# Patient Record
Sex: FEMALE | Race: BLACK OR AFRICAN AMERICAN | NOT HISPANIC OR LATINO | Employment: OTHER | URBAN - METROPOLITAN AREA
[De-identification: names, ages, dates, MRNs, and addresses within clinical notes are randomized per-mention and may not be internally consistent; named-entity substitution may affect disease eponyms.]

---

## 2017-01-27 DIAGNOSIS — E11.9 TYPE 2 DIABETES MELLITUS WITHOUT COMPLICATION: ICD-10-CM

## 2017-03-01 ENCOUNTER — OFFICE VISIT (OUTPATIENT)
Dept: FAMILY MEDICINE | Facility: CLINIC | Age: 77
End: 2017-03-01
Payer: MEDICARE

## 2017-03-01 VITALS
TEMPERATURE: 98 F | HEART RATE: 86 BPM | DIASTOLIC BLOOD PRESSURE: 82 MMHG | WEIGHT: 190.5 LBS | OXYGEN SATURATION: 99 % | HEIGHT: 64 IN | SYSTOLIC BLOOD PRESSURE: 144 MMHG | BODY MASS INDEX: 32.52 KG/M2

## 2017-03-01 DIAGNOSIS — E03.4 HYPOTHYROIDISM DUE TO ACQUIRED ATROPHY OF THYROID: ICD-10-CM

## 2017-03-01 DIAGNOSIS — E11.9 TYPE 2 DIABETES MELLITUS WITHOUT COMPLICATION, WITHOUT LONG-TERM CURRENT USE OF INSULIN: ICD-10-CM

## 2017-03-01 DIAGNOSIS — Z78.0 ASYMPTOMATIC POSTMENOPAUSAL STATUS (AGE-RELATED) (NATURAL): ICD-10-CM

## 2017-03-01 DIAGNOSIS — H61.23 BILATERAL IMPACTED CERUMEN: ICD-10-CM

## 2017-03-01 DIAGNOSIS — H91.90 HEARING LOSS, UNSPECIFIED HEARING LOSS TYPE, UNSPECIFIED LATERALITY: Primary | ICD-10-CM

## 2017-03-01 DIAGNOSIS — E78.5 HYPERLIPIDEMIA, UNSPECIFIED HYPERLIPIDEMIA TYPE: ICD-10-CM

## 2017-03-01 DIAGNOSIS — I10 ESSENTIAL HYPERTENSION: ICD-10-CM

## 2017-03-01 PROCEDURE — 69210 REMOVE IMPACTED EAR WAX UNI: CPT | Mod: S$GLB,,, | Performed by: FAMILY MEDICINE

## 2017-03-01 PROCEDURE — 99214 OFFICE O/P EST MOD 30 MIN: CPT | Mod: 25,S$GLB,, | Performed by: FAMILY MEDICINE

## 2017-03-01 RX ORDER — IRBESARTAN 300 MG/1
300 TABLET ORAL DAILY
Qty: 90 TABLET | Refills: 3 | Status: SHIPPED | OUTPATIENT
Start: 2017-03-01 | End: 2018-06-04 | Stop reason: SDUPTHER

## 2017-03-01 RX ORDER — AMLODIPINE BESYLATE 10 MG/1
10 TABLET ORAL DAILY
Qty: 90 TABLET | Refills: 3 | Status: SHIPPED | OUTPATIENT
Start: 2017-03-01 | End: 2017-07-27

## 2017-03-01 RX ORDER — METFORMIN HYDROCHLORIDE 500 MG/1
500 TABLET ORAL
Qty: 90 TABLET | Refills: 1 | Status: SHIPPED | OUTPATIENT
Start: 2017-03-01 | End: 2017-06-23

## 2017-03-01 NOTE — MR AVS SNAPSHOT
[facility]  [address] [address]  Phone: [phone]  Fax: [phone]                  CLAUDETTE Soto   3/1/2017 9:20 AM   Office Visit    Description:  Female : 1940   Provider:  Rudy Cruz MD   Department:  [facility]           Reason for Visit     Follow-up     Otalgia           Diagnoses this Visit        Comments    Hearing loss, unspecified hearing loss type, unspecified laterality    -  Primary     Essential hypertension         Type 2 diabetes mellitus without complication, without long-term current use of insulin         Hyperlipidemia, unspecified hyperlipidemia type         Hypothyroidism due to acquired atrophy of thyroid         Asymptomatic postmenopausal status (age-related) (natural)         Bilateral impacted cerumen                To Do List           Goals (5 Years of Data)     None      Follow-Up and Disposition     Return in about 6 months (around 2017).       These Medications        Disp Refills Start End    amlodipine (NORVASC) 10 MG tablet 90 tablet 3 3/1/2017     Take 1 tablet (10 mg total) by mouth once daily. - Oral    Pharmacy: [facility] [address] [place] [address] [address] [facility] Ph #: [phone]       irbesartan (AVAPRO) 300 MG tablet 90 tablet 3 3/1/2017     Take 1 tablet (300 mg total) by mouth once daily. - Oral    Pharmacy: [facility] [address] [place] [address] [address] [facility] Ph #: [phone]       metformin (GLUCOPHAGE) 500 MG tablet 90 tablet 1 3/1/2017     Take 1 tablet (500 mg total) by mouth daily with breakfast. - Oral    Pharmacy: [facility] [address] [place] [address] [address] [facility] Ph #: [phone]         [facility]     [facility] Care [facility] -  Assistance  Registered nurses in the [facility] provide clinical advisement, health  education, appointment booking, and other advisory services.  Call for this free service at [phone].             Medications           Message regarding Medications     Verify the changes and/or additions to your medication regime listed below are the same as discussed with your clinician today.  If any of these changes or additions are incorrect, please notify your healthcare provider.        STOP taking these medications     gabapentin (NEURONTIN) 300 MG capsule Take 1 capsule (300 mg total) by mouth 3 (three) times daily.           Verify that the below list of medications is an accurate representation of the medications you are currently taking.  If none reported, the list may be blank. If incorrect, please contact your healthcare provider. Carry this list with you in case of emergency.           Current Medications     amlodipine (NORVASC) 10 MG tablet Take 1 tablet (10 mg total) by mouth once daily.    aspirin (ECOTRIN) 81 MG EC tablet Take 81 mg by mouth once daily.    blood sugar diagnostic (BLOOD GLUCOSE TEST) Strp Please disp strips and lancets for true result meter  Pt testing BID  Disp 90 supply of each with 3 refills    blood sugar diagnostic Strp 1 each by Misc.(Non-Drug; Combo Route) route 2 (two) times daily before meals.    furosemide (LASIX) 20 MG tablet Take 1 tablet (20 mg total) by mouth 2 (two) times daily.    irbesartan (AVAPRO) 300 MG tablet Take 1 tablet (300 mg total) by mouth once daily.    metformin (GLUCOPHAGE) 500 MG tablet Take 1 tablet (500 mg total) by mouth daily with breakfast.    potassium chloride SA (K-DUR,KLOR-CON) 20 MEQ tablet Take 1 tablet (20 mEq total) by mouth once daily.           Clinical Reference Information           Your Vitals Were     BP                   144/82           Blood Pressure          Most Recent Value    BP  (!)  144/82      Allergies as of 3/1/2017     Shellfish Containing Products      Immunizations Administered on Date of Encounter - 3/1/2017      None      Orders Placed During Today's Visit     Future Labs/Procedures Expected by Expires    DXA Bone Density Spine And Hip_Axial Skeleton  3/1/2017 3/1/2018    Hemoglobin A1c  As directed 3/1/2018    Lipid panel  As directed 3/1/2018    TSH  As directed 3/1/2018      [facility] Sign-Up     Activating your [facility] account is as easy as 1-2-3!     1) Visit [email or web address], select Sign Up Now, enter this activation code and your date of birth, then select Next.  [email or web address]  Expires: 4/15/2017 10:38 AM      2) Create a username and password to use when you visit [facility] in the future and select a security question in case you lose your password and select Next.    3) Enter your e-mail address and click Sign Up!    Additional Information  If you have questions, please e-mail [email or web address] or call [phone] to talk to our [facility] staff. Remember, [facility] is NOT to be used for urgent needs. For medical emergencies, dial 911.         Language Assistance Services     ATTENTION: Language assistance services are available, free of charge. Please call [phone].      ATENCIÓN: Si shahriarla edith, tiene a rahman disposición servicios gratuitos de asistencia lingüística. Llame al [phone].     CHÚ Ý: N?u b?n nói Ti?ng Vi?t, có các d?ch v? h? tr? ngôn ng? mi?n phí dành cho b?n. G?i s? [phone].         [facility] complies with applicable Federal civil rights laws and does not discriminate on the basis of race, color, national origin, age, disability, or sex.

## 2017-03-01 NOTE — PROCEDURES
"Ear Cerumen Removal  Date/Time: 3/1/2017 10:40 AM  Performed by: THALIA SAL  Authorized by: THALIA SAL     Time out: Immediately prior to procedure a "time out" was called to verify the correct patient, procedure, equipment, support staff and site/side marked as required.    Consent Done?:  Yes (Verbal)    Local anesthetic:  None  Ceruminolytics applied: Ceruminolytics applied prior to the procedure    Medication Used:  Other  Location details:  Both ears  Procedure type: irrigation    Cerumen  Removal Results:  Cerumen completely removed  Patient tolerance:  Patient tolerated the procedure well with no immediate complications      "

## 2017-03-01 NOTE — PROGRESS NOTES
Subjective:      Patient ID: CLAUDETTE Soto is a 76 y.o. female.    Chief Complaint: Follow-up (check-up) and Otalgia (both ears)    HPI Comments: Check up and n eice Holger Kohler wants her ears flushed; Kenisha is her sister and Humphrey; due labs, eye exam, declines imm. History updated    Otalgia    Associated symptoms include hearing loss.     Review of Systems   Constitutional: Negative.    HENT: Positive for ear pain and hearing loss.    Respiratory: Negative.    Cardiovascular: Negative.    Gastrointestinal: Negative.    Endocrine: Negative.    Genitourinary: Negative.    Musculoskeletal: Negative.    Psychiatric/Behavioral: Negative.    All other systems reviewed and are negative.    Objective:     Physical Exam   Constitutional: She is oriented to person, place, and time. She appears well-developed and well-nourished.   HENT:   Head: Normocephalic.   Nose: Nose normal.   Mouth/Throat: Oropharynx is clear and moist.   Some wax both ears removed   Eyes: Conjunctivae and EOM are normal. Pupils are equal, round, and reactive to light.   Neck: Normal range of motion. Neck supple.   Cardiovascular: Normal rate, regular rhythm and normal heart sounds.    Pulses:       Dorsalis pedis pulses are 2+ on the right side, and 2+ on the left side.        Posterior tibial pulses are 2+ on the right side, and 2+ on the left side.   Pulmonary/Chest: Effort normal and breath sounds normal.   Abdominal: Soft. Bowel sounds are normal. She exhibits no distension and no mass. There is no tenderness. There is no guarding.   Musculoskeletal: Normal range of motion.        Right foot: There is normal range of motion and no deformity.        Left foot: There is normal range of motion.   Feet:   Right Foot:   Protective Sensation: 3 sites tested. 3 sites sensed.   Skin Integrity: Negative for ulcer, blister, skin breakdown, erythema, warmth, callus or dry skin.   Left Foot:   Protective Sensation: 3 sites tested. 3 sites sensed.    Skin Integrity: Negative for ulcer, blister, skin breakdown, erythema, warmth, callus or dry skin.   Neurological: She is alert and oriented to person, place, and time. She has normal reflexes.   Skin: Skin is warm and dry.   Psychiatric: She has a normal mood and affect. Her behavior is normal. Judgment and thought content normal.   Nursing note and vitals reviewed.    Assessment:     1. Hearing loss, unspecified hearing loss type, unspecified laterality    2. Essential hypertension    3. Type 2 diabetes mellitus without complication, without long-term current use of insulin    4. Hyperlipidemia, unspecified hyperlipidemia type    5. Hypothyroidism due to acquired atrophy of thyroid    6. Asymptomatic postmenopausal status (age-related) (natural)    7. Bilateral impacted cerumen      Plan:     New Prescriptions    No medications on file     Discontinued Medications    GABAPENTIN (NEURONTIN) 300 MG CAPSULE    Take 1 capsule (300 mg total) by mouth 3 (three) times daily.     Modified Medications    Modified Medication Previous Medication    AMLODIPINE (NORVASC) 10 MG TABLET amlodipine (NORVASC) 10 MG tablet       Take 1 tablet (10 mg total) by mouth once daily.    Take 1 tablet (10 mg total) by mouth once daily.    IRBESARTAN (AVAPRO) 300 MG TABLET irbesartan (AVAPRO) 300 MG tablet       Take 1 tablet (300 mg total) by mouth once daily.    Take 1 tablet (300 mg total) by mouth once daily.    METFORMIN (GLUCOPHAGE) 500 MG TABLET metformin (GLUCOPHAGE) 500 MG tablet       Take 1 tablet (500 mg total) by mouth daily with breakfast.    Take 1 tablet (500 mg total) by mouth daily with breakfast.       Hearing loss, unspecified hearing loss type, unspecified laterality  -     amlodipine (NORVASC) 10 MG tablet; Take 1 tablet (10 mg total) by mouth once daily.  Dispense: 90 tablet; Refill: 3  -     irbesartan (AVAPRO) 300 MG tablet; Take 1 tablet (300 mg total) by mouth once daily.  Dispense: 90 tablet; Refill: 3  -      metformin (GLUCOPHAGE) 500 MG tablet; Take 1 tablet (500 mg total) by mouth daily with breakfast.  Dispense: 90 tablet; Refill: 1  -     DXA Bone Density Spine And Hip_Axial Skeleton; Future; Expected date: 3/1/17  -     Cancel: Hemoglobin A1c; Future  -     Hemoglobin A1c; Future  -     Lipid panel; Future  -     TSH; Future    Essential hypertension  -     amlodipine (NORVASC) 10 MG tablet; Take 1 tablet (10 mg total) by mouth once daily.  Dispense: 90 tablet; Refill: 3  -     irbesartan (AVAPRO) 300 MG tablet; Take 1 tablet (300 mg total) by mouth once daily.  Dispense: 90 tablet; Refill: 3  -     metformin (GLUCOPHAGE) 500 MG tablet; Take 1 tablet (500 mg total) by mouth daily with breakfast.  Dispense: 90 tablet; Refill: 1  -     DXA Bone Density Spine And Hip_Axial Skeleton; Future; Expected date: 3/1/17  -     Cancel: Hemoglobin A1c; Future  -     Hemoglobin A1c; Future  -     Lipid panel; Future  -     TSH; Future    Type 2 diabetes mellitus without complication, without long-term current use of insulin  -     amlodipine (NORVASC) 10 MG tablet; Take 1 tablet (10 mg total) by mouth once daily.  Dispense: 90 tablet; Refill: 3  -     irbesartan (AVAPRO) 300 MG tablet; Take 1 tablet (300 mg total) by mouth once daily.  Dispense: 90 tablet; Refill: 3  -     metformin (GLUCOPHAGE) 500 MG tablet; Take 1 tablet (500 mg total) by mouth daily with breakfast.  Dispense: 90 tablet; Refill: 1  -     DXA Bone Density Spine And Hip_Axial Skeleton; Future; Expected date: 3/1/17  -     Cancel: Hemoglobin A1c; Future  -     Hemoglobin A1c; Future  -     Lipid panel; Future  -     TSH; Future    Hyperlipidemia, unspecified hyperlipidemia type  -     amlodipine (NORVASC) 10 MG tablet; Take 1 tablet (10 mg total) by mouth once daily.  Dispense: 90 tablet; Refill: 3  -     irbesartan (AVAPRO) 300 MG tablet; Take 1 tablet (300 mg total) by mouth once daily.  Dispense: 90 tablet; Refill: 3  -     metformin (GLUCOPHAGE) 500 MG  tablet; Take 1 tablet (500 mg total) by mouth daily with breakfast.  Dispense: 90 tablet; Refill: 1  -     DXA Bone Density Spine And Hip_Axial Skeleton; Future; Expected date: 3/1/17  -     Cancel: Hemoglobin A1c; Future  -     Hemoglobin A1c; Future  -     Lipid panel; Future  -     TSH; Future    Hypothyroidism due to acquired atrophy of thyroid  -     amlodipine (NORVASC) 10 MG tablet; Take 1 tablet (10 mg total) by mouth once daily.  Dispense: 90 tablet; Refill: 3  -     irbesartan (AVAPRO) 300 MG tablet; Take 1 tablet (300 mg total) by mouth once daily.  Dispense: 90 tablet; Refill: 3  -     metformin (GLUCOPHAGE) 500 MG tablet; Take 1 tablet (500 mg total) by mouth daily with breakfast.  Dispense: 90 tablet; Refill: 1  -     DXA Bone Density Spine And Hip_Axial Skeleton; Future; Expected date: 3/1/17  -     Cancel: Hemoglobin A1c; Future  -     Hemoglobin A1c; Future  -     Lipid panel; Future  -     TSH; Future    Asymptomatic postmenopausal status (age-related) (natural)  -     amlodipine (NORVASC) 10 MG tablet; Take 1 tablet (10 mg total) by mouth once daily.  Dispense: 90 tablet; Refill: 3  -     irbesartan (AVAPRO) 300 MG tablet; Take 1 tablet (300 mg total) by mouth once daily.  Dispense: 90 tablet; Refill: 3  -     metformin (GLUCOPHAGE) 500 MG tablet; Take 1 tablet (500 mg total) by mouth daily with breakfast.  Dispense: 90 tablet; Refill: 1  -     DXA Bone Density Spine And Hip_Axial Skeleton; Future; Expected date: 3/1/17  -     Cancel: Hemoglobin A1c; Future  -     Hemoglobin A1c; Future  -     Lipid panel; Future  -     TSH; Future    Bilateral impacted cerumen  -     EAR CERUMEN REMOVAL      Go get hearing aides; come back for bp check

## 2017-03-02 ENCOUNTER — TELEPHONE (OUTPATIENT)
Dept: FAMILY MEDICINE | Facility: CLINIC | Age: 77
End: 2017-03-02

## 2017-03-02 LAB
CHOLEST SERPL-MCNC: 227 MG/DL (ref 125–200)
CHOLEST/HDLC SERPL: 5.8 (CALC)
HBA1C MFR BLD: 5.6 % OF TOTAL HGB
HDLC SERPL-MCNC: 39 MG/DL
LDLC SERPL CALC-MCNC: 156 MG/DL (CALC)
NONHDLC SERPL-MCNC: 188 MG/DL (CALC)
TRIGL SERPL-MCNC: 159 MG/DL

## 2017-03-02 RX ORDER — ATORVASTATIN CALCIUM 10 MG/1
10 TABLET, FILM COATED ORAL DAILY
Qty: 90 TABLET | Refills: 3 | Status: SHIPPED | OUTPATIENT
Start: 2017-03-02 | End: 2017-07-27

## 2017-03-02 NOTE — TELEPHONE ENCOUNTER
----- Message from Rudy Cruz MD sent at 3/2/2017  6:59 AM CST -----  Cholesterol too high; starting atrovastatin 10 mg; repeat labs 3 months, lipid

## 2017-03-02 NOTE — LETTER
March 6, 2017    CLAUDETTE Soto   [address]  [facility] [address]             [place] [id number] [address] [address]  Phone: [phone]  Fax: [phone] Dear Mrs. Soto:    We have been trying to contact you in reference to lab results.  Please give us a  call to discuss.        Sincerely,        Justina Don LPN

## 2017-03-14 ENCOUNTER — TELEPHONE (OUTPATIENT)
Dept: FAMILY MEDICINE | Facility: CLINIC | Age: 77
End: 2017-03-14

## 2017-03-14 NOTE — TELEPHONE ENCOUNTER
----- Message from Emilie Khoury sent at 3/14/2017  1:15 PM CDT -----  Contact: Holger @ [phone]  Please call with lab results

## 2017-03-14 NOTE — TELEPHONE ENCOUNTER
Spoke with patient's niece, Holger, regarding patient's lab work results. Patient already started on the Atorvastatin. Will repeat in 3 months.

## 2017-04-04 ENCOUNTER — HOSPITAL ENCOUNTER (OUTPATIENT)
Dept: RADIOLOGY | Facility: HOSPITAL | Age: 77
Discharge: HOME | End: 2017-04-04
Attending: FAMILY MEDICINE
Payer: MEDICARE

## 2017-04-04 DIAGNOSIS — I10 ESSENTIAL HYPERTENSION: ICD-10-CM

## 2017-04-04 DIAGNOSIS — E03.4 HYPOTHYROIDISM DUE TO ACQUIRED ATROPHY OF THYROID: ICD-10-CM

## 2017-04-04 DIAGNOSIS — E78.5 HYPERLIPIDEMIA, UNSPECIFIED HYPERLIPIDEMIA TYPE: ICD-10-CM

## 2017-04-04 DIAGNOSIS — H91.90 HEARING LOSS, UNSPECIFIED HEARING LOSS TYPE, UNSPECIFIED LATERALITY: ICD-10-CM

## 2017-04-04 DIAGNOSIS — Z78.0 ASYMPTOMATIC POSTMENOPAUSAL STATUS (AGE-RELATED) (NATURAL): ICD-10-CM

## 2017-04-04 DIAGNOSIS — E11.9 TYPE 2 DIABETES MELLITUS WITHOUT COMPLICATION, WITHOUT LONG-TERM CURRENT USE OF INSULIN: ICD-10-CM

## 2017-04-04 PROCEDURE — 77080 DXA BONE DENSITY AXIAL: CPT | Mod: TC,PO

## 2017-06-01 DIAGNOSIS — I10 ESSENTIAL HYPERTENSION: Primary | ICD-10-CM

## 2017-06-01 DIAGNOSIS — E11.9 TYPE 2 DIABETES MELLITUS WITHOUT COMPLICATION, WITHOUT LONG-TERM CURRENT USE OF INSULIN: ICD-10-CM

## 2017-06-01 DIAGNOSIS — E78.5 HYPERLIPIDEMIA, UNSPECIFIED HYPERLIPIDEMIA TYPE: ICD-10-CM

## 2017-06-01 DIAGNOSIS — E03.4 HYPOTHYROIDISM DUE TO ACQUIRED ATROPHY OF THYROID: ICD-10-CM

## 2017-06-07 ENCOUNTER — TELEPHONE (OUTPATIENT)
Dept: FAMILY MEDICINE | Facility: CLINIC | Age: 77
End: 2017-06-07

## 2017-06-07 LAB
CHOLEST SERPL-MCNC: 175 MG/DL (ref 125–200)
CHOLEST/HDLC SERPL: 4.2 (CALC)
HBA1C MFR BLD: 5.6 % OF TOTAL HGB
HDLC SERPL-MCNC: 42 MG/DL
LDLC SERPL CALC-MCNC: 114 MG/DL (CALC)
NONHDLC SERPL-MCNC: 133 MG/DL (CALC)
TRIGL SERPL-MCNC: 97 MG/DL
TSH SERPL-ACNC: 3.07 MIU/L (ref 0.4–4.5)

## 2017-06-23 ENCOUNTER — HOSPITAL ENCOUNTER (OUTPATIENT)
Dept: RADIOLOGY | Facility: HOSPITAL | Age: 77
Discharge: HOME | End: 2017-06-23
Attending: FAMILY MEDICINE
Payer: MEDICARE

## 2017-06-23 ENCOUNTER — OFFICE VISIT (OUTPATIENT)
Dept: FAMILY MEDICINE | Facility: CLINIC | Age: 77
End: 2017-06-23
Payer: MEDICARE

## 2017-06-23 VITALS
RESPIRATION RATE: 18 BRPM | HEIGHT: 64 IN | DIASTOLIC BLOOD PRESSURE: 78 MMHG | WEIGHT: 195.75 LBS | BODY MASS INDEX: 33.42 KG/M2 | OXYGEN SATURATION: 100 % | SYSTOLIC BLOOD PRESSURE: 138 MMHG | HEART RATE: 84 BPM

## 2017-06-23 DIAGNOSIS — R60.9 EDEMA, UNSPECIFIED TYPE: ICD-10-CM

## 2017-06-23 DIAGNOSIS — H91.90 HEARING LOSS, UNSPECIFIED HEARING LOSS TYPE, UNSPECIFIED LATERALITY: ICD-10-CM

## 2017-06-23 DIAGNOSIS — M19.90 ARTHRITIS: ICD-10-CM

## 2017-06-23 DIAGNOSIS — M19.90 ARTHRITIS: Primary | ICD-10-CM

## 2017-06-23 DIAGNOSIS — I10 ESSENTIAL HYPERTENSION: ICD-10-CM

## 2017-06-23 DIAGNOSIS — E78.5 HYPERLIPIDEMIA, UNSPECIFIED HYPERLIPIDEMIA TYPE: ICD-10-CM

## 2017-06-23 PROBLEM — E03.4 HYPOTHYROIDISM DUE TO ACQUIRED ATROPHY OF THYROID: Status: RESOLVED | Noted: 2017-03-01 | Resolved: 2017-06-23

## 2017-06-23 PROBLEM — Z78.0 ASYMPTOMATIC POSTMENOPAUSAL STATUS (AGE-RELATED) (NATURAL): Status: RESOLVED | Noted: 2017-03-01 | Resolved: 2017-06-23

## 2017-06-23 PROBLEM — E11.9 TYPE 2 DIABETES MELLITUS WITHOUT COMPLICATION, WITHOUT LONG-TERM CURRENT USE OF INSULIN: Status: RESOLVED | Noted: 2017-03-01 | Resolved: 2017-06-23

## 2017-06-23 PROCEDURE — 73562 X-RAY EXAM OF KNEE 3: CPT | Mod: TC,PO,RT

## 2017-06-23 PROCEDURE — 99214 OFFICE O/P EST MOD 30 MIN: CPT | Mod: S$GLB,,, | Performed by: FAMILY MEDICINE

## 2017-06-23 PROCEDURE — 1159F MED LIST DOCD IN RCRD: CPT | Mod: S$GLB,,, | Performed by: FAMILY MEDICINE

## 2017-06-23 PROCEDURE — 1125F AMNT PAIN NOTED PAIN PRSNT: CPT | Mod: S$GLB,,, | Performed by: FAMILY MEDICINE

## 2017-06-23 RX ORDER — TRAMADOL HYDROCHLORIDE 50 MG/1
50 TABLET ORAL 2 TIMES DAILY PRN
Qty: 30 TABLET | Refills: 1 | Status: SHIPPED | OUTPATIENT
Start: 2017-06-23 | End: 2017-07-23

## 2017-06-23 NOTE — PROGRESS NOTES
Subjective:      Patient ID: CLAUDETTE Soto is a 77 y.o. female.    Chief Complaint: Knee Pain (bilateral knee pain , started 1 month ago)    Check up and right knee hurts for 6 weeks since being in [facility] [employer] [facility]; no falls; Occ left knee; hurts to stand up and walk; not at rest; took advil, a little help; no hx surgery; legs are always swollen; swelling runs in the family, Kenisha and this pts mother, Nubia Mera; she made it to 79, despite having 13 children; A1C normal; on one metformin a day from Dr Laguna; has chronoic edema of both legs, on norvasc 10;       Knee Pain        Review of Systems   Constitutional: Negative.    HENT: Negative.    Respiratory: Negative.    Cardiovascular: Positive for leg swelling.   Gastrointestinal: Negative.    Endocrine: Negative.    Genitourinary: Negative.    Musculoskeletal: Positive for arthralgias.   Psychiatric/Behavioral: Negative.    All other systems reviewed and are negative.    Objective:     Physical Exam   Constitutional: She is oriented to person, place, and time. She appears well-developed and well-nourished.   HENT:   Head: Normocephalic.   Eyes: Conjunctivae and EOM are normal. Pupils are equal, round, and reactive to light.   Neck: Normal range of motion. Neck supple.   Cardiovascular: Normal rate, regular rhythm and normal heart sounds.    Pulmonary/Chest: Effort normal and breath sounds normal.   Musculoskeletal: Normal range of motion. She exhibits edema and tenderness.   Neurological: She is alert and oriented to person, place, and time. She has normal reflexes.   Skin: Skin is warm and dry.   Psychiatric: She has a normal mood and affect. Her behavior is normal. Judgment and thought content normal.   Nursing note and vitals reviewed.    Assessment:     1. Arthritis    2. Essential hypertension    3. Hearing loss, unspecified hearing loss type, unspecified laterality    4. Hyperlipidemia, unspecified hyperlipidemia type    5. Edema, unspecified type       Plan:     New Prescriptions    No medications on file     Discontinued Medications    METFORMIN (GLUCOPHAGE) 500 MG TABLET    Take 1 tablet (500 mg total) by mouth daily with breakfast.     Modified Medications    No medications on file       Arthritis  Comments:  right knee  Orders:  -     X-Ray Knee 3 View Right; Future; Expected date: 06/23/2017    Essential hypertension    Hearing loss, unspecified hearing loss type, unspecified laterality    Hyperlipidemia, unspecified hyperlipidemia type    Edema, unspecified type      Poss gout causing sudden right knee pain, swelling, on lasix    Handicapped form for parking given

## 2017-06-26 ENCOUNTER — TELEPHONE (OUTPATIENT)
Dept: FAMILY MEDICINE | Facility: CLINIC | Age: 77
End: 2017-06-26

## 2017-06-26 NOTE — LETTER
June 26, 2017    CLAUDETTE Soto  [address]  [address] [facility] [address]             [facility]  [address] [address]  Phone: [phone]  Fax: [phone] Dear Mrs. Soto:    Below are the results from your recent visit:    Resulted Orders   Hemoglobin A1c   Result Value Ref Range    Hemoglobin A1C 5.6 4.0 - 5.6 %      Comment:      According to ADA guidelines, hemoglobin A1c <7.0% represents  optimal control in non-pregnant diabetic patients. Different  metrics may apply to specific patient populations.   Standards of Medical Care in Diabetes-2016.  For the purpose of screening for the presence of diabetes:  <5.7%     Consistent with the absence of diabetes  5.7-6.4%  Consistent with increasing risk for diabetes   (prediabetes)  >or=6.5%  Consistent with diabetes  Currently, no consensus exists for use of hemoglobin A1c  for diagnosis of diabetes for children.  This Hemoglobin A1c assay has significant interference with fetal   hemoglobin   (HbF). The results are invalid for patients with abnormal amounts of   HbF,   including those with known Hereditary Persistence   of Fetal Hemoglobin. Heterozygous hemoglobin variants (HbAS, HbAC,   HbAD, HbAE, HbA2) do not significantly interfere with this assay;   however, presence of multiple variants in a sample may impact the %   interference.      Estimated Avg Glucose 114 68 - 131 mg/dL    Narrative    [facility] Bulk Order     Your results are normal   Sincerely,  Dr. Rudy Ramirez

## 2017-06-26 NOTE — TELEPHONE ENCOUNTER
----- Message from Rudy Cruz MD sent at 6/24/2017  6:09 PM CDT -----  Arthritis with fluid in her knee

## 2017-06-26 NOTE — LETTER
June 26, 2017    CLAUDETTE Soto   [address]  [address] [facility] [address]             [facility]  [address] [address]  Phone: [phone]  Fax: [phone] Dear Mrs. Soto:    Below are the results from your recent visit:    Resulted Orders   X-Ray Knee 3 View Right    Narrative    Three-view x-ray of the right knee    Clinical History:     Unspecified osteoarthritis, unspecified site    Findings:     There is no fracture. There is no dislocation. There is mild narrowing of the joint space of the medial compartment of the right knee. There is a moderate-sized joint effusion in the right knee.    Impression    1. There is a moderate-sized joint effusion in the right knee.  2. There is mild narrowing of the joint space of the medial compartment of the right knee. This is consistent with the patient's history and characteristic of osteoarthritis.      Electronically signed by: LAURA AKHTAR MD  Date:     06/23/17  Time:    13:15      Your results show Arthritis with fluid in right knee.    Sincerely,    Dr. Rudy Cruz MD

## 2017-06-26 NOTE — TELEPHONE ENCOUNTER
Called to advise of x-ray results. No answer patient doesn't have answering . Results mailed to patient

## 2017-07-17 ENCOUNTER — TELEPHONE (OUTPATIENT)
Dept: FAMILY MEDICINE | Facility: CLINIC | Age: 77
End: 2017-07-17

## 2017-07-17 NOTE — TELEPHONE ENCOUNTER
----- Message from Emilie Khoury sent at 7/17/2017  3:07 PM CDT -----  Contact: Mara -friend [phone]  Patient stopped by office & is requesting steroid daina & Allopurinol for gout. Being told that this will reduce muratic acid. Would like to  later this afternoon. Please call to advise once done    Lian

## 2017-07-18 NOTE — TELEPHONE ENCOUNTER
Where are all the labs I ordered on previous visit; I spoke to someone with pt; knee no better; all I have is an A1C; get me  Other labs and show them/fax to me so I can call her back ASAP.

## 2017-07-19 LAB
ALBUMIN: 3.5 GRAM/DL (ref 3.5–5)
ALP SERPL-CCNC: 114 UNIT/L (ref 38–126)
ALT SERPL W P-5'-P-CCNC: <10 UNIT/L (ref 7–56)
ANION GAP SERPL CALC-SCNC: 17 MEQ/L (ref 9–18)
AST SERPL-CCNC: 14 UNIT/L (ref 7–40)
BILIRUB SERPL-MCNC: 0.5 MG/DL (ref 0–1.2)
BUN BLD-MCNC: 20 MG/DL (ref 7–21)
BUN/CREAT SERPL: 22 RATIO (ref 6–22)
CALC OSMOLALITY: 285 MOSM/KG (ref 275–295)
CALCIUM SERPL-MCNC: 8.8 MG/DL (ref 8.5–10.3)
CHLORIDE SERPL-SCNC: 101 MEQ/L (ref 98–107)
CO2 SERPL-SCNC: 26 MEQ/L (ref 21–31)
CREAT SERPL-MCNC: 0.9 MG/DL (ref 0.5–1)
ERYTHROCYTE [SEDIMENTATION RATE] IN BLOOD BY WESTERGREN METHOD: 74 MM/H
GFR: 59.8 ML/MIN/1.73M2
GLUCOSE SERPL-MCNC: 144 MG/DL (ref 70–100)
POTASSIUM SERPL-SCNC: 4.1 MEQ/L (ref 3.5–5)
RHEUMATOID FACT SERPL-ACNC: 7 IU/ML
SODIUM BLD-SCNC: 140 MEQ/L (ref 135–145)
TOTAL PROTEIN: 7.5 GRAM/DL (ref 6.3–8.2)
URATE SERPL-MCNC: 6.9 MG/DL (ref 2.5–7.5)

## 2017-07-20 ENCOUNTER — TELEPHONE (OUTPATIENT)
Dept: FAMILY MEDICINE | Facility: CLINIC | Age: 77
End: 2017-07-20

## 2017-07-20 RX ORDER — METHYLPREDNISOLONE 4 MG/1
TABLET ORAL
Qty: 1 PACKAGE | Refills: 0 | Status: SHIPPED | OUTPATIENT
Start: 2017-07-20 | End: 2017-07-27

## 2017-07-27 ENCOUNTER — OFFICE VISIT (OUTPATIENT)
Dept: FAMILY MEDICINE | Facility: CLINIC | Age: 77
End: 2017-07-27
Payer: MEDICARE

## 2017-07-27 VITALS
HEIGHT: 64 IN | HEART RATE: 74 BPM | WEIGHT: 192.44 LBS | SYSTOLIC BLOOD PRESSURE: 136 MMHG | BODY MASS INDEX: 32.85 KG/M2 | DIASTOLIC BLOOD PRESSURE: 84 MMHG | TEMPERATURE: 98 F | OXYGEN SATURATION: 100 %

## 2017-07-27 DIAGNOSIS — H91.90 HEARING LOSS, UNSPECIFIED HEARING LOSS TYPE, UNSPECIFIED LATERALITY: ICD-10-CM

## 2017-07-27 DIAGNOSIS — I10 ESSENTIAL HYPERTENSION: ICD-10-CM

## 2017-07-27 DIAGNOSIS — M19.90 ARTHRITIS: Primary | ICD-10-CM

## 2017-07-27 DIAGNOSIS — E78.5 HYPERLIPIDEMIA, UNSPECIFIED HYPERLIPIDEMIA TYPE: ICD-10-CM

## 2017-07-27 DIAGNOSIS — Z78.0 ASYMPTOMATIC POSTMENOPAUSAL STATUS (AGE-RELATED) (NATURAL): ICD-10-CM

## 2017-07-27 DIAGNOSIS — E11.9 TYPE 2 DIABETES MELLITUS WITHOUT COMPLICATION, WITHOUT LONG-TERM CURRENT USE OF INSULIN: ICD-10-CM

## 2017-07-27 DIAGNOSIS — E03.4 HYPOTHYROIDISM DUE TO ACQUIRED ATROPHY OF THYROID: ICD-10-CM

## 2017-07-27 DIAGNOSIS — I73.9 PAD (PERIPHERAL ARTERY DISEASE): ICD-10-CM

## 2017-07-27 PROCEDURE — 1159F MED LIST DOCD IN RCRD: CPT | Mod: S$GLB,,, | Performed by: FAMILY MEDICINE

## 2017-07-27 PROCEDURE — 1125F AMNT PAIN NOTED PAIN PRSNT: CPT | Mod: S$GLB,,, | Performed by: FAMILY MEDICINE

## 2017-07-27 PROCEDURE — 99213 OFFICE O/P EST LOW 20 MIN: CPT | Mod: S$GLB,,, | Performed by: FAMILY MEDICINE

## 2017-07-27 RX ORDER — FUROSEMIDE 20 MG/1
20 TABLET ORAL DAILY
Qty: 30 TABLET | Refills: 3 | Status: SHIPPED | OUTPATIENT
Start: 2017-07-27 | End: 2017-09-01 | Stop reason: SDUPTHER

## 2017-07-27 RX ORDER — POTASSIUM CHLORIDE 20 MEQ/1
20 TABLET, EXTENDED RELEASE ORAL DAILY
Qty: 30 TABLET | Refills: 3 | Status: SHIPPED | OUTPATIENT
Start: 2017-07-27 | End: 2018-01-03 | Stop reason: SDUPTHER

## 2017-07-27 RX ORDER — IBUPROFEN 600 MG/1
600 TABLET ORAL 2 TIMES DAILY PRN
Qty: 60 TABLET | Refills: 11 | Status: SHIPPED | OUTPATIENT
Start: 2017-07-27 | End: 2018-03-09

## 2017-07-27 NOTE — PROGRESS NOTES
Subjective:      Patient ID: Katy Soto is a 77 y.o. female.    Chief Complaint: Follow-up (lab results)    Here with step daughter nurse; both legs hurt, ache 3 months; first time; no injuries; toenails fell of right foot and pain is severe from knee down, right worse than left; no back pain, no fever; swell too; was in sisters cold house, has DM, sees [place]; never smoked. Off statin, off dm rx; off norvasc and swelling got better; appears knees hurt and Bakers cyst, not dependent      Review of Systems   Constitutional: Negative.    HENT: Positive for hearing loss.    Respiratory: Negative.    Cardiovascular: Positive for leg swelling.   Gastrointestinal: Negative.    Endocrine: Negative.    Genitourinary: Negative.    Musculoskeletal: Positive for arthralgias.   Psychiatric/Behavioral: Negative.    All other systems reviewed and are negative.    Objective:     Physical Exam   Constitutional: She is oriented to person, place, and time. She appears well-developed and well-nourished.   HENT:   Head: Normocephalic.   Eyes: Conjunctivae and EOM are normal. Pupils are equal, round, and reactive to light.   Neck: Normal range of motion. Neck supple.   Cardiovascular: Normal rate, regular rhythm and normal heart sounds.    No palpable pulses   Pulmonary/Chest: Effort normal and breath sounds normal.   Musculoskeletal: Normal range of motion.   Neurological: She is alert and oriented to person, place, and time. She has normal reflexes.   Skin: Skin is warm and dry.   Psychiatric: She has a normal mood and affect. Her behavior is normal. Judgment and thought content normal.   Nursing note and vitals reviewed.    Assessment:     1. Arthritis    2. Hyperlipidemia, unspecified hyperlipidemia type    3. Type 2 diabetes mellitus without complication, without long-term current use of insulin    4. Essential hypertension    5. Hearing loss, unspecified hearing loss type, unspecified laterality    6. PAD (peripheral artery  disease)    7. Hypothyroidism due to acquired atrophy of thyroid    8. Asymptomatic postmenopausal status (age-related) (natural)      Plan:     New Prescriptions    IBUPROFEN (ADVIL,MOTRIN) 600 MG TABLET    Take 1 tablet (600 mg total) by mouth 2 (two) times daily as needed for Pain. With food as needed     Discontinued Medications    AMLODIPINE (NORVASC) 10 MG TABLET    Take 1 tablet (10 mg total) by mouth once daily.    ATORVASTATIN (LIPITOR) 10 MG TABLET    Take 1 tablet (10 mg total) by mouth once daily.    METHYLPREDNISOLONE (MEDROL DOSEPACK) 4 MG TABLET    use as directed     Modified Medications    Modified Medication Previous Medication    FUROSEMIDE (LASIX) 20 MG TABLET furosemide (LASIX) 20 MG tablet       Take 1 tablet (20 mg total) by mouth once daily. As leg swelling    Take 1 tablet (20 mg total) by mouth 2 (two) times daily.    POTASSIUM CHLORIDE SA (K-DUR,KLOR-CON) 20 MEQ TABLET potassium chloride SA (K-DUR,KLOR-CON) 20 MEQ tablet       Take 1 tablet (20 mEq total) by mouth once daily. With lasix as needed    Take 1 tablet (20 mEq total) by mouth once daily.       Arthritis  -     US Lower Extremity Arteries Bilateral; Future; Expected date: 07/27/2017    Hyperlipidemia, unspecified hyperlipidemia type  -     US Lower Extremity Arteries Bilateral; Future; Expected date: 07/27/2017    Type 2 diabetes mellitus without complication, without long-term current use of insulin  -     US Lower Extremity Arteries Bilateral; Future; Expected date: 07/27/2017    Essential hypertension  -     US Lower Extremity Arteries Bilateral; Future; Expected date: 07/27/2017    Hearing loss, unspecified hearing loss type, unspecified laterality  -     US Lower Extremity Arteries Bilateral; Future; Expected date: 07/27/2017    PAD (peripheral artery disease)  -     US Lower Extremity Arteries Bilateral; Future; Expected date: 07/27/2017    Hypothyroidism due to acquired atrophy of thyroid    Asymptomatic postmenopausal  status (age-related) (natural)    Other orders  -     ibuprofen (ADVIL,MOTRIN) 600 MG tablet; Take 1 tablet (600 mg total) by mouth 2 (two) times daily as needed for Pain. With food as needed  Dispense: 60 tablet; Refill: 11  -     furosemide (LASIX) 20 MG tablet; Take 1 tablet (20 mg total) by mouth once daily. As leg swelling  Dispense: 30 tablet; Refill: 3  -     potassium chloride SA (K-DUR,KLOR-CON) 20 MEQ tablet; Take 1 tablet (20 mEq total) by mouth once daily. With lasix as needed  Dispense: 30 tablet; Refill: 3

## 2017-07-28 ENCOUNTER — HOSPITAL ENCOUNTER (OUTPATIENT)
Dept: RADIOLOGY | Facility: HOSPITAL | Age: 77
Discharge: HOME | End: 2017-07-28
Attending: FAMILY MEDICINE
Payer: MEDICARE

## 2017-07-28 DIAGNOSIS — I10 ESSENTIAL HYPERTENSION: ICD-10-CM

## 2017-07-28 DIAGNOSIS — M19.90 ARTHRITIS: ICD-10-CM

## 2017-07-28 DIAGNOSIS — I73.9 PAD (PERIPHERAL ARTERY DISEASE): ICD-10-CM

## 2017-07-28 DIAGNOSIS — E78.5 HYPERLIPIDEMIA, UNSPECIFIED HYPERLIPIDEMIA TYPE: ICD-10-CM

## 2017-07-28 DIAGNOSIS — E11.9 TYPE 2 DIABETES MELLITUS WITHOUT COMPLICATION, WITHOUT LONG-TERM CURRENT USE OF INSULIN: ICD-10-CM

## 2017-07-28 DIAGNOSIS — H91.90 HEARING LOSS, UNSPECIFIED HEARING LOSS TYPE, UNSPECIFIED LATERALITY: ICD-10-CM

## 2017-07-28 PROCEDURE — 93925 LOWER EXTREMITY STUDY: CPT | Mod: TC,PO

## 2017-07-30 ENCOUNTER — TELEPHONE (OUTPATIENT)
Dept: FAMILY MEDICINE | Facility: CLINIC | Age: 77
End: 2017-07-30

## 2017-07-30 DIAGNOSIS — I73.9 PAD (PERIPHERAL ARTERY DISEASE): ICD-10-CM

## 2017-08-01 ENCOUNTER — TELEPHONE (OUTPATIENT)
Dept: FAMILY MEDICINE | Facility: CLINIC | Age: 77
End: 2017-08-01

## 2017-08-01 NOTE — TELEPHONE ENCOUNTER
----- Message from Rudy Cruz MD sent at 7/30/2017 10:11 PM CDT -----  Leg arteries have blockages; referring to cardiologist; help pt with appt

## 2017-08-01 NOTE — TELEPHONE ENCOUNTER
Patient's daughter advised of test results & appointment scheduled with cardiology. Daughter advised that she will contact Dr. Fried's office to seek a earlier date & time.

## 2017-08-04 ENCOUNTER — OFFICE VISIT (OUTPATIENT)
Dept: CARDIOLOGY | Facility: CLINIC | Age: 77
End: 2017-08-04
Payer: MEDICARE

## 2017-08-04 VITALS
OXYGEN SATURATION: 98 % | HEART RATE: 66 BPM | WEIGHT: 195.38 LBS | BODY MASS INDEX: 33.54 KG/M2 | DIASTOLIC BLOOD PRESSURE: 94 MMHG | SYSTOLIC BLOOD PRESSURE: 160 MMHG

## 2017-08-04 DIAGNOSIS — I10 ESSENTIAL HYPERTENSION: ICD-10-CM

## 2017-08-04 DIAGNOSIS — E78.5 HYPERLIPIDEMIA, UNSPECIFIED HYPERLIPIDEMIA TYPE: ICD-10-CM

## 2017-08-04 DIAGNOSIS — R60.0 BILATERAL LEG EDEMA: ICD-10-CM

## 2017-08-04 DIAGNOSIS — I73.9 PAD (PERIPHERAL ARTERY DISEASE): Primary | ICD-10-CM

## 2017-08-04 PROCEDURE — 99204 OFFICE O/P NEW MOD 45 MIN: CPT | Mod: S$GLB,,, | Performed by: INTERNAL MEDICINE

## 2017-08-04 PROCEDURE — 1159F MED LIST DOCD IN RCRD: CPT | Mod: S$GLB,,, | Performed by: INTERNAL MEDICINE

## 2017-08-04 RX ORDER — CILOSTAZOL 50 MG/1
50 TABLET ORAL 2 TIMES DAILY
Qty: 60 TABLET | Refills: 11 | Status: SHIPPED | OUTPATIENT
Start: 2017-08-04 | End: 2017-09-01

## 2017-08-04 RX ORDER — ATORVASTATIN CALCIUM 40 MG/1
40 TABLET, FILM COATED ORAL DAILY
Qty: 90 TABLET | Refills: 3 | Status: SHIPPED | OUTPATIENT
Start: 2017-08-04 | End: 2018-12-20 | Stop reason: SDUPTHER

## 2017-08-04 NOTE — LETTER
August 4, 2017      Rudy Cruz MD  [address] [facility] [address]           [facility]  [address]  [facility] [address]  Phone: [phone]  Fax: [phone]          Patient: Katy Soto   MR Number: [id number]   YOB: 1940   Date of Visit: 8/4/2017       Dear Dr. Rudy Cruz:    Thank you for referring Katy Soto to me for evaluation. Attached you will find relevant portions of my assessment and plan of care.    If you have questions, please do not hesitate to call me. I look forward to following Katy Soto along with you.    Sincerely,    David Fried MD    Enclosure  CC:  No Recipients    If you would like to receive this communication electronically, please contact [email or web address] or [phone] to request more information on [facility] access.    For providers and/or their staff who would like to refer a patient to [facility], please contact us through our one-stop-shop provider referral line, [facility], at [phone].    If you feel you have received this communication in error or would no longer like to receive these types of communications, please e-mail [email or web address]

## 2017-08-04 NOTE — PROGRESS NOTES
Subjective:    Patient ID:  Katy Soto is a 77 y.o. female who presents for evaluation of Leg Swelling      HPI  76 y/o female with hx of HTN, HLD who presents for evaluation. She has been having bilateral LE symptoms for about 3 months now. She has exertional bilateral calf cramping less than a block which resolves with rest. No non healing ulceration or signs of limb ischemia. RLE worse that LLE. Had arterial doppler which suggests moderate PAD in both lower extremities. Has stopped taking ASA and no longer on a statin. She also has bilateral LE edema R>L. Legs are painful to touch in the shin and ankle area. No lesions or ulceration. She admits to dietary indiscretion with high salt intake. BP is also high today in clinic and she does not take BP at home. Does not exercise regularly and does not smoke.     Review of Systems   Constitution: Negative for weakness and malaise/fatigue.   HENT: Negative for congestion and headaches.    Eyes: Negative for blurred vision.   Cardiovascular: Positive for claudication, dyspnea on exertion and leg swelling. Negative for chest pain, cyanosis, irregular heartbeat, near-syncope, orthopnea, palpitations, paroxysmal nocturnal dyspnea and syncope.   Respiratory: Negative for shortness of breath.    Endocrine: Negative for polyuria.   Hematologic/Lymphatic: Negative for bleeding problem.   Skin: Negative for itching and rash.   Musculoskeletal: Positive for joint pain and joint swelling. Negative for muscle cramps and muscle weakness.   Gastrointestinal: Negative for abdominal pain, hematemesis, hematochezia, melena, nausea and vomiting.   Genitourinary: Negative for dysuria and hematuria.   Neurological: Negative for dizziness, focal weakness, light-headedness and loss of balance.   Psychiatric/Behavioral: Negative for depression. The patient is not nervous/anxious.         Objective:    Physical Exam   Constitutional: She is oriented to person, place, and time. She appears  well-developed and well-nourished.   HENT:   Head: Normocephalic and atraumatic.   Neck: Neck supple. No JVD present.   Cardiovascular: Normal rate, regular rhythm and normal heart sounds.    Pulses:       Carotid pulses are 2+ on the right side, and 2+ on the left side.       Radial pulses are 2+ on the right side, and 2+ on the left side.        Femoral pulses are 2+ on the right side, and 2+ on the left side.       Dorsalis pedis pulses are 1+ on the left side.        Posterior tibial pulses are 1+ on the right side, and 2+ on the left side.   Monophasic doppler RDP  Biphasic doppler RPT  Triphasic doppler LDP and LPT   Pulmonary/Chest: Effort normal and breath sounds normal.   Abdominal: Soft. Bowel sounds are normal.   Musculoskeletal: She exhibits edema.   Neurological: She is alert and oriented to person, place, and time.   Skin: Skin is warm and dry.   Psychiatric: She has a normal mood and affect. Her behavior is normal. Thought content normal.         Assessment:       1. PAD (peripheral artery disease)    2. Bilateral leg edema    3. Hyperlipidemia, unspecified hyperlipidemia type    4. Essential hypertension      78 y/o female with hx and presentation as above. Arterial doppler suggests PAD and clinically has Balta IIb claudication (no rest pain). We discussed the etiology, evaluation, and management of PAD. Will start with optimizing medical management. ASA/statin daily. Cilostazol BID for claudication. If after a month symptoms persist will schedule for peripheral angiogram +/- intervention. Regarding the edema, likely venous insufficiency. Will evaluate with venous doppler and to r/o DVT. Will increase lasix to BID for 1 week, then back to once daily. Recommend compression stockings and elevation and night. We discussed the importance of low salt diet and medication compliance.          Plan:       -Restart ASA 81 mg daily  -Continue current prescription of atorvastatin for now and will increase to  moderate dose (40 mg) daily  -Start cilostazol 50 mg BID for claudication  -Venous doppler bilateral lower extremities  -BP log daily   -f/u in 1 month

## 2017-08-04 NOTE — PATIENT INSTRUCTIONS
Increase lasix (fluid pill) to twice a day for 1 week, then back to once daily  Start cilostazol twice daily for leg pain - new medication  Compression stockings - 20-30 mmHg  Low salt diet  Follow up in 1 month

## 2017-08-14 ENCOUNTER — HOSPITAL ENCOUNTER (OUTPATIENT)
Dept: RADIOLOGY | Facility: HOSPITAL | Age: 77
Discharge: HOME | End: 2017-08-14
Attending: INTERNAL MEDICINE
Payer: MEDICARE

## 2017-08-14 DIAGNOSIS — R60.0 BILATERAL LEG EDEMA: ICD-10-CM

## 2017-08-14 PROCEDURE — 93970 EXTREMITY STUDY: CPT | Mod: TC,PO

## 2017-08-21 ENCOUNTER — TELEPHONE (OUTPATIENT)
Dept: CARDIOLOGY | Facility: CLINIC | Age: 77
End: 2017-08-21

## 2017-08-21 NOTE — TELEPHONE ENCOUNTER
----- Message from Abbie Guerrier sent at 8/18/2017  1:47 PM CDT -----  Contact: Daughter/ [phone]  Patient's daughter would like to speak with you about getting patient's test results. Please advise.

## 2017-08-29 ENCOUNTER — TELEPHONE (OUTPATIENT)
Dept: CARDIOLOGY | Facility: CLINIC | Age: 77
End: 2017-08-29

## 2017-08-29 NOTE — TELEPHONE ENCOUNTER
----- Message from David Fried MD sent at 8/28/2017  1:39 PM CDT -----  Please let Ms Soto know that her leg ultrasound showed no evidence of clots and will further discuss during next clinic visit  Thanks  MATTHEW

## 2017-09-01 ENCOUNTER — OFFICE VISIT (OUTPATIENT)
Dept: CARDIOLOGY | Facility: CLINIC | Age: 77
End: 2017-09-01
Payer: MEDICARE

## 2017-09-01 DIAGNOSIS — I10 ESSENTIAL HYPERTENSION: ICD-10-CM

## 2017-09-01 DIAGNOSIS — E78.5 HYPERLIPIDEMIA, UNSPECIFIED HYPERLIPIDEMIA TYPE: ICD-10-CM

## 2017-09-01 DIAGNOSIS — I73.9 CLAUDICATION OF BOTH LOWER EXTREMITIES: ICD-10-CM

## 2017-09-01 DIAGNOSIS — I73.9 PAD (PERIPHERAL ARTERY DISEASE): Primary | ICD-10-CM

## 2017-09-01 PROCEDURE — 99214 OFFICE O/P EST MOD 30 MIN: CPT | Mod: S$GLB,,, | Performed by: INTERNAL MEDICINE

## 2017-09-01 PROCEDURE — 1159F MED LIST DOCD IN RCRD: CPT | Mod: S$GLB,,, | Performed by: INTERNAL MEDICINE

## 2017-09-01 RX ORDER — FUROSEMIDE 40 MG/1
40 TABLET ORAL DAILY
Qty: 30 TABLET | Refills: 11 | Status: SHIPPED | OUTPATIENT
Start: 2017-09-01 | End: 2019-01-29 | Stop reason: SDUPTHER

## 2017-09-01 RX ORDER — DILTIAZEM HYDROCHLORIDE 120 MG/1
120 CAPSULE, EXTENDED RELEASE ORAL DAILY
Qty: 30 CAPSULE | Refills: 11 | Status: SHIPPED | OUTPATIENT
Start: 2017-09-01 | End: 2017-12-14

## 2017-09-01 NOTE — PATIENT INSTRUCTIONS
Discontinue cilostazol (circulation pill)  Restart Aspirin 81 mg daily  Start Diltiazem 120 mg daily (new blood pressure pill)  Increase furosemide to 40 mg daily (fluid pill)  Procedure scheduled for September 11th  Low salt diet  Compression stockings

## 2017-09-01 NOTE — PROGRESS NOTES
Subjective:    Patient ID:  Katy Soto is a 77 y.o. female who presents for follow-up of Peripheral Arterial Disease      HPI  78 y/o female with hx of recently diagnosed PAD with severe Balta IIb lifestyle limiting claudication, HTN, HLD who presents for f/u with her daughter. She has been having bilateral LE symptoms for about 3 months now. She has exertional bilateral calf cramping less than a block which resolves with rest. No non healing ulceration or signs of limb ischemia. RLE worse that LLE. Had arterial doppler which suggests moderate PAD in both lower extremities. She also has bilateral LE edema R>L. Legs are painful to touch in the shin and ankle area. No lesions or ulceration.   Last clinic visit was restarted on statin, ASA and prescribed cilostazol. Had nosebleeds and stopped ASA. She continues to admit to dietary indiscretion with high salt intake. BP again is high today in clinic and last clinic visit she has not been taking BP at home. Does not exercise regularly and does not smoke. Arterial doppler with waveforms suggestive of possibly RCFA stenosis, distal LSFA stenosis and likely bilateral mid to distal AT disease. Venous doppler without evidence of DVT.     Review of Systems   Constitution: Positive for weakness. Negative for malaise/fatigue.   HENT: Negative for congestion.    Eyes: Negative for blurred vision.   Cardiovascular: Positive for claudication, dyspnea on exertion and leg swelling. Negative for chest pain, cyanosis, irregular heartbeat, near-syncope, orthopnea, palpitations, paroxysmal nocturnal dyspnea and syncope.   Respiratory: Negative for shortness of breath.    Endocrine: Negative for polyuria.   Hematologic/Lymphatic: Negative for bleeding problem.   Skin: Negative for itching and rash.   Musculoskeletal: Positive for joint pain and joint swelling. Negative for muscle cramps and muscle weakness.   Gastrointestinal: Negative for abdominal pain, hematemesis,  hematochezia, melena, nausea and vomiting.   Genitourinary: Negative for dysuria and hematuria.   Neurological: Negative for dizziness, focal weakness, headaches, light-headedness and loss of balance.   Psychiatric/Behavioral: Negative for depression. The patient is not nervous/anxious.         Objective:    Physical Exam   Constitutional: She is oriented to person, place, and time. She appears well-developed and well-nourished.   HENT:   Head: Normocephalic and atraumatic.   Neck: Neck supple. No JVD present.   Cardiovascular: Normal rate, regular rhythm and normal heart sounds.    Pulses:       Carotid pulses are 2+ on the right side, and 2+ on the left side.       Radial pulses are 2+ on the right side, and 2+ on the left side.        Femoral pulses are 2+ on the right side, and 2+ on the left side.       Dorsalis pedis pulses are 2+ on the right side, and 2+ on the left side.        Posterior tibial pulses are 2+ on the right side, and 2+ on the left side.   Monophasic doppler RDP  Biphasic doppler RPT  Triphasic doppler LDP and LPT    Pulmonary/Chest: Effort normal and breath sounds normal.   Abdominal: Soft. Bowel sounds are normal.   Musculoskeletal: She exhibits edema.   Neurological: She is alert and oriented to person, place, and time.   Skin: Skin is warm and dry.   Psychiatric: She has a normal mood and affect. Her behavior is normal. Thought content normal.         Assessment:       1. PAD (peripheral artery disease)    2. Hyperlipidemia, unspecified hyperlipidemia type    3. Essential hypertension    4. Claudication of both lower extremities      78 y/o female with hx and presentation as above. Has severe lifestyle limiting Balta IIb claudication. Will stop cilostazol due to bleeding and restart ASA/statin daily. Will plan for diagnostic peripheral angiogram. Regarding edema, we again spoke of low salt diet, BP control, compression stockings and med compliance. Will add anti-hypertensive (previously  on amlodipine which was stopped due to LE edema and already on diuretic) and increase lasix dose.       Plan:       -Stop cilostazol  -Restart ASA 81 mg daily  -Compression stockings 20-30 mmHg  -Increase lasix to 40 mg daily  -Start Diltiazem 120 mg daily  -Aortogram with selective LE angiogram   -Right radial access with 4/5 slender   -The procedure was discussed with the pt along with the risks/benefits and the pt voiced understanding. All questions were answered and written consents obtained.

## 2017-09-05 NOTE — TELEPHONE ENCOUNTER
----- Message from Will Ellis sent at 9/5/2017  9:55 AM CDT -----  Contact: self  RX request - refill or new RX.  Is this a refill or new RX:  New RX  RX name and strength: DilitaZEM 120MG  Directions:   Is this a 30 day or 90 day RX:    Pharmacy name and phone #: [facility] [phone]   Comments:  This is a new RX Dr Fried  Put her on Friday Sept 1st.  Pt states she went to the pharmacy and it's not there please have Dr Fried resend it, pt says she will pick it up this afternoon

## 2017-09-11 ENCOUNTER — TELEPHONE (OUTPATIENT)
Dept: CARDIOLOGY | Facility: CLINIC | Age: 77
End: 2017-09-11

## 2017-09-11 DIAGNOSIS — I73.9 CLAUDICATION: ICD-10-CM

## 2017-09-11 DIAGNOSIS — I73.9 PAD (PERIPHERAL ARTERY DISEASE): Primary | ICD-10-CM

## 2017-09-11 RX ORDER — SODIUM CHLORIDE 9 MG/ML
3 INJECTION, SOLUTION INTRAVENOUS CONTINUOUS
Status: CANCELLED | OUTPATIENT
Start: 2017-09-11 | End: 2017-09-11

## 2017-09-11 RX ORDER — DIPHENHYDRAMINE HCL 25 MG
50 CAPSULE ORAL ONCE
Status: CANCELLED | OUTPATIENT
Start: 2017-09-11 | End: 2017-09-11

## 2017-09-11 NOTE — TELEPHONE ENCOUNTER
----- Message from Roula Adames sent at 9/11/2017  2:29 PM CDT -----  Contact: [phone]/pt's daughter Mara  Patient's daughter  called in requesting to speak with you. Patient prefers to speak with a nurse. Please advise.

## 2017-09-20 NOTE — NURSING
Spoke with patient's daughter, Mara, and she will have the patient here for 8am.  All preop instructions given.  All questions answered.

## 2017-09-25 ENCOUNTER — HOSPITAL ENCOUNTER (OUTPATIENT)
Facility: HOSPITAL | Age: 77
Discharge: HOME | End: 2017-09-25
Attending: INTERNAL MEDICINE | Admitting: INTERNAL MEDICINE
Payer: MEDICARE

## 2017-09-25 VITALS
RESPIRATION RATE: 20 BRPM | TEMPERATURE: 98 F | OXYGEN SATURATION: 99 % | HEART RATE: 77 BPM | DIASTOLIC BLOOD PRESSURE: 74 MMHG | WEIGHT: 195 LBS | HEIGHT: 64 IN | BODY MASS INDEX: 33.29 KG/M2 | SYSTOLIC BLOOD PRESSURE: 141 MMHG

## 2017-09-25 DIAGNOSIS — I73.9 CLAUDICATION: ICD-10-CM

## 2017-09-25 DIAGNOSIS — I73.9 PAD (PERIPHERAL ARTERY DISEASE): Primary | ICD-10-CM

## 2017-09-25 LAB
ANION GAP SERPL CALC-SCNC: 9 MMOL/L
BASOPHILS # BLD AUTO: 0.02 K/UL
BASOPHILS NFR BLD: 0.4 %
BUN SERPL-MCNC: 23 MG/DL
CALCIUM SERPL-MCNC: 8.6 MG/DL
CHLORIDE SERPL-SCNC: 107 MMOL/L
CO2 SERPL-SCNC: 21 MMOL/L
CREAT SERPL-MCNC: 1 MG/DL
DIFFERENTIAL METHOD: ABNORMAL
EOSINOPHIL # BLD AUTO: 0.1 K/UL
EOSINOPHIL NFR BLD: 1.6 %
ERYTHROCYTE [DISTWIDTH] IN BLOOD BY AUTOMATED COUNT: 12.7 %
EST. GFR  (AFRICAN AMERICAN): >60 ML/MIN/1.73 M^2
EST. GFR  (NON AFRICAN AMERICAN): 54 ML/MIN/1.73 M^2
GLUCOSE SERPL-MCNC: 125 MG/DL
HCT VFR BLD AUTO: 36 %
HGB BLD-MCNC: 11.6 G/DL
INR PPP: 1
LYMPHOCYTES # BLD AUTO: 2.4 K/UL
LYMPHOCYTES NFR BLD: 43.1 %
MCH RBC QN AUTO: 30 PG
MCHC RBC AUTO-ENTMCNC: 32.2 G/DL
MCV RBC AUTO: 93 FL
MONOCYTES # BLD AUTO: 0.5 K/UL
MONOCYTES NFR BLD: 8.7 %
NEUTROPHILS # BLD AUTO: 2.6 K/UL
NEUTROPHILS NFR BLD: 46 %
PERIPHERAL STENOSIS: ABNORMAL
PLATELET # BLD AUTO: 217 K/UL
PMV BLD AUTO: 9.6 FL
POTASSIUM SERPL-SCNC: 4.3 MMOL/L
PROTHROMBIN TIME: 10.2 SEC
RBC # BLD AUTO: 3.87 M/UL
SODIUM SERPL-SCNC: 137 MMOL/L
WBC # BLD AUTO: 5.61 K/UL

## 2017-09-25 PROCEDURE — 80048 BASIC METABOLIC PNL TOTAL CA: CPT

## 2017-09-25 PROCEDURE — 25000003 PHARM REV CODE 250: Performed by: INTERNAL MEDICINE

## 2017-09-25 PROCEDURE — 36245 INS CATH ABD/L-EXT ART 1ST: CPT | Mod: ,,, | Performed by: INTERNAL MEDICINE

## 2017-09-25 PROCEDURE — 75625 CONTRAST EXAM ABDOMINL AORTA: CPT | Mod: 26,,, | Performed by: INTERNAL MEDICINE

## 2017-09-25 PROCEDURE — 63600175 PHARM REV CODE 636 W HCPCS

## 2017-09-25 PROCEDURE — 99152 MOD SED SAME PHYS/QHP 5/>YRS: CPT | Mod: ,,, | Performed by: INTERNAL MEDICINE

## 2017-09-25 PROCEDURE — 63600175 PHARM REV CODE 636 W HCPCS: Performed by: INTERNAL MEDICINE

## 2017-09-25 PROCEDURE — 25000003 PHARM REV CODE 250

## 2017-09-25 PROCEDURE — 85610 PROTHROMBIN TIME: CPT

## 2017-09-25 PROCEDURE — 27200085 CATH LAB PROCEDURE

## 2017-09-25 PROCEDURE — 25500020 PHARM REV CODE 255

## 2017-09-25 PROCEDURE — 75716 ARTERY X-RAYS ARMS/LEGS: CPT | Mod: 26,,, | Performed by: INTERNAL MEDICINE

## 2017-09-25 PROCEDURE — 36415 COLL VENOUS BLD VENIPUNCTURE: CPT

## 2017-09-25 PROCEDURE — 85025 COMPLETE CBC W/AUTO DIFF WBC: CPT

## 2017-09-25 RX ORDER — SODIUM CHLORIDE 9 MG/ML
3 INJECTION, SOLUTION INTRAVENOUS CONTINUOUS
Status: ACTIVE | OUTPATIENT
Start: 2017-09-25 | End: 2017-09-25

## 2017-09-25 RX ORDER — ACETAMINOPHEN 325 MG/1
650 TABLET ORAL EVERY 6 HOURS PRN
Status: DISCONTINUED | OUTPATIENT
Start: 2017-09-25 | End: 2017-09-25 | Stop reason: HOSPADM

## 2017-09-25 RX ORDER — DIPHENHYDRAMINE HCL 50 MG
50 CAPSULE ORAL ONCE
Status: COMPLETED | OUTPATIENT
Start: 2017-09-25 | End: 2017-09-25

## 2017-09-25 RX ORDER — HYDRALAZINE HYDROCHLORIDE 20 MG/ML
10 INJECTION INTRAMUSCULAR; INTRAVENOUS ONCE
Status: COMPLETED | OUTPATIENT
Start: 2017-09-25 | End: 2017-09-25

## 2017-09-25 RX ORDER — SODIUM CHLORIDE 9 MG/ML
3 INJECTION, SOLUTION INTRAVENOUS CONTINUOUS
Status: DISCONTINUED | OUTPATIENT
Start: 2017-09-25 | End: 2017-09-25 | Stop reason: HOSPADM

## 2017-09-25 RX ORDER — ONDANSETRON 2 MG/ML
4 INJECTION INTRAMUSCULAR; INTRAVENOUS EVERY 12 HOURS PRN
Status: DISCONTINUED | OUTPATIENT
Start: 2017-09-25 | End: 2017-09-25 | Stop reason: HOSPADM

## 2017-09-25 RX ADMIN — SODIUM CHLORIDE 3 ML/KG/HR: 0.9 INJECTION, SOLUTION INTRAVENOUS at 11:09

## 2017-09-25 RX ADMIN — HYDRALAZINE HYDROCHLORIDE 10 MG: 20 INJECTION INTRAMUSCULAR; INTRAVENOUS at 10:09

## 2017-09-25 RX ADMIN — SODIUM CHLORIDE 3 ML/KG/HR: 0.9 INJECTION, SOLUTION INTRAVENOUS at 08:09

## 2017-09-25 RX ADMIN — DIPHENHYDRAMINE HYDROCHLORIDE 50 MG: 50 CAPSULE ORAL at 08:09

## 2017-09-25 NOTE — NURSING
Informed MD Fried about pt SBP in 190s-200s. Pt resting well and no complaints of pain. Took home BP meds around 2am. Verbal order for 10 IV hydralazine once. Repeated back and confirmed.

## 2017-09-25 NOTE — INTERVAL H&P NOTE
The patient has been examined and the H&P has been reviewed:    I concur with the findings and no changes have occurred since H&P was written.    Anesthesia/Surgery risks, benefits and alternative options discussed and understood by patient/family.          Active Hospital Problems    Diagnosis  POA    PAD (peripheral artery disease) [I73.9]  Yes     Priority: Low      Resolved Hospital Problems    Diagnosis Date Resolved POA   No resolved problems to display.

## 2017-09-25 NOTE — PROGRESS NOTES
Post Procedure Note: Aortogram with runoff    The pt was brought to the cath lab and under sterile technique, right radial access was obtained without difficulty with US guidance. Images were obtained in multiple views and single vessel r/o noted btk bilaterally via peroneal which supplies collaterals distally to a DP and plantar arch. At least moderate RCFA disease angiographically. Please see full report for details. The pt tolerated the procedure well without complications. Radial band device used with successful hemostasis.     Vitals:    09/25/17 1015 09/25/17 1030 09/25/17 1045 09/25/17 1100   BP: (!) 198/88 (!) 177/81 (!) 165/71 (!) 151/69   BP Location: Left arm Left arm Left arm Left arm   Patient Position: Lying Lying Lying Lying   Pulse: (!) 51 (!) 56 68 63   Resp: 11 20 16 19   Temp:       TempSrc:       SpO2: 98% 100% 99% 100%   Weight:       Height:             Gen: NAD  Ext: 2+ radial pulse, no evidence of hematoma    Plan:  -Post cath care per protocol  -ASA/statin  -staged intervention of RLE first - PT

## 2017-09-25 NOTE — PLAN OF CARE
Pt in bed with no complaints. Monitors in place, VSS. Yellow socks on. Fall risk assessment reviewed with pt and verbalizes understanding. All questions answered. Will continue to monitor.

## 2017-09-25 NOTE — H&P (VIEW-ONLY)
Subjective:    Patient ID:  Katy Soto is a 77 y.o. female who presents for follow-up of Peripheral Arterial Disease      HPI  78 y/o female with hx of recently diagnosed PAD with severe Balta IIb lifestyle limiting claudication, HTN, HLD who presents for f/u with her daughter. She has been having bilateral LE symptoms for about 3 months now. She has exertional bilateral calf cramping less than a block which resolves with rest. No non healing ulceration or signs of limb ischemia. RLE worse that LLE. Had arterial doppler which suggests moderate PAD in both lower extremities. She also has bilateral LE edema R>L. Legs are painful to touch in the shin and ankle area. No lesions or ulceration.   Last clinic visit was restarted on statin, ASA and prescribed cilostazol. Had nosebleeds and stopped ASA. She continues to admit to dietary indiscretion with high salt intake. BP again is high today in clinic and last clinic visit she has not been taking BP at home. Does not exercise regularly and does not smoke. Arterial doppler with waveforms suggestive of possibly RCFA stenosis, distal LSFA stenosis and likely bilateral mid to distal AT disease. Venous doppler without evidence of DVT.     Review of Systems   Constitution: Positive for weakness. Negative for malaise/fatigue.   HENT: Negative for congestion.    Eyes: Negative for blurred vision.   Cardiovascular: Positive for claudication, dyspnea on exertion and leg swelling. Negative for chest pain, cyanosis, irregular heartbeat, near-syncope, orthopnea, palpitations, paroxysmal nocturnal dyspnea and syncope.   Respiratory: Negative for shortness of breath.    Endocrine: Negative for polyuria.   Hematologic/Lymphatic: Negative for bleeding problem.   Skin: Negative for itching and rash.   Musculoskeletal: Positive for joint pain and joint swelling. Negative for muscle cramps and muscle weakness.   Gastrointestinal: Negative for abdominal pain, hematemesis,  hematochezia, melena, nausea and vomiting.   Genitourinary: Negative for dysuria and hematuria.   Neurological: Negative for dizziness, focal weakness, headaches, light-headedness and loss of balance.   Psychiatric/Behavioral: Negative for depression. The patient is not nervous/anxious.         Objective:    Physical Exam   Constitutional: She is oriented to person, place, and time. She appears well-developed and well-nourished.   HENT:   Head: Normocephalic and atraumatic.   Neck: Neck supple. No JVD present.   Cardiovascular: Normal rate, regular rhythm and normal heart sounds.    Pulses:       Carotid pulses are 2+ on the right side, and 2+ on the left side.       Radial pulses are 2+ on the right side, and 2+ on the left side.        Femoral pulses are 2+ on the right side, and 2+ on the left side.       Dorsalis pedis pulses are 2+ on the right side, and 2+ on the left side.        Posterior tibial pulses are 2+ on the right side, and 2+ on the left side.   Monophasic doppler RDP  Biphasic doppler RPT  Triphasic doppler LDP and LPT    Pulmonary/Chest: Effort normal and breath sounds normal.   Abdominal: Soft. Bowel sounds are normal.   Musculoskeletal: She exhibits edema.   Neurological: She is alert and oriented to person, place, and time.   Skin: Skin is warm and dry.   Psychiatric: She has a normal mood and affect. Her behavior is normal. Thought content normal.         Assessment:       1. PAD (peripheral artery disease)    2. Hyperlipidemia, unspecified hyperlipidemia type    3. Essential hypertension    4. Claudication of both lower extremities      76 y/o female with hx and presentation as above. Has severe lifestyle limiting Balta IIb claudication. Will stop cilostazol due to bleeding and restart ASA/statin daily. Will plan for diagnostic peripheral angiogram. Regarding edema, we again spoke of low salt diet, BP control, compression stockings and med compliance. Will add anti-hypertensive (previously  on amlodipine which was stopped due to LE edema and already on diuretic) and increase lasix dose.       Plan:       -Stop cilostazol  -Restart ASA 81 mg daily  -Compression stockings 20-30 mmHg  -Increase lasix to 40 mg daily  -Start Diltiazem 120 mg daily  -Aortogram with selective LE angiogram   -Right radial access with 4/5 slender   -The procedure was discussed with the pt along with the risks/benefits and the pt voiced understanding. All questions were answered and written consents obtained.

## 2017-09-25 NOTE — DISCHARGE SUMMARY
[facility]  Discharge Summary      Admit Date: 9/25/2017    Discharge Date and Time:  09/25/2017 11:32 AM    Attending Physician: David Fried MD     Reason for Admission: Peripheral angiogram    Procedures Performed: Procedure(s) (LRB):  AORTOGRAM WITH RUNOFF (N/A)    Hospital Course (synopsis of major diagnoses, care, treatment, and services provided during the course of the hospital stay): The pt was brought to the cath lab and she has significant below the knee disease bilaterally. Will plan for intervention of RLE first for lifestyle limiting claudication, Balta III (rest pain).       Final Diagnoses:    Principal Problem: PAD   Secondary Diagnoses:   Active Hospital Problems    Diagnosis  POA    PAD (peripheral artery disease) [I73.9]  Yes     Priority: Low      Resolved Hospital Problems    Diagnosis Date Resolved POA   No resolved problems to display.   HLD  HTN  Claudication    Discharged Condition: good    Disposition: Home or Self Care    Follow Up/Patient Instructions:     Medications:  Reconciled Home Medications:   Current Discharge Medication List      CONTINUE these medications which have NOT CHANGED    Details   aspirin (ECOTRIN) 81 MG EC tablet Take 81 mg by mouth once daily.      atorvastatin (LIPITOR) 40 MG tablet Take 1 tablet (40 mg total) by mouth once daily.  Qty: 90 tablet, Refills: 3      !! blood sugar diagnostic (BLOOD GLUCOSE TEST) Strp Please disp strips and lancets for true result meter  Pt testing BID  Disp 90 supply of each with 3 refills  Qty: 200 each, Refills: 3      !! blood sugar diagnostic Strp 1 each by Misc.(Non-Drug; Combo Route) route 2 (two) times daily before meals.  Qty: 200 each, Refills: 11      diltiaZEM (DILACOR XR) 120 MG CDCR Take 1 capsule (120 mg total) by mouth once daily.  Qty: 30 capsule, Refills: 11      furosemide (LASIX) 40 MG tablet Take 1 tablet (40 mg total) by mouth once daily. As leg swelling  Qty: 30 tablet, Refills: 11       ibuprofen (ADVIL,MOTRIN) 600 MG tablet Take 1 tablet (600 mg total) by mouth 2 (two) times daily as needed for Pain. With food as needed  Qty: 60 tablet, Refills: 11      irbesartan (AVAPRO) 300 MG tablet Take 1 tablet (300 mg total) by mouth once daily.  Qty: 90 tablet, Refills: 3    Associated Diagnoses: Essential hypertension; Hearing loss, unspecified hearing loss type, unspecified laterality; Type 2 diabetes mellitus without complication, without long-term current use of insulin; Hyperlipidemia, unspecified hyperlipidemia type; Hypothyroidism due to acquired atrophy of thyroid; Asymptomatic postmenopausal status (age-related) (natural)      potassium chloride SA (K-DUR,KLOR-CON) 20 MEQ tablet Take 1 tablet (20 mEq total) by mouth once daily. With lasix as needed  Qty: 30 tablet, Refills: 3       !! - Potential duplicate medications found. Please discuss with provider.          Discharge Procedure Orders  Diet general   Order Specific Question Answer Comments   Additional restrictions: Cardiac (Low Na/Chol)      Activity as tolerated

## 2017-10-10 ENCOUNTER — TELEPHONE (OUTPATIENT)
Dept: CARDIOLOGY | Facility: CLINIC | Age: 77
End: 2017-10-10

## 2017-10-10 DIAGNOSIS — I73.9 CLAUDICATION: ICD-10-CM

## 2017-10-10 DIAGNOSIS — I73.9 PAD (PERIPHERAL ARTERY DISEASE): Primary | ICD-10-CM

## 2017-10-10 RX ORDER — SODIUM CHLORIDE 9 MG/ML
3 INJECTION, SOLUTION INTRAVENOUS CONTINUOUS
Status: CANCELLED | OUTPATIENT
Start: 2017-10-10 | End: 2017-10-10

## 2017-10-10 RX ORDER — DIPHENHYDRAMINE HCL 25 MG
50 CAPSULE ORAL ONCE
Status: CANCELLED | OUTPATIENT
Start: 2017-10-10 | End: 2017-10-10

## 2017-10-10 NOTE — TELEPHONE ENCOUNTER
----- Message from David Fried MD sent at 10/10/2017  1:08 PM CDT -----  Please let Ms Soto know that we will schedule her for her procedure in the next few weeks and someone from the cath lab will be contacting her with final instructions  Thanks  MATTHEW

## 2017-10-11 ENCOUNTER — TELEPHONE (OUTPATIENT)
Dept: CARDIOLOGY | Facility: CLINIC | Age: 77
End: 2017-10-11

## 2017-10-26 NOTE — NURSING
Called and spoke with patient's daughter, Mara.   Arrival time 6am on Nov. 1st.   Pt's daughter  verbalizes full understanding of all pre op instructions and denies questions.

## 2017-11-01 ENCOUNTER — HOSPITAL ENCOUNTER (OUTPATIENT)
Facility: HOSPITAL | Age: 77
Discharge: HOME | End: 2017-11-01
Attending: INTERNAL MEDICINE | Admitting: INTERNAL MEDICINE
Payer: MEDICARE

## 2017-11-01 VITALS
DIASTOLIC BLOOD PRESSURE: 70 MMHG | RESPIRATION RATE: 20 BRPM | HEART RATE: 70 BPM | BODY MASS INDEX: 32.49 KG/M2 | OXYGEN SATURATION: 100 % | TEMPERATURE: 98 F | WEIGHT: 195 LBS | SYSTOLIC BLOOD PRESSURE: 153 MMHG | HEIGHT: 65 IN

## 2017-11-01 DIAGNOSIS — I10 ESSENTIAL (PRIMARY) HYPERTENSION: ICD-10-CM

## 2017-11-01 DIAGNOSIS — I73.9 CLAUDICATION: ICD-10-CM

## 2017-11-01 DIAGNOSIS — I73.9 PAD (PERIPHERAL ARTERY DISEASE): Primary | ICD-10-CM

## 2017-11-01 LAB
ANION GAP SERPL CALC-SCNC: 7 MMOL/L
BASOPHILS # BLD AUTO: 0.02 K/UL
BASOPHILS NFR BLD: 0.3 %
BUN SERPL-MCNC: 22 MG/DL
CALCIUM SERPL-MCNC: 8.8 MG/DL
CHLORIDE SERPL-SCNC: 107 MMOL/L
CO2 SERPL-SCNC: 24 MMOL/L
CREAT SERPL-MCNC: 1 MG/DL
DIFFERENTIAL METHOD: ABNORMAL
EOSINOPHIL # BLD AUTO: 0.1 K/UL
EOSINOPHIL NFR BLD: 1.6 %
ERYTHROCYTE [DISTWIDTH] IN BLOOD BY AUTOMATED COUNT: 13 %
EST. GFR  (AFRICAN AMERICAN): >60 ML/MIN/1.73 M^2
EST. GFR  (NON AFRICAN AMERICAN): 54 ML/MIN/1.73 M^2
GLUCOSE SERPL-MCNC: 106 MG/DL
HCT VFR BLD AUTO: 34.8 %
HGB BLD-MCNC: 11.2 G/DL
INR PPP: 1
LYMPHOCYTES # BLD AUTO: 2.9 K/UL
LYMPHOCYTES NFR BLD: 41.6 %
MCH RBC QN AUTO: 29.9 PG
MCHC RBC AUTO-ENTMCNC: 32.2 G/DL
MCV RBC AUTO: 93 FL
MONOCYTES # BLD AUTO: 0.5 K/UL
MONOCYTES NFR BLD: 7.7 %
NEUTROPHILS # BLD AUTO: 3.4 K/UL
NEUTROPHILS NFR BLD: 48.5 %
PERIPHERAL STENOSIS: ABNORMAL
PLATELET # BLD AUTO: 237 K/UL
PMV BLD AUTO: 9.7 FL
POTASSIUM SERPL-SCNC: 4 MMOL/L
PROTHROMBIN TIME: 10.5 SEC
RBC # BLD AUTO: 3.74 M/UL
SODIUM SERPL-SCNC: 138 MMOL/L
WBC # BLD AUTO: 7.02 K/UL

## 2017-11-01 PROCEDURE — 25500020 PHARM REV CODE 255

## 2017-11-01 PROCEDURE — C1769 GUIDE WIRE: HCPCS

## 2017-11-01 PROCEDURE — 25000003 PHARM REV CODE 250: Performed by: INTERNAL MEDICINE

## 2017-11-01 PROCEDURE — 63600175 PHARM REV CODE 636 W HCPCS

## 2017-11-01 PROCEDURE — 80048 BASIC METABOLIC PNL TOTAL CA: CPT

## 2017-11-01 PROCEDURE — 37228 CATH LAB PROCEDURE: CPT | Mod: RT,,, | Performed by: INTERNAL MEDICINE

## 2017-11-01 PROCEDURE — 36415 COLL VENOUS BLD VENIPUNCTURE: CPT

## 2017-11-01 PROCEDURE — 85610 PROTHROMBIN TIME: CPT

## 2017-11-01 PROCEDURE — 25000003 PHARM REV CODE 250

## 2017-11-01 PROCEDURE — 85025 COMPLETE CBC W/AUTO DIFF WBC: CPT

## 2017-11-01 PROCEDURE — 37252 INTRVASC US NONCORONARY 1ST: CPT | Mod: ,,, | Performed by: INTERNAL MEDICINE

## 2017-11-01 PROCEDURE — 99152 MOD SED SAME PHYS/QHP 5/>YRS: CPT | Mod: ,,, | Performed by: INTERNAL MEDICINE

## 2017-11-01 PROCEDURE — 93005 ELECTROCARDIOGRAM TRACING: CPT

## 2017-11-01 PROCEDURE — C1753 CATH, INTRAVAS ULTRASOUND: HCPCS

## 2017-11-01 RX ORDER — ONDANSETRON 2 MG/ML
4 INJECTION INTRAMUSCULAR; INTRAVENOUS EVERY 12 HOURS PRN
Status: DISCONTINUED | OUTPATIENT
Start: 2017-11-01 | End: 2017-11-01 | Stop reason: HOSPADM

## 2017-11-01 RX ORDER — DIPHENHYDRAMINE HCL 50 MG
50 CAPSULE ORAL ONCE
Status: DISCONTINUED | OUTPATIENT
Start: 2017-11-01 | End: 2017-11-01 | Stop reason: HOSPADM

## 2017-11-01 RX ORDER — SODIUM CHLORIDE 9 MG/ML
3 INJECTION, SOLUTION INTRAVENOUS CONTINUOUS
Status: ACTIVE | OUTPATIENT
Start: 2017-11-01 | End: 2017-11-01

## 2017-11-01 RX ORDER — ACETAMINOPHEN 325 MG/1
650 TABLET ORAL EVERY 4 HOURS PRN
Status: DISCONTINUED | OUTPATIENT
Start: 2017-11-01 | End: 2017-11-01 | Stop reason: HOSPADM

## 2017-11-01 RX ADMIN — SODIUM CHLORIDE 3 ML/KG/HR: 0.9 INJECTION, SOLUTION INTRAVENOUS at 06:11

## 2017-11-01 RX ADMIN — SODIUM CHLORIDE 3 ML/KG/HR: 0.9 INJECTION, SOLUTION INTRAVENOUS at 10:11

## 2017-11-01 NOTE — PROGRESS NOTES
"Post Procedure Note: Peripheral PTA    The pt was brought to the cath lab and under sterile technique, LCFA access was obtained without difficulty. Images were obtained in multiple views and successful PTA performed to RPTA. Please see full report for details. The pt tolerated the procedure well without complications. Perclose closure device used with successful hemostasis.     Vitals:    11/01/17 0651 11/01/17 0657   BP:  (!) 179/85   BP Location:  Right arm   Patient Position:  Lying   Temp: 97.9 °F (36.6 °C)    TempSrc: Oral    SpO2:  100%   Weight:  88.5 kg (195 lb)   Height:  5' 5" (1.651 m)         Gen: NAD  Ext: 2+ fem/PT pulse, no evidence of hematoma    Plan:  -Post cath care per protocol  -ASA/statin  -Arterial doppler at 1, 3, 6, 12 months   -Walking program   "

## 2017-11-01 NOTE — NURSING
Ambulated in recovery area gait steady no distress noted. Left groin site remained clean dry and intact and soft to touch. Verbalized understanding of discharge instructions.

## 2017-11-01 NOTE — DISCHARGE SUMMARY
[facility]  Discharge Summary      Admit Date: 11/1/2017    Discharge Date and Time:  11/01/2017 12:48 PM    Attending Physician: David Fried MD     Reason for Admission: Peripheral PTA    Procedures Performed: Procedure(s) (LRB):  PTA-PERIPHERAL (N/A)    Hospital Course (synopsis of major diagnoses, care, treatment, and services provided during the course of the hospital stay): The pt was taken to the cath lab and successful PTA performed to RPTA for severe, lifestyle limiting Balta III claudication        Final Diagnoses:    Principal Problem: PAD (peripheral artery disease)   Secondary Diagnoses:   Active Hospital Problems    Diagnosis  POA    *PAD (peripheral artery disease) [I73.9]  Yes     Priority: Low    Claudication [I73.9]  Yes    Bilateral leg edema [R60.0]  Yes    Hearing loss [H91.90]  Yes    Hyperlipidemia [E78.5]  Yes    Hypertension [I10]  Yes      Resolved Hospital Problems    Diagnosis Date Resolved POA   No resolved problems to display.       Discharged Condition: good    Disposition: Home or Self Care    Follow Up/Patient Instructions:     Medications:  Reconciled Home Medications:   Current Discharge Medication List      CONTINUE these medications which have NOT CHANGED    Details   aspirin (ECOTRIN) 81 MG EC tablet Take 81 mg by mouth once daily.      atorvastatin (LIPITOR) 40 MG tablet Take 1 tablet (40 mg total) by mouth once daily.  Qty: 90 tablet, Refills: 3      diltiaZEM (DILACOR XR) 120 MG CDCR Take 1 capsule (120 mg total) by mouth once daily.  Qty: 30 capsule, Refills: 11      furosemide (LASIX) 40 MG tablet Take 1 tablet (40 mg total) by mouth once daily. As leg swelling  Qty: 30 tablet, Refills: 11      irbesartan (AVAPRO) 300 MG tablet Take 1 tablet (300 mg total) by mouth once daily.  Qty: 90 tablet, Refills: 3    Associated Diagnoses: Essential hypertension; Hearing loss, unspecified hearing loss type, unspecified laterality; Type 2 diabetes  mellitus without complication, without long-term current use of insulin; Hyperlipidemia, unspecified hyperlipidemia type; Hypothyroidism due to acquired atrophy of thyroid; Asymptomatic postmenopausal status (age-related) (natural)      potassium chloride SA (K-DUR,KLOR-CON) 20 MEQ tablet Take 1 tablet (20 mEq total) by mouth once daily. With lasix as needed  Qty: 30 tablet, Refills: 3      !! blood sugar diagnostic (BLOOD GLUCOSE TEST) Strp Please disp strips and lancets for true result meter  Pt testing BID  Disp 90 supply of each with 3 refills  Qty: 200 each, Refills: 3      !! blood sugar diagnostic Strp 1 each by Misc.(Non-Drug; Combo Route) route 2 (two) times daily before meals.  Qty: 200 each, Refills: 11      ibuprofen (ADVIL,MOTRIN) 600 MG tablet Take 1 tablet (600 mg total) by mouth 2 (two) times daily as needed for Pain. With food as needed  Qty: 60 tablet, Refills: 11       !! - Potential duplicate medications found. Please discuss with provider.          Discharge Procedure Orders  Diet general   Order Specific Question Answer Comments   Additional restrictions: Cardiac (Low Na/Chol)      Activity as tolerated       Follow-up Information     David Fried MD In 1 month.    Specialties:  INTERVENTIONAL CARDIOLOGY, Cardiology  Contact information:  [address]  [address]  [facility] [address] [phone]

## 2017-11-01 NOTE — H&P
Subjective:    Patient ID:  Katy Soto is a 77 y.o. female who presents for follow-up of Peripheral Arterial Disease        HPI  78 y/o female with hx of recently diagnosed PAD with severe Balta IIb lifestyle limiting claudication, HTN, HLD who presents for f/u with her daughter. She has been having bilateral LE symptoms for about 3 months now. She has exertional bilateral calf cramping less than a block which resolves with rest. No non healing ulceration or signs of limb ischemia. RLE worse that LLE. Had arterial doppler which suggests moderate PAD in both lower extremities. She also has bilateral LE edema R>L. Legs are painful to touch in the shin and ankle area. No lesions or ulceration.   Last clinic visit was restarted on statin, ASA and prescribed cilostazol. Had nosebleeds and stopped ASA. She continues to admit to dietary indiscretion with high salt intake. BP again is high today in clinic and last clinic visit she has not been taking BP at home. Does not exercise regularly and does not smoke. Arterial doppler with waveforms suggestive of possibly RCFA stenosis, distal LSFA stenosis and likely bilateral mid to distal AT disease. Venous doppler without evidence of DVT.      Review of Systems   Constitution: Positive for weakness. Negative for malaise/fatigue.   HENT: Negative for congestion.    Eyes: Negative for blurred vision.   Cardiovascular: Positive for claudication, dyspnea on exertion and leg swelling. Negative for chest pain, cyanosis, irregular heartbeat, near-syncope, orthopnea, palpitations, paroxysmal nocturnal dyspnea and syncope.   Respiratory: Negative for shortness of breath.    Endocrine: Negative for polyuria.   Hematologic/Lymphatic: Negative for bleeding problem.   Skin: Negative for itching and rash.   Musculoskeletal: Positive for joint pain and joint swelling. Negative for muscle cramps and muscle weakness.   Gastrointestinal: Negative for abdominal pain, hematemesis,  "hematochezia, melena, nausea and vomiting.   Genitourinary: Negative for dysuria and hematuria.   Neurological: Negative for dizziness, focal weakness, headaches, light-headedness and loss of balance.   Psychiatric/Behavioral: Negative for depression. The patient is not nervous/anxious.          Objective:    Physical Exam   Constitutional: She is oriented to person, place, and time. She appears well-developed and well-nourished.   HENT:   Head: Normocephalic and atraumatic.   Neck: Neck supple. No JVD present.   Cardiovascular: Normal rate, regular rhythm and normal heart sounds.    Pulses:       Carotid pulses are 2+ on the right side, and 2+ on the left side.       Radial pulses are 2+ on the right side, and 2+ on the left side.        Femoral pulses are 2+ on the right side, and 2+ on the left side.       Dorsalis pedis pulses are 2+ on the right side, and 2+ on the left side.        Posterior tibial pulses are 2+ on the right side, and 2+ on the left side.   Monophasic doppler RDP  Biphasic doppler RPT  Triphasic doppler LDP and LPT    Pulmonary/Chest: Effort normal and breath sounds normal.   Abdominal: Soft. Bowel sounds are normal.   Musculoskeletal: She exhibits edema.   Neurological: She is alert and oriented to person, place, and time.   Skin: Skin is warm and dry.   Psychiatric: She has a normal mood and affect. Her behavior is normal. Thought content normal.      Vitals:    11/01/17 0651 11/01/17 0657   BP:  (!) 179/85   BP Location:  Right arm   Patient Position:  Lying   Temp: 97.9 °F (36.6 °C)    TempSrc: Oral    SpO2:  100%   Weight:  88.5 kg (195 lb)   Height:  5' 5" (1.651 m)     Recent Labs      11/01/17   0624   HGB  11.2*   HCT  34.8*   BUN  22   CREATININE  1.0   ]        Assessment:       1. PAD (peripheral artery disease)    2. Hyperlipidemia, unspecified hyperlipidemia type    3. Essential hypertension    4. Claudication of both lower extremities       76 y/o female with hx and presentation " as above. Has severe lifestyle limiting Balta IIb claudication. Will stop cilostazol due to bleeding and restart ASA/statin daily. Will plan for diagnostic peripheral angiogram. Regarding edema, we again spoke of low salt diet, BP control, compression stockings and med compliance. Will add anti-hypertensive (previously on amlodipine which was stopped due to LE edema and already on diuretic) and increase lasix dose.        Plan:       -Staged intervention of RLE   LCFA access  -The procedure was discussed with the pt along with the risks/benefits and the pt voiced understanding. All questions were answered and written consents obtained.

## 2017-11-01 NOTE — NURSING
Received in post cath at 1025 per stretcher no distress noted left groin site clean dry and intact with gauze and tegaderm, site is soft to touch.

## 2017-11-14 LAB
POC ACTIVATED CLOTTING TIME K: 246 SEC (ref 74–137)
SAMPLE: ABNORMAL

## 2017-12-14 ENCOUNTER — OFFICE VISIT (OUTPATIENT)
Dept: CARDIOLOGY | Facility: CLINIC | Age: 77
End: 2017-12-14
Payer: MEDICARE

## 2017-12-14 VITALS
SYSTOLIC BLOOD PRESSURE: 187 MMHG | DIASTOLIC BLOOD PRESSURE: 80 MMHG | HEIGHT: 65 IN | BODY MASS INDEX: 33.21 KG/M2 | OXYGEN SATURATION: 99 % | HEART RATE: 68 BPM | WEIGHT: 199.31 LBS

## 2017-12-14 DIAGNOSIS — R60.0 BILATERAL LEG EDEMA: ICD-10-CM

## 2017-12-14 DIAGNOSIS — I10 ESSENTIAL HYPERTENSION: ICD-10-CM

## 2017-12-14 DIAGNOSIS — I73.9 PAD (PERIPHERAL ARTERY DISEASE): Primary | ICD-10-CM

## 2017-12-14 DIAGNOSIS — M19.90 ARTHRITIS: ICD-10-CM

## 2017-12-14 DIAGNOSIS — I73.9 CLAUDICATION: ICD-10-CM

## 2017-12-14 DIAGNOSIS — E78.5 HYPERLIPIDEMIA, UNSPECIFIED HYPERLIPIDEMIA TYPE: ICD-10-CM

## 2017-12-14 PROCEDURE — 99213 OFFICE O/P EST LOW 20 MIN: CPT | Mod: PBBFAC,PO | Performed by: INTERNAL MEDICINE

## 2017-12-14 PROCEDURE — 99214 OFFICE O/P EST MOD 30 MIN: CPT | Mod: S$PBB,,, | Performed by: INTERNAL MEDICINE

## 2017-12-14 PROCEDURE — 99999 PR PBB SHADOW E&M-EST. PATIENT-LVL III: CPT | Mod: PBBFAC,,, | Performed by: INTERNAL MEDICINE

## 2017-12-14 RX ORDER — GABAPENTIN 600 MG/1
600 TABLET ORAL 3 TIMES DAILY
COMMUNITY
End: 2017-12-21

## 2017-12-14 RX ORDER — CILOSTAZOL 50 MG/1
TABLET ORAL
Refills: 11 | COMMUNITY
Start: 2017-10-12 | End: 2017-12-14

## 2017-12-14 RX ORDER — DILTIAZEM HYDROCHLORIDE EXTENDED-RELEASE TABLETS 120 MG/1
120 TABLET, EXTENDED RELEASE ORAL DAILY
Qty: 30 TABLET | Refills: 11 | Status: SHIPPED | OUTPATIENT
Start: 2017-12-14 | End: 2018-02-02 | Stop reason: SDUPTHER

## 2017-12-14 NOTE — PROGRESS NOTES
Subjective:    Patient ID:  Katy Soto is a 77 y.o. female who presents for follow-up of Peripheral Arterial Disease and Hypertension      HPI  76 y/o female with hx of PAD s/p PTA to RPTA with resolution of severe Balta IIb lifestyle limiting claudication, HTN, HLD who presents for f/u.   Last clinic visit was seen for claudication, intervention performed to RLE with resolution of claudication. No claudication of LLE. Confusion as to her meds and claims to have had a nosebleed from Diltiazem and currently not taking Dilt or Pletal (I suspect it was Pletal that caused the nosebleed). She continues to admit to dietary indiscretion with high salt intake. BP again is high today in clinic and last clinic visit she has not been taking BP at home. Does not exercise regularly and does not smoke. Denies CP, SOB/SPIVEY, orthopnea, PND, syncope, palps.      Review of Systems   Constitution: Negative for weakness and malaise/fatigue.   HENT: Negative for congestion.    Eyes: Negative for blurred vision.   Cardiovascular: Positive for leg swelling. Negative for chest pain, claudication, cyanosis, dyspnea on exertion, irregular heartbeat, near-syncope, orthopnea, palpitations, paroxysmal nocturnal dyspnea and syncope.   Respiratory: Negative for shortness of breath.    Endocrine: Negative for polyuria.   Hematologic/Lymphatic: Negative for bleeding problem.   Skin: Negative for itching and rash.   Musculoskeletal: Positive for joint pain. Negative for joint swelling, muscle cramps and muscle weakness.   Gastrointestinal: Negative for abdominal pain, hematemesis, hematochezia, melena, nausea and vomiting.   Genitourinary: Negative for dysuria and hematuria.   Neurological: Negative for dizziness, focal weakness, headaches, light-headedness and loss of balance.   Psychiatric/Behavioral: Negative for depression. The patient is not nervous/anxious.         Objective:    Physical Exam   Constitutional: She is oriented to  person, place, and time. She appears well-developed and well-nourished.   HENT:   Head: Normocephalic and atraumatic.   Neck: Neck supple. No JVD present.   Cardiovascular: Normal rate, regular rhythm and normal heart sounds.    Pulses:       Carotid pulses are 2+ on the right side, and 2+ on the left side.       Radial pulses are 2+ on the right side, and 2+ on the left side.        Femoral pulses are 2+ on the right side, and 2+ on the left side.       Posterior tibial pulses are 1+ on the right side, and 1+ on the left side.   Monophasic doppler RDP  Biphasic doppler RPT  Biphasic doppler LDP and LPT     Pulmonary/Chest: Effort normal and breath sounds normal.   Abdominal: Soft. Bowel sounds are normal.   Musculoskeletal: She exhibits no edema.   Neurological: She is alert and oriented to person, place, and time.   Skin: Skin is warm and dry.   Psychiatric: She has a normal mood and affect. Her behavior is normal. Thought content normal.         Assessment:       1. PAD (peripheral artery disease)    2. Claudication    3. Essential hypertension    4. Hyperlipidemia, unspecified hyperlipidemia type    5. Bilateral leg edema    6. Arthritis      76 y/o female with hx and presentation as above. Regarding PAD, no more claudication. Will monitor with serial arterial doppler. Regarding HTN, will restart Diltiazem.        Plan:       -Restart Diltiazem  -OK to continue to hold cilostazol  -F/u in 6 months with arterial doppler before

## 2017-12-14 NOTE — TELEPHONE ENCOUNTER
----- Message from Lucia Uriarte sent at 12/14/2017  2:09 PM CST -----  Contact: Belkis (caretaker)/[phone]  Caretaker needs the following medication called into the pharmacy as the patient is out.  Please call her and advise when done so it can be picked up.    gabapentin (NEURONTIN) 600 MG tablet    [facility] [address] [place] [address], [place] [address] [address] [facility]

## 2017-12-15 RX ORDER — GABAPENTIN 600 MG/1
600 TABLET ORAL 3 TIMES DAILY
OUTPATIENT
Start: 2017-12-15

## 2017-12-15 NOTE — TELEPHONE ENCOUNTER
----- Message from Favio Luu NP sent at 12/15/2017  2:20 PM CST -----  She needs to contact her PCP for Neurontin refill

## 2017-12-21 ENCOUNTER — OFFICE VISIT (OUTPATIENT)
Dept: FAMILY MEDICINE | Facility: CLINIC | Age: 77
End: 2017-12-21
Payer: MEDICARE

## 2017-12-21 ENCOUNTER — HOSPITAL ENCOUNTER (OUTPATIENT)
Dept: RADIOLOGY | Facility: HOSPITAL | Age: 77
Discharge: HOME | End: 2017-12-21
Attending: INTERNAL MEDICINE
Payer: MEDICARE

## 2017-12-21 VITALS
SYSTOLIC BLOOD PRESSURE: 176 MMHG | TEMPERATURE: 98 F | BODY MASS INDEX: 32.93 KG/M2 | HEIGHT: 65 IN | WEIGHT: 197.63 LBS | DIASTOLIC BLOOD PRESSURE: 80 MMHG | HEART RATE: 74 BPM | OXYGEN SATURATION: 100 %

## 2017-12-21 DIAGNOSIS — I73.9 PAD (PERIPHERAL ARTERY DISEASE): ICD-10-CM

## 2017-12-21 DIAGNOSIS — M19.90 ARTHRITIS: ICD-10-CM

## 2017-12-21 DIAGNOSIS — E78.5 HYPERLIPIDEMIA, UNSPECIFIED HYPERLIPIDEMIA TYPE: ICD-10-CM

## 2017-12-21 DIAGNOSIS — I73.9 CLAUDICATION: ICD-10-CM

## 2017-12-21 DIAGNOSIS — H91.90 HEARING LOSS, UNSPECIFIED HEARING LOSS TYPE, UNSPECIFIED LATERALITY: ICD-10-CM

## 2017-12-21 DIAGNOSIS — F41.9 ANXIETY: ICD-10-CM

## 2017-12-21 DIAGNOSIS — F32.A DEPRESSION, UNSPECIFIED DEPRESSION TYPE: ICD-10-CM

## 2017-12-21 DIAGNOSIS — I10 ESSENTIAL HYPERTENSION: Primary | ICD-10-CM

## 2017-12-21 DIAGNOSIS — R60.0 BILATERAL LEG EDEMA: ICD-10-CM

## 2017-12-21 PROCEDURE — 93925 LOWER EXTREMITY STUDY: CPT | Mod: TC,PO

## 2017-12-21 PROCEDURE — 99214 OFFICE O/P EST MOD 30 MIN: CPT | Mod: S$GLB,,, | Performed by: FAMILY MEDICINE

## 2017-12-21 RX ORDER — ESCITALOPRAM OXALATE 10 MG/1
10 TABLET ORAL DAILY
Qty: 30 TABLET | Refills: 11 | Status: SHIPPED | OUTPATIENT
Start: 2017-12-21 | End: 2018-01-04

## 2017-12-21 RX ORDER — CILOSTAZOL 50 MG/1
50 TABLET ORAL 2 TIMES DAILY
COMMUNITY
End: 2018-03-09

## 2017-12-21 NOTE — PROGRESS NOTES
Subjective:      Patient ID: Katy Soto is a 77 y.o. female.    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    HENT: Negative.    Respiratory: Negative.    Cardiovascular: Negative.    Gastrointestinal: Negative.    Endocrine: Negative.    Genitourinary: Negative.    Musculoskeletal: Negative.    Psychiatric/Behavioral: Positive for dysphoric mood. Negative for sleep disturbance and suicidal ideas. The patient is nervous/anxious.    All other systems reviewed and are negative.    Objective:     Physical Exam   Constitutional: She is oriented to person, place, and time. She appears well-developed and well-nourished.   obese   HENT:   Head: Normocephalic.   Eyes: Conjunctivae and EOM are normal. Pupils are equal, round, and reactive to light.   Neck: Normal range of motion. Neck supple.   Cardiovascular: Normal rate, regular rhythm and normal heart sounds.    Pulmonary/Chest: Effort normal and breath sounds normal.   Musculoskeletal: Normal range of motion.   Neurological: She is alert and oriented to person, place, and time. She has normal reflexes.   Skin: Skin is warm and dry.   Psychiatric: She has a normal mood and affect. Her behavior is normal. Judgment and thought content normal.     Assessment:     1. Essential hypertension    2. Hearing loss, unspecified hearing loss type, unspecified laterality    3. PAD (peripheral artery disease)    4. Hyperlipidemia, unspecified hyperlipidemia type    5. Arthritis    6. Bilateral leg edema    7. Depression, unspecified depression type    8. Anxiety      Plan:     New Prescriptions    ESCITALOPRAM OXALATE (LEXAPRO) 10 MG TABLET     Take 1 tablet (10 mg total) by mouth once daily. For depression and anxiety     Discontinued Medications    GABAPENTIN (NEURONTIN) 600 MG TABLET    Take 600 mg by mouth 3 (three) times daily.     Modified Medications    No medications on file       Essential hypertension    Hearing loss, unspecified hearing loss type, unspecified laterality    PAD (peripheral artery disease)    Hyperlipidemia, unspecified hyperlipidemia type    Arthritis    Bilateral leg edema    Depression, unspecified depression type    Anxiety    Other orders  -     escitalopram oxalate (LEXAPRO) 10 MG tablet; Take 1 tablet (10 mg total) by mouth once daily. For depression and anxiety  Dispense: 30 tablet; Refill: 11    monitor bp at home, come back with the machine and shows us her numbers;  Take as little ibuprofen  As possible for kneess; strt lexapro for depressionand anxiety    No more diabetes

## 2018-01-02 ENCOUNTER — TELEPHONE (OUTPATIENT)
Dept: CARDIOLOGY | Facility: CLINIC | Age: 78
End: 2018-01-02

## 2018-01-02 NOTE — TELEPHONE ENCOUNTER
----- Message from David Fried MD sent at 1/2/2018  9:01 AM CST -----  Please let Ms Soto know that her leg ultrasound showed findings that we already knew about from her angiogram and will follow up in clinic as previously scheduled  Thanks  MATTHEW

## 2018-01-03 ENCOUNTER — TELEPHONE (OUTPATIENT)
Dept: FAMILY MEDICINE | Facility: CLINIC | Age: 78
End: 2018-01-03

## 2018-01-04 ENCOUNTER — CLINICAL SUPPORT (OUTPATIENT)
Dept: FAMILY MEDICINE | Facility: CLINIC | Age: 78
End: 2018-01-04
Payer: MEDICARE

## 2018-01-04 DIAGNOSIS — I10 ESSENTIAL HYPERTENSION: Primary | ICD-10-CM

## 2018-01-04 RX ORDER — POTASSIUM CHLORIDE 20 MEQ/1
TABLET, EXTENDED RELEASE ORAL
Qty: 90 TABLET | Refills: 3 | Status: SHIPPED | OUTPATIENT
Start: 2018-01-04 | End: 2019-03-04 | Stop reason: SDUPTHER

## 2018-01-04 NOTE — TELEPHONE ENCOUNTER
Patient here today for BP check  Last seen here on Dec 21, 2017  Pt c/o nose bleeds daily since her last visit with you.  Pt is only taking motrin prn leg pain    Pt is monitoring BP at home. Log scanned in for you to view ( I compared her machine to my manual BP and it is working properly )    Today BP here 168/80 pulse 80 @ 940  Patient took her Bp meds this am at 745 am  She is taking Cardizem  mg 1 daily  Irbesartan 300 mg and lasix 40 mg  For her BP    At last visit pt was given new rx of lexapro - she only took one dose and then d/abida it due to dizziness    She has a follow up apt with you on Jan 18    Please advise [phone]

## 2018-01-04 NOTE — PROGRESS NOTES
Katy Soto 77 y.o. female is here today for Blood Pressure check.   History of HTN yes.    Review of patient's allergies indicates:   Allergen Reactions    Shellfish containing products Hives     Creatinine   Date Value Ref Range Status   11/01/2017 1.0 0.5 - 1.4 mg/dL Final     Sodium   Date Value Ref Range Status   11/01/2017 138 136 - 145 mmol/L Final     Potassium   Date Value Ref Range Status   11/01/2017 4.0 3.5 - 5.1 mmol/L Final   ]  Patient verifies taking blood pressure medications on a regular basis at the same time of the day.     Current Outpatient Prescriptions:     aspirin (ECOTRIN) 81 MG EC tablet, Take 81 mg by mouth once daily., Disp: , Rfl:     atorvastatin (LIPITOR) 40 MG tablet, Take 1 tablet (40 mg total) by mouth once daily., Disp: 90 tablet, Rfl: 3    blood sugar diagnostic (BLOOD GLUCOSE TEST) Strp, Please disp strips and lancets for true result meter Pt testing BID Disp 90 supply of each with 3 refills, Disp: 200 each, Rfl: 3    blood sugar diagnostic Strp, 1 each by Misc.(Non-Drug; Combo Route) route 2 (two) times daily before meals., Disp: 200 each, Rfl: 11    cilostazol (PLETAL) 50 MG Tab, Take 50 mg by mouth 2 (two) times daily., Disp: , Rfl:     diltiaZEM (CARDIZEM LA) 120 mg 24 hr tablet, Take 1 tablet (120 mg total) by mouth once daily., Disp: 30 tablet, Rfl: 11    furosemide (LASIX) 40 MG tablet, Take 1 tablet (40 mg total) by mouth once daily. As leg swelling, Disp: 30 tablet, Rfl: 11    ibuprofen (ADVIL,MOTRIN) 600 MG tablet, Take 1 tablet (600 mg total) by mouth 2 (two) times daily as needed for Pain. With food as needed, Disp: 60 tablet, Rfl: 11    irbesartan (AVAPRO) 300 MG tablet, Take 1 tablet (300 mg total) by mouth once daily., Disp: 90 tablet, Rfl: 3    potassium chloride SA (K-DUR,KLOR-CON) 20 MEQ tablet, TAKE 1 TABLET BY MOUTH ONCE DAILY WITH LASIX AS NEEDED, Disp: 90 tablet, Rfl: 3  Does patient have record of home blood pressure readings yes. See BP  log scanned into Epic  Last dose of blood pressure medication was taken at 745 am.  Patient is asymptomatic.   Complains of nose bleeds.      Blood pressure reading after 15 minutes was 168/80, Pulse 80.  Message sent to dr suarez with detailed message

## 2018-02-02 ENCOUNTER — OFFICE VISIT (OUTPATIENT)
Dept: FAMILY MEDICINE | Facility: CLINIC | Age: 78
End: 2018-02-02
Payer: MEDICARE

## 2018-02-02 VITALS
HEART RATE: 92 BPM | SYSTOLIC BLOOD PRESSURE: 180 MMHG | TEMPERATURE: 98 F | OXYGEN SATURATION: 100 % | BODY MASS INDEX: 33.76 KG/M2 | DIASTOLIC BLOOD PRESSURE: 110 MMHG | WEIGHT: 202.63 LBS | HEIGHT: 65 IN

## 2018-02-02 DIAGNOSIS — F41.9 ANXIETY: ICD-10-CM

## 2018-02-02 DIAGNOSIS — H91.90 HEARING LOSS, UNSPECIFIED HEARING LOSS TYPE, UNSPECIFIED LATERALITY: Primary | ICD-10-CM

## 2018-02-02 DIAGNOSIS — I73.9 PAD (PERIPHERAL ARTERY DISEASE): ICD-10-CM

## 2018-02-02 DIAGNOSIS — I10 ESSENTIAL HYPERTENSION: ICD-10-CM

## 2018-02-02 PROCEDURE — 1126F AMNT PAIN NOTED NONE PRSNT: CPT | Mod: S$GLB,,, | Performed by: FAMILY MEDICINE

## 2018-02-02 PROCEDURE — 1159F MED LIST DOCD IN RCRD: CPT | Mod: S$GLB,,, | Performed by: FAMILY MEDICINE

## 2018-02-02 PROCEDURE — 99213 OFFICE O/P EST LOW 20 MIN: CPT | Mod: S$GLB,,, | Performed by: FAMILY MEDICINE

## 2018-02-02 RX ORDER — CARVEDILOL 12.5 MG/1
12.5 TABLET ORAL 2 TIMES DAILY WITH MEALS
Qty: 60 TABLET | Refills: 11 | Status: SHIPPED | OUTPATIENT
Start: 2018-02-02 | End: 2018-02-19 | Stop reason: SDUPTHER

## 2018-02-02 RX ORDER — DILTIAZEM HYDROCHLORIDE EXTENDED-RELEASE TABLETS 120 MG/1
240 TABLET, EXTENDED RELEASE ORAL DAILY
Qty: 60 TABLET | Refills: 11 | Status: SHIPPED | OUTPATIENT
Start: 2018-02-02 | End: 2018-02-19

## 2018-02-02 RX ORDER — ALPRAZOLAM 0.25 MG/1
0.25 TABLET ORAL 2 TIMES DAILY PRN
Qty: 30 TABLET | Refills: 0 | Status: SHIPPED | OUTPATIENT
Start: 2018-02-02 | End: 2018-02-27 | Stop reason: SDUPTHER

## 2018-02-02 NOTE — PROGRESS NOTES
Subjective:      Patient ID: Katy Soto is a 77 y.o. female.    Chief Complaint: Follow-up (blood pressure)    BP still too high at home; brought home numbers; doesn't feel badk; no ha, cp, sob; taking meds as should ; lots of stress with dying brotgher in law and son in prison;       Review of Systems   Constitutional: Negative.    HENT: Negative.    Respiratory: Positive for shortness of breath.         Walking yesterday   Cardiovascular: Negative.  Negative for chest pain and leg swelling.   Gastrointestinal: Negative.    Endocrine: Negative.    Genitourinary: Negative.    Musculoskeletal: Negative.    Psychiatric/Behavioral: Negative for sleep disturbance and suicidal ideas. The patient is nervous/anxious.    All other systems reviewed and are negative.    Objective:     Physical Exam   Constitutional: She is oriented to person, place, and time. She appears well-developed and well-nourished.   HENT:   Head: Normocephalic.   Eyes: Conjunctivae and EOM are normal. Pupils are equal, round, and reactive to light.   Neck: Normal range of motion. Neck supple.   Cardiovascular: Normal rate, regular rhythm and normal heart sounds.    Pulmonary/Chest: Effort normal and breath sounds normal.   Musculoskeletal: Normal range of motion.   Neurological: She is alert and oriented to person, place, and time. She has normal reflexes.   Skin: Skin is warm and dry.   Psychiatric: She has a normal mood and affect. Her behavior is normal. Judgment and thought content normal.   Nursing note and vitals reviewed.    Assessment:     1. Hearing loss, unspecified hearing loss type, unspecified laterality    2. Essential hypertension    3. Anxiety    4. PAD (peripheral artery disease)      Plan:     New Prescriptions    ALPRAZOLAM (XANAX) 0.25 MG TABLET    Take 1 tablet (0.25 mg total) by mouth 2 (two) times daily as needed for Anxiety.    CARVEDILOL (COREG) 12.5 MG TABLET    Take 1 tablet (12.5 mg total) by mouth 2 (two) times  daily with meals.     Discontinued Medications    No medications on file     Modified Medications    Modified Medication Previous Medication    DILTIAZEM (CARDIZEM LA) 120 MG 24 HR TABLET diltiaZEM (CARDIZEM LA) 120 mg 24 hr tablet       Take 2 tablets (240 mg total) by mouth once daily.    Take 1 tablet (120 mg total) by mouth once daily.       Hearing loss, unspecified hearing loss type, unspecified laterality  Comments:  stable    Essential hypertension  Comments:  not controlled; will adjust meds and have her come back in 2 weeks    Anxiety  Comments:  will treat with low dose xanax bid prn to help her be less anxious and help BP    PAD (peripheral artery disease)  Comments:  no symptoms and stable    Other orders  -     ALPRAZolam (XANAX) 0.25 MG tablet; Take 1 tablet (0.25 mg total) by mouth 2 (two) times daily as needed for Anxiety.  Dispense: 30 tablet; Refill: 0  -     diltiaZEM (CARDIZEM LA) 120 mg 24 hr tablet; Take 2 tablets (240 mg total) by mouth once daily.  Dispense: 60 tablet; Refill: 11  -     carvedilol (COREG) 12.5 MG tablet; Take 1 tablet (12.5 mg total) by mouth 2 (two) times daily with meals.  Dispense: 60 tablet; Refill: 11

## 2018-02-19 ENCOUNTER — OFFICE VISIT (OUTPATIENT)
Dept: FAMILY MEDICINE | Facility: CLINIC | Age: 78
End: 2018-02-19
Payer: MEDICARE

## 2018-02-19 VITALS
DIASTOLIC BLOOD PRESSURE: 76 MMHG | HEIGHT: 65 IN | OXYGEN SATURATION: 99 % | TEMPERATURE: 98 F | HEART RATE: 76 BPM | BODY MASS INDEX: 33.92 KG/M2 | SYSTOLIC BLOOD PRESSURE: 134 MMHG | WEIGHT: 203.63 LBS

## 2018-02-19 DIAGNOSIS — I10 ESSENTIAL HYPERTENSION: Primary | ICD-10-CM

## 2018-02-19 PROCEDURE — 1159F MED LIST DOCD IN RCRD: CPT | Mod: S$GLB,,, | Performed by: FAMILY MEDICINE

## 2018-02-19 PROCEDURE — 1126F AMNT PAIN NOTED NONE PRSNT: CPT | Mod: S$GLB,,, | Performed by: FAMILY MEDICINE

## 2018-02-19 PROCEDURE — 99213 OFFICE O/P EST LOW 20 MIN: CPT | Mod: S$GLB,,, | Performed by: FAMILY MEDICINE

## 2018-02-19 RX ORDER — CARVEDILOL 25 MG/1
25 TABLET ORAL 2 TIMES DAILY WITH MEALS
Qty: 60 TABLET | Refills: 11 | Status: SHIPPED | OUTPATIENT
Start: 2018-02-19 | End: 2018-03-09

## 2018-02-19 RX ORDER — DILTIAZEM HYDROCHLORIDE EXTENDED-RELEASE TABLETS 240 MG/1
240 TABLET, EXTENDED RELEASE ORAL DAILY
Qty: 30 TABLET | Refills: 11 | Status: SHIPPED | OUTPATIENT
Start: 2018-02-19 | End: 2018-03-09

## 2018-02-19 NOTE — PROGRESS NOTES
Subjective:      Patient ID: Katy Soto is a 77 y.o. female.    Chief Complaint: Hypertension    Blood pressure has been too high at home.  She brought numbers with her there are reason the 160s systolic.  She's been compliant with her medicines Coreg 12.5 twice a day and Cardizem extended release 120 once a day she also is on Lasix no headaches or chest pain her left nostril has been bleeding but it is been doing that off and on since she was a child.  She one episode of [employer] Omniplane yesterday suddenly socially with nausea diaphoresis that resolved with drinking Coca-Cola      Review of Systems   Constitutional: Negative.    HENT: Negative.    Respiratory: Negative.    Cardiovascular: Negative.    Gastrointestinal: Positive for abdominal pain.        The pain was yesterday negative result quickly spontaneously   Endocrine: Negative.    Genitourinary: Negative.    Musculoskeletal: Negative.    Psychiatric/Behavioral: Negative.    All other systems reviewed and are negative.    Objective:     Physical Exam   Constitutional: She is oriented to person, place, and time. She appears well-developed and well-nourished.   HENT:   Head: Normocephalic.   Eyes: Conjunctivae and EOM are normal. Pupils are equal, round, and reactive to light.   Neck: Normal range of motion. Neck supple.   Cardiovascular: Normal rate, regular rhythm and normal heart sounds.    Pulmonary/Chest: Effort normal and breath sounds normal.   Musculoskeletal: Normal range of motion.   Neurological: She is alert and oriented to person, place, and time. She has normal reflexes.   Skin: Skin is warm and dry.   Psychiatric: She has a normal mood and affect. Her behavior is normal. Judgment and thought content normal.     Assessment:     1. Essential hypertension      Plan:     New Prescriptions    DILTIAZEM HCL (CARDIZEM LA) 240 MG 24 HR TABLET    Take 1 tablet (240 mg total) by mouth once daily.     Discontinued Medications    DILTIAZEM (CARDIZEM  LA) 120 MG 24 HR TABLET    Take 2 tablets (240 mg total) by mouth once daily.     Modified Medications    Modified Medication Previous Medication    CARVEDILOL (COREG) 25 MG TABLET carvedilol (COREG) 12.5 MG tablet       Take 1 tablet (25 mg total) by mouth 2 (two) times daily with meals.    Take 1 tablet (12.5 mg total) by mouth 2 (two) times daily with meals.       Essential hypertension    Other orders  -     carvedilol (COREG) 25 MG tablet; Take 1 tablet (25 mg total) by mouth 2 (two) times daily with meals.  Dispense: 60 tablet; Refill: 11  -     diltiaZEM HCl (CARDIZEM LA) 240 mg 24 hr tablet; Take 1 tablet (240 mg total) by mouth once daily.  Dispense: 30 tablet; Refill: 11      Increasing Cardizem to 240 extended release once a day and Coreg 25 twice a day and return to clinic 2 weeks for blood pressure check

## 2018-02-28 RX ORDER — ALPRAZOLAM 0.25 MG/1
TABLET ORAL
Qty: 30 TABLET | Refills: 0 | Status: SHIPPED | OUTPATIENT
Start: 2018-02-28 | End: 2018-03-09 | Stop reason: ALTCHOICE

## 2018-03-09 ENCOUNTER — OFFICE VISIT (OUTPATIENT)
Dept: FAMILY MEDICINE | Facility: CLINIC | Age: 78
End: 2018-03-09
Payer: MEDICARE

## 2018-03-09 VITALS
TEMPERATURE: 98 F | WEIGHT: 203.63 LBS | HEART RATE: 72 BPM | BODY MASS INDEX: 33.92 KG/M2 | DIASTOLIC BLOOD PRESSURE: 90 MMHG | HEIGHT: 65 IN | SYSTOLIC BLOOD PRESSURE: 150 MMHG | OXYGEN SATURATION: 99 %

## 2018-03-09 DIAGNOSIS — I10 ESSENTIAL HYPERTENSION: Primary | ICD-10-CM

## 2018-03-09 PROCEDURE — 99213 OFFICE O/P EST LOW 20 MIN: CPT | Mod: S$GLB,,, | Performed by: FAMILY MEDICINE

## 2018-03-09 RX ORDER — ISOSORBIDE MONONITRATE 30 MG/1
30 TABLET, EXTENDED RELEASE ORAL DAILY
Qty: 30 TABLET | Refills: 11 | Status: SHIPPED | OUTPATIENT
Start: 2018-03-09 | End: 2018-03-26 | Stop reason: SDUPTHER

## 2018-03-09 NOTE — PROGRESS NOTES
Subjective:      Patient ID: Katy Soto is a 77 y.o. female.    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    HENT: Negative.    Respiratory: Negative.    Cardiovascular: Negative.    Gastrointestinal: Negative.    Endocrine: Negative.    Genitourinary: Negative.    Musculoskeletal: Negative.    Psychiatric/Behavioral: Negative.    All other systems reviewed and are negative.    Objective:     Physical Exam   Constitutional: She is oriented to person, place, and time. She appears well-developed and well-nourished.   HENT:   Head: Normocephalic.   [place]   Eyes: Conjunctivae and EOM are normal. Pupils are equal, round, and reactive to light.   Neck: Normal range of motion. Neck supple.   Cardiovascular: Normal rate, regular rhythm and normal heart sounds.    Pulmonary/Chest: Effort normal and breath sounds normal.   Musculoskeletal: Normal range of motion.   Neurological: She is alert and oriented to person, place, and time. She has normal reflexes.   Skin: Skin is warm and dry.   Psychiatric: She has a normal mood and affect. Her behavior is normal. Judgment and thought content normal.   Nursing note and vitals reviewed.    Assessment:     1. Essential hypertension      Plan:     New Prescriptions    ISOSORBIDE MONONITRATE (IMDUR) 30 MG 24 HR TABLET    Take 1 tablet (30 mg total) by mouth once daily.     Discontinued Medications    ALPRAZOLAM (XANAX) 0.25 MG TABLET    TAKE 1 TABLET BY MOUTH TWICE DAILY AS NEEDED FOR ANXIETY    CARVEDILOL (COREG) 25 MG TABLET    Take 1 tablet (25 mg total) by mouth 2 (two) times daily with meals.     CILOSTAZOL (PLETAL) 50 MG TAB    Take 50 mg by mouth 2 (two) times daily.    DILTIAZEM HCL (CARDIZEM LA) 240 MG 24 HR TABLET    Take 1 tablet (240 mg total) by mouth once daily.    IBUPROFEN (ADVIL,MOTRIN) 600 MG TABLET    Take 1 tablet (600 mg total) by mouth 2 (two) times daily as needed for Pain. With food as needed     Modified Medications    No medications on file       Essential hypertension    Other orders  -     isosorbide mononitrate (IMDUR) 30 MG 24 hr tablet; Take 1 tablet (30 mg total) by mouth once daily.  Dispense: 30 tablet; Refill: 11    recheck one week with me

## 2018-03-26 ENCOUNTER — OFFICE VISIT (OUTPATIENT)
Dept: FAMILY MEDICINE | Facility: CLINIC | Age: 78
End: 2018-03-26
Payer: MEDICARE

## 2018-03-26 VITALS
TEMPERATURE: 98 F | HEART RATE: 80 BPM | DIASTOLIC BLOOD PRESSURE: 70 MMHG | WEIGHT: 199.94 LBS | HEIGHT: 65 IN | SYSTOLIC BLOOD PRESSURE: 160 MMHG | BODY MASS INDEX: 33.31 KG/M2 | OXYGEN SATURATION: 98 %

## 2018-03-26 DIAGNOSIS — I10 ESSENTIAL HYPERTENSION: Primary | ICD-10-CM

## 2018-03-26 PROCEDURE — 90670 PCV13 VACCINE IM: CPT | Mod: S$GLB,,, | Performed by: FAMILY MEDICINE

## 2018-03-26 PROCEDURE — 99213 OFFICE O/P EST LOW 20 MIN: CPT | Mod: S$GLB,,, | Performed by: FAMILY MEDICINE

## 2018-03-26 PROCEDURE — G0009 ADMIN PNEUMOCOCCAL VACCINE: HCPCS | Mod: S$GLB,,, | Performed by: FAMILY MEDICINE

## 2018-03-26 RX ORDER — ISOSORBIDE MONONITRATE 60 MG/1
60 TABLET, EXTENDED RELEASE ORAL DAILY
Qty: 90 TABLET | Refills: 1 | Status: SHIPPED | OUTPATIENT
Start: 2018-03-26 | End: 2018-09-23 | Stop reason: SDUPTHER

## 2018-03-26 NOTE — PROGRESS NOTES
Subjective:      Patient ID: Katy Soto is a 77 y.o. female.    Chief Complaint: Follow-up (bloodpressure)    bp follow up with family; not dizzy, nose not bleeding, goes outside, walking dee; taking imdur 30, lasix, ARB; bp numbers from home, in 160's;       Review of Systems   Constitutional: Negative.    HENT: Positive for hearing loss.    Respiratory: Negative.    Cardiovascular: Negative.    Gastrointestinal: Negative.    Endocrine: Negative.    Genitourinary: Negative.    Musculoskeletal: Negative.    Psychiatric/Behavioral: Negative.    All other systems reviewed and are negative.    Objective:     Physical Exam   Constitutional: She is oriented to person, place, and time. She appears well-developed and well-nourished.   HENT:   Head: Normocephalic.   Eyes: Conjunctivae and EOM are normal. Pupils are equal, round, and reactive to light.   Neck: Normal range of motion. Neck supple.   Cardiovascular: Normal rate, regular rhythm and normal heart sounds.    Pulmonary/Chest: Effort normal and breath sounds normal.   Musculoskeletal: Normal range of motion.   Neurological: She is alert and oriented to person, place, and time. She has normal reflexes.   Skin: Skin is warm and dry.   Psychiatric: She has a normal mood and affect. Her behavior is normal. Judgment and thought content normal.   Nursing note and vitals reviewed.    Assessment:     1. Essential hypertension      Plan:     New Prescriptions    No medications on file     Discontinued Medications    No medications on file     Modified Medications    Modified Medication Previous Medication    ISOSORBIDE MONONITRATE (IMDUR) 60 MG 24 HR TABLET isosorbide mononitrate (IMDUR) 30 MG 24 hr tablet       Take 1 tablet (60 mg total) by mouth once daily.    Take 1 tablet (30 mg total) by mouth once daily.       Essential hypertension  Comments:  dobule imdur 60, recheck one months    Other orders  -     isosorbide mononitrate (IMDUR) 60 MG 24 hr tablet; Take 1  tablet (60 mg total) by mouth once daily.  Dispense: 90 tablet; Refill: 1  -     Pneumococcal Conjugate Vaccine (13 Valent) (IM)

## 2018-04-26 ENCOUNTER — OFFICE VISIT (OUTPATIENT)
Dept: FAMILY MEDICINE | Facility: CLINIC | Age: 78
End: 2018-04-26
Payer: MEDICARE

## 2018-04-26 VITALS
DIASTOLIC BLOOD PRESSURE: 98 MMHG | HEIGHT: 65 IN | SYSTOLIC BLOOD PRESSURE: 160 MMHG | BODY MASS INDEX: 34.79 KG/M2 | HEART RATE: 70 BPM | WEIGHT: 208.81 LBS | TEMPERATURE: 98 F | OXYGEN SATURATION: 100 %

## 2018-04-26 DIAGNOSIS — I10 ESSENTIAL HYPERTENSION: Primary | ICD-10-CM

## 2018-04-26 DIAGNOSIS — E78.5 HYPERLIPIDEMIA, UNSPECIFIED HYPERLIPIDEMIA TYPE: ICD-10-CM

## 2018-04-26 PROBLEM — H91.90 HEARING LOSS: Status: RESOLVED | Noted: 2017-03-01 | Resolved: 2018-04-26

## 2018-04-26 PROBLEM — F32.A DEPRESSION: Status: RESOLVED | Noted: 2017-12-21 | Resolved: 2018-04-26

## 2018-04-26 PROCEDURE — 99213 OFFICE O/P EST LOW 20 MIN: CPT | Mod: S$GLB,,, | Performed by: FAMILY MEDICINE

## 2018-04-26 RX ORDER — AMLODIPINE BESYLATE 5 MG/1
5 TABLET ORAL DAILY
Qty: 30 TABLET | Refills: 11 | Status: SHIPPED | OUTPATIENT
Start: 2018-04-26 | End: 2018-05-25 | Stop reason: SDUPTHER

## 2018-04-26 NOTE — PROGRESS NOTES
Subjective:      Patient ID: Katy Soto is a 77 y.o. female.    Chief Complaint: Follow-up and Blood Pressure Check      HPI   BP follow up; here with her family; she stays active, no more bleeds, recording bP, taking meds; asking about weight gain and the BP meds.; daughter won't let me give clonidine    Review of Systems   Constitutional: Positive for unexpected weight change.   HENT: Negative.  Negative for nosebleeds.    Respiratory: Negative.    Cardiovascular: Negative.    Gastrointestinal: Negative.    Endocrine: Negative.    Genitourinary: Negative.    Musculoskeletal: Negative.    Psychiatric/Behavioral: Negative.    All other systems reviewed and are negative.    Objective:     Physical Exam   Constitutional: She is oriented to person, place, and time. She appears well-developed and well-nourished.   HENT:   Head: Normocephalic.   Eyes: Conjunctivae and EOM are normal. Pupils are equal, round, and reactive to light.   Neck: Normal range of motion. Neck supple.   Cardiovascular: Normal rate, regular rhythm and normal heart sounds.    Pulmonary/Chest: Effort normal and breath sounds normal.   Musculoskeletal: Normal range of motion.   Neurological: She is alert and oriented to person, place, and time. She has normal reflexes.   Skin: Skin is warm and dry.   Psychiatric: She has a normal mood and affect. Her behavior is normal. Judgment and thought content normal.   Nursing note and vitals reviewed.    Assessment:     1. Essential hypertension    2. Hyperlipidemia, unspecified hyperlipidemia type      Plan:     New Prescriptions    AMLODIPINE (NORVASC) 5 MG TABLET    Take 1 tablet (5 mg total) by mouth once daily.     Discontinued Medications    BLOOD SUGAR DIAGNOSTIC (BLOOD GLUCOSE TEST) STRP    Please disp strips and lancets for true result meter  Pt testing BID  Disp 90 supply of each with 3 refills    BLOOD SUGAR DIAGNOSTIC STRP    1 each by Misc.(Non-Drug; Combo Route) route 2 (two) times daily  before meals.     Modified Medications    No medications on file       Essential hypertension    Hyperlipidemia, unspecified hyperlipidemia type    Other orders  -     amLODIPine (NORVASC) 5 MG tablet; Take 1 tablet (5 mg total) by mouth once daily.  Dispense: 30 tablet; Refill: 11    trying with norvasc 5 mg daily and bring numbers in 2 weeks; RTC 1 months

## 2018-05-25 ENCOUNTER — OFFICE VISIT (OUTPATIENT)
Dept: FAMILY MEDICINE | Facility: CLINIC | Age: 78
End: 2018-05-25
Payer: MEDICARE

## 2018-05-25 VITALS
HEART RATE: 77 BPM | OXYGEN SATURATION: 97 % | TEMPERATURE: 98 F | BODY MASS INDEX: 33.86 KG/M2 | WEIGHT: 203.25 LBS | DIASTOLIC BLOOD PRESSURE: 80 MMHG | HEIGHT: 65 IN | SYSTOLIC BLOOD PRESSURE: 140 MMHG

## 2018-05-25 DIAGNOSIS — I10 ESSENTIAL HYPERTENSION: Primary | ICD-10-CM

## 2018-05-25 PROCEDURE — 99213 OFFICE O/P EST LOW 20 MIN: CPT | Mod: S$GLB,,, | Performed by: FAMILY MEDICINE

## 2018-05-25 RX ORDER — AMLODIPINE BESYLATE 10 MG/1
10 TABLET ORAL DAILY
Qty: 90 TABLET | Refills: 3 | Status: SHIPPED | OUTPATIENT
Start: 2018-05-25 | End: 2018-06-25

## 2018-05-25 NOTE — PROGRESS NOTES
Subjective:      Patient ID: Katy Soto is a 77 y.o. female.    Chief Complaint: Follow-up (bloodpressure)      HPI   bp check; feels ok; on amlodipine 5 mg; brought numbers from home will increase to 10 mg and recheck bp 1 month    Review of Systems   Constitutional: Negative.    HENT: Negative.    Respiratory: Negative.    Cardiovascular: Negative.         Bp better at home; brougth numbers   Gastrointestinal: Negative.    Endocrine: Negative.    Genitourinary: Negative.    Musculoskeletal: Negative.    Psychiatric/Behavioral: Negative.    All other systems reviewed and are negative.    Objective:     Physical Exam   Constitutional: She is oriented to person, place, and time. She appears well-developed and well-nourished.   HENT:   Head: Normocephalic.   Eyes: Conjunctivae and EOM are normal. Pupils are equal, round, and reactive to light.   Neck: Normal range of motion. Neck supple.   Cardiovascular: Normal rate, regular rhythm and normal heart sounds.    Pulmonary/Chest: Effort normal and breath sounds normal.   Musculoskeletal: Normal range of motion.   Neurological: She is alert and oriented to person, place, and time. She has normal reflexes.   Skin: Skin is warm and dry.   Psychiatric: She has a normal mood and affect. Her behavior is normal. Judgment and thought content normal.   Nursing note and vitals reviewed.    Assessment:     1. Essential hypertension      Plan:     New Prescriptions    No medications on file     Discontinued Medications    No medications on file     Modified Medications    Modified Medication Previous Medication    AMLODIPINE (NORVASC) 10 MG TABLET amLODIPine (NORVASC) 5 MG tablet       Take 1 tablet (10 mg total) by mouth once daily.    Take 1 tablet (5 mg total) by mouth once daily.       Essential hypertension    Other orders  -     amLODIPine (NORVASC) 10 MG tablet; Take 1 tablet (10 mg total) by mouth once daily.  Dispense: 90 tablet; Refill: 3

## 2018-06-04 DIAGNOSIS — E78.5 HYPERLIPIDEMIA, UNSPECIFIED HYPERLIPIDEMIA TYPE: ICD-10-CM

## 2018-06-04 DIAGNOSIS — Z78.0 ASYMPTOMATIC POSTMENOPAUSAL STATUS: ICD-10-CM

## 2018-06-04 DIAGNOSIS — E11.9 TYPE 2 DIABETES MELLITUS WITHOUT COMPLICATION, WITHOUT LONG-TERM CURRENT USE OF INSULIN: ICD-10-CM

## 2018-06-04 DIAGNOSIS — H91.90 HEARING LOSS, UNSPECIFIED HEARING LOSS TYPE, UNSPECIFIED LATERALITY: ICD-10-CM

## 2018-06-04 DIAGNOSIS — I10 ESSENTIAL HYPERTENSION: ICD-10-CM

## 2018-06-04 DIAGNOSIS — E03.4 HYPOTHYROIDISM DUE TO ACQUIRED ATROPHY OF THYROID: ICD-10-CM

## 2018-06-05 RX ORDER — IRBESARTAN 300 MG/1
TABLET ORAL
Qty: 90 TABLET | Refills: 1 | Status: SHIPPED | OUTPATIENT
Start: 2018-06-05 | End: 2018-06-05 | Stop reason: SDUPTHER

## 2018-06-05 NOTE — TELEPHONE ENCOUNTER
----- Message from Mercedes Uriarte sent at 6/5/2018 12:28 PM CDT -----  Contact: self  Pt came in wanting to get her medication irbesartan refill. [phone]

## 2018-06-07 RX ORDER — IRBESARTAN 300 MG/1
TABLET ORAL
Qty: 90 TABLET | Refills: 1 | Status: SHIPPED | OUTPATIENT
Start: 2018-06-07 | End: 2019-05-21

## 2018-06-25 ENCOUNTER — TELEPHONE (OUTPATIENT)
Dept: FAMILY MEDICINE | Facility: CLINIC | Age: 78
End: 2018-06-25

## 2018-06-25 ENCOUNTER — CLINICAL SUPPORT (OUTPATIENT)
Dept: FAMILY MEDICINE | Facility: CLINIC | Age: 78
End: 2018-06-25
Payer: MEDICARE

## 2018-06-25 VITALS — OXYGEN SATURATION: 100 % | SYSTOLIC BLOOD PRESSURE: 154 MMHG | HEART RATE: 56 BPM | DIASTOLIC BLOOD PRESSURE: 82 MMHG

## 2018-06-25 DIAGNOSIS — I10 ESSENTIAL HYPERTENSION: Primary | ICD-10-CM

## 2018-06-25 RX ORDER — AMLODIPINE BESYLATE 5 MG/1
5 TABLET ORAL DAILY
Qty: 90 TABLET | Refills: 1 | Status: SHIPPED | OUTPATIENT
Start: 2018-06-25 | End: 2019-01-27 | Stop reason: SDUPTHER

## 2018-06-25 NOTE — TELEPHONE ENCOUNTER
Patient here this am for BP check    Pt is currently taking   asa 81 mg  Atorvastatin 40 mg  Lasix 40 mg daily  Avapro 300 mg daily  Imdur 60 mg daily  K Dur 20 mEq daily  At her last office visit you increased her Norvasc from 5 mg to 10 mg  She took 3 doses and it caused swelling so she stopped taking it  So she is no longer taking nay strength of Norvasc  She said the 5 mg did not cause any swelling    /82  P  56  See BP log scanned into media today    Please advise pt  [phone]  If you add 5 mg Norvasc back she will need a new rx sent in to [facility]

## 2018-06-25 NOTE — PROGRESS NOTES
Katy SHIPMAN Brittany 78 y.o. female is here today for Blood Pressure check.   History of HTN yes.    Review of patient's allergies indicates:   Allergen Reactions    Cilostazol      [id number] and stomach cramps    Coreg [carvedilol]      Cramps stomach and nausea    Diltiazem      nauseana d stoamch cramps    Lexapro [escitalopram oxalate]      Dizzy, nausea and weak    Shellfish containing products Hives       Patient verifies taking blood pressure medications on a regular basis at the same time of the day.   Does patient have record of home blood pressure readings yes.   Last dose of blood pressure medication was taken at 7 am.  Patient is asymptomatic.       Pt is currently taking   asa 81 mg  Atorvastatin 40 mg  Lasix 40 mg daily  Avapro 300 mg daily  Imdur 60 mg daily  K Dur 20 mEq daily    At her last office visit Dr Cruz increased her Norvasc from 5 mg to 10 mg  She took 3 doses and it caused swelling so she stopped taking it  So she is no longer taking nay strength of Norvasc  She said the 5 mg did not cause any swelling    /82  P  56  See BP log scanned into media today         Dr. Cruz notified.

## 2018-09-23 RX ORDER — ISOSORBIDE MONONITRATE 60 MG/1
TABLET, EXTENDED RELEASE ORAL
Qty: 90 TABLET | Refills: 0 | Status: SHIPPED | OUTPATIENT
Start: 2018-09-23 | End: 2018-12-20 | Stop reason: SDUPTHER

## 2018-11-07 ENCOUNTER — PES CALL (OUTPATIENT)
Dept: ADMINISTRATIVE | Facility: CLINIC | Age: 78
End: 2018-11-07

## 2018-12-03 DIAGNOSIS — E03.4 HYPOTHYROIDISM DUE TO ACQUIRED ATROPHY OF THYROID: ICD-10-CM

## 2018-12-03 DIAGNOSIS — I10 ESSENTIAL HYPERTENSION: ICD-10-CM

## 2018-12-03 DIAGNOSIS — H91.90 HEARING LOSS, UNSPECIFIED HEARING LOSS TYPE, UNSPECIFIED LATERALITY: ICD-10-CM

## 2018-12-03 DIAGNOSIS — Z78.0 ASYMPTOMATIC POSTMENOPAUSAL STATUS: ICD-10-CM

## 2018-12-03 DIAGNOSIS — E11.9 TYPE 2 DIABETES MELLITUS WITHOUT COMPLICATION, WITHOUT LONG-TERM CURRENT USE OF INSULIN: ICD-10-CM

## 2018-12-03 DIAGNOSIS — E78.5 HYPERLIPIDEMIA, UNSPECIFIED HYPERLIPIDEMIA TYPE: ICD-10-CM

## 2018-12-03 RX ORDER — IRBESARTAN 300 MG/1
TABLET ORAL
Qty: 90 TABLET | Refills: 0 | Status: SHIPPED | OUTPATIENT
Start: 2018-12-03 | End: 2018-12-14 | Stop reason: SDUPTHER

## 2018-12-14 ENCOUNTER — OFFICE VISIT (OUTPATIENT)
Dept: INTERNAL MEDICINE | Facility: CLINIC | Age: 78
End: 2018-12-14
Payer: MEDICARE

## 2018-12-14 VITALS
DIASTOLIC BLOOD PRESSURE: 86 MMHG | HEART RATE: 87 BPM | OXYGEN SATURATION: 97 % | SYSTOLIC BLOOD PRESSURE: 122 MMHG | HEIGHT: 64 IN | TEMPERATURE: 98 F | BODY MASS INDEX: 34.1 KG/M2 | WEIGHT: 199.75 LBS

## 2018-12-14 DIAGNOSIS — M19.90 ARTHRITIS: ICD-10-CM

## 2018-12-14 DIAGNOSIS — I10 ESSENTIAL HYPERTENSION: ICD-10-CM

## 2018-12-14 DIAGNOSIS — Z00.00 ENCOUNTER FOR PREVENTIVE HEALTH EXAMINATION: Primary | ICD-10-CM

## 2018-12-14 DIAGNOSIS — Z23 IMMUNIZATION DUE: ICD-10-CM

## 2018-12-14 DIAGNOSIS — F41.9 ANXIETY: ICD-10-CM

## 2018-12-14 DIAGNOSIS — E66.9 OBESITY (BMI 30.0-34.9): ICD-10-CM

## 2018-12-14 DIAGNOSIS — M85.80 OSTEOPENIA, UNSPECIFIED LOCATION: ICD-10-CM

## 2018-12-14 DIAGNOSIS — E78.5 HYPERLIPIDEMIA, UNSPECIFIED HYPERLIPIDEMIA TYPE: ICD-10-CM

## 2018-12-14 DIAGNOSIS — I70.0 ATHEROSCLEROSIS OF AORTA: ICD-10-CM

## 2018-12-14 DIAGNOSIS — I73.9 PAD (PERIPHERAL ARTERY DISEASE): ICD-10-CM

## 2018-12-14 PROBLEM — E66.811 OBESITY (BMI 30.0-34.9): Status: ACTIVE | Noted: 2018-12-14

## 2018-12-14 PROCEDURE — G0439 PPPS, SUBSEQ VISIT: HCPCS | Mod: S$GLB,,, | Performed by: NURSE PRACTITIONER

## 2018-12-14 PROCEDURE — 99214 OFFICE O/P EST MOD 30 MIN: CPT | Mod: PBBFAC,PN | Performed by: NURSE PRACTITIONER

## 2018-12-14 PROCEDURE — 99999 PR PBB SHADOW E&M-EST. PATIENT-LVL IV: CPT | Mod: PBBFAC,,, | Performed by: NURSE PRACTITIONER

## 2018-12-14 NOTE — PROGRESS NOTES
"Katy Soto presented for a  Medicare AWV and comprehensive Health Risk Assessment today. The following components were reviewed and updated:    · Medical history  · Family History  · Social history  · Allergies and Current Medications  · Health Risk Assessment  · Health Maintenance  · Care Team     ** See Completed Assessments for Annual Wellness Visit within the encounter summary.**       The following assessments were completed:  · Living Situation  · CAGE  · Depression Screening  · Timed Get Up and Go  · Whisper Test  · Cognitive Function Screening  · Nutrition Screening  · ADL Screening  · PAQ Screening    Vitals:    12/14/18 0808   BP: 122/86   BP Location: Left arm   Patient Position: Sitting   BP Method: Medium (Manual)   Pulse: 87   Temp: 98.4 °F (36.9 °C)   TempSrc: Oral   SpO2: 97%   Weight: 90.6 kg (199 lb 11.8 oz)   Height: 5' 4" (1.626 m)     Body mass index is 34.28 kg/m².  Physical Exam   Constitutional: She is oriented to person, place, and time. She appears well-developed and well-nourished.   HENT:   Head: Normocephalic and atraumatic.   Wearing bilateral hearing aids.    Eyes: EOM are normal. Pupils are equal, round, and reactive to light.   Neck: Normal range of motion.   Cardiovascular: Normal rate, regular rhythm and normal heart sounds.   Pulmonary/Chest: Effort normal. No respiratory distress. She has rhonchi.   Musculoskeletal: Normal range of motion.   Neurological: She is alert and oriented to person, place, and time. Coordination normal.   Skin: Skin is warm and dry.   Psychiatric: She has a normal mood and affect. Her behavior is normal. Judgment and thought content normal.   Nursing note and vitals reviewed.        Diagnoses and health risks identified today and associated recommendations/orders:    1. Encounter for preventive health examination    2. PAD (peripheral artery disease)  Chronic; stable. Continue current treatment plan as previously prescribed by Cardiology (Dr." Corky).     3. Atherosclerosis of aorta  Noted on chest xray dated 10/31/2011. Patient followed by Cardiology.     4. Essential hypertension  Chronic; stable. Continue current treatment plan as previously prescribed by PCP.     5. Hyperlipidemia, unspecified hyperlipidemia type  Chronic; stable. Continue current treatment plan as previously prescribed by PCP.     6. Anxiety  Chronic; stable. Patient followed by PCP.     7. Arthritis  Chronic; stable. Continue current treatment plan as previously prescribed by PCP.     8. Osteopenia, unspecified location  Noted on DEXA scan dated 4/4/2017. Patient followed by PCP.     9. Obesity (BMI 30.0-34.9)  Current BMI 34.28. Lifestyle modifications discussed with patient.     10. Immunization due  Patient declined immunization at this time. Patient aware of benefits of being immunized and risks of not getting immunization. Patient reports she has reservations because she had a bad experience with the flu vaccine that caused her entire arm to swell.   - Influenza vaccine        Provided Katy with a 5-10 year written screening schedule and personal prevention plan. Recommendations were developed using the USPSTF age appropriate recommendations. Education, counseling, and referrals were provided as needed. After Visit Summary printed and given to patient which includes a list of additional screenings\tests needed.    Follow-up for HRA visit in 1 year.    Neil Diaz NP

## 2018-12-14 NOTE — PATIENT INSTRUCTIONS
Counseling and Referral of Other Preventative  (Italic type indicates deductible and co-insurance are waived)    Patient Name: Katy Soto  Today's Date: 12/14/2018    Health Maintenance       Date Due Completion Date    Influenza Vaccine 08/01/2018 12/21/2017 (Declined)    Override on 12/21/2017: Declined    Override on 3/1/2017: Not Clinically Appropriate (dec)    Pneumococcal Vaccine (65+ Low/Medium Risk) (2 of 2 - PPSV23) 03/26/2019 3/26/2018    DEXA SCAN 04/04/2020 4/4/2017    Override on 3/1/2017: Done    Lipid Panel 06/06/2022 6/6/2017    TETANUS VACCINE 03/01/2027 3/1/2017 (Done)    Override on 3/1/2017: Done (dec)        No orders of the defined types were placed in this encounter.    The following information is provided to all patients.  This information is to help you find resources for any of the problems found today that may be affecting your health:                Living healthy guide: www.[facility].[place].gov      Understanding Diabetes: www.diabetes.org      Eating healthy: www.cdc.gov/healthyweight      CDC home safety checklist: www.cdc.gov/steadi/patient.html      Agency on Aging: www.goea.[place].gov      Alcoholics anonymous (AA): www.aa.org      Physical Activity: www.spencer.nih.gov/rp0lupv      Tobacco use: www.quitwithusla.org

## 2018-12-19 ENCOUNTER — HOSPITAL ENCOUNTER (EMERGENCY)
Facility: HOSPITAL | Age: 78
Discharge: HOME | End: 2018-12-19
Attending: EMERGENCY MEDICINE
Payer: MEDICARE

## 2018-12-19 VITALS
TEMPERATURE: 98 F | SYSTOLIC BLOOD PRESSURE: 139 MMHG | HEART RATE: 70 BPM | WEIGHT: 198 LBS | HEIGHT: 64 IN | DIASTOLIC BLOOD PRESSURE: 65 MMHG | RESPIRATION RATE: 18 BRPM | OXYGEN SATURATION: 100 % | BODY MASS INDEX: 33.8 KG/M2

## 2018-12-19 DIAGNOSIS — R55 NEAR SYNCOPE: ICD-10-CM

## 2018-12-19 DIAGNOSIS — J06.9 UPPER RESPIRATORY TRACT INFECTION, UNSPECIFIED TYPE: ICD-10-CM

## 2018-12-19 DIAGNOSIS — E86.0 DEHYDRATION: Primary | ICD-10-CM

## 2018-12-19 DIAGNOSIS — J32.9 SINUSITIS, UNSPECIFIED CHRONICITY, UNSPECIFIED LOCATION: ICD-10-CM

## 2018-12-19 DIAGNOSIS — R42 DIZZINESS: ICD-10-CM

## 2018-12-19 LAB
ALBUMIN SERPL BCP-MCNC: 3.8 G/DL
ALP SERPL-CCNC: 126 U/L
ALT SERPL W/O P-5'-P-CCNC: 13 U/L
ANION GAP SERPL CALC-SCNC: 8 MMOL/L
AST SERPL-CCNC: 18 U/L
BACTERIA #/AREA URNS AUTO: ABNORMAL /HPF
BASOPHILS # BLD AUTO: 0.02 K/UL
BASOPHILS NFR BLD: 0.2 %
BILIRUB SERPL-MCNC: 0.9 MG/DL
BILIRUB UR QL STRIP: NEGATIVE
BUN SERPL-MCNC: 20 MG/DL
CALCIUM SERPL-MCNC: 9.1 MG/DL
CHLORIDE SERPL-SCNC: 101 MMOL/L
CLARITY UR REFRACT.AUTO: ABNORMAL
CO2 SERPL-SCNC: 29 MMOL/L
COLOR UR AUTO: ABNORMAL
CREAT SERPL-MCNC: 1.41 MG/DL
DIFFERENTIAL METHOD: ABNORMAL
EOSINOPHIL # BLD AUTO: 0.3 K/UL
EOSINOPHIL NFR BLD: 3.1 %
ERYTHROCYTE [DISTWIDTH] IN BLOOD BY AUTOMATED COUNT: 13.2 %
EST. GFR  (AFRICAN AMERICAN): 40.9 ML/MIN/1.73 M^2
EST. GFR  (NON AFRICAN AMERICAN): 35.5 ML/MIN/1.73 M^2
GLUCOSE SERPL-MCNC: 190 MG/DL
GLUCOSE UR QL STRIP: ABNORMAL
HCT VFR BLD AUTO: 38.1 %
HGB BLD-MCNC: 12.1 G/DL
HGB UR QL STRIP: ABNORMAL
HYALINE CASTS UR QL AUTO: 2 /LPF
KETONES UR QL STRIP: ABNORMAL
LACTATE SERPL-SCNC: 1.3 MMOL/L
LEUKOCYTE ESTERASE UR QL STRIP: ABNORMAL
LYMPHOCYTES # BLD AUTO: 2.4 K/UL
LYMPHOCYTES NFR BLD: 28.2 %
MAGNESIUM SERPL-MCNC: 1.9 MG/DL
MCH RBC QN AUTO: 29.6 PG
MCHC RBC AUTO-ENTMCNC: 31.8 G/DL
MCV RBC AUTO: 93 FL
MICROSCOPIC COMMENT: ABNORMAL
MONOCYTES # BLD AUTO: 0.8 K/UL
MONOCYTES NFR BLD: 9.1 %
NEUTROPHILS # BLD AUTO: 5 K/UL
NEUTROPHILS NFR BLD: 58.7 %
NITRITE UR QL STRIP: NEGATIVE
PH UR STRIP: 5 [PH] (ref 5–8)
PLATELET # BLD AUTO: 257 K/UL
PMV BLD AUTO: 9.9 FL
POTASSIUM SERPL-SCNC: 3.6 MMOL/L
PROT SERPL-MCNC: 8.5 G/DL
PROT UR QL STRIP: ABNORMAL
RBC # BLD AUTO: 4.09 M/UL
RBC #/AREA URNS AUTO: 2 /HPF (ref 0–4)
SODIUM SERPL-SCNC: 138 MMOL/L
SP GR UR STRIP: 1.02 (ref 1–1.03)
TROPONIN I SERPL DL<=0.01 NG/ML-MCNC: <0.012 NG/ML
URN SPEC COLLECT METH UR: ABNORMAL
UROBILINOGEN UR STRIP-ACNC: 1 EU/DL
WBC # BLD AUTO: 8.45 K/UL
WBC #/AREA URNS AUTO: 2 /HPF (ref 0–5)

## 2018-12-19 PROCEDURE — 84484 ASSAY OF TROPONIN QUANT: CPT

## 2018-12-19 PROCEDURE — 96360 HYDRATION IV INFUSION INIT: CPT

## 2018-12-19 PROCEDURE — 94760 N-INVAS EAR/PLS OXIMETRY 1: CPT

## 2018-12-19 PROCEDURE — 96361 HYDRATE IV INFUSION ADD-ON: CPT

## 2018-12-19 PROCEDURE — 25000003 PHARM REV CODE 250: Performed by: EMERGENCY MEDICINE

## 2018-12-19 PROCEDURE — 80053 COMPREHEN METABOLIC PANEL: CPT

## 2018-12-19 PROCEDURE — 93010 EKG 12-LEAD: ICD-10-PCS | Mod: ,,, | Performed by: STUDENT IN AN ORGANIZED HEALTH CARE EDUCATION/TRAINING PROGRAM

## 2018-12-19 PROCEDURE — 85025 COMPLETE CBC W/AUTO DIFF WBC: CPT

## 2018-12-19 PROCEDURE — 93010 ELECTROCARDIOGRAM REPORT: CPT | Mod: ,,, | Performed by: STUDENT IN AN ORGANIZED HEALTH CARE EDUCATION/TRAINING PROGRAM

## 2018-12-19 PROCEDURE — 83605 ASSAY OF LACTIC ACID: CPT

## 2018-12-19 PROCEDURE — 81000 URINALYSIS NONAUTO W/SCOPE: CPT

## 2018-12-19 PROCEDURE — 83735 ASSAY OF MAGNESIUM: CPT

## 2018-12-19 PROCEDURE — 99285 EMERGENCY DEPT VISIT HI MDM: CPT | Mod: 25

## 2018-12-19 PROCEDURE — 93005 ELECTROCARDIOGRAM TRACING: CPT

## 2018-12-19 RX ORDER — LEVOFLOXACIN 500 MG/1
500 TABLET, FILM COATED ORAL DAILY
Qty: 5 TABLET | Refills: 0 | Status: SHIPPED | OUTPATIENT
Start: 2018-12-19 | End: 2018-12-27

## 2018-12-19 RX ORDER — BENZONATATE 100 MG/1
100 CAPSULE ORAL EVERY 8 HOURS
Qty: 15 CAPSULE | Refills: 0 | Status: SHIPPED | OUTPATIENT
Start: 2018-12-19 | End: 2018-12-27

## 2018-12-19 RX ADMIN — SODIUM CHLORIDE 1000 ML: 0.9 INJECTION, SOLUTION INTRAVENOUS at 01:12

## 2018-12-19 NOTE — ED PROVIDER NOTES
Encounter Date: 2018       History     Chief Complaint   Patient presents with    Loss of Consciousness     Pt reports she was sitting on a chair and started feeling dizzy and sweating. Pt woke up on floor. Unknown length of LOC. PT denies any pain right now     Patient is a 78-year-old female who has been sick for several days with upper respiratory infection who was cleaning his house today and began to feel somewhat fatigued.  She stood up and ended up slipping to the ground due to the weakness and was witnessed to fall by the daughter.  There was no striking of her head or loss of consciousness.  She states that she attempted to get up again and once again felt like she was lightheaded.  She then proceeded to the emergency room for evaluation.  She denies any chest pain or shortness of breath.  She has had decreased oral intake over the last several days.          Review of patient's allergies indicates:   Allergen Reactions    Cilostazol      [id number] and stomach cramps    Coreg [carvedilol]      Cramps stomach and nausea    Diltiazem      nauseana d stoamch cramps    Lexapro [escitalopram oxalate]      Dizzy, nausea and weak    Shellfish containing products Hives     Past Medical History:   Diagnosis Date    Hyperlipidemia     Hypertension      Past Surgical History:   Procedure Laterality Date     SECTION      x1    HERNIA REPAIR      umbilical    LEG SURGERY Right 2017    stents placed     Family History   Problem Relation Age of Onset    Diabetes Mother     Hypertension Mother     Stroke Father     Heart disease Father     Heart attack Father     Cancer Sister         Breast cancer    Breast cancer Sister     No Known Problems Son     Kidney failure Sister         Kidney transplant x18 years    Lupus Sister     Hypertension Sister     Diabetes Sister     Arthritis Sister     Hypertension Sister     Cancer Brother      Social History     Tobacco Use    Smoking  status: Never Smoker    Smokeless tobacco: Never Used   Substance Use Topics    Alcohol use: No    Drug use: No     Review of Systems    Physical Exam     Initial Vitals [12/19/18 1205]   BP Pulse Resp Temp SpO2   131/63 84 18 98.3 °F (36.8 °C) 100 %      MAP       --         Physical Exam    ED Course   Procedures  Labs Reviewed   CBC W/ AUTO DIFFERENTIAL - Abnormal; Notable for the following components:       Result Value    MCHC 31.8 (*)     All other components within normal limits   COMPREHENSIVE METABOLIC PANEL - Abnormal; Notable for the following components:    Glucose 190 (*)     BUN, Bld 20 (*)     Creatinine 1.41 (*)     Total Protein 8.5 (*)     eGFR if  40.9 (*)     eGFR if non  35.5 (*)     All other components within normal limits   URINALYSIS - Abnormal; Notable for the following components:    Appearance, UA Hazy (*)     Protein, UA 1+ (*)     Glucose, UA 1+ (*)     Ketones, UA 1+ (*)     Occult Blood UA 1+ (*)     Leukocytes, UA 2+ (*)     All other components within normal limits   URINALYSIS MICROSCOPIC - Abnormal; Notable for the following components:    Bacteria, UA Few (*)     Hyaline Casts, UA 2 (*)     All other components within normal limits   LACTIC ACID, PLASMA   MAGNESIUM   TROPONIN I   TROPONIN I          Imaging Results          CT Head Without Contrast (Final result)  Result time 12/19/18 13:49:28    Final result by Isaias Jj MD (12/19/18 13:49:28)                 Impression:      No acute abnormality.    All CT scans at this facility use dose modulation, iterative reconstruction, and/or weight based dosing when appropriate to reduce radiation dose to as low as reasonably achievable.      Electronically signed by: Isaias Jj  Date:    12/19/2018  Time:    13:49             Narrative:    EXAMINATION:  CT HEAD WITHOUT CONTRAST    CLINICAL HISTORY:  Dizziness;    TECHNIQUE:  Low dose axial CT images obtained throughout the head without  "intravenous contrast. Sagittal and coronal reconstructions were performed.    COMPARISON:  None.    FINDINGS:  Intracranial compartment:    Ventricles and sulci are normal in size for age without evidence of hydrocephalus. No extra-axial blood or fluid collections.    The brain parenchyma appears normal. No parenchymal mass, hemorrhage, edema or major vascular distribution infarct.    Skull/extracranial contents (limited evaluation): No fracture. Mastoids clear.  Mild mucosal thickening ethmoid sinuses and bilateral maxillary sinuses.                               X-Ray Chest AP Portable (Final result)  Result time 12/19/18 13:45:01    Final result by Isaias Jj MD (12/19/18 13:45:01)                 Impression:      No acute abnormality.      Electronically signed by: Isaias Jj  Date:    12/19/2018  Time:    13:45             Narrative:    EXAMINATION:  XR CHEST AP PORTABLE    CLINICAL HISTORY:  Chest Pain;.    TECHNIQUE:  Single frontal portable view of the chest was performed.    COMPARISON:  Report only chest radiograph 10/31/2011    FINDINGS:  Support devices: Telemetry leads    The lungs are clear, with normal appearance of pulmonary vasculature and no pleural effusion or pneumothorax.    The cardiac silhouette is normal in size. The hilar and mediastinal contours are unremarkable.    Bones are intact.                                     - none    Vitals:    12/19/18 1205 12/19/18 1255 12/19/18 1313 12/19/18 1314   BP: 131/63   (!) 145/65   Pulse: 84 71 74 69   Resp: 18 (!) 22 (!) 25    Temp: 98.3 °F (36.8 °C)      TempSrc: Oral      SpO2: 100% 100% 100%    Weight: 89.8 kg (198 lb)      Height: 5' 4" (1.626 m)       12/19/18 1315 12/19/18 1316 12/19/18 1402 12/19/18 1502   BP: (!) 140/62 127/60 138/63 (!) 154/68   Pulse: 73 82 77 70   Resp:  20 19 10   Temp:       TempSrc:       SpO2:       Weight:       Height:        12/19/18 1511 12/19/18 1602   BP:  139/65   Pulse: 69 70   Resp: 18    Temp:   "   TempSrc:     SpO2:     Weight:     Height:         Results for orders placed or performed during the hospital encounter of 12/19/18   CBC auto differential   Result Value Ref Range    WBC 8.45 3.90 - 12.70 K/uL    RBC 4.09 4.00 - 5.40 M/uL    Hemoglobin 12.1 12.0 - 16.0 g/dL    Hematocrit 38.1 37.0 - 48.5 %    MCV 93 82 - 98 fL    MCH 29.6 27.0 - 31.0 pg    MCHC 31.8 (L) 32.0 - 36.0 g/dL    RDW 13.2 11.5 - 14.5 %    Platelets 257 150 - 350 K/uL    MPV 9.9 9.2 - 12.9 fL    Gran # (ANC) 5.0 1.8 - 7.7 K/uL    Lymph # 2.4 1.0 - 4.8 K/uL    Mono # 0.8 0.3 - 1.0 K/uL    Eos # 0.3 0.0 - 0.5 K/uL    Baso # 0.02 0.00 - 0.20 K/uL    Gran% 58.7 38.0 - 73.0 %    Lymph% 28.2 18.0 - 48.0 %    Mono% 9.1 4.0 - 15.0 %    Eosinophil% 3.1 0.0 - 8.0 %    Basophil% 0.2 0.0 - 1.9 %    Differential Method Automated    Comprehensive metabolic panel   Result Value Ref Range    Sodium 138 136 - 145 mmol/L    Potassium 3.6 3.5 - 5.1 mmol/L    Chloride 101 95 - 110 mmol/L    CO2 29 23 - 29 mmol/L    Glucose 190 (H) 70 - 110 mg/dL    BUN, Bld 20 (H) 7 - 17 mg/dL    Creatinine 1.41 (H) 0.50 - 1.40 mg/dL    Calcium 9.1 8.7 - 10.5 mg/dL    Total Protein 8.5 (H) 6.0 - 8.4 g/dL    Albumin 3.8 3.5 - 5.2 g/dL    Total Bilirubin 0.9 0.1 - 1.0 mg/dL    Alkaline Phosphatase 126 38 - 126 U/L    AST 18 15 - 46 U/L    ALT 13 10 - 44 U/L    Anion Gap 8 8 - 16 mmol/L    eGFR if African American 40.9 (A) >60 mL/min/1.73 m^2    eGFR if non African American 35.5 (A) >60 mL/min/1.73 m^2   Urinalysis - Clean Catch   Result Value Ref Range    Specimen UA Urine, Clean Catch     Color, UA Leyla Yellow, Straw, Leyla    Appearance, UA Hazy (A) Clear    pH, UA 5.0 5.0 - 8.0    Specific Gravity, UA 1.025 1.005 - 1.030    Protein, UA 1+ (A) Negative    Glucose, UA 1+ (A) Negative    Ketones, UA 1+ (A) Negative    Bilirubin (UA) Negative Negative    Occult Blood UA 1+ (A) Negative    Nitrite, UA Negative Negative    Urobilinogen, UA 1.0 <2.0 EU/dL    Leukocytes, UA 2+  (A) Negative   Lactic acid, plasma   Result Value Ref Range    Lactate (Lactic Acid) 1.3 0.5 - 2.2 mmol/L   Magnesium   Result Value Ref Range    Magnesium 1.9 1.6 - 2.6 mg/dL   Urinalysis Microscopic   Result Value Ref Range    RBC, UA 2 0 - 4 /hpf    WBC, UA 2 0 - 5 /hpf    Bacteria, UA Few (A) None-Occ /hpf    Hyaline Casts, UA 2 (A) 0-1/lpf /lpf    Microscopic Comment SEE COMMENT    Troponin I   Result Value Ref Range    Troponin I <0.012 0.012 - 0.034 ng/mL     old?    Imaging Results          CT Head Without Contrast (Final result)  Result time 12/19/18 13:49:28    Final result by Isaias Jj MD (12/19/18 13:49:28)                 Impression:      No acute abnormality.    All CT scans at this facility use dose modulation, iterative reconstruction, and/or weight based dosing when appropriate to reduce radiation dose to as low as reasonably achievable.      Electronically signed by: Isaias Jj  Date:    12/19/2018  Time:    13:49             Narrative:    EXAMINATION:  CT HEAD WITHOUT CONTRAST    CLINICAL HISTORY:  Dizziness;    TECHNIQUE:  Low dose axial CT images obtained throughout the head without intravenous contrast. Sagittal and coronal reconstructions were performed.    COMPARISON:  None.    FINDINGS:  Intracranial compartment:    Ventricles and sulci are normal in size for age without evidence of hydrocephalus. No extra-axial blood or fluid collections.    The brain parenchyma appears normal. No parenchymal mass, hemorrhage, edema or major vascular distribution infarct.    Skull/extracranial contents (limited evaluation): No fracture. Mastoids clear.  Mild mucosal thickening ethmoid sinuses and bilateral maxillary sinuses.                               X-Ray Chest AP Portable (Final result)  Result time 12/19/18 13:45:01    Final result by Isaias Jj MD (12/19/18 13:45:01)                 Impression:      No acute abnormality.      Electronically signed by: Isaias  Parviz  Date:    12/19/2018  Time:    13:45             Narrative:    EXAMINATION:  XR CHEST AP PORTABLE    CLINICAL HISTORY:  Chest Pain;.    TECHNIQUE:  Single frontal portable view of the chest was performed.    COMPARISON:  Report only chest radiograph 10/31/2011    FINDINGS:  Support devices: Telemetry leads    The lungs are clear, with normal appearance of pulmonary vasculature and no pleural effusion or pneumothorax.    The cardiac silhouette is normal in size. The hilar and mediastinal contours are unremarkable.    Bones are intact.                                    The above test results and vital signs have been reviewed by the physician.      I have a low suspicion for medical, surgical or other life threatening illness and I believe patient is safe for discharge.  I specifically counseled the patient and/or family/caretaker that despite an unrevealing evaluation in the ED, patient may still be at risk for serious, even life threatening illness.  I have attempted to answer all questions related to her stay today.  I have given the patient explicit instructions to return immediately for any worsening or change in current symptoms, or for any concern.         Re-evaluation:  The patient is resting comfortably and is in no acute distress. Discussed test results and notified of pending labs. Answered questions at this time.                        Clinical Impression:   The primary encounter diagnosis was Dehydration. Diagnoses of Dizziness, Upper respiratory tract infection, unspecified type, Sinusitis, unspecified chronicity, unspecified location, and Near syncope were also pertinent to this visit.                             Jb Mcginnis MD  01/02/19 2032

## 2018-12-19 NOTE — ED TRIAGE NOTES
Pt reports she was sitting on a chair and started feeling dizzy and sweating. Pt woke up on floor. Unknown length of LOC. PT denies any pain right now

## 2018-12-20 NOTE — TELEPHONE ENCOUNTER
----- Message from Ricky Segovia sent at 12/20/2018 11:37 AM CST -----  Contact: [phone]  Patient would like to be seen sooner than the next available appointment for a hospital follow up. Please call.

## 2018-12-20 NOTE — TELEPHONE ENCOUNTER
Called pt back and no answer so called solis back and she had no idea what was going on and she said she has been trying to get in touch with the pt and going to her house and shes not home she said she will try and find out what is wrong and call us back looks as if the pt was in the hospital yesterday for loss of consciousness and dehydration

## 2018-12-20 NOTE — TELEPHONE ENCOUNTER
----- Message from Humera Benítez sent at 12/20/2018  3:47 PM CST -----  Contact: Self / [phone]  Patient is requesting a medication refill.     RX name: isosorbide mononitrate (IMDUR)  Strength:  60 MG 24 hr tablet  Quantity: 90 pills  Directions:  TAKE 1 TABLET(60 MG) BY MOUTH EVERY DAY    RX name: atorvastatin (LIPITOR)   Strength: 40 mg  Quantity: 90 pills  Directions:  Take 1 tablet (40 mg total) by mouth once daily    Pharmacy name: [facility]      Phone number where patient can be reached: [phone]

## 2018-12-21 RX ORDER — ATORVASTATIN CALCIUM 40 MG/1
40 TABLET, FILM COATED ORAL DAILY
Qty: 90 TABLET | Refills: 1 | Status: SHIPPED | OUTPATIENT
Start: 2018-12-21 | End: 2019-07-03 | Stop reason: SDUPTHER

## 2018-12-21 RX ORDER — ISOSORBIDE MONONITRATE 60 MG/1
TABLET, EXTENDED RELEASE ORAL
Qty: 90 TABLET | Refills: 1 | Status: SHIPPED | OUTPATIENT
Start: 2018-12-21 | End: 2019-07-05 | Stop reason: SDUPTHER

## 2018-12-27 ENCOUNTER — OFFICE VISIT (OUTPATIENT)
Dept: FAMILY MEDICINE | Facility: CLINIC | Age: 78
End: 2018-12-27
Payer: MEDICARE

## 2018-12-27 VITALS
BODY MASS INDEX: 33.98 KG/M2 | SYSTOLIC BLOOD PRESSURE: 130 MMHG | WEIGHT: 199.06 LBS | DIASTOLIC BLOOD PRESSURE: 74 MMHG | HEIGHT: 64 IN | TEMPERATURE: 98 F | OXYGEN SATURATION: 100 % | HEART RATE: 85 BPM

## 2018-12-27 DIAGNOSIS — J06.9 ACUTE URI: Primary | ICD-10-CM

## 2018-12-27 DIAGNOSIS — J06.9 ACUTE URI: ICD-10-CM

## 2018-12-27 PROCEDURE — 99213 OFFICE O/P EST LOW 20 MIN: CPT | Mod: S$GLB,,, | Performed by: FAMILY MEDICINE

## 2018-12-27 RX ORDER — FLUTICASONE PROPIONATE 50 MCG
SPRAY, SUSPENSION (ML) NASAL
Qty: 48 ML | Refills: 0 | Status: SHIPPED | OUTPATIENT
Start: 2018-12-27 | End: 2020-09-22

## 2018-12-27 RX ORDER — FLUTICASONE PROPIONATE 50 MCG
1 SPRAY, SUSPENSION (ML) NASAL DAILY
Qty: 15.8 ML | Refills: 0 | Status: SHIPPED | OUTPATIENT
Start: 2018-12-27 | End: 2018-12-27 | Stop reason: SDUPTHER

## 2018-12-27 RX ORDER — BENZONATATE 100 MG/1
100 CAPSULE ORAL 3 TIMES DAILY PRN
Qty: 30 CAPSULE | Refills: 1 | Status: SHIPPED | OUTPATIENT
Start: 2018-12-27 | End: 2019-01-06

## 2018-12-27 NOTE — PROGRESS NOTES
Subjective:       Patient ID: Katy Soto is a 78 y.o. female.    Chief Complaint:   Chief Complaint   Patient presents with    Cough       URI    This is a new (Was seen in the ER about 5 days ago after a near syncopal event.  Head MRI was normal.  She was treated with levaquin and tessalon perles) problem. The current episode started 1 to 4 weeks ago. The problem has been gradually improving. There has been no fever. Associated symptoms include congestion, sinus pain and a sore throat. Pertinent negatives include no abdominal pain, chest pain, coughing, diarrhea, dysuria, ear pain, headaches, nausea, neck pain, rash, rhinorrhea, vomiting or wheezing. She has tried increased fluids and sleep for the symptoms. The treatment provided mild relief.     Review of Systems   Constitutional: Negative for activity change, appetite change, chills, fatigue and fever.   HENT: Positive for congestion, sinus pain and sore throat. Negative for ear discharge, ear pain, rhinorrhea and trouble swallowing.    Eyes: Negative for photophobia, pain, redness, itching and visual disturbance.   Respiratory: Negative for cough, chest tightness, shortness of breath and wheezing.    Cardiovascular: Negative for chest pain, palpitations and leg swelling.   Gastrointestinal: Negative for abdominal distention, abdominal pain, blood in stool, diarrhea, nausea and vomiting.   Genitourinary: Negative for dysuria, pelvic pain, vaginal bleeding, vaginal discharge and vaginal pain.   Musculoskeletal: Negative for arthralgias, back pain, gait problem and neck pain.   Skin: Negative for color change, pallor and rash.   Neurological: Negative for dizziness, tremors, weakness, light-headedness, numbness and headaches.   Psychiatric/Behavioral: Negative for agitation, behavioral problems, confusion and sleep disturbance.       Past Medical History:   Diagnosis Date    Hyperlipidemia     Hypertension      Social History     Socioeconomic History  "   Marital status: Single     Spouse name: Not on file    Number of children: Not on file    Years of education: Not on file    Highest education level: Not on file   Social Needs    Financial resource strain: Not on file    Food insecurity - worry: Not on file    Food insecurity - inability: Not on file    Transportation needs - medical: Not on file    Transportation needs - non-medical: Not on file   Occupational History    Not on file   Tobacco Use    Smoking status: Never Smoker    Smokeless tobacco: Never Used   Substance and Sexual Activity    Alcohol use: No    Drug use: No    Sexual activity: No     Partners: Male   Other Topics Concern    Not on file   Social History Narrative    Not on file       Objective:     /74 (BP Location: Right arm, Patient Position: Sitting)   Pulse 85   Temp 98.3 °F (36.8 °C) (Oral)   Ht 5' 4" (1.626 m)   Wt 90.3 kg (199 lb 1.2 oz)   SpO2 100%   BMI 34.17 kg/m²     Physical Exam   Constitutional: She appears well-developed and well-nourished.   HENT:   Head: Normocephalic.   Eyes: Conjunctivae are normal.   Neck: Normal range of motion. Neck supple.   Cardiovascular: Normal rate, regular rhythm and normal heart sounds.   Pulmonary/Chest: Effort normal and breath sounds normal.   Neurological: She is alert. She displays normal reflexes. No cranial nerve deficit or sensory deficit. She exhibits normal muscle tone. Coordination normal.   Skin: Skin is warm and dry.   Psychiatric: Her behavior is normal.       Assessment:       Acute URI  -     fluticasone (FLONASE) 50 mcg/actuation nasal spray; 1 spray (50 mcg total) by Each Nare route once daily.  Dispense: 15.8 mL; Refill: 0  -     benzonatate (TESSALON) 100 MG capsule; Take 1 capsule (100 mg total) by mouth 3 (three) times daily as needed for Cough.  Dispense: 30 capsule; Refill: 1       - I counseled the patient on general home care guidelines for cough and congestion including increasing fluid " intake, getting plenty of rest and use of OTC cough and cold medications.  I recommended guafenesin for congestion and dextromethorphan as directed for cough.  A brand like Mucinex DM is recommended.  Avoidance of decongestants is recommended for patients with heart problems and hypertension.  Extra vitamin C may also benefit.  Return to clinic if symptoms last longer than 10 days or sooner if worsen with symptoms like fever > 100.4, severe sinus pain or headache, thick yellow nasal discharge or sputum, dehydration or lethargy.

## 2019-01-23 DIAGNOSIS — J06.9 ACUTE URI: ICD-10-CM

## 2019-01-23 RX ORDER — FLUTICASONE PROPIONATE 50 MCG
SPRAY, SUSPENSION (ML) NASAL
Qty: 16 ML | Refills: 0 | Status: SHIPPED | OUTPATIENT
Start: 2019-01-23 | End: 2020-09-22

## 2019-01-24 ENCOUNTER — PES CALL (OUTPATIENT)
Dept: ADMINISTRATIVE | Facility: CLINIC | Age: 79
End: 2019-01-24

## 2019-01-27 RX ORDER — AMLODIPINE BESYLATE 5 MG/1
TABLET ORAL
Qty: 90 TABLET | Refills: 0 | Status: SHIPPED | OUTPATIENT
Start: 2019-01-27 | End: 2019-04-26 | Stop reason: SDUPTHER

## 2019-01-29 NOTE — TELEPHONE ENCOUNTER
----- Message from Hua Rivera sent at 1/29/2019 11:50 AM CST -----  Contact: [phone]/SELF     Patient is requesting a medication refill.       RX name: furosemide (LASIX)   Strength: 40 MG tablet  Quantity: 30  Directions:  Take 1 tablet (40 mg total) by mouth once daily. As leg swelling       Pharmacy name: [facility] [id number] [place] [address], [place] [id number] [address] [facility] AT [facility]

## 2019-01-30 RX ORDER — FUROSEMIDE 40 MG/1
40 TABLET ORAL DAILY
Qty: 30 TABLET | Refills: 11 | Status: SHIPPED | OUTPATIENT
Start: 2019-01-30 | End: 2020-09-19

## 2019-03-04 NOTE — TELEPHONE ENCOUNTER
----- Message from Emilie Khoury sent at 3/4/2019  2:59 PM CST -----  Patient is requesting a medication refill.     RX name: potassium chloride SA (K-DUR,KLOR-CON) 20 MEQ tablet  Strength:   Quantity: 90 day w/refills   Directions:  TAKE 1 TABLET BY MOUTH ONCE DAILY WITH LASIX AS NEEDED    Pharmacy name: [facility]      Phone number where patient can be reached:

## 2019-03-05 RX ORDER — POTASSIUM CHLORIDE 20 MEQ/1
TABLET, EXTENDED RELEASE ORAL
Qty: 90 TABLET | Refills: 3 | Status: SHIPPED | OUTPATIENT
Start: 2019-03-05 | End: 2020-05-01

## 2019-03-16 DIAGNOSIS — Z78.0 ASYMPTOMATIC POSTMENOPAUSAL STATUS: ICD-10-CM

## 2019-03-16 DIAGNOSIS — I10 ESSENTIAL HYPERTENSION: ICD-10-CM

## 2019-03-16 DIAGNOSIS — H91.90 HEARING LOSS, UNSPECIFIED HEARING LOSS TYPE, UNSPECIFIED LATERALITY: ICD-10-CM

## 2019-03-16 DIAGNOSIS — E03.4 HYPOTHYROIDISM DUE TO ACQUIRED ATROPHY OF THYROID: ICD-10-CM

## 2019-03-16 DIAGNOSIS — E11.9 TYPE 2 DIABETES MELLITUS WITHOUT COMPLICATION, WITHOUT LONG-TERM CURRENT USE OF INSULIN: ICD-10-CM

## 2019-03-16 DIAGNOSIS — E78.5 HYPERLIPIDEMIA, UNSPECIFIED HYPERLIPIDEMIA TYPE: ICD-10-CM

## 2019-03-17 RX ORDER — IRBESARTAN 300 MG/1
TABLET ORAL
Qty: 90 TABLET | Refills: 0 | Status: SHIPPED | OUTPATIENT
Start: 2019-03-17 | End: 2019-05-21

## 2019-04-26 RX ORDER — AMLODIPINE BESYLATE 5 MG/1
TABLET ORAL
Qty: 90 TABLET | Refills: 0 | Status: SHIPPED | OUTPATIENT
Start: 2019-04-26 | End: 2019-07-03

## 2019-05-13 ENCOUNTER — HOSPITAL ENCOUNTER (EMERGENCY)
Facility: HOSPITAL | Age: 79
Discharge: HOME | End: 2019-05-13
Attending: FAMILY MEDICINE
Payer: MEDICARE

## 2019-05-13 ENCOUNTER — TELEPHONE (OUTPATIENT)
Dept: FAMILY MEDICINE | Facility: CLINIC | Age: 79
End: 2019-05-13

## 2019-05-13 VITALS
WEIGHT: 198 LBS | TEMPERATURE: 98 F | SYSTOLIC BLOOD PRESSURE: 183 MMHG | OXYGEN SATURATION: 100 % | RESPIRATION RATE: 15 BRPM | BODY MASS INDEX: 33.99 KG/M2 | DIASTOLIC BLOOD PRESSURE: 77 MMHG | HEART RATE: 77 BPM

## 2019-05-13 DIAGNOSIS — J06.9 VIRAL URI: ICD-10-CM

## 2019-05-13 DIAGNOSIS — J30.9 ALLERGIC RHINITIS, UNSPECIFIED SEASONALITY, UNSPECIFIED TRIGGER: Primary | ICD-10-CM

## 2019-05-13 PROCEDURE — 25000003 PHARM REV CODE 250: Mod: ER | Performed by: PHYSICIAN ASSISTANT

## 2019-05-13 PROCEDURE — 99283 EMERGENCY DEPT VISIT LOW MDM: CPT | Mod: ER

## 2019-05-13 RX ORDER — CETIRIZINE HYDROCHLORIDE 10 MG/1
10 TABLET ORAL DAILY
Qty: 30 TABLET | Refills: 1 | Status: SHIPPED | OUTPATIENT
Start: 2019-05-13 | End: 2020-09-22

## 2019-05-13 RX ORDER — CETIRIZINE HYDROCHLORIDE 10 MG/1
10 TABLET ORAL
Status: COMPLETED | OUTPATIENT
Start: 2019-05-13 | End: 2019-05-13

## 2019-05-13 RX ADMIN — CETIRIZINE HYDROCHLORIDE 10 MG: 10 TABLET, FILM COATED ORAL at 06:05

## 2019-05-13 NOTE — ED PROVIDER NOTES
Encounter Date: 2019       History     Chief Complaint   Patient presents with    Facial Swelling     I been having swelling on and off since last . I  think it is my nerves. I mean Dr Cruz says im allergic to shell fish but i havent been eating that. I woke up this morning and was swollen all over. I didnt take naything for it though.     Patient is a 78-year-old female with an allergy to shellfish.  She has had intermittent facial swelling, itchy watery eyes, and swollen fingers intermittently for the past week.  Patient has been taking her Lasix.  Denies any wheezing, throat swelling, difficulty breathing.            Review of patient's allergies indicates:   Allergen Reactions    Cilostazol      [id number] and stomach cramps    Coreg [carvedilol]      Cramps stomach and nausea    Diltiazem      nauseana d stoamch cramps    Lexapro [escitalopram oxalate]      Dizzy, nausea and weak    Shellfish containing products Hives     Past Medical History:   Diagnosis Date    Hyperlipidemia     Hypertension      Past Surgical History:   Procedure Laterality Date     SECTION      x1    HERNIA REPAIR      umbilical    LEG SURGERY Right 2017    stents placed     Family History   Problem Relation Age of Onset    Diabetes Mother     Hypertension Mother     Stroke Father     Heart disease Father     Heart attack Father     Cancer Sister         Breast cancer    Breast cancer Sister     No Known Problems Son     Kidney failure Sister         Kidney transplant x18 years    Lupus Sister     Hypertension Sister     Diabetes Sister     Arthritis Sister     Hypertension Sister     Cancer Brother      Social History     Tobacco Use    Smoking status: Never Smoker    Smokeless tobacco: Never Used   Substance Use Topics    Alcohol use: No    Drug use: No     Review of Systems   Constitutional: Negative for diaphoresis, fatigue and fever.        14 Point ROS completed and negative with  the exception of HPI and Below.   HENT: Positive for facial swelling, postnasal drip and sinus pressure. Negative for congestion, ear pain and sore throat.    Eyes: Positive for itching. Negative for discharge.   Respiratory: Negative for cough, chest tightness, shortness of breath and wheezing.    Cardiovascular: Negative for chest pain, palpitations and leg swelling.   Gastrointestinal: Negative for abdominal distention, abdominal pain, nausea and vomiting.   Genitourinary: Negative for dysuria, flank pain and frequency.   Musculoskeletal: Negative for back pain, neck pain and neck stiffness.   Skin: Negative for pallor, rash and wound.   Neurological: Negative for dizziness, syncope, facial asymmetry, weakness and headaches.   Hematological: Does not bruise/bleed easily.   Psychiatric/Behavioral: Negative for agitation, behavioral problems and confusion.   All other systems reviewed and are negative.      Physical Exam     Initial Vitals [05/13/19 1723]   BP Pulse Resp Temp SpO2   (!) 183/77 77 15 97.8 °F (36.6 °C) 100 %      MAP       --         Physical Exam    Constitutional: She appears well-developed and well-nourished.   HENT:   Head: Normocephalic and atraumatic.   Right Ear: External ear normal.   Left Ear: External ear normal.   Nose: Nose normal.   Mouth/Throat: Oropharynx is clear and moist.   No periorbital edema noted. Mild tenderness and swelling to left tonsillar lymph node.   Eyes: Conjunctivae and EOM are normal. Pupils are equal, round, and reactive to light. No scleral icterus.   Itchy watery eyes bilaterally with the appearance of allergic/viral conjunctivitis   Neck: Normal range of motion. Neck supple. No thyromegaly present. No tracheal deviation present. No JVD present.   Cardiovascular: Normal rate, regular rhythm, normal heart sounds and intact distal pulses. Exam reveals no gallop and no friction rub.    No murmur heard.  Pulmonary/Chest: Breath sounds normal. No stridor. No respiratory  distress. She has no wheezes. She has no rhonchi. She has no rales. She exhibits no tenderness.   Abdominal: Soft. Bowel sounds are normal. She exhibits no distension and no mass. There is no tenderness. There is no rebound and no guarding.   Musculoskeletal: Normal range of motion. She exhibits no edema or tenderness.   Lymphadenopathy:     She has no cervical adenopathy.   Neurological: She is alert and oriented to person, place, and time. She has normal strength and normal reflexes. She displays normal reflexes. No cranial nerve deficit or sensory deficit.   Skin: Skin is warm and dry. Capillary refill takes less than 2 seconds. No rash and no abscess noted. No erythema. No pallor.   Psychiatric: She has a normal mood and affect. Her behavior is normal. Thought content normal.         ED Course   Procedures  Labs Reviewed - No data to display       Imaging Results    None          Medical Decision Making:   Initial Assessment:   Patient is a 78-year-old female with an allergy to shellfish.  She has had intermittent facial swelling, itchy watery eyes, and swollen fingers intermittently for the past week.  Patient has been taking her Lasix.  Denies any wheezing, throat swelling, difficulty breathing.    Itchy watery eyes bilaterally, with appearance of allergic rhinitis and allergic conjunctivitis.  Lower turbinates are pale and boggy.  Left tonsillar lymph nodes slightly swollen.  Differential Diagnosis:   Allergic reaction, viral illness, allergic rhinitis,  ED Management:  Discussed with the patient the need to change her so, change her laundry detergent and keep a log of everything she eats or drinks.  She is also to follow up with her primary care physician Dr. Cruz for review of all of her medications.  We will also place the patient on Zyrtec daily.  Patient verbalized understanding of all discharge instructions.                      Clinical Impression:       ICD-10-CM ICD-9-CM   1. Allergic rhinitis,  unspecified seasonality, unspecified trigger J30.9 477.9   2. Viral URI J06.9 465.9                                Jared South PA-C  05/13/19 1812

## 2019-05-13 NOTE — TELEPHONE ENCOUNTER
----- Message from Hua Rivera sent at 5/13/2019 12:57 PM CDT -----  Contact: [phone]/xepe  Patient requesting to be seen today for swollen face.

## 2019-05-13 NOTE — TELEPHONE ENCOUNTER
Called pt back and the sister answered and she states that the pts face is swollen and eyes are closing up and she is itching and it has been happening since last week I explained that she should go to the [facility] due to Dr Cruz not having any availability till Friday and she wanted to see some one today but that is no possible so she will go to UC

## 2019-05-15 ENCOUNTER — TELEPHONE (OUTPATIENT)
Dept: FAMILY MEDICINE | Facility: CLINIC | Age: 79
End: 2019-05-15

## 2019-05-15 NOTE — TELEPHONE ENCOUNTER
Called zimmerman back no answer unable to leave a message on VM due to not being available scheduled for Tuesday at 320 need to know if this works I do not have anything for friday

## 2019-05-15 NOTE — TELEPHONE ENCOUNTER
----- Message from Dilcia Ellis sent at 5/15/2019  2:05 PM CDT -----  Contact: [phone]/Yaa  Patient is requesting to be seen Friday by Nancy only. She needs a hospital follow up. Please call to schedule. Thanks

## 2019-05-21 ENCOUNTER — OFFICE VISIT (OUTPATIENT)
Dept: FAMILY MEDICINE | Facility: CLINIC | Age: 79
End: 2019-05-21
Payer: MEDICARE

## 2019-05-21 VITALS
WEIGHT: 196 LBS | DIASTOLIC BLOOD PRESSURE: 78 MMHG | HEART RATE: 85 BPM | TEMPERATURE: 98 F | BODY MASS INDEX: 32.65 KG/M2 | OXYGEN SATURATION: 98 % | SYSTOLIC BLOOD PRESSURE: 138 MMHG | HEIGHT: 65 IN

## 2019-05-21 DIAGNOSIS — R04.0 NOSEBLEED: Primary | ICD-10-CM

## 2019-05-21 DIAGNOSIS — E78.5 HYPERLIPIDEMIA, UNSPECIFIED HYPERLIPIDEMIA TYPE: ICD-10-CM

## 2019-05-21 DIAGNOSIS — F41.9 ANXIETY: ICD-10-CM

## 2019-05-21 DIAGNOSIS — R10.84 GENERALIZED ABDOMINAL PAIN: ICD-10-CM

## 2019-05-21 DIAGNOSIS — I10 ESSENTIAL HYPERTENSION: ICD-10-CM

## 2019-05-21 DIAGNOSIS — I73.9 PAD (PERIPHERAL ARTERY DISEASE): ICD-10-CM

## 2019-05-21 PROCEDURE — 99213 OFFICE O/P EST LOW 20 MIN: CPT | Mod: S$GLB,,, | Performed by: FAMILY MEDICINE

## 2019-05-21 PROCEDURE — 99213 PR OFFICE/OUTPT VISIT, EST, LEVL III, 20-29 MIN: ICD-10-PCS | Mod: S$GLB,,, | Performed by: FAMILY MEDICINE

## 2019-05-21 RX ORDER — SUCRALFATE 1 G/1
1 TABLET ORAL 3 TIMES DAILY
Qty: 90 TABLET | Refills: 5 | Status: SHIPPED | OUTPATIENT
Start: 2019-05-21 | End: 2020-12-30 | Stop reason: SDUPTHER

## 2019-05-21 NOTE — PROGRESS NOTES
Subjective:      Patient ID: Katy Soto is a 78 y.o. female.    Chief Complaint: Hospital Follow Up      HPI   Went to ER for sinus infection with sinus swelling; was told to change bp meds    Review of Systems   Constitutional: Negative.    HENT: Negative.    Respiratory: Negative.    Cardiovascular: Negative.    Gastrointestinal: Negative.    Endocrine: Negative.    Genitourinary: Negative.    Musculoskeletal: Negative.    Psychiatric/Behavioral: Negative.    All other systems reviewed and are negative.    Objective:     Physical Exam   Constitutional: She is oriented to person, place, and time. She appears well-developed and well-nourished.   HENT:   Head: Normocephalic.   Eyes: Pupils are equal, round, and reactive to light. Conjunctivae and EOM are normal.   Neck: Normal range of motion. Neck supple.   Cardiovascular: Normal rate, regular rhythm and normal heart sounds.   Pulmonary/Chest: Effort normal and breath sounds normal.   Musculoskeletal: Normal range of motion.   Neurological: She is alert and oriented to person, place, and time. She has normal reflexes.   Skin: Skin is warm and dry.   Psychiatric: She has a normal mood and affect. Her behavior is normal. Judgment and thought content normal.   Nursing note and vitals reviewed.    Assessment:     1. Nosebleed    2. Essential hypertension    3. Generalized abdominal pain    4. Anxiety    5. PAD (peripheral artery disease)    6. Hyperlipidemia, unspecified hyperlipidemia type      Plan:        Medication List           Accurate as of 5/21/19  3:57 PM. If you have any questions, ask your nurse or doctor.               START taking these medications    sucralfate 1 gram tablet  Commonly known as:  CARAFATE  Take 1 tablet (1 g total) by mouth 3 (three) times daily.  Started by:  Rudy Cruz MD        CONTINUE taking these medications    amLODIPine 5 MG tablet  Commonly known as:  NORVASC  TAKE 1 TABLET(5 MG) BY MOUTH EVERY DAY     aspirin 81 MG EC  tablet  Commonly known as:  ECOTRIN     atorvastatin 40 MG tablet  Commonly known as:  LIPITOR  Take 1 tablet (40 mg total) by mouth once daily.     cetirizine 10 MG tablet  Commonly known as:  ZYRTEC  Take 1 tablet (10 mg total) by mouth once daily.     * fluticasone propionate 50 mcg/actuation nasal spray  Commonly known as:  FLONASE  SHAKE LIQUID AND USE 1 SPRAY(50 MCG) IN EACH NOSTRIL EVERY DAY     * fluticasone propionate 50 mcg/actuation nasal spray  Commonly known as:  FLONASE  SHAKE LIQUID AND USE 1 SPRAY(50 MCG) IN EACH NOSTRIL EVERY DAY     furosemide 40 MG tablet  Commonly known as:  LASIX  Take 1 tablet (40 mg total) by mouth once daily. As leg swelling     isosorbide mononitrate 60 MG 24 hr tablet  Commonly known as:  IMDUR  TAKE 1 TABLET(60 MG) BY MOUTH EVERY DAY     potassium chloride SA 20 MEQ tablet  Commonly known as:  K-DUR,KLOR-CON  TAKE 1 TABLET BY MOUTH ONCE DAILY WITH LASIX AS NEEDED         * This list has 2 medication(s) that are the same as other medications prescribed for you. Read the directions carefully, and ask your doctor or other care provider to review them with you.            STOP taking these medications    irbesartan 300 MG tablet  Commonly known as:  AVAPRO  Stopped by:  Rudy Cruz MD           Where to Get Your Medications      These medications were sent to [id number] - [facility] [address] [facility] [address]  [address] [address]    Hours:  24-hours Phone:  [phone]   · sucralfate 1 gram tablet       Nosebleed    Essential hypertension    Generalized abdominal pain    Anxiety    PAD (peripheral artery disease)    Hyperlipidemia, unspecified hyperlipidemia type    Other orders  -     sucralfate (CARAFATE) 1 gram tablet; Take 1 tablet (1 g total) by mouth 3 (three) times daily.  Dispense: 90 tablet; Refill: 5    wants to be off ARB due to nose bleeding  Hold ASA until no nose bleed for a month, thene  resume  Wants a protective pill for her stomach  Pt will monitor BP at home

## 2019-05-22 ENCOUNTER — TELEPHONE (OUTPATIENT)
Dept: FAMILY MEDICINE | Facility: CLINIC | Age: 79
End: 2019-05-22

## 2019-05-22 NOTE — TELEPHONE ENCOUNTER
No answer: Called pt to inform her that Dr. Cruz does not want to send in a new BP medication at this time. He would like for pt to continue monitoring BP and if numbers continue to be elevated, a new medicine will be sent in at that time.

## 2019-06-10 ENCOUNTER — TELEPHONE (OUTPATIENT)
Dept: FAMILY MEDICINE | Facility: CLINIC | Age: 79
End: 2019-06-10

## 2019-06-10 NOTE — TELEPHONE ENCOUNTER
----- Message from Alaina Thomas sent at 6/10/2019  4:40 PM CDT -----  Contact: Pt  Pt is requesting an appt due to follow up    Pt can be reached at [phone]

## 2019-07-03 ENCOUNTER — OFFICE VISIT (OUTPATIENT)
Dept: FAMILY MEDICINE | Facility: CLINIC | Age: 79
End: 2019-07-03
Payer: MEDICARE

## 2019-07-03 VITALS
WEIGHT: 198.44 LBS | DIASTOLIC BLOOD PRESSURE: 90 MMHG | HEIGHT: 65 IN | BODY MASS INDEX: 33.06 KG/M2 | SYSTOLIC BLOOD PRESSURE: 200 MMHG

## 2019-07-03 DIAGNOSIS — I10 ESSENTIAL HYPERTENSION: Primary | ICD-10-CM

## 2019-07-03 DIAGNOSIS — I10 UNCONTROLLED HYPERTENSION: ICD-10-CM

## 2019-07-03 PROCEDURE — 99213 OFFICE O/P EST LOW 20 MIN: CPT | Mod: S$GLB,,, | Performed by: FAMILY MEDICINE

## 2019-07-03 PROCEDURE — 99213 PR OFFICE/OUTPT VISIT, EST, LEVL III, 20-29 MIN: ICD-10-PCS | Mod: S$GLB,,, | Performed by: FAMILY MEDICINE

## 2019-07-03 RX ORDER — CLONIDINE HYDROCHLORIDE 0.1 MG/1
TABLET ORAL
Qty: 90 TABLET | Refills: 11 | Status: SHIPPED | OUTPATIENT
Start: 2019-07-03 | End: 2019-08-09

## 2019-07-06 RX ORDER — ATORVASTATIN CALCIUM 40 MG/1
40 TABLET, FILM COATED ORAL DAILY
Qty: 90 TABLET | Refills: 1 | Status: SHIPPED | OUTPATIENT
Start: 2019-07-06 | End: 2020-05-04

## 2019-07-06 RX ORDER — ISOSORBIDE MONONITRATE 60 MG/1
TABLET, EXTENDED RELEASE ORAL
Qty: 90 TABLET | Refills: 1 | Status: SHIPPED | OUTPATIENT
Start: 2019-07-06 | End: 2020-08-10

## 2019-07-06 RX ORDER — ATORVASTATIN CALCIUM 40 MG/1
TABLET, FILM COATED ORAL
Qty: 90 TABLET | Refills: 0 | Status: SHIPPED | OUTPATIENT
Start: 2019-07-06 | End: 2019-07-10

## 2019-07-10 ENCOUNTER — OFFICE VISIT (OUTPATIENT)
Dept: FAMILY MEDICINE | Facility: CLINIC | Age: 79
End: 2019-07-10
Payer: MEDICARE

## 2019-07-10 VITALS
BODY MASS INDEX: 33.31 KG/M2 | SYSTOLIC BLOOD PRESSURE: 148 MMHG | HEIGHT: 65 IN | HEART RATE: 81 BPM | DIASTOLIC BLOOD PRESSURE: 70 MMHG | WEIGHT: 199.94 LBS | OXYGEN SATURATION: 98 %

## 2019-07-10 DIAGNOSIS — I10 ESSENTIAL HYPERTENSION: Primary | ICD-10-CM

## 2019-07-10 DIAGNOSIS — R60.0 BILATERAL LEG EDEMA: ICD-10-CM

## 2019-07-10 PROCEDURE — 99213 PR OFFICE/OUTPT VISIT, EST, LEVL III, 20-29 MIN: ICD-10-PCS | Mod: S$GLB,,, | Performed by: FAMILY MEDICINE

## 2019-07-10 PROCEDURE — 99213 OFFICE O/P EST LOW 20 MIN: CPT | Mod: S$GLB,,, | Performed by: FAMILY MEDICINE

## 2019-07-10 NOTE — PROGRESS NOTES
Subjective:      Patient ID: Katy Soto is a 79 y.o. female.    Chief Complaint: Follow-up (1 week )      HPI   bp follow up; taking clonidine; makes her sleepy; pressure better; 140/72; puffy feet; not sob; checks daily  Taking clonidine    Review of Systems   Constitutional: Negative.    HENT: Negative.    Respiratory: Negative.    Cardiovascular: Negative.    Gastrointestinal: Negative.    Endocrine: Negative.    Genitourinary: Negative.    Musculoskeletal: Negative.    Psychiatric/Behavioral: Negative.    All other systems reviewed and are negative.    Objective:     Physical Exam   Constitutional: She is oriented to person, place, and time. She appears well-developed and well-nourished.   HENT:   Head: Normocephalic.   Eyes: Pupils are equal, round, and reactive to light. Conjunctivae and EOM are normal.   Neck: Normal range of motion. Neck supple.   Cardiovascular: Normal rate, regular rhythm and normal heart sounds.   Pulmonary/Chest: Effort normal and breath sounds normal.   Musculoskeletal: Normal range of motion.   Neurological: She is alert and oriented to person, place, and time. She has normal reflexes.   Skin: Skin is warm and dry.   Psychiatric: She has a normal mood and affect. Her behavior is normal. Judgment and thought content normal.   Nursing note and vitals reviewed.    Assessment:     1. Essential hypertension    2. Bilateral leg edema      Plan:        Medication List           Accurate as of 7/10/19  4:45 PM. If you have any questions, ask your nurse or doctor.               CHANGE how you take these medications    atorvastatin 40 MG tablet  Commonly known as:  LIPITOR  Take 1 tablet (40 mg total) by mouth once daily.  What changed:  Another medication with the same name was removed. Continue taking this medication, and follow the directions you see here.  Changed by:  Rudy Cruz MD        CONTINUE taking these medications    aspirin 81 MG EC tablet  Commonly known as:  ECOTRIN      cetirizine 10 MG tablet  Commonly known as:  ZYRTEC  Take 1 tablet (10 mg total) by mouth once daily.     cloNIDine 0.1 MG tablet  Commonly known as:  CATAPRES  One tab tid at 6 AM, 2 PM and 10 PM for blood pressure     * fluticasone propionate 50 mcg/actuation nasal spray  Commonly known as:  FLONASE  SHAKE LIQUID AND USE 1 SPRAY(50 MCG) IN EACH NOSTRIL EVERY DAY     * fluticasone propionate 50 mcg/actuation nasal spray  Commonly known as:  FLONASE  SHAKE LIQUID AND USE 1 SPRAY(50 MCG) IN EACH NOSTRIL EVERY DAY     furosemide 40 MG tablet  Commonly known as:  LASIX  Take 1 tablet (40 mg total) by mouth once daily. As leg swelling     isosorbide mononitrate 60 MG 24 hr tablet  Commonly known as:  IMDUR  TAKE 1 TABLET(60 MG) BY MOUTH EVERY DAY     potassium chloride SA 20 MEQ tablet  Commonly known as:  K-DUR,KLOR-CON  TAKE 1 TABLET BY MOUTH ONCE DAILY WITH LASIX AS NEEDED     sucralfate 1 gram tablet  Commonly known as:  CARAFATE  Take 1 tablet (1 g total) by mouth 3 (three) times daily.         * This list has 2 medication(s) that are the same as other medications prescribed for you. Read the directions carefully, and ask your doctor or other care provider to review them with you.              Essential hypertension    Bilateral leg edema    same meds; RTC one month with bp numbers and meds

## 2019-07-23 DIAGNOSIS — E78.5 HYPERLIPIDEMIA, UNSPECIFIED HYPERLIPIDEMIA TYPE: ICD-10-CM

## 2019-07-23 DIAGNOSIS — H91.90 HEARING LOSS, UNSPECIFIED HEARING LOSS TYPE, UNSPECIFIED LATERALITY: ICD-10-CM

## 2019-07-23 DIAGNOSIS — E03.4 HYPOTHYROIDISM DUE TO ACQUIRED ATROPHY OF THYROID: ICD-10-CM

## 2019-07-23 DIAGNOSIS — Z78.0 ASYMPTOMATIC POSTMENOPAUSAL STATUS: ICD-10-CM

## 2019-07-23 DIAGNOSIS — E11.9 TYPE 2 DIABETES MELLITUS WITHOUT COMPLICATION, WITHOUT LONG-TERM CURRENT USE OF INSULIN: ICD-10-CM

## 2019-07-23 DIAGNOSIS — I10 ESSENTIAL HYPERTENSION: ICD-10-CM

## 2019-07-23 RX ORDER — IRBESARTAN 300 MG/1
TABLET ORAL
Qty: 90 TABLET | Refills: 0 | Status: SHIPPED | OUTPATIENT
Start: 2019-07-23 | End: 2019-08-09 | Stop reason: SDUPTHER

## 2019-08-05 ENCOUNTER — PES CALL (OUTPATIENT)
Dept: ADMINISTRATIVE | Facility: CLINIC | Age: 79
End: 2019-08-05

## 2019-08-09 ENCOUNTER — OFFICE VISIT (OUTPATIENT)
Dept: FAMILY MEDICINE | Facility: CLINIC | Age: 79
End: 2019-08-09
Payer: MEDICARE

## 2019-08-09 VITALS
BODY MASS INDEX: 33.22 KG/M2 | TEMPERATURE: 98 F | OXYGEN SATURATION: 98 % | HEIGHT: 64 IN | DIASTOLIC BLOOD PRESSURE: 90 MMHG | WEIGHT: 194.56 LBS | SYSTOLIC BLOOD PRESSURE: 154 MMHG | HEART RATE: 81 BPM

## 2019-08-09 DIAGNOSIS — H91.90 HEARING LOSS, UNSPECIFIED HEARING LOSS TYPE, UNSPECIFIED LATERALITY: ICD-10-CM

## 2019-08-09 DIAGNOSIS — Z78.0 ASYMPTOMATIC POSTMENOPAUSAL STATUS: ICD-10-CM

## 2019-08-09 DIAGNOSIS — R60.0 BILATERAL LEG EDEMA: ICD-10-CM

## 2019-08-09 DIAGNOSIS — M19.90 ARTHRITIS: ICD-10-CM

## 2019-08-09 DIAGNOSIS — M94.9 DISORDER OF CARTILAGE: ICD-10-CM

## 2019-08-09 DIAGNOSIS — E11.9 TYPE 2 DIABETES MELLITUS WITHOUT COMPLICATION, WITHOUT LONG-TERM CURRENT USE OF INSULIN: ICD-10-CM

## 2019-08-09 DIAGNOSIS — F32.A DEPRESSION, UNSPECIFIED DEPRESSION TYPE: ICD-10-CM

## 2019-08-09 DIAGNOSIS — F41.9 ANXIETY: ICD-10-CM

## 2019-08-09 DIAGNOSIS — I10 ESSENTIAL HYPERTENSION: Primary | ICD-10-CM

## 2019-08-09 DIAGNOSIS — E78.5 HYPERLIPIDEMIA, UNSPECIFIED HYPERLIPIDEMIA TYPE: ICD-10-CM

## 2019-08-09 DIAGNOSIS — I10 UNCONTROLLED HYPERTENSION: ICD-10-CM

## 2019-08-09 DIAGNOSIS — E03.4 HYPOTHYROIDISM DUE TO ACQUIRED ATROPHY OF THYROID: ICD-10-CM

## 2019-08-09 PROCEDURE — 99213 OFFICE O/P EST LOW 20 MIN: CPT | Mod: S$GLB,,, | Performed by: FAMILY MEDICINE

## 2019-08-09 PROCEDURE — 99213 PR OFFICE/OUTPT VISIT, EST, LEVL III, 20-29 MIN: ICD-10-PCS | Mod: S$GLB,,, | Performed by: FAMILY MEDICINE

## 2019-08-09 RX ORDER — IRBESARTAN 300 MG/1
TABLET ORAL
Qty: 90 TABLET | Refills: 3 | Status: SHIPPED | OUTPATIENT
Start: 2019-08-09 | End: 2020-08-09

## 2019-08-09 NOTE — PROGRESS NOTES
"Subjective:      Patient ID: Katy Soto is a 79 y.o. female.    Chief Complaint: Follow-up (1 month)      Vitals:    08/09/19 1056   BP: (!) 154/90   Pulse: 81   Temp: 98 °F (36.7 °C)   SpO2: 98%   Weight: 88.3 kg (194 lb 8.9 oz)   Height: 5' 4" (1.626 m)        HPI   bp check; stillhgh a home; brought in numbers; high here too;   Clonidine caused face to swell, she stopped it and now she wants to resume avapro 300 again  Pt brought her meds; reveiwed them, so now avapro and imdur and lasix is her only bp meds  Review of Systems   Constitutional: Negative.    HENT: Negative.    Respiratory: Negative.    Cardiovascular: Negative.    Gastrointestinal: Negative.    Endocrine: Negative.    Genitourinary: Negative.    Musculoskeletal: Negative.    Psychiatric/Behavioral: Negative.    All other systems reviewed and are negative.    Objective:     Physical Exam   Constitutional: She is oriented to person, place, and time. She appears well-developed and well-nourished.   HENT:   Head: Normocephalic.   [place]   Eyes: Pupils are equal, round, and reactive to light. Conjunctivae and EOM are normal.   Neck: Normal range of motion. Neck supple.   Cardiovascular: Normal rate, regular rhythm and normal heart sounds.   Pulmonary/Chest: Effort normal and breath sounds normal.   Musculoskeletal: Normal range of motion.   Neurological: She is alert and oriented to person, place, and time. She has normal reflexes.   Skin: Skin is warm and dry.   Psychiatric: She has a normal mood and affect. Her behavior is normal. Judgment and thought content normal.   Nursing note and vitals reviewed.    Assessment:     1. Essential hypertension    2. Hearing loss, unspecified hearing loss type, unspecified laterality    3. Type 2 diabetes mellitus without complication, without long-term current use of insulin    4. Hyperlipidemia, unspecified hyperlipidemia type    5. Hypothyroidism due to acquired atrophy of thyroid    6. Asymptomatic " postmenopausal status    7. Bilateral leg edema    8. Uncontrolled hypertension    9. Anxiety    10. Depression, unspecified depression type    11. 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 If you have any questions, ask your nurse or doctor.               CONTINUE taking these medications    aspirin 81 MG EC tablet  Commonly known as:  ECOTRIN     atorvastatin 40 MG tablet  Commonly known as:  LIPITOR  Take 1 tablet (40 mg total) by mouth once daily.     cetirizine 10 MG tablet  Commonly known as:  ZYRTEC  Take 1 tablet (10 mg total) by mouth once daily.     * fluticasone propionate 50 mcg/actuation nasal spray  Commonly known as:  FLONASE  SHAKE LIQUID AND USE 1 SPRAY(50 MCG) IN EACH NOSTRIL EVERY DAY     * fluticasone propionate 50 mcg/actuation nasal spray  Commonly known as:  FLONASE  SHAKE LIQUID AND USE 1 SPRAY(50 MCG) IN EACH NOSTRIL EVERY DAY     furosemide 40 MG tablet  Commonly known as:  LASIX  Take 1 tablet (40 mg total) by mouth once daily. As leg swelling     irbesartan 300 MG tablet  Commonly known as:  AVAPRO  TAKE 1 TABLET(300 MG) BY MOUTH EVERY DAY     isosorbide mononitrate 60 MG 24 hr tablet  Commonly known as:  IMDUR  TAKE 1 TABLET(60 MG) BY MOUTH EVERY DAY     potassium chloride SA 20 MEQ tablet  Commonly known as:  K-DUR,KLOR-CON  TAKE 1 TABLET BY MOUTH ONCE DAILY WITH LASIX AS NEEDED     sucralfate 1 gram tablet  Commonly known as:  CARAFATE  Take 1 tablet (1 g total) by mouth 3 (three) times daily.         * This list has 2 medication(s) that are the same as other medications prescribed for you. Read the directions carefully, and ask your doctor or other care provider to review them with you.            STOP taking these medications    cloNIDine 0.1 MG tablet  Commonly known as:  CATAPRES  Stopped by:  Rudy Cruz MD           Where to Get Your Medications      These medications were sent to [id number] - [place] - [address] AT [facility] [address]  [address], [place] [address]    Phone:  [phone]   · irbesartan 300 MG tablet       Essential hypertension  -     irbesartan (AVAPRO) 300 MG tablet; TAKE 1 TABLET(300 MG) BY MOUTH EVERY DAY  Dispense: 90 tablet; Refill: 3  -     CBC auto differential; Future; Expected date: 08/09/2019  -     Comprehensive metabolic panel; Future; Expected date: 08/09/2019  -     Hemoglobin A1c; Future  -     Lipid panel; Future  -     Microalbumin/creatinine urine ratio; Future  -     Urinalysis; Future  -     TSH; Future  -     Sedimentation rate; Future  -     Vitamin D; Future    Hearing loss, unspecified hearing loss type, unspecified laterality  -     irbesartan (AVAPRO) 300 MG tablet; TAKE 1 TABLET(300 MG) BY MOUTH EVERY DAY  Dispense: 90 tablet; Refill: 3  -     CBC auto differential; Future; Expected date: 08/09/2019  -     Comprehensive metabolic panel; Future; Expected date: 08/09/2019  -     Hemoglobin A1c; Future  -     Lipid panel; Future  -     Microalbumin/creatinine urine ratio; Future  -     Urinalysis; Future  -     TSH; Future  -     Sedimentation rate; Future  -     Vitamin D; Future    Type 2 diabetes mellitus without complication, without long-term current use of insulin  -     irbesartan (AVAPRO) 300 MG tablet; TAKE 1 TABLET(300 MG) BY MOUTH EVERY DAY  Dispense: 90 tablet; Refill: 3  -     CBC auto differential; Future; Expected date: 08/09/2019  -     Comprehensive metabolic panel; Future; Expected date: 08/09/2019  -     Hemoglobin A1c; Future  -     Lipid panel; Future  -     Microalbumin/creatinine urine ratio; Future  -     Urinalysis; Future  -     TSH; Future  -     Sedimentation rate; Future  -     Vitamin D; Future    Hyperlipidemia, unspecified hyperlipidemia type  -     irbesartan (AVAPRO) 300 MG tablet; TAKE 1 TABLET(300 MG) BY MOUTH EVERY DAY  Dispense: 90 tablet; Refill: 3  -     CBC auto differential;  Future; Expected date: 08/09/2019  -     Comprehensive metabolic panel; Future; Expected date: 08/09/2019  -     Hemoglobin A1c; Future  -     Lipid panel; Future  -     Microalbumin/creatinine urine ratio; Future  -     Urinalysis; Future  -     TSH; Future  -     Sedimentation rate; Future  -     Vitamin D; Future    Hypothyroidism due to acquired atrophy of thyroid  -     irbesartan (AVAPRO) 300 MG tablet; TAKE 1 TABLET(300 MG) BY MOUTH EVERY DAY  Dispense: 90 tablet; Refill: 3  -     CBC auto differential; Future; Expected date: 08/09/2019  -     Comprehensive metabolic panel; Future; Expected date: 08/09/2019  -     Hemoglobin A1c; Future  -     Lipid panel; Future  -     Microalbumin/creatinine urine ratio; Future  -     Urinalysis; Future  -     TSH; Future  -     Sedimentation rate; Future  -     Vitamin D; Future    Asymptomatic postmenopausal status  -     irbesartan (AVAPRO) 300 MG tablet; TAKE 1 TABLET(300 MG) BY MOUTH EVERY DAY  Dispense: 90 tablet; Refill: 3  -     CBC auto differential; Future; Expected date: 08/09/2019  -     Comprehensive metabolic panel; Future; Expected date: 08/09/2019  -     Hemoglobin A1c; Future  -     Lipid panel; Future  -     Microalbumin/creatinine urine ratio; Future  -     Urinalysis; Future  -     TSH; Future  -     Sedimentation rate; Future  -     Vitamin D; Future    Bilateral leg edema  -     CBC auto differential; Future; Expected date: 08/09/2019  -     Comprehensive metabolic panel; Future; Expected date: 08/09/2019  -     Hemoglobin A1c; Future  -     Lipid panel; Future  -     Microalbumin/creatinine urine ratio; Future  -     Urinalysis; Future  -     TSH; Future  -     Sedimentation rate; Future  -     Vitamin D; Future    Uncontrolled hypertension  -     CBC auto differential; Future; Expected date: 08/09/2019  -     Comprehensive metabolic panel; Future; Expected date: 08/09/2019  -     Hemoglobin A1c; Future  -     Lipid panel; Future  -      Microalbumin/creatinine urine ratio; Future  -     Urinalysis; Future  -     TSH; Future  -     Sedimentation rate; Future  -     Vitamin D; Future    Anxiety  -     CBC auto differential; Future; Expected date: 08/09/2019  -     Comprehensive metabolic panel; Future; Expected date: 08/09/2019  -     Hemoglobin A1c; Future  -     Lipid panel; Future  -     Microalbumin/creatinine urine ratio; Future  -     Urinalysis; Future  -     TSH; Future  -     Sedimentation rate; Future  -     Vitamin D; Future    Depression, unspecified depression type  -     CBC auto differential; Future; Expected date: 08/09/2019  -     Comprehensive metabolic panel; Future; Expected date: 08/09/2019  -     Hemoglobin A1c; Future  -     Lipid panel; Future  -     Microalbumin/creatinine urine ratio; Future  -     Urinalysis; Future  -     TSH; Future  -     Sedimentation rate; Future  -     Vitamin D; Future    Arthritis  -     CBC auto differential; Future; Expected date: 08/09/2019  -     Comprehensive metabolic panel; Future; Expected date: 08/09/2019  -     Hemoglobin A1c; Future  -     Lipid panel; Future  -     Microalbumin/creatinine urine ratio; Future  -     Urinalysis; Future  -     TSH; Future  -     Sedimentation rate; Future  -     Vitamin D; Future    Disorder of cartilage   -     Vitamin D; Future

## 2019-08-12 ENCOUNTER — LAB VISIT (OUTPATIENT)
Dept: LAB | Facility: HOSPITAL | Age: 79
End: 2019-08-12
Attending: FAMILY MEDICINE
Payer: MEDICARE

## 2019-08-12 DIAGNOSIS — I10 UNCONTROLLED HYPERTENSION: ICD-10-CM

## 2019-08-12 DIAGNOSIS — Z78.0 ASYMPTOMATIC POSTMENOPAUSAL STATUS: ICD-10-CM

## 2019-08-12 DIAGNOSIS — F32.A DEPRESSION, UNSPECIFIED DEPRESSION TYPE: ICD-10-CM

## 2019-08-12 DIAGNOSIS — H91.90 HEARING LOSS, UNSPECIFIED HEARING LOSS TYPE, UNSPECIFIED LATERALITY: ICD-10-CM

## 2019-08-12 DIAGNOSIS — F41.9 ANXIETY: ICD-10-CM

## 2019-08-12 DIAGNOSIS — I10 ESSENTIAL HYPERTENSION: ICD-10-CM

## 2019-08-12 DIAGNOSIS — E78.5 HYPERLIPIDEMIA, UNSPECIFIED HYPERLIPIDEMIA TYPE: ICD-10-CM

## 2019-08-12 DIAGNOSIS — M19.90 ARTHRITIS: ICD-10-CM

## 2019-08-12 DIAGNOSIS — R60.0 BILATERAL LEG EDEMA: ICD-10-CM

## 2019-08-12 DIAGNOSIS — E03.4 HYPOTHYROIDISM DUE TO ACQUIRED ATROPHY OF THYROID: ICD-10-CM

## 2019-08-12 DIAGNOSIS — M94.9 DISORDER OF CARTILAGE: ICD-10-CM

## 2019-08-12 DIAGNOSIS — E11.9 TYPE 2 DIABETES MELLITUS WITHOUT COMPLICATION, WITHOUT LONG-TERM CURRENT USE OF INSULIN: ICD-10-CM

## 2019-08-12 LAB
25(OH)D3+25(OH)D2 SERPL-MCNC: 13 NG/ML (ref 30–96)
ALBUMIN SERPL BCP-MCNC: 3.6 G/DL (ref 3.5–5.2)
ALP SERPL-CCNC: 134 U/L (ref 38–126)
ALT SERPL W/O P-5'-P-CCNC: 16 U/L (ref 10–44)
ANION GAP SERPL CALC-SCNC: 2 MMOL/L (ref 8–16)
AST SERPL-CCNC: 21 U/L (ref 15–46)
BASOPHILS # BLD AUTO: 0.03 K/UL (ref 0–0.2)
BASOPHILS NFR BLD: 0.5 % (ref 0–1.9)
BILIRUB SERPL-MCNC: 1 MG/DL (ref 0.1–1)
BUN SERPL-MCNC: 14 MG/DL (ref 7–17)
CALCIUM SERPL-MCNC: 9.4 MG/DL (ref 8.7–10.5)
CHLORIDE SERPL-SCNC: 112 MMOL/L (ref 95–110)
CHOLEST SERPL-MCNC: 136 MG/DL (ref 120–199)
CHOLEST/HDLC SERPL: 5.2 {RATIO} (ref 2–5)
CO2 SERPL-SCNC: 28 MMOL/L (ref 23–29)
CREAT SERPL-MCNC: 0.85 MG/DL (ref 0.5–1.4)
DIFFERENTIAL METHOD: ABNORMAL
EOSINOPHIL # BLD AUTO: 0.2 K/UL (ref 0–0.5)
EOSINOPHIL NFR BLD: 3.3 % (ref 0–8)
ERYTHROCYTE [DISTWIDTH] IN BLOOD BY AUTOMATED COUNT: 13.6 % (ref 11.5–14.5)
ERYTHROCYTE [SEDIMENTATION RATE] IN BLOOD BY WESTERGREN METHOD: 83 MM/HR (ref 0–20)
EST. GFR  (AFRICAN AMERICAN): >60 ML/MIN/1.73 M^2
EST. GFR  (NON AFRICAN AMERICAN): >60 ML/MIN/1.73 M^2
ESTIMATED AVG GLUCOSE: 134 MG/DL (ref 68–131)
GLUCOSE SERPL-MCNC: 121 MG/DL (ref 70–110)
HBA1C MFR BLD HPLC: 6.3 % (ref 4–5.6)
HCT VFR BLD AUTO: 37.4 % (ref 37–48.5)
HDLC SERPL-MCNC: 26 MG/DL (ref 40–75)
HDLC SERPL: 19.1 % (ref 20–50)
HGB BLD-MCNC: 11.6 G/DL (ref 12–16)
LDLC SERPL CALC-MCNC: 75.2 MG/DL (ref 63–159)
LYMPHOCYTES # BLD AUTO: 2.3 K/UL (ref 1–4.8)
LYMPHOCYTES NFR BLD: 36.3 % (ref 18–48)
MCH RBC QN AUTO: 29.3 PG (ref 27–31)
MCHC RBC AUTO-ENTMCNC: 31 G/DL (ref 32–36)
MCV RBC AUTO: 94 FL (ref 82–98)
MONOCYTES # BLD AUTO: 0.6 K/UL (ref 0.3–1)
MONOCYTES NFR BLD: 9.2 % (ref 4–15)
NEUTROPHILS # BLD AUTO: 3.2 K/UL (ref 1.8–7.7)
NEUTROPHILS NFR BLD: 50.7 % (ref 38–73)
NONHDLC SERPL-MCNC: 110 MG/DL
PLATELET # BLD AUTO: 228 K/UL (ref 150–350)
PMV BLD AUTO: 10.3 FL (ref 9.2–12.9)
POTASSIUM SERPL-SCNC: 3.7 MMOL/L (ref 3.5–5.1)
PROT SERPL-MCNC: 7.8 G/DL (ref 6–8.4)
RBC # BLD AUTO: 3.96 M/UL (ref 4–5.4)
SODIUM SERPL-SCNC: 142 MMOL/L (ref 136–145)
TRIGL SERPL-MCNC: 174 MG/DL (ref 30–150)
TSH SERPL DL<=0.005 MIU/L-ACNC: 3.27 UIU/ML (ref 0.4–4)
WBC # BLD AUTO: 6.33 K/UL (ref 3.9–12.7)

## 2019-08-12 PROCEDURE — 85652 RBC SED RATE AUTOMATED: CPT

## 2019-08-12 PROCEDURE — 80053 COMPREHEN METABOLIC PANEL: CPT | Mod: PO

## 2019-08-12 PROCEDURE — 80061 LIPID PANEL: CPT

## 2019-08-12 PROCEDURE — 84443 ASSAY THYROID STIM HORMONE: CPT | Mod: PO

## 2019-08-12 PROCEDURE — 36415 COLL VENOUS BLD VENIPUNCTURE: CPT | Mod: PO

## 2019-08-12 PROCEDURE — 82306 VITAMIN D 25 HYDROXY: CPT | Mod: PO

## 2019-08-12 PROCEDURE — 83036 HEMOGLOBIN GLYCOSYLATED A1C: CPT

## 2019-08-12 PROCEDURE — 85025 COMPLETE CBC W/AUTO DIFF WBC: CPT | Mod: PO

## 2019-08-14 ENCOUNTER — TELEPHONE (OUTPATIENT)
Dept: FAMILY MEDICINE | Facility: CLINIC | Age: 79
End: 2019-08-14

## 2019-08-14 RX ORDER — ACETAMINOPHEN 500 MG
5000 TABLET ORAL DAILY
Qty: 90 TABLET | Refills: 3 | Status: SHIPPED | OUTPATIENT
Start: 2019-08-14 | End: 2020-11-09 | Stop reason: SDUPTHER

## 2019-08-14 NOTE — TELEPHONE ENCOUNTER
----- Message from Rudy Cruz MD sent at 8/14/2019  8:35 AM CDT -----  Vit d low; start 5000 units daily; rx sent

## 2019-08-14 NOTE — TELEPHONE ENCOUNTER
Called pt sister back and explained that her vit D was low and she needs to take Vit D she will give her the message

## 2019-09-16 ENCOUNTER — OFFICE VISIT (OUTPATIENT)
Dept: FAMILY MEDICINE | Facility: CLINIC | Age: 79
End: 2019-09-16
Payer: MEDICARE

## 2019-09-16 VITALS
HEART RATE: 66 BPM | BODY MASS INDEX: 33.29 KG/M2 | OXYGEN SATURATION: 98 % | HEIGHT: 64 IN | WEIGHT: 195 LBS | TEMPERATURE: 98 F

## 2019-09-16 DIAGNOSIS — E11.9 TYPE 2 DIABETES MELLITUS WITHOUT COMPLICATION, WITHOUT LONG-TERM CURRENT USE OF INSULIN: ICD-10-CM

## 2019-09-16 DIAGNOSIS — M19.90 ARTHRITIS: ICD-10-CM

## 2019-09-16 DIAGNOSIS — I10 ESSENTIAL HYPERTENSION: Primary | ICD-10-CM

## 2019-09-16 DIAGNOSIS — E03.4 HYPOTHYROIDISM DUE TO ACQUIRED ATROPHY OF THYROID: ICD-10-CM

## 2019-09-16 DIAGNOSIS — H91.90 HEARING LOSS, UNSPECIFIED HEARING LOSS TYPE, UNSPECIFIED LATERALITY: ICD-10-CM

## 2019-09-16 DIAGNOSIS — I10 UNCONTROLLED HYPERTENSION: ICD-10-CM

## 2019-09-16 DIAGNOSIS — E78.5 HYPERLIPIDEMIA, UNSPECIFIED HYPERLIPIDEMIA TYPE: ICD-10-CM

## 2019-09-16 DIAGNOSIS — R70.0 ELEVATED SED RATE: ICD-10-CM

## 2019-09-16 DIAGNOSIS — F32.A DEPRESSION, UNSPECIFIED DEPRESSION TYPE: ICD-10-CM

## 2019-09-16 PROCEDURE — 99213 OFFICE O/P EST LOW 20 MIN: CPT | Mod: S$GLB,,, | Performed by: FAMILY MEDICINE

## 2019-09-16 PROCEDURE — 99213 PR OFFICE/OUTPT VISIT, EST, LEVL III, 20-29 MIN: ICD-10-PCS | Mod: S$GLB,,, | Performed by: FAMILY MEDICINE

## 2019-09-16 NOTE — PROGRESS NOTES
"Subjective:      Patient ID: Katy Soto is a 79 y.o. female.    Chief Complaint: Follow-up (1 month)      Vitals:    09/16/19 1411   Pulse: 66   Temp: 98.3 °F (36.8 °C)   SpO2: 98%   Weight: 88.5 kg (195 lb)   Height: 5' 4" (1.626 m)        HPI   bp check; high here; better at home but still too high'; no nose bleed  See last note; had labs done since last visit; gets up late, takes Rx at 11 AM; told her to start taking avapro 300 HS, 11 PM; she usually has high numbers at AM; drinking too much sugar  A1C slight elvated;  driks a lot of sugar;  Stop the sugar  High sed rate for 8 years  Low Vit D low  BP up hre and at home        Review of Systems   Constitutional: Negative.    HENT: Positive for hearing loss.    Respiratory: Negative.    Cardiovascular: Negative.    Gastrointestinal: Negative.    Endocrine: Negative.    Genitourinary: Negative.    Musculoskeletal: Negative.    Psychiatric/Behavioral: Negative.    All other systems reviewed and are negative.    Objective:     Physical Exam   Constitutional: She is oriented to person, place, and time. She appears well-developed and well-nourished.   HENT:   Head: Normocephalic.   Hard of hearing   Eyes: Pupils are equal, round, and reactive to light. Conjunctivae and EOM are normal.   Neck: Normal range of motion. Neck supple.   Cardiovascular: Normal rate, regular rhythm and normal heart sounds.   Pulmonary/Chest: Effort normal and breath sounds normal.   Musculoskeletal: Normal range of motion.   Neurological: She is alert and oriented to person, place, and time. She has normal reflexes.   Skin: Skin is warm and dry.   Psychiatric: She has a normal mood and affect. Her behavior is normal. Judgment and thought content normal.   Nursing note and vitals reviewed.    Assessment:     1. Essential hypertension    2. Hearing loss, unspecified hearing loss type, unspecified laterality    3. Type 2 diabetes mellitus without complication, without long-term current use " of insulin    4. Hyperlipidemia, unspecified hyperlipidemia type    5. Hypothyroidism due to acquired atrophy of thyroid    6. Uncontrolled hypertension    7. Depression, unspecified depression type    8. Arthritis    9. Elevated sed rate      Plan:        Medication List           Accurate as of 9/16/19  3:08 PM. If you have any questions, ask your nurse or doctor.               CONTINUE taking these medications    aspirin 81 MG EC tablet  Commonly known as:  ECOTRIN     atorvastatin 40 MG tablet  Commonly known as:  LIPITOR  Take 1 tablet (40 mg total) by mouth once daily.     cetirizine 10 MG tablet  Commonly known as:  ZYRTEC  Take 1 tablet (10 mg total) by mouth once daily.     cholecalciferol (vitamin D3) 5,000 unit Tab  Commonly known as:  VITAMIN D3  Take 1 tablet (5,000 Units total) by mouth once daily.     * fluticasone propionate 50 mcg/actuation nasal spray  Commonly known as:  FLONASE  SHAKE LIQUID AND USE 1 SPRAY(50 MCG) IN EACH NOSTRIL EVERY DAY     * fluticasone propionate 50 mcg/actuation nasal spray  Commonly known as:  FLONASE  SHAKE LIQUID AND USE 1 SPRAY(50 MCG) IN EACH NOSTRIL EVERY DAY     furosemide 40 MG tablet  Commonly known as:  LASIX  Take 1 tablet (40 mg total) by mouth once daily. As leg swelling     irbesartan 300 MG tablet  Commonly known as:  AVAPRO  TAKE 1 TABLET(300 MG) BY MOUTH EVERY DAY     isosorbide mononitrate 60 MG 24 hr tablet  Commonly known as:  IMDUR  TAKE 1 TABLET(60 MG) BY MOUTH EVERY DAY     potassium chloride SA 20 MEQ tablet  Commonly known as:  K-DUR,KLOR-CON  TAKE 1 TABLET BY MOUTH ONCE DAILY WITH LASIX AS NEEDED     sucralfate 1 gram tablet  Commonly known as:  CARAFATE  Take 1 tablet (1 g total) by mouth 3 (three) times daily.         * This list has 2 medication(s) that are the same as other medications prescribed for you. Read the directions carefully, and ask your doctor or other care provider to review them with you.              Essential  hypertension    Hearing loss, unspecified hearing loss type, unspecified laterality    Type 2 diabetes mellitus without complication, without long-term current use of insulin    Hyperlipidemia, unspecified hyperlipidemia type    Hypothyroidism due to acquired atrophy of thyroid    Uncontrolled hypertension    Depression, unspecified depression type    Arthritis    Elevated sed rate    take avapro HS  Take imdur and lasix in AM;

## 2019-09-30 ENCOUNTER — OFFICE VISIT (OUTPATIENT)
Dept: FAMILY MEDICINE | Facility: CLINIC | Age: 79
End: 2019-09-30
Payer: MEDICARE

## 2019-09-30 VITALS
HEART RATE: 71 BPM | BODY MASS INDEX: 33.17 KG/M2 | HEIGHT: 64 IN | OXYGEN SATURATION: 98 % | DIASTOLIC BLOOD PRESSURE: 90 MMHG | WEIGHT: 194.31 LBS | SYSTOLIC BLOOD PRESSURE: 180 MMHG

## 2019-09-30 DIAGNOSIS — I10 ESSENTIAL HYPERTENSION: Primary | ICD-10-CM

## 2019-09-30 PROCEDURE — 99213 OFFICE O/P EST LOW 20 MIN: CPT | Mod: S$GLB,,, | Performed by: FAMILY MEDICINE

## 2019-09-30 PROCEDURE — 99213 PR OFFICE/OUTPT VISIT, EST, LEVL III, 20-29 MIN: ICD-10-PCS | Mod: S$GLB,,, | Performed by: FAMILY MEDICINE

## 2019-09-30 NOTE — PROGRESS NOTES
"Subjective:      Patient ID: Katy Soto is a 79 y.o. female.    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    HENT: Negative.    Respiratory: Negative.    Cardiovascular: Negative.    Gastrointestinal: Negative.    Endocrine: Negative.    Genitourinary: Negative.    Musculoskeletal: Negative.    Psychiatric/Behavioral: The patient is nervous/anxious.    All other systems reviewed and are negative.    Objective:     Physical Exam   Constitutional: She is oriented to person, place, and time. She appears well-developed and well-nourished.   HENT:   Head: Normocephalic.   Eyes: Pupils are equal, round, and reactive to light. Conjunctivae and EOM are normal.   Neck: Normal range of motion. Neck supple.   Cardiovascular: Normal rate, regular rhythm and normal heart sounds.   Pulmonary/Chest: Effort normal and breath sounds normal.   Musculoskeletal: Normal range of motion.   Neurological: She is alert and oriented to person, place, and time. She has normal reflexes.   Skin: Skin is warm and dry.   Psychiatric: She has a normal mood and affect. Her behavior is normal. Judgment and thought content normal.   Nursing note and vitals reviewed.    Assessment:     1. Essential hypertension      Plan:        Medication List           Accurate as of 2019 12:15 PM. If you have any questions, ask your nurse or doctor.               CONTINUE taking these medications    aspirin 81 MG EC tablet  Commonly known as:  ECOTRIN     atorvastatin 40 MG tablet  Commonly known as:  LIPITOR  Take 1 tablet (40 mg total) by mouth once daily.   "   cetirizine 10 MG tablet  Commonly known as:  ZYRTEC  Take 1 tablet (10 mg total) by mouth once daily.     cholecalciferol (vitamin D3) 5,000 unit Tab  Commonly known as:  VITAMIN D3  Take 1 tablet (5,000 Units total) by mouth once daily.     * fluticasone propionate 50 mcg/actuation nasal spray  Commonly known as:  FLONASE  SHAKE LIQUID AND USE 1 SPRAY(50 MCG) IN EACH NOSTRIL EVERY DAY     * fluticasone propionate 50 mcg/actuation nasal spray  Commonly known as:  FLONASE  SHAKE LIQUID AND USE 1 SPRAY(50 MCG) IN EACH NOSTRIL EVERY DAY     furosemide 40 MG tablet  Commonly known as:  LASIX  Take 1 tablet (40 mg total) by mouth once daily. As leg swelling     irbesartan 300 MG tablet  Commonly known as:  AVAPRO  TAKE 1 TABLET(300 MG) BY MOUTH EVERY DAY     isosorbide mononitrate 60 MG 24 hr tablet  Commonly known as:  IMDUR  TAKE 1 TABLET(60 MG) BY MOUTH EVERY DAY     potassium chloride SA 20 MEQ tablet  Commonly known as:  K-DUR,KLOR-CON  TAKE 1 TABLET BY MOUTH ONCE DAILY WITH LASIX AS NEEDED     sucralfate 1 gram tablet  Commonly known as:  CARAFATE  Take 1 tablet (1 g total) by mouth 3 (three) times daily.         * This list has 2 medication(s) that are the same as other medications prescribed for you. Read the directions carefully, and ask your doctor or other care provider to review them with you.              Essential hypertension    bring numbers from home; no change in treatment

## 2019-10-14 ENCOUNTER — TELEPHONE (OUTPATIENT)
Dept: FAMILY MEDICINE | Facility: CLINIC | Age: 79
End: 2019-10-14

## 2019-10-14 RX ORDER — TERAZOSIN 1 MG/1
1 CAPSULE ORAL NIGHTLY
Qty: 30 CAPSULE | Refills: 11 | Status: SHIPPED | OUTPATIENT
Start: 2019-10-14 | End: 2020-11-09 | Stop reason: SDUPTHER

## 2019-12-20 ENCOUNTER — PES CALL (OUTPATIENT)
Dept: ADMINISTRATIVE | Facility: CLINIC | Age: 79
End: 2019-12-20

## 2020-01-10 ENCOUNTER — OFFICE VISIT (OUTPATIENT)
Dept: INTERNAL MEDICINE | Facility: CLINIC | Age: 80
End: 2020-01-10
Payer: MEDICARE

## 2020-01-10 VITALS
OXYGEN SATURATION: 99 % | DIASTOLIC BLOOD PRESSURE: 94 MMHG | HEIGHT: 64 IN | BODY MASS INDEX: 34.6 KG/M2 | SYSTOLIC BLOOD PRESSURE: 160 MMHG | WEIGHT: 202.69 LBS | HEART RATE: 66 BPM

## 2020-01-10 DIAGNOSIS — E78.5 HYPERLIPIDEMIA, UNSPECIFIED HYPERLIPIDEMIA TYPE: ICD-10-CM

## 2020-01-10 DIAGNOSIS — Z23 IMMUNIZATION DUE: ICD-10-CM

## 2020-01-10 DIAGNOSIS — Z00.00 ENCOUNTER FOR PREVENTIVE HEALTH EXAMINATION: Primary | ICD-10-CM

## 2020-01-10 DIAGNOSIS — E55.9 VITAMIN D INSUFFICIENCY: ICD-10-CM

## 2020-01-10 DIAGNOSIS — E66.9 OBESITY (BMI 30.0-34.9): ICD-10-CM

## 2020-01-10 DIAGNOSIS — I10 ESSENTIAL HYPERTENSION: ICD-10-CM

## 2020-01-10 DIAGNOSIS — I70.0 ATHEROSCLEROSIS OF AORTA: ICD-10-CM

## 2020-01-10 DIAGNOSIS — M85.80 OSTEOPENIA, UNSPECIFIED LOCATION: ICD-10-CM

## 2020-01-10 DIAGNOSIS — M19.90 ARTHRITIS: ICD-10-CM

## 2020-01-10 DIAGNOSIS — F41.9 ANXIETY: ICD-10-CM

## 2020-01-10 DIAGNOSIS — I73.9 PAD (PERIPHERAL ARTERY DISEASE): ICD-10-CM

## 2020-01-10 PROCEDURE — G0439 PR MEDICARE ANNUAL WELLNESS SUBSEQUENT VISIT: ICD-10-PCS | Mod: S$GLB,,, | Performed by: NURSE PRACTITIONER

## 2020-01-10 PROCEDURE — 99214 OFFICE O/P EST MOD 30 MIN: CPT | Mod: PBBFAC,PN | Performed by: NURSE PRACTITIONER

## 2020-01-10 PROCEDURE — 99999 PR PBB SHADOW E&M-EST. PATIENT-LVL IV: CPT | Mod: PBBFAC,,, | Performed by: NURSE PRACTITIONER

## 2020-01-10 PROCEDURE — G0439 PPPS, SUBSEQ VISIT: HCPCS | Mod: S$GLB,,, | Performed by: NURSE PRACTITIONER

## 2020-01-10 PROCEDURE — G0009 ADMIN PNEUMOCOCCAL VACCINE: HCPCS | Mod: PBBFAC,PN

## 2020-01-10 PROCEDURE — 99999 PR PBB SHADOW E&M-EST. PATIENT-LVL IV: ICD-10-PCS | Mod: PBBFAC,,, | Performed by: NURSE PRACTITIONER

## 2020-01-10 NOTE — PATIENT INSTRUCTIONS
Counseling and Referral of Other Preventative  (Italic type indicates deductible and co-insurance are waived)    Patient Name: Katy Soto  Today's Date: 1/10/2020    Health Maintenance       Date Due Completion Date    Shingles Vaccine (1 of 2) 06[birth year]08/1989 ---    Pneumococcal Vaccine (65+ Low/Medium Risk) (2 of 2 - PPSV23) 03/26/2019 3/26/2018    Influenza Vaccine (1) 09/01/2019 12/21/2017 (Declined)    Override on 12/21/2017: Declined    Override on 3/1/2017: Not Clinically Appropriate (dec)    DEXA SCAN 04/04/2020 4/4/2017    Override on 3/1/2017: Done    Lipid Panel 08/12/2024 8/12/2019    TETANUS VACCINE 03/01/2027 3/1/2017 (Done)    Override on 3/1/2017: Done (dec)        Orders Placed This Encounter   Procedures    (In Office Administered) Pneumococcal Polysaccharide Vaccine (23 Valent) (SQ/IM)     The following information is provided to all patients.  This information is to help you find resources for any of the problems found today that may be affecting your health:                Living healthy guide: www.[facility].[place].gov      Understanding Diabetes: www.diabetes.org      Eating healthy: www.cdc.gov/healthyweight      CDC home safety checklist: www.cdc.gov/steadi/patient.html      Agency on Aging: www.goea.[place].[facility]      Alcoholics anonymous (AA): www.aa.org      Physical Activity: www.spencer.nih.gov/zj1fwou      Tobacco use: www.quitwithusla.org

## 2020-01-10 NOTE — PROGRESS NOTES
"Katy Soto presented for a  Medicare AWV and comprehensive Health Risk Assessment today. Patient is here today with a family friend (Mara Taylor). The following components were reviewed and updated:    · Medical history  · Family History  · Social history  · Allergies and Current Medications  · Health Risk Assessment  · Health Maintenance  · Care Team     ** See Completed Assessments for Annual Wellness Visit within the encounter summary.**       The following assessments were completed:  · Living Situation  · CAGE  · Depression Screening  · Timed Get Up and Go  · Whisper Test  · Cognitive Function Screening  · Nutrition Screening  · ADL Screening  · PAQ Screening    Vitals:    01/10/20 1110   BP: (!) 160/94   Pulse: 66   SpO2: 99%   Weight: 92 kg (202 lb 11.4 oz)   Height: 5' 4" (1.626 m)     Body mass index is 34.8 kg/m².  Physical Exam   Constitutional: She is oriented to person, place, and time. No distress.   Obese   HENT:   Head: Normocephalic and atraumatic.   Eyes: Pupils are equal, round, and reactive to light. EOM are normal.   Neck: Normal range of motion.   Cardiovascular: Normal rate, regular rhythm and normal heart sounds.   Pulmonary/Chest: Effort normal and breath sounds normal. No respiratory distress.   Musculoskeletal: Normal range of motion. She exhibits no edema.   Neurological: She is alert and oriented to person, place, and time. Coordination normal.   Skin: Skin is warm and dry.   Psychiatric: She has a normal mood and affect. Her behavior is normal. Judgment and thought content normal.   Nursing note and vitals reviewed.        Diagnoses and health risks identified today and associated recommendations/orders:    1. Encounter for preventive health examination    2. Atherosclerosis of aorta  Noted on chest xray dated 10/31/2011. Patient followed by Cardiology (Dr. Fried).     3. PAD (peripheral artery disease)  Chronic; stable. Continue current treatment plan as previously prescribed " by Cardiology.    4. Essential hypertension  Chronic; unstable. Continue current treatment plan as previously prescribed by PCP. Follow-up with PCP.    5. Hyperlipidemia, unspecified hyperlipidemia type  Chronic; stable. Continue current treatment plan as previously prescribed by PCP.     6. Anxiety  Chronic; stable. Continue current treatment plan as previously prescribed by PCP.     7. Vitamin D insufficiency  Vitamin D 13 (L) as noted on most recent vit d lab dated 8/12/19. Patient followed by PCP.     8. Arthritis  Chronic; stable. Continue current treatment plan as previously prescribed by PCP.     9. Osteopenia, unspecified location  Noted on DEXA scan dated 4/4/17. Patient followed by PCP.     10. Obesity (BMI 30.0-34.9)  Current BMI 34.80. Lifestyle modifications discussed with patient.    11. Immunization due  - (In Office Administered) Pneumococcal Polysaccharide Vaccine (23 Valent) (SQ/IM)      Provided Katy with a 5-10 year written screening schedule and personal prevention plan. Recommendations were developed using the USPSTF age appropriate recommendations. Education, counseling, and referrals were provided as needed. After Visit Summary printed and given to patient which includes a list of additional screenings\tests needed.    Follow up for HRA visit in 1 year.    Neil Diaz NP  I offered to discuss end of life issues, including information on how to make advance directives that the patient could use to name someone who would make medical decisions on their behalf if they became too ill to make themselves.    ___Patient declined  _X_Patient is interested, I provided paper work and offered to discuss.

## 2020-04-14 ENCOUNTER — HOSPITAL ENCOUNTER (EMERGENCY)
Facility: HOSPITAL | Age: 80
Discharge: HOME | End: 2020-04-14
Attending: EMERGENCY MEDICINE
Payer: MEDICARE

## 2020-04-14 VITALS
BODY MASS INDEX: 32.44 KG/M2 | WEIGHT: 190 LBS | TEMPERATURE: 100 F | RESPIRATION RATE: 18 BRPM | OXYGEN SATURATION: 97 % | SYSTOLIC BLOOD PRESSURE: 188 MMHG | HEART RATE: 83 BPM | DIASTOLIC BLOOD PRESSURE: 86 MMHG | HEIGHT: 64 IN

## 2020-04-14 DIAGNOSIS — U07.1 COVID-19 VIRUS DETECTED: Primary | ICD-10-CM

## 2020-04-14 DIAGNOSIS — U07.1 COVID-19 VIRUS DETECTED: ICD-10-CM

## 2020-04-14 DIAGNOSIS — E86.0 DEHYDRATION: ICD-10-CM

## 2020-04-14 DIAGNOSIS — R11.0 NAUSEA: ICD-10-CM

## 2020-04-14 LAB — SARS-COV-2 RDRP RESP QL NAA+PROBE: POSITIVE

## 2020-04-14 PROCEDURE — 99284 EMERGENCY DEPT VISIT MOD MDM: CPT | Mod: 25,ER

## 2020-04-14 PROCEDURE — 25000003 PHARM REV CODE 250: Mod: ER | Performed by: NURSE PRACTITIONER

## 2020-04-14 PROCEDURE — U0002 COVID-19 LAB TEST NON-CDC: HCPCS | Mod: ER

## 2020-04-14 RX ORDER — AZITHROMYCIN 250 MG/1
250 TABLET, FILM COATED ORAL DAILY
Qty: 6 TABLET | Refills: 0 | Status: SHIPPED | OUTPATIENT
Start: 2020-04-14 | End: 2020-11-09

## 2020-04-14 RX ORDER — ONDANSETRON 4 MG/1
8 TABLET, ORALLY DISINTEGRATING ORAL
Status: COMPLETED | OUTPATIENT
Start: 2020-04-14 | End: 2020-04-14

## 2020-04-14 RX ORDER — ONDANSETRON 4 MG/1
8 TABLET, FILM COATED ORAL EVERY 8 HOURS PRN
Qty: 12 TABLET | Refills: 0 | Status: SHIPPED | OUTPATIENT
Start: 2020-04-14 | End: 2020-05-11 | Stop reason: SDUPTHER

## 2020-04-14 RX ADMIN — ONDANSETRON 8 MG: 4 TABLET, ORALLY DISINTEGRATING ORAL at 11:04

## 2020-04-14 NOTE — ED PROVIDER NOTES
CHIEF COMPLAINT:   Chief Complaint   Patient presents with    Nausea    Weakness       HISTORY OF PRESENT ILLNESS: Katy Soto who is a 79 y.o. presents to the emergency department today with complaint of nausea and weakness since this morning.  Patient states that she has been helping with someone who has COVID.    REVIEW OF SYSTEMS:  Constitutional: +fever, +chills.  Eyes: No discharge. No pain.  HENT: +nasal congestion; No sore throat.   Cardiovascular: No chest pain, no palpitations.  Respiratory: +cough, -shortness of breath.  Gastrointestinal:  Nausea; No abdominal pain, no vomiting.   Genitourinary: No hematuria, dysuria, urgency.  Musculoskeletal: No back pain.  Skin: No rashes, no lesions.  Neurological: No headache, no focal weakness.    Otherwise remaining ROS negative     ALLERGIES REVIEWED  MEDICATIONS REVIEWED  PMH/PSH/SOC/FH REVIEWED     The history is provided by the patient.    Nursing/Ancillary staff note reviewed.        PHYSICAL EXAM:  VS reviewed  Vitals:    04/14/20 1056   BP: (!) 188/86   Pulse: 83   Resp: 18   Temp: 99.6 °F (37.6 °C)       This emergency department encounter was performed via a HIPAA-compliant telemedicine application, in concert with a tele-presenter in the room. A face to face evaluation was available to the patient in the case it was deemed necessary by me or the Emergency Department staff.   Nursing Notes and Triage Vitals reviewed by me.    Telemedicine exam performed via [employer]   Constitutional: Well developed, well nourished, no apparent distress.  HENT: Normocephalic, atraumatic. Nose: No rhinorrhea or discharge noted. Pharynx: Mucous membranes moist, no erythema per tele-presenter. No tenderness to frontal/maxillary sinuses on exam by tele-presenter.  Eyes: No conjunctival injection, pallor or icterus. No discharge.  Neck: Normal range of motion. No cervical adenopathy palpated per patient self-exam.  Respiratory: No respiratory distress or  conversational dyspnea. No accessory muscle use or retractions.  Cardiovascular: No perioral cyanosis, cap refill < 2 sec on patient self-exam.  Musculoskeletal: No gross deformity or limited range of motion of all major joints.  Integument: Warm and dry. No rash.  Neurologic: Alert and oriented x3, GCS 15. Moving all extremities. No aphasia.   Psychiatric: Affect normal. Mood normal. Normal behavior.      Past Medical History:   Diagnosis Date    Hyperlipidemia     Hypertension          Past Surgical History:   Procedure Laterality Date     SECTION      x1    HERNIA REPAIR      umbilical    LEG SURGERY Right 2017    stents placed                ED COURSE:     Patient presenting with general illness symptoms; appears well and nontoxic. Exam grossly unremarkable at this time.    DIFFERENTIAL DIAGNOSIS: After history and physical exam a differential diagnosis was considered, but was not limited to,   Sepsis, meningitis, otitis media/external, nasal polyp, bacterial sinusitis, allergic rhinitis, influenza, COVID19, bacterial/viral pharyngitis, bacterial/viral pneumonia.    ED management:  Chest x-ray, COVID test is positive.      IMPRESSION  The primary encounter diagnosis was COVID-19 virus detected. Diagnoses of Nausea and Dehydration were also pertinent to this visit. Strict instructions to follow up with primary care physician or reference provided for further assessment and evaluation. Given instructions to return for any acute symptoms and verbalized understanding of this medical plan.      Patient was seen in the Emergency Department with symptoms consistent with a viral respiratory illness.  Vital signs do not indicate sepsis, hypoxia nor respiratory distress, and in my professional opinion the patient is well enough for discharge home. The patient was provided with discharge instructions on self-care and how to quarantine at home. I reinforced this advice and the dangers to family and public  with failure to comply. We will proceed with symptomatic treatment. Return precautions discussed with the patient. The patient expressed understanding to my instructions                                   Tracee Timmons, CHARBEL  04/14/20 7296

## 2020-04-14 NOTE — DISCHARGE INSTRUCTIONS
"Given the current circumstances in the community regarding corona virus, you should presumptively treat yourself as if you do have Coronavirus / Covid 19 and quarantine yourself at home.  Please read the and follow the instructions of the discharge paperwork reguarding "Commonly asked Questions About Home Quarantine" provided by the CDC.  If you have significantly worsening shortness of breath, difficulty breathing, severe chest pain, return to the emergency room.  Take over-the-counter medications and any other prescriptions provided for symptoms of fever, cough, cold, etc..    "

## 2020-05-01 RX ORDER — POTASSIUM CHLORIDE 20 MEQ/1
TABLET, EXTENDED RELEASE ORAL
Qty: 90 TABLET | Refills: 3 | Status: SHIPPED | OUTPATIENT
Start: 2020-05-01 | End: 2021-05-02

## 2020-05-04 RX ORDER — ATORVASTATIN CALCIUM 40 MG/1
TABLET, FILM COATED ORAL
Qty: 90 TABLET | Refills: 3 | Status: SHIPPED | OUTPATIENT
Start: 2020-05-04 | End: 2021-08-01

## 2020-05-11 ENCOUNTER — TELEPHONE (OUTPATIENT)
Dept: ADMINISTRATIVE | Facility: HOSPITAL | Age: 80
End: 2020-05-11

## 2020-05-11 DIAGNOSIS — U07.1 COVID-19 VIRUS DETECTED: Primary | ICD-10-CM

## 2020-05-11 RX ORDER — ONDANSETRON 4 MG/1
8 TABLET, FILM COATED ORAL EVERY 8 HOURS PRN
Qty: 12 TABLET | Refills: 0 | Status: SHIPPED | OUTPATIENT
Start: 2020-05-11 | End: 2022-03-17

## 2020-05-11 NOTE — TELEPHONE ENCOUNTER
I spoke to pt; ? Nausea/food smell causes her to lose her appetite; Dx Covid 19 in ER one month ago;   Rec: cbc, cmp and refilled zofran

## 2020-05-11 NOTE — TELEPHONE ENCOUNTER
Pt is requesting a medication be called into [facility] for nausea, pt states she still has a slight cough from the COVID19 but also has a loss of appetite.

## 2020-05-12 ENCOUNTER — LAB VISIT (OUTPATIENT)
Dept: LAB | Facility: HOSPITAL | Age: 80
End: 2020-05-12
Attending: FAMILY MEDICINE
Payer: MEDICARE

## 2020-05-12 ENCOUNTER — TELEPHONE (OUTPATIENT)
Dept: FAMILY MEDICINE | Facility: CLINIC | Age: 80
End: 2020-05-12

## 2020-05-12 DIAGNOSIS — U07.1 COVID-19 VIRUS DETECTED: ICD-10-CM

## 2020-05-12 LAB
ALBUMIN SERPL BCP-MCNC: 3.7 G/DL (ref 3.5–5.2)
ALP SERPL-CCNC: 120 U/L (ref 38–126)
ALT SERPL W/O P-5'-P-CCNC: 12 U/L (ref 10–44)
ANION GAP SERPL CALC-SCNC: 9 MMOL/L (ref 8–16)
AST SERPL-CCNC: 22 U/L (ref 15–46)
BASOPHILS # BLD AUTO: 0.03 K/UL (ref 0–0.2)
BASOPHILS NFR BLD: 0.5 % (ref 0–1.9)
BILIRUB SERPL-MCNC: 1.1 MG/DL (ref 0.1–1)
BUN SERPL-MCNC: 14 MG/DL (ref 7–17)
CALCIUM SERPL-MCNC: 9.2 MG/DL (ref 8.7–10.5)
CHLORIDE SERPL-SCNC: 104 MMOL/L (ref 95–110)
CO2 SERPL-SCNC: 28 MMOL/L (ref 23–29)
CREAT SERPL-MCNC: 1 MG/DL (ref 0.5–1.4)
DIFFERENTIAL METHOD: ABNORMAL
EOSINOPHIL # BLD AUTO: 0.1 K/UL (ref 0–0.5)
EOSINOPHIL NFR BLD: 1.7 % (ref 0–8)
ERYTHROCYTE [DISTWIDTH] IN BLOOD BY AUTOMATED COUNT: 14.1 % (ref 11.5–14.5)
EST. GFR  (AFRICAN AMERICAN): >60 ML/MIN/1.73 M^2
EST. GFR  (NON AFRICAN AMERICAN): 53.3 ML/MIN/1.73 M^2
GLUCOSE SERPL-MCNC: 126 MG/DL (ref 70–110)
HCT VFR BLD AUTO: 38 % (ref 37–48.5)
HGB BLD-MCNC: 11.8 G/DL (ref 12–16)
IMM GRANULOCYTES # BLD AUTO: 0.01 K/UL (ref 0–0.04)
IMM GRANULOCYTES NFR BLD AUTO: 0.2 % (ref 0–0.5)
LYMPHOCYTES # BLD AUTO: 2.4 K/UL (ref 1–4.8)
LYMPHOCYTES NFR BLD: 37.1 % (ref 18–48)
MCH RBC QN AUTO: 29.2 PG (ref 27–31)
MCHC RBC AUTO-ENTMCNC: 31.1 G/DL (ref 32–36)
MCV RBC AUTO: 94 FL (ref 82–98)
MONOCYTES # BLD AUTO: 0.6 K/UL (ref 0.3–1)
MONOCYTES NFR BLD: 9.7 % (ref 4–15)
NEUTROPHILS # BLD AUTO: 3.2 K/UL (ref 1.8–7.7)
NEUTROPHILS NFR BLD: 50.8 % (ref 38–73)
NRBC BLD-RTO: 0 /100 WBC
PLATELET # BLD AUTO: 229 K/UL (ref 150–350)
PMV BLD AUTO: 9.9 FL (ref 9.2–12.9)
POTASSIUM SERPL-SCNC: 4.2 MMOL/L (ref 3.5–5.1)
PROT SERPL-MCNC: 8.1 G/DL (ref 6–8.4)
RBC # BLD AUTO: 4.04 M/UL (ref 4–5.4)
SODIUM SERPL-SCNC: 141 MMOL/L (ref 136–145)
WBC # BLD AUTO: 6.38 K/UL (ref 3.9–12.7)

## 2020-05-12 PROCEDURE — 80053 COMPREHEN METABOLIC PANEL: CPT | Mod: PO

## 2020-05-12 PROCEDURE — 36415 COLL VENOUS BLD VENIPUNCTURE: CPT | Mod: PO

## 2020-05-12 PROCEDURE — 85025 COMPLETE CBC W/AUTO DIFF WBC: CPT | Mod: PO

## 2020-05-12 NOTE — TELEPHONE ENCOUNTER
PATIENT NOTIFIED  ----- Message from Rudy Cruz MD sent at 5/12/2020  1:25 PM CDT -----  CALL PT TESTS ARE NORMAL    Result Notes for Comprehensive metabolic panel

## 2020-07-28 ENCOUNTER — OUTPATIENT CASE MANAGEMENT (OUTPATIENT)
Dept: ADMINISTRATIVE | Facility: OTHER | Age: 80
End: 2020-07-28

## 2020-07-28 NOTE — LETTER
August 4, 2020    Katy Soto  [id number]  [place] [address]             [facility] [address] [place] [address] Dear Ms. 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  The program is free and is made up of nurses and social workers who help connect patients to resources and education that can help them in managing their health.     We received a referral through the [facility] system  to offer assistance if needed.I have attempted to reach you by phone two times and will make a last attempt by August 11, 2020.      If you receive this letter and have questions or concerns, please don't hesitate to call. I will be glad to speak with you.     Sincerely,      Onelia Alvarenga RN  [facility]  TEL:  [phone]

## 2020-07-28 NOTE — PROGRESS NOTES
07/28/20- OPCM placed call to pt/cg to introduce, offer, and enroll into [facility] program. Pt is from [facility] High Risk Score list. Primary number called disconnected, alternate number called message left. 1st assessment attempt  08/04/20- OPCM placed call to pt/cg to introduce, offer, and enroll into [facility] program. Pt is from [facility] High Risk Score list. Primary number called disconnected, alternate number called message left. 2nd assessment attempt

## 2020-08-31 ENCOUNTER — HOSPITAL ENCOUNTER (EMERGENCY)
Facility: HOSPITAL | Age: 80
Discharge: HOME | End: 2020-08-31
Attending: EMERGENCY MEDICINE
Payer: MEDICARE

## 2020-08-31 ENCOUNTER — TELEPHONE (OUTPATIENT)
Dept: FAMILY MEDICINE | Facility: CLINIC | Age: 80
End: 2020-08-31

## 2020-08-31 VITALS
RESPIRATION RATE: 20 BRPM | HEIGHT: 64 IN | TEMPERATURE: 98 F | DIASTOLIC BLOOD PRESSURE: 95 MMHG | WEIGHT: 199.88 LBS | OXYGEN SATURATION: 98 % | HEART RATE: 55 BPM | SYSTOLIC BLOOD PRESSURE: 215 MMHG | BODY MASS INDEX: 34.12 KG/M2

## 2020-08-31 DIAGNOSIS — R68.89 SUSPECTED SOFT TISSUE INFECTION: Primary | ICD-10-CM

## 2020-08-31 DIAGNOSIS — I10 POORLY-CONTROLLED HYPERTENSION: ICD-10-CM

## 2020-08-31 DIAGNOSIS — R10.9 ABDOMINAL PAIN: ICD-10-CM

## 2020-08-31 LAB — POCT GLUCOSE: 123 MG/DL (ref 70–110)

## 2020-08-31 PROCEDURE — 82962 GLUCOSE BLOOD TEST: CPT | Mod: ER

## 2020-08-31 PROCEDURE — 99283 EMERGENCY DEPT VISIT LOW MDM: CPT | Mod: 25,ER

## 2020-08-31 RX ORDER — DOXYCYCLINE 100 MG/1
100 CAPSULE ORAL 2 TIMES DAILY
Qty: 20 CAPSULE | Refills: 0 | Status: SHIPPED | OUTPATIENT
Start: 2020-08-31 | End: 2020-09-10

## 2020-08-31 NOTE — TELEPHONE ENCOUNTER
----- Message from Maricel Cast sent at 8/31/2020  3:46 PM CDT -----  Contact: Patient  Type:  Sooner Apoointment Request    Caller is requesting a sooner appointment.  Caller declined first available appointment listed below.  Caller will not accept being placed on the waitlist and is requesting a message be sent to doctor.  Name of Caller:Patient  When is the first available appointment 10/15/20  Symptoms: Hospital follow up  Would the patient rather a call back or a response via [facility]?    Best Call Back Number: [phone]  Additional Information:

## 2020-08-31 NOTE — DISCHARGE INSTRUCTIONS
Please eat a low-sodium diet, less than 2 g per day.  Please have your blood pressure re-evaluated this week by your primary doctor.  Please return to the ED immediately if symptoms return, change or worsen in any way.  Please call your primary doctor today for reevaluation appointment.

## 2020-08-31 NOTE — ED PROVIDER NOTES
"Chief Complaint  Chief Complaint   Patient presents with    Abdominal Pain     Pt states "About 12 years ago I had a hernia repair, I did not get a mesh but on Saturday I noticied this and pus ran out from my belly button." MD at bedside.        TREMAINE  Katy Soto is a 80 y.o. female who presents with pus from her belly button.  Patient denies any trauma but she has been doing a lot outside gardening.  She is concerned that she might have had a small insect bite.  She denies any significant pain but was picking at her belly button and noticed pus draining from it.  She has not had this previously.  She had a hernia repair 12 years ago without any complications since.  No fevers or vomiting or diarrhea.  She feels well at this time.  She does report that she did not take her blood pressure medicines yet this morning.  She denies any chest pain or shortness of breath orthopnea or dyspnea on exertion.  She reports her blood pressures up every morning but when she takes her medicines a goes down.  No leg swelling.  No other skin rash or pain    Past medical history  Past Medical History:   Diagnosis Date    Hyperlipidemia     Hypertension        Current Medications  No current facility-administered medications for this encounter.     Current Outpatient Medications:     atorvastatin (LIPITOR) 40 MG tablet, TAKE 1 TABLET(40 MG) BY MOUTH EVERY DAY, Disp: 90 tablet, Rfl: 3    furosemide (LASIX) 40 MG tablet, Take 1 tablet (40 mg total) by mouth once daily. As leg swelling, Disp: 30 tablet, Rfl: 11    isosorbide mononitrate (IMDUR) 60 MG 24 hr tablet, TAKE 1 TABLET(60 MG) BY MOUTH EVERY DAY, Disp: 90 tablet, Rfl: 1    potassium chloride SA (K-DUR,KLOR-CON) 20 MEQ tablet, TAKE 1 TABLET BY MOUTH ONCE DAILY WITH LASIX AS NEEDED, Disp: 90 tablet, Rfl: 3    sucralfate (CARAFATE) 1 gram tablet, Take 1 tablet (1 g total) by mouth 3 (three) times daily., Disp: 90 tablet, Rfl: 5    aspirin (ECOTRIN) 81 MG EC tablet, Take " 81 mg by mouth once daily., Disp: , Rfl:     azithromycin (Z-CHRISTY) 250 MG tablet, Take 1 tablet (250 mg total) by mouth once daily. Take first 2 tablets together, then 1 every day until finished., Disp: 6 tablet, Rfl: 0    cetirizine (ZYRTEC) 10 MG tablet, Take 1 tablet (10 mg total) by mouth once daily. (Patient not taking: Reported on 1/10/2020), Disp: 30 tablet, Rfl: 1    cholecalciferol, vitamin D3, (VITAMIN D3) 5,000 unit Tab, Take 1 tablet (5,000 Units total) by mouth once daily., Disp: 90 tablet, Rfl: 3    doxycycline (VIBRAMYCIN) 100 MG Cap, Take 1 capsule (100 mg total) by mouth 2 (two) times daily. for 10 days, Disp: 20 capsule, Rfl: 0    fluticasone (FLONASE) 50 mcg/actuation nasal spray, SHAKE LIQUID AND USE 1 SPRAY(50 MCG) IN EACH NOSTRIL EVERY DAY (Patient not taking: Reported on 1/10/2020), Disp: 48 mL, Rfl: 0    fluticasone (FLONASE) 50 mcg/actuation nasal spray, SHAKE LIQUID AND USE 1 SPRAY(50 MCG) IN EACH NOSTRIL EVERY DAY (Patient not taking: Reported on 2019), Disp: 16 mL, Rfl: 0    irbesartan (AVAPRO) 300 MG tablet, TAKE 1 TABLET(300 MG) BY MOUTH EVERY DAY, Disp: 90 tablet, Rfl: 3    ondansetron (ZOFRAN) 4 MG tablet, Take 2 tablets (8 mg total) by mouth every 8 (eight) hours as needed for Nausea., Disp: 12 tablet, Rfl: 0    terazosin (HYTRIN) 1 MG capsule, Take 1 capsule (1 mg total) by mouth every evening. (Patient not taking: Reported on 1/10/2020), Disp: 30 capsule, Rfl: 11    Allergies  Review of patient's allergies indicates:   Allergen Reactions    Cilostazol      hweov4c and stomach cramps    Clonidine     Coreg [carvedilol]      Cramps stomach and nausea    Diltiazem      nauseana d stoamch cramps    Lexapro [escitalopram oxalate]      Dizzy, nausea and weak    Shellfish containing products Hives       Surgical history  Past Surgical History:   Procedure Laterality Date     SECTION      x1    HERNIA REPAIR      umbilical    LEG SURGERY Right 2017     stents placed       Social history  Social History     Socioeconomic History    Marital status: Single     Spouse name: Not on file    Number of children: Not on file    Years of education: Not on file    Highest education level: Not on file   Occupational History    Not on file   Social Needs    Financial resource strain: Not on file    Food insecurity     Worry: Not on file     Inability: Not on file    Transportation needs     Medical: Not on file     Non-medical: Not on file   Tobacco Use    Smoking status: Never Smoker    Smokeless tobacco: Never Used   Substance and Sexual Activity    Alcohol use: No    Drug use: No    Sexual activity: Not Currently     Partners: Male   Lifestyle    Physical activity     Days per week: Not on file     Minutes per session: Not on file    Stress: Not at all   Relationships    Social connections     Talks on phone: Not on file     Gets together: Not on file     Attends [employer] service: Not on file     Active member of club or organization: Not on file     Attends meetings of clubs or organizations: Not on file     Relationship status: Not on file   Other Topics Concern    Not on file   Social History Narrative    Not on file       Family History  Family History   Problem Relation Age of Onset    Diabetes Mother     Hypertension Mother     Stroke Father     Heart disease Father     Heart attack Father     Cancer Sister         Breast cancer    Breast cancer Sister     No Known Problems Son     Kidney failure Sister         Kidney transplant x18 years    Lupus Sister     Hypertension Sister     Diabetes Sister     Arthritis Sister     Hypertension Sister     Cancer Brother        Review of systems  Constitutional: No fever or weakness.  Eyes: No redness, pain, or discharge.  HENT: No ear pain, no sudden onset headache, no throat pain.  Respiratory: No wheezing, cough, or shortness of breath.  Neurologic: No new focal weakness or sensory  "changes.  All systems otherwise negative except as noted in ROS and HPI    Physical Exam  Vital signs: BP (!) 215/95 (BP Location: Right arm)   Pulse (!) 55   Temp 98.4 °F (36.9 °C)   Resp 20   Ht 5' 4" (1.626 m)   Wt 90.7 kg (199 lb 14.4 oz)   SpO2 98%   BMI 34.31 kg/m²   Constitutional: No acute distress.  Well developed, alert, oriented and appropriate.  HENT: Normocephalic, atraumatic. Normal ear, nose, and throat.  Eyes: PERRL, EOMI, normal conjunctiva.  Neck: Normal range of motion, no tenderness; supple.  Respiratory: Nonlabored breathing with normal breath sounds.  Cardiovascular: RRR with no pulse deficit.  GI: Soft, nontender, no rebound or guarding.  Musculoskeletal: Normal ROM, no tenderness, injury, or edema.  Skin:  Less than a drop of liquid return from belly button but difficult to say if any purulence.  No abscess.  No significant tenderness or pain.  No clear cellulitis or skin changes  Neurologic: Normal motor, sensation with no new focal deficit.  Psychiatric: Affect normal, judgement normal, mood normal.  No SI, HI, and not gravely disabled.    Labs  Pertinent labs reviewed (see chart for details)  Labs Reviewed   POCT GLUCOSE - Abnormal; Notable for the following components:       Result Value    POCT Glucose 123 (*)     All other components within normal limits   POCT GLUCOSE MONITORING CONTINUOUS       ECG    ECG interpreted by ED MD    Radiology  X-Ray Abdomen Flat And Erect    (Results Pending)       Procedures  Procedures    Medications - No data to display    ED course and medical decision making         Patient will go home and take her blood pressure medicines.  I do not feel like this is likely related to the mesh that was implanted 12 years ago for hernia repair.  I think it is more likely that the patient might have had a inset bite or folliculitis that was unrecognized that drained.  Patient given strict instructions to return immediately to the emergency department if any " change or worsening symptoms including any worsening pain or fever vomiting.  She is also encouraged to follow up with her doctors for re-evaluation including her blood pressure as well as her belly button problems.  No peritonitis or rebound or guarding    Disposition    Patient discharged in stable condition      Final impression  1. Suspected soft tissue infection    2. Abdominal pain    3. Poorly-controlled hypertension        Critical care time spent with this patient was 0 minutes excluding the procedure time.          Chino Guzman MD  08/31/20 3437

## 2020-09-04 ENCOUNTER — OFFICE VISIT (OUTPATIENT)
Dept: FAMILY MEDICINE | Facility: CLINIC | Age: 80
End: 2020-09-04
Payer: MEDICARE

## 2020-09-04 ENCOUNTER — TELEPHONE (OUTPATIENT)
Dept: SURGERY | Facility: CLINIC | Age: 80
End: 2020-09-04

## 2020-09-04 ENCOUNTER — HOSPITAL ENCOUNTER (OUTPATIENT)
Dept: RADIOLOGY | Facility: HOSPITAL | Age: 80
Discharge: HOME | End: 2020-09-04
Attending: FAMILY MEDICINE
Payer: MEDICARE

## 2020-09-04 VITALS
SYSTOLIC BLOOD PRESSURE: 146 MMHG | DIASTOLIC BLOOD PRESSURE: 82 MMHG | BODY MASS INDEX: 33.21 KG/M2 | TEMPERATURE: 99 F | HEART RATE: 92 BPM | OXYGEN SATURATION: 98 % | WEIGHT: 193.44 LBS

## 2020-09-04 DIAGNOSIS — E66.9 CLASS 1 OBESITY WITH SERIOUS COMORBIDITY AND BODY MASS INDEX (BMI) OF 33.0 TO 33.9 IN ADULT, UNSPECIFIED OBESITY TYPE: ICD-10-CM

## 2020-09-04 DIAGNOSIS — Z00.00 WELLNESS EXAMINATION: ICD-10-CM

## 2020-09-04 DIAGNOSIS — R10.33 PERIUMBILICAL ABDOMINAL PAIN: Primary | ICD-10-CM

## 2020-09-04 DIAGNOSIS — R10.12 LEFT UPPER QUADRANT PAIN: ICD-10-CM

## 2020-09-04 DIAGNOSIS — Z78.0 ASYMPTOMATIC MENOPAUSAL STATE: ICD-10-CM

## 2020-09-04 PROCEDURE — 74176 CT ABD & PELVIS W/O CONTRAST: CPT | Mod: TC,PO

## 2020-09-04 PROCEDURE — 99213 PR OFFICE/OUTPT VISIT, EST, LEVL III, 20-29 MIN: ICD-10-PCS | Mod: S$GLB,,, | Performed by: FAMILY MEDICINE

## 2020-09-04 PROCEDURE — 99213 OFFICE O/P EST LOW 20 MIN: CPT | Mod: S$GLB,,, | Performed by: FAMILY MEDICINE

## 2020-09-04 NOTE — TELEPHONE ENCOUNTER
09/04/2020       1450  Attempted to contact patient regarding her appointment on 09/09/2020. No answer. Could not leave voicemail due to mailbox not being set up. Will try calling again at a later time.

## 2020-09-04 NOTE — PROGRESS NOTES
Subjective:      Patient ID: Katy Soto is a 80 y.o. female.    Chief Complaint: Hospital Follow Up      Vitals:    09/04/20 1216   BP: (!) 146/82   Pulse: 92   Temp: 98.8 °F (37.1 °C)   SpO2: 98%   Weight: 87.8 kg (193 lb 7.3 oz)        HPI   BM after each meal; not diarrhea  Went to ER Monday for her abdomen; note read  Pt is [place]    Problem List  Patient Active Problem List   Diagnosis    Hypertension    Hyperlipidemia    Arthritis    PAD (peripheral artery disease)    Bilateral leg edema    Anxiety    Obesity (BMI 30.0-34.9)    Osteopenia    Atherosclerosis of aorta    Elevated sed rate    Vitamin D insufficiency        ALLERGIES:   Review of patient's allergies indicates:   Allergen Reactions    Cilostazol      tttmp1m and stomach cramps    Clonidine     Coreg [carvedilol]      Cramps stomach and nausea    Diltiazem      nauseana d stoamch cramps    Lexapro [escitalopram oxalate]      Dizzy, nausea and weak    Shellfish containing products Hives       MEDS:   Current Outpatient Medications:     atorvastatin (LIPITOR) 40 MG tablet, TAKE 1 TABLET(40 MG) BY MOUTH EVERY DAY, Disp: 90 tablet, Rfl: 3    cholecalciferol, vitamin D3, (VITAMIN D3) 5,000 unit Tab, Take 1 tablet (5,000 Units total) by mouth once daily., Disp: 90 tablet, Rfl: 3    furosemide (LASIX) 40 MG tablet, Take 1 tablet (40 mg total) by mouth once daily. As leg swelling, Disp: 30 tablet, Rfl: 11    irbesartan (AVAPRO) 300 MG tablet, TAKE 1 TABLET(300 MG) BY MOUTH EVERY DAY, Disp: 90 tablet, Rfl: 3    potassium chloride SA (K-DUR,KLOR-CON) 20 MEQ tablet, TAKE 1 TABLET BY MOUTH ONCE DAILY WITH LASIX AS NEEDED, Disp: 90 tablet, Rfl: 3    sucralfate (CARAFATE) 1 gram tablet, Take 1 tablet (1 g total) by mouth 3 (three) times daily., Disp: 90 tablet, Rfl: 5    aspirin (ECOTRIN) 81 MG EC tablet, Take 81 mg by mouth once daily., Disp: , Rfl:     azithromycin (Z-CHRISTY) 250 MG tablet, Take 1 tablet (250 mg total) by mouth once  daily. Take first 2 tablets together, then 1 every day until finished., Disp: 6 tablet, Rfl: 0    cetirizine (ZYRTEC) 10 MG tablet, Take 1 tablet (10 mg total) by mouth once daily. (Patient not taking: Reported on 1/10/2020), Disp: 30 tablet, Rfl: 1    cyclobenzaprine (FLEXERIL) 5 MG tablet, Take 1 tablet (5 mg total) by mouth 3 (three) times daily as needed for Muscle spasms., Disp: 30 tablet, Rfl: 1    fluticasone (FLONASE) 50 mcg/actuation nasal spray, SHAKE LIQUID AND USE 1 SPRAY(50 MCG) IN EACH NOSTRIL EVERY DAY (Patient not taking: Reported on 1/10/2020), Disp: 48 mL, Rfl: 0    fluticasone (FLONASE) 50 mcg/actuation nasal spray, SHAKE LIQUID AND USE 1 SPRAY(50 MCG) IN EACH NOSTRIL EVERY DAY (Patient not taking: Reported on 2019), Disp: 16 mL, Rfl: 0    isosorbide mononitrate (IMDUR) 60 MG 24 hr tablet, TAKE 1 TABLET(60 MG) BY MOUTH EVERY DAY, Disp: 90 tablet, Rfl: 1    ondansetron (ZOFRAN) 4 MG tablet, Take 2 tablets (8 mg total) by mouth every 8 (eight) hours as needed for Nausea., Disp: 12 tablet, Rfl: 0    terazosin (HYTRIN) 1 MG capsule, Take 1 capsule (1 mg total) by mouth every evening., Disp: 30 capsule, Rfl: 11      History:  Current Providers as of 2020  PCP: Rudy Cruz MD  Care Team Provider: New Mehta MD  Care Team Provider: Mo Cleary LPN  Care Team Provider: David Fried MD  Care Team Provider: Mo Cleary LPN  Encounter Provider: Rudy Cruz MD, starting on Fri Sep 4, 2020 12:00 AM  Referring Provider: not found, starting on Fri Sep 4, 2020 12:00 AM  Consulting Physician: Rudy Cruz MD, starting on Fri Sep 4, 2020 12:16 PM (Active)   Past Medical History:   Diagnosis Date    Hyperlipidemia     Hypertension      Past Surgical History:   Procedure Laterality Date     SECTION      x1    HERNIA REPAIR      umbilical    LEG SURGERY Right 2017    stents placed     Social History     Tobacco Use    Smoking status: Never Smoker     Smokeless tobacco: Never Used   Substance Use Topics    Alcohol use: No    Drug use: No         Review of Systems   Constitutional: Negative.    HENT: Positive for hearing loss and nosebleeds.    Respiratory: Negative.    Cardiovascular: Positive for chest pain.        223 bp at home this am   Gastrointestinal: Positive for abdominal pain.   Endocrine: Negative.    Genitourinary: Negative.    Musculoskeletal: Negative.    Psychiatric/Behavioral: Negative.    All other systems reviewed and are negative.    Objective:     Physical Exam  Vitals signs and nursing note reviewed.   Constitutional:       Appearance: She is well-developed. She is obese.   HENT:      Head: Normocephalic.   Eyes:      Conjunctiva/sclera: Conjunctivae normal.      Pupils: Pupils are equal, round, and reactive to light.   Neck:      Musculoskeletal: Normal range of motion and neck supple.   Cardiovascular:      Rate and Rhythm: Normal rate and regular rhythm.      Heart sounds: Normal heart sounds.   Pulmonary:      Effort: Pulmonary effort is normal.      Breath sounds: Normal breath sounds.   Abdominal:      General: Abdomen is flat. Bowel sounds are normal. There is no distension.      Palpations: Abdomen is soft. There is no mass.      Tenderness: There is no abdominal tenderness. There is no guarding or rebound.      Hernia: No hernia is present.      Comments: Post op scars   Musculoskeletal: Normal range of motion.   Skin:     General: Skin is warm and dry.   Neurological:      Mental Status: She is alert and oriented to person, place, and time.      Deep Tendon Reflexes: Reflexes are normal and symmetric.   Psychiatric:         Behavior: Behavior normal.         Thought Content: Thought content normal.         Judgment: Judgment normal.             Assessment:     1. Periumbilical abdominal pain    2. Left upper quadrant pain    3. Wellness examination    4. Asymptomatic menopausal state     5. Class 1 obesity with serious comorbidity  and body mass index (BMI) of 33.0 to 33.9 in adult, unspecified obesity type      Plan:        Medication List          Accurate as of September 4, 2020 11:59 PM. If you have any questions, ask your nurse or doctor.            CONTINUE taking these medications    aspirin 81 MG EC tablet  Commonly known as: ECOTRIN     atorvastatin 40 MG tablet  Commonly known as: LIPITOR  TAKE 1 TABLET(40 MG) BY MOUTH EVERY DAY     azithromycin 250 MG tablet  Commonly known as: Z-CHRISTY  Take 1 tablet (250 mg total) by mouth once daily. Take first 2 tablets together, then 1 every day until finished.     cetirizine 10 MG tablet  Commonly known as: ZYRTEC  Take 1 tablet (10 mg total) by mouth once daily.     cholecalciferol (vitamin D3) 125 mcg (5,000 unit) Tab  Commonly known as: VITAMIN D3  Take 1 tablet (5,000 Units total) by mouth once daily.     doxycycline 100 MG Cap  Commonly known as: VIBRAMYCIN  Take 1 capsule (100 mg total) by mouth 2 (two) times daily. for 10 days     * fluticasone propionate 50 mcg/actuation nasal spray  Commonly known as: FLONASE  SHAKE LIQUID AND USE 1 SPRAY(50 MCG) IN EACH NOSTRIL EVERY DAY     * fluticasone propionate 50 mcg/actuation nasal spray  Commonly known as: FLONASE  SHAKE LIQUID AND USE 1 SPRAY(50 MCG) IN EACH NOSTRIL EVERY DAY     furosemide 40 MG tablet  Commonly known as: LASIX  Take 1 tablet (40 mg total) by mouth once daily. As leg swelling     irbesartan 300 MG tablet  Commonly known as: AVAPRO  TAKE 1 TABLET(300 MG) BY MOUTH EVERY DAY     isosorbide mononitrate 60 MG 24 hr tablet  Commonly known as: IMDUR  TAKE 1 TABLET(60 MG) BY MOUTH EVERY DAY     ondansetron 4 MG tablet  Commonly known as: ZOFRAN  Take 2 tablets (8 mg total) by mouth every 8 (eight) hours as needed for Nausea.     potassium chloride SA 20 MEQ tablet  Commonly known as: K-DUR,KLOR-CON  TAKE 1 TABLET BY MOUTH ONCE DAILY WITH LASIX AS NEEDED     sucralfate 1 gram tablet  Commonly known as: CARAFATE  Take 1 tablet (1 g  total) by mouth 3 (three) times daily.     terazosin 1 MG capsule  Commonly known as: HYTRIN  Take 1 capsule (1 mg total) by mouth every evening.         * This list has 2 medication(s) that are the same as other medications prescribed for you. Read the directions carefully, and ask your doctor or other care provider to review them with you.              Periumbilical abdominal pain  -     Ambulatory referral/consult to General Surgery; Future; Expected date: 09/11/2020    Left upper quadrant pain  -     CT Abdomen Pelvis  Without Contrast; Future; Expected date: 09/04/2020    Wellness examination  -     DXA Bone Density Spine And Hip; Future; Expected date: 09/04/2020    Asymptomatic menopausal state   -     DXA Bone Density Spine And Hip; Future; Expected date: 09/04/2020    Class 1 obesity with serious comorbidity and body mass index (BMI) of 33.0 to 33.9 in adult, unspecified obesity type

## 2020-09-06 ENCOUNTER — TELEPHONE (OUTPATIENT)
Dept: FAMILY MEDICINE | Facility: CLINIC | Age: 80
End: 2020-09-06

## 2020-09-06 DIAGNOSIS — R59.9 ENLARGED LYMPH NODES: Primary | ICD-10-CM

## 2020-09-08 ENCOUNTER — PATIENT OUTREACH (OUTPATIENT)
Dept: ADMINISTRATIVE | Facility: OTHER | Age: 80
End: 2020-09-08

## 2020-09-08 ENCOUNTER — TELEPHONE (OUTPATIENT)
Dept: FAMILY MEDICINE | Facility: CLINIC | Age: 80
End: 2020-09-08

## 2020-09-08 NOTE — TELEPHONE ENCOUNTER
----- Message from Rudy Cruz MD sent at 9/6/2020  7:16 AM CDT -----  Several findings; no hernia; gallstones, kidney stone, kidney cysts, enlarged lymph nodes in abdomen; I want her to see oncologist for opinion if enlarged lymph nodes need to be evaluated further

## 2020-09-08 NOTE — TELEPHONE ENCOUNTER
Spoke with patient and results of CT Scan given with recommendations. Appt scheduled with Dr. Paez. Patient reports she will come to the office to  the appt. Patient voices understanding.

## 2020-09-09 ENCOUNTER — OFFICE VISIT (OUTPATIENT)
Dept: SURGERY | Facility: CLINIC | Age: 80
End: 2020-09-09
Payer: MEDICARE

## 2020-09-09 VITALS — SYSTOLIC BLOOD PRESSURE: 196 MMHG | DIASTOLIC BLOOD PRESSURE: 92 MMHG

## 2020-09-09 DIAGNOSIS — R10.33 PERIUMBILICAL ABDOMINAL PAIN: ICD-10-CM

## 2020-09-09 DIAGNOSIS — R10.9 ABDOMINAL CRAMPING: Primary | ICD-10-CM

## 2020-09-09 PROCEDURE — 99999 PR PBB SHADOW E&M-EST. PATIENT-LVL IV: CPT | Mod: PBBFAC,,, | Performed by: SURGERY

## 2020-09-09 PROCEDURE — 99999 PR PBB SHADOW E&M-EST. PATIENT-LVL IV: ICD-10-PCS | Mod: PBBFAC,,, | Performed by: SURGERY

## 2020-09-09 PROCEDURE — 99203 OFFICE O/P NEW LOW 30 MIN: CPT | Mod: S$PBB,,, | Performed by: SURGERY

## 2020-09-09 PROCEDURE — 99203 PR OFFICE/OUTPT VISIT, NEW, LEVL III, 30-44 MIN: ICD-10-PCS | Mod: S$PBB,,, | Performed by: SURGERY

## 2020-09-09 PROCEDURE — 99214 OFFICE O/P EST MOD 30 MIN: CPT | Mod: PBBFAC,PO | Performed by: SURGERY

## 2020-09-09 RX ORDER — CYCLOBENZAPRINE HCL 5 MG
5 TABLET ORAL 3 TIMES DAILY PRN
Qty: 30 TABLET | Refills: 1 | Status: SHIPPED | OUTPATIENT
Start: 2020-09-09 | End: 2020-09-19

## 2020-09-09 NOTE — LETTER
September 9, 2020      Rudy Cruz MD  [address] [facility] [address]           [facility]  [address], [address]  [facility] [address]  Phone: [phone]          Patient: Katy Soto   MR Number: [id number]   YOB: 1940   Date of Visit: 9/9/2020       Dear Dr. Rudy Cruz:    Thank you for referring Katy Soto to me for evaluation. Attached you will find relevant portions of my assessment and plan of care.    If you have questions, please do not hesitate to call me. I look forward to following Katy Soto along with you.    Sincerely,    Edward Fairbanks Jr., MD    Enclosure  CC:  No Recipients    If you would like to receive this communication electronically, please contact [email or web address] or [phone] to request more information on [employer] Link access.    For providers and/or their staff who would like to refer a patient to [facility], please contact us through our one-stop-shop provider referral line, [facility] , at [phone].    If you feel you have received this communication in error or would no longer like to receive these types of communications, please e-mail [email or web address]

## 2020-09-09 NOTE — H&P
[facility]  OUTPATIENT H&P    REASON FOR VISIT/CC:  Abdominal pain, gallstones, umbilical discharge    HPI: Katy Soto is a 80 y.o. female who presented to clinic for evaluation umbilical discharge and associated abdominal pain.  Patient is company by family member.  Patient appears to have some form of dementia which made history taking somewhat difficult.  Information from the patient, family member and from epic indicate the patient presented to the emergency room on 08/31/2020 with main complaint of discharge from umbilicus.  At that time the patient thought she had a tick bite which had led to the issue.  She did have a history of umbilical hernia repair approximately 10 years ago an outside facility but did not believe she had mesh placed.  No significant wound or drainage could be expressed during her ER visit.  She was given a course of doxycycline for possible soft tissue infection however.  She followed up with her PCP on 09/04/2020.  At that time she noted she had recently began having the urge have a bowel movement shortly after eating every meal.  The bowel movement was normal and was not diarrhea and did not have mucus or blood.  She also noted cramping abdominal pain which would occur but this was relieved by p.o. intake.  A CT scan was obtained the patient was referred to General surgery.    Today the patient still reports the urge to have a bowel after eating.  She denies any right upper quadrant or epigastric abdominal pain, nausea, vomiting, yellowing of the skin or eyes, fevers chills.  Does report occasional cramping the abdomen which is relieved by food intake.  She denies any more drainage from the umbilicus or any redness of the skin surrounding the area.  Still seems convinced that a tick bit her in her belly button leading to the purulent drainage.    I have reviewed the patient's chart including prior progress notes, procedures and testing.     ROS:   Review of  Systems   Constitutional: Negative for activity change, appetite change, chills and fever.   HENT: Negative for trouble swallowing.    Respiratory: Negative for apnea, chest tightness and shortness of breath.    Cardiovascular: Negative for chest pain, palpitations and leg swelling.   Gastrointestinal: Positive for abdominal pain (Occasional cramping sensation). Negative for abdominal distention, blood in stool, constipation, diarrhea, nausea and vomiting.   Genitourinary: Negative for difficulty urinating, dysuria and hematuria.   Musculoskeletal: Negative for arthralgias, neck pain and neck stiffness.   Skin: Negative for color change, rash and wound.   Neurological: Negative for dizziness, syncope and light-headedness.   Psychiatric/Behavioral: Negative for agitation and behavioral problems.       PROBLEM LIST:  Patient Active Problem List   Diagnosis    Hypertension    Hyperlipidemia    Arthritis    PAD (peripheral artery disease)    Bilateral leg edema    Anxiety    Obesity (BMI 30.0-34.9)    Osteopenia    Atherosclerosis of aorta    Elevated sed rate    Vitamin D insufficiency         HISTORY  Past Medical History:   Diagnosis Date    Hyperlipidemia     Hypertension        Past Surgical History:   Procedure Laterality Date     SECTION      x1    HERNIA REPAIR      umbilical    LEG SURGERY Right 2017    stents placed       Social History     Tobacco Use    Smoking status: Never Smoker    Smokeless tobacco: Never Used   Substance Use Topics    Alcohol use: No    Drug use: No       Family History   Problem Relation Age of Onset    Diabetes Mother     Hypertension Mother     Stroke Father     Heart disease Father     Heart attack Father     Cancer Sister         Breast cancer    Breast cancer Sister     No Known Problems Son     Kidney failure Sister         Kidney transplant x18 years    Lupus Sister     Hypertension Sister     Diabetes Sister     Arthritis Sister      Hypertension Sister     Cancer Brother          MEDS:  Current Outpatient Medications on File Prior to Visit   Medication Sig Dispense Refill    atorvastatin (LIPITOR) 40 MG tablet TAKE 1 TABLET(40 MG) BY MOUTH EVERY DAY 90 tablet 3    azithromycin (Z-CHRISTY) 250 MG tablet Take 1 tablet (250 mg total) by mouth once daily. Take first 2 tablets together, then 1 every day until finished. 6 tablet 0    cholecalciferol, vitamin D3, (VITAMIN D3) 5,000 unit Tab Take 1 tablet (5,000 Units total) by mouth once daily. 90 tablet 3    doxycycline (VIBRAMYCIN) 100 MG Cap Take 1 capsule (100 mg total) by mouth 2 (two) times daily. for 10 days 20 capsule 0    furosemide (LASIX) 40 MG tablet Take 1 tablet (40 mg total) by mouth once daily. As leg swelling 30 tablet 11    irbesartan (AVAPRO) 300 MG tablet TAKE 1 TABLET(300 MG) BY MOUTH EVERY DAY 90 tablet 3    isosorbide mononitrate (IMDUR) 60 MG 24 hr tablet TAKE 1 TABLET(60 MG) BY MOUTH EVERY DAY 90 tablet 1    ondansetron (ZOFRAN) 4 MG tablet Take 2 tablets (8 mg total) by mouth every 8 (eight) hours as needed for Nausea. 12 tablet 0    potassium chloride SA (K-DUR,KLOR-CON) 20 MEQ tablet TAKE 1 TABLET BY MOUTH ONCE DAILY WITH LASIX AS NEEDED 90 tablet 3    sucralfate (CARAFATE) 1 gram tablet Take 1 tablet (1 g total) by mouth 3 (three) times daily. 90 tablet 5    terazosin (HYTRIN) 1 MG capsule Take 1 capsule (1 mg total) by mouth every evening. 30 capsule 11    aspirin (ECOTRIN) 81 MG EC tablet Take 81 mg by mouth once daily.      cetirizine (ZYRTEC) 10 MG tablet Take 1 tablet (10 mg total) by mouth once daily. (Patient not taking: Reported on 1/10/2020) 30 tablet 1    fluticasone (FLONASE) 50 mcg/actuation nasal spray SHAKE LIQUID AND USE 1 SPRAY(50 MCG) IN EACH NOSTRIL EVERY DAY (Patient not taking: Reported on 1/10/2020) 48 mL 0    fluticasone (FLONASE) 50 mcg/actuation nasal spray SHAKE LIQUID AND USE 1 SPRAY(50 MCG) IN EACH NOSTRIL EVERY DAY (Patient not  taking: Reported on 9/30/2019) 16 mL 0     No current facility-administered medications on file prior to visit.        ALLERGIES:  Review of patient's allergies indicates:   Allergen Reactions    Cilostazol      [id number] and stomach cramps    Clonidine     Coreg [carvedilol]      Cramps stomach and nausea    Diltiazem      nauseana d stoamch cramps    Lexapro [escitalopram oxalate]      Dizzy, nausea and weak    Shellfish containing products Hives         VITALS:  Vitals:    09/09/20 1240   BP: (!) 196/92   Pulse:          PHYSICAL EXAM:  Physical Exam  Vitals signs reviewed.   Constitutional:       General: She is not in acute distress.     Appearance: Normal appearance.   HENT:      Head: Normocephalic and atraumatic.      Nose: Nose normal.   Eyes:      General: No scleral icterus.     Conjunctiva/sclera: Conjunctivae normal.   Neck:      Musculoskeletal: Normal range of motion and neck supple. No neck rigidity.   Cardiovascular:      Rate and Rhythm: Normal rate and regular rhythm.      Pulses: Normal pulses.   Pulmonary:      Effort: Pulmonary effort is normal. No respiratory distress.      Breath sounds: Normal breath sounds.   Abdominal:      General: There is no distension.      Palpations: Abdomen is soft. There is no mass.      Tenderness: There is no abdominal tenderness. There is no guarding.      Hernia: No hernia is present.      Comments: Surgical suture knot palpable at the base umbilicus, no wound, no drainage, no erythema, no pain   Musculoskeletal: Normal range of motion.         General: No swelling or tenderness.   Skin:     General: Skin is warm and dry.      Findings: No erythema.   Neurological:      General: No focal deficit present.      Mental Status: She is alert and oriented to person, place, and time.      Motor: No weakness.   Psychiatric:         Mood and Affect: Mood normal.         Behavior: Behavior normal.         Thought Content: Thought content normal.         Judgment:  Judgment normal.           LABS:  Lab Results   Component Value Date    WBC 6.38 05/12/2020    WBC 0 10/01/2011    RBC 4.04 05/12/2020    HGB 11.8 (L) 05/12/2020    HCT 38.0 05/12/2020     05/12/2020     Lab Results   Component Value Date     (H) 05/12/2020     05/12/2020    K 4.2 05/12/2020     05/12/2020    CO2 28 05/12/2020    BUN 14 05/12/2020    BUN 20 07/18/2017    CREATININE 1.00 05/12/2020    CALCIUM 9.2 05/12/2020     Lab Results   Component Value Date    ALT 12 05/12/2020    AST 22 05/12/2020    GGT 27 10/02/2011    ALKPHOS 120 05/12/2020    BILITOT 1.1 (H) 05/12/2020     Lab Results   Component Value Date    MG 1.9 12/19/2018    PHOS 2.0 (L) 11/02/2011       STUDIES:  CT images and reports were personally reviewed.  Impression:     1. Lymphadenopathy.  There are enlarged lymph nodes on both sides of the pelvis. One of the larger ones is located on the left side of the pelvis and has a short axis measurement of 14 mm.  2. There is a 3 mm nonobstructive stone in the midpole of the right kidney.  3. There is an 11 mm exophytic hypodense mass off of the anterior aspect of the midpole of the left kidney. This mass has a Hounsfield measurement of -1.  This is characteristic of a cyst.  4. There are multiple small stones in the dependent portion of the gallbladder.  5. The uterus and ovaries are not well seen. There are calcifications scattered throughout the uterus.  If additional imaging evaluation is clinically indicated, I recommend consideration of an ultrasound examination of the pelvis.  6. There are surgical changes in the anterior wall of the abdomen and pelvis.  7. There are 4 lumbar-type vertebral bodies. There is a transitional vertebral body between L4 and the sacrum. There is grade 1 anterolisthesis of L3 on L4. There are mild degenerative changes between L4 on the transitional vertebral body.      ASSESSMENT & PLAN:  80 y.o. female with abdominal cramping, gallstone,  umbilical drainage  - CT scan reviewed, patient has evidence of prior umbilical hernia repair but it does appear mesh was used, mesh is not approximated to the anterior abdominal wall but there is no evidence of hernia recurrence or underlying soft tissue infection, I suspect the drainage patient had at the umbilicus was either from localized irritation or possibly from a stitch granuloma as I can feel suture knot at the base of her umbilicus, there is no indication for surgical intervention at this time and we will continue to observe  - abdominal cramping is of unknown etiology, not likely to be related to her gallstones as the symptoms are relieved with eating, possible the patient just had a musculoskeletal injury and will provide with Flexeril for 10 days, if symptoms persist patient will contact the office we may give her trial of Bentyl for possible bowel spasms  - no indication for cholecystectomy as the patient does not have symptoms consistent with biliary disease  - return to clinic p.r.n.

## 2020-09-14 ENCOUNTER — HOSPITAL ENCOUNTER (OUTPATIENT)
Dept: RADIOLOGY | Facility: HOSPITAL | Age: 80
Discharge: HOME | End: 2020-09-14
Attending: FAMILY MEDICINE
Payer: MEDICARE

## 2020-09-14 DIAGNOSIS — Z00.00 WELLNESS EXAMINATION: ICD-10-CM

## 2020-09-14 DIAGNOSIS — Z78.0 ASYMPTOMATIC MENOPAUSAL STATE: ICD-10-CM

## 2020-09-14 PROCEDURE — 77080 DXA BONE DENSITY AXIAL: CPT | Mod: TC,PO

## 2020-09-22 ENCOUNTER — OFFICE VISIT (OUTPATIENT)
Dept: HEMATOLOGY/ONCOLOGY | Facility: CLINIC | Age: 80
End: 2020-09-22
Payer: MEDICARE

## 2020-09-22 VITALS
DIASTOLIC BLOOD PRESSURE: 100 MMHG | WEIGHT: 198.44 LBS | SYSTOLIC BLOOD PRESSURE: 204 MMHG | OXYGEN SATURATION: 98 % | TEMPERATURE: 98 F | RESPIRATION RATE: 18 BRPM | BODY MASS INDEX: 34.06 KG/M2 | HEART RATE: 85 BPM

## 2020-09-22 DIAGNOSIS — R59.9 ENLARGED LYMPH NODES: ICD-10-CM

## 2020-09-22 DIAGNOSIS — I10 ESSENTIAL HYPERTENSION: Primary | ICD-10-CM

## 2020-09-22 PROCEDURE — 99204 OFFICE O/P NEW MOD 45 MIN: CPT | Mod: S$PBB,,, | Performed by: INTERNAL MEDICINE

## 2020-09-22 PROCEDURE — 99204 PR OFFICE/OUTPT VISIT, NEW, LEVL IV, 45-59 MIN: ICD-10-PCS | Mod: S$PBB,,, | Performed by: INTERNAL MEDICINE

## 2020-09-22 PROCEDURE — 99999 PR PBB SHADOW E&M-EST. PATIENT-LVL V: CPT | Mod: PBBFAC,,, | Performed by: INTERNAL MEDICINE

## 2020-09-22 PROCEDURE — 99215 OFFICE O/P EST HI 40 MIN: CPT | Mod: PBBFAC,PO | Performed by: INTERNAL MEDICINE

## 2020-09-22 PROCEDURE — 99999 PR PBB SHADOW E&M-EST. PATIENT-LVL V: ICD-10-PCS | Mod: PBBFAC,,, | Performed by: INTERNAL MEDICINE

## 2020-09-22 NOTE — LETTER
September 22, 2020      Rudy Cruz MD  [address] [facility] [address]           [facility]  [address], [facility] [address]  [facility] [address]  Phone: [phone]          Patient: Katy Soto   MR Number: [id number]   YOB: 1940   Date of Visit: 9/22/2020       Dear Dr. Rudy Cruz:    Thank you for referring Katy Soto to me for evaluation. Attached you will find relevant portions of my assessment and plan of care.    If you have questions, please do not hesitate to call me. I look forward to following Katy Soto along with you.    Sincerely,    Felice Paez MD    Enclosure  CC:  No Recipients    If you would like to receive this communication electronically, please contact [email or web address] or [phone] to request more information on [employer] Link access.    For providers and/or their staff who would like to refer a patient to [facility], please contact us through our one-stop-shop provider referral line, [facility], at [phone].    If you feel you have received this communication in error or would no longer like to receive these types of communications, please e-mail [email or web address]

## 2020-09-22 NOTE — PROGRESS NOTES
PATIENT: Katy Soto  MRN: [id number]  DATE: 2020    Diagnosis:   1. Essential hypertension    2. Enlarged lymph nodes      Chief Complaint: enlarged lymph nodes    Subjective:     History of Present Illness:     Referred for enlarged pelvic Lymph nodes    CT abd/pelvis 2020 - Lymphadenopathy.  There are enlarged lymph nodes on both sides of the pelvis. One of the larger ones is located on the left side of the pelvis and has a short axis measurement of 14 mm.    Pt accompanied by her daughter    She has h/o prior umbilical hernia repair where a mesh was used and she had some drainage some time back, saw  and the drainage is better now    Denies fevers or abdominal pains now    Past Medical History:   Past Medical History:   Diagnosis Date    Hyperlipidemia     Hypertension        Past Surgical History:   Past Surgical History:   Procedure Laterality Date     SECTION      x1    HERNIA REPAIR      umbilical    LEG SURGERY Right 2017    stents placed       Family History:   Family History   Problem Relation Age of Onset    Diabetes Mother     Hypertension Mother     Stroke Father     Heart disease Father     Heart attack Father     Cancer Sister         Breast cancer    Breast cancer Sister     No Known Problems Son     Kidney failure Sister         Kidney transplant x18 years    Lupus Sister     Hypertension Sister     Diabetes Sister     Arthritis Sister     Hypertension Sister     Cancer Brother        Social History:  reports that she has never smoked. She has never used smokeless tobacco. She reports that she does not drink alcohol or use drugs.    Allergies:  Review of patient's allergies indicates:   Allergen Reactions    Cilostazol      [id number] and stomach cramps    Clonidine     Coreg [carvedilol]      Cramps stomach and nausea    Diltiazem      nauseana d stoamch cramps    Lexapro [escitalopram oxalate]      Dizzy, nausea and weak    Shellfish  containing products Hives       Medications:  Current Outpatient Medications   Medication Sig Dispense Refill    aspirin (ECOTRIN) 81 MG EC tablet Take 81 mg by mouth once daily.      atorvastatin (LIPITOR) 40 MG tablet TAKE 1 TABLET(40 MG) BY MOUTH EVERY DAY 90 tablet 3    azithromycin (Z-CHRISTY) 250 MG tablet Take 1 tablet (250 mg total) by mouth once daily. Take first 2 tablets together, then 1 every day until finished. 6 tablet 0    cholecalciferol, vitamin D3, (VITAMIN D3) 5,000 unit Tab Take 1 tablet (5,000 Units total) by mouth once daily. 90 tablet 3    furosemide (LASIX) 40 MG tablet TAKE 1 TABLET(40 MG) BY MOUTH EVERY DAY FOR LEG SWELLING 30 tablet 11    irbesartan (AVAPRO) 300 MG tablet TAKE 1 TABLET(300 MG) BY MOUTH EVERY DAY 90 tablet 3    isosorbide mononitrate (IMDUR) 60 MG 24 hr tablet TAKE 1 TABLET(60 MG) BY MOUTH EVERY DAY 90 tablet 1    ondansetron (ZOFRAN) 4 MG tablet Take 2 tablets (8 mg total) by mouth every 8 (eight) hours as needed for Nausea. 12 tablet 0    potassium chloride SA (K-DUR,KLOR-CON) 20 MEQ tablet TAKE 1 TABLET BY MOUTH ONCE DAILY WITH LASIX AS NEEDED 90 tablet 3    sucralfate (CARAFATE) 1 gram tablet Take 1 tablet (1 g total) by mouth 3 (three) times daily. 90 tablet 5    terazosin (HYTRIN) 1 MG capsule Take 1 capsule (1 mg total) by mouth every evening. 30 capsule 11     No current facility-administered medications for this visit.        Review of Systems   Constitutional: Negative for fever and unexpected weight change.   HENT: Negative for nosebleeds and sore throat.    Eyes: Negative for pain and redness.   Respiratory: Negative for cough and shortness of breath.    Cardiovascular: Negative for chest pain and palpitations.   Gastrointestinal: Negative for abdominal pain and nausea.   Genitourinary: Negative for dysuria and hematuria.   Musculoskeletal: Negative for arthralgias and back pain.   Skin: Negative for rash and wound.   Neurological: Negative for tremors  and headaches.   Hematological: Negative for adenopathy. Does not bruise/bleed easily.   Psychiatric/Behavioral: Negative for behavioral problems and sleep disturbance.       Objective:     Vitals:    09/22/20 1506   BP: (!) 204/100   BP Location: Right arm   Pulse: 85   Resp: 18   Temp: 97.8 °F (36.6 °C)   SpO2: 98%   Weight: 90 kg (198 lb 6.6 oz)     BMI: Body mass index is 34.06 kg/m² (pended).    Physical Exam  Vitals signs reviewed.   Constitutional:       General: She is not in acute distress.     Appearance: She is not ill-appearing or diaphoretic.   HENT:      Head: No right periorbital erythema, left periorbital erythema or laceration.      Mouth/Throat:      Pharynx: No oropharyngeal exudate.      Tonsils: No tonsillar exudate.   Eyes:      Conjunctiva/sclera: Conjunctivae normal.   Neck:      Musculoskeletal: Neck supple.      Thyroid: No thyroid mass or thyromegaly.   Cardiovascular:      Rate and Rhythm: Normal rate and regular rhythm.      Heart sounds: No friction rub.   Pulmonary:      Effort: No tachypnea, accessory muscle usage or respiratory distress.      Breath sounds: Normal breath sounds. No stridor.   Chest:      Chest wall: No tenderness. There is no dullness to percussion.   Abdominal:      General: There is no distension.      Palpations: Abdomen is soft. There is no mass.   Lymphadenopathy:      Head:      Right side of head: No submental or submandibular adenopathy.      Left side of head: No submental or submandibular adenopathy.      Cervical: No cervical adenopathy.   Skin:     Findings: No bruising, petechiae or rash.   Neurological:      Mental Status: She is alert.      Cranial Nerves: No cranial nerve deficit.   Psychiatric:         Mood and Affect: Mood is not depressed.         Behavior: Behavior normal. Behavior is cooperative.         Thought Content: Thought content normal.         Assessment:       1. Essential hypertension    2. Enlarged lymph nodes      Plan:   Enlarged  pelvis lymph nodes  -noted on imaging  -discussed differential diagnosis  -will plan to repeat CT abd/pelvis in 4 months and monitor the lymph nodes  -will hold off on further work-up for now    Uncontrolled Hypertension  -noted elevated blood pressures  -counseled on stopping use of sodas  -consult cardiology per pt request    F/u in jan with labs and scans

## 2020-10-13 ENCOUNTER — PATIENT OUTREACH (OUTPATIENT)
Dept: ADMINISTRATIVE | Facility: OTHER | Age: 80
End: 2020-10-13

## 2020-10-15 ENCOUNTER — OFFICE VISIT (OUTPATIENT)
Dept: CARDIOLOGY | Facility: CLINIC | Age: 80
End: 2020-10-15
Payer: MEDICARE

## 2020-10-15 VITALS
BODY MASS INDEX: 33.68 KG/M2 | HEART RATE: 81 BPM | DIASTOLIC BLOOD PRESSURE: 110 MMHG | WEIGHT: 196.19 LBS | OXYGEN SATURATION: 98 % | SYSTOLIC BLOOD PRESSURE: 176 MMHG

## 2020-10-15 DIAGNOSIS — R06.02 SOB (SHORTNESS OF BREATH): Primary | ICD-10-CM

## 2020-10-15 DIAGNOSIS — R60.0 BILATERAL LEG EDEMA: ICD-10-CM

## 2020-10-15 DIAGNOSIS — I70.0 ATHEROSCLEROSIS OF AORTA: ICD-10-CM

## 2020-10-15 DIAGNOSIS — E66.9 OBESITY (BMI 30.0-34.9): ICD-10-CM

## 2020-10-15 DIAGNOSIS — E78.5 HYPERLIPIDEMIA, UNSPECIFIED HYPERLIPIDEMIA TYPE: ICD-10-CM

## 2020-10-15 DIAGNOSIS — I10 ESSENTIAL HYPERTENSION: ICD-10-CM

## 2020-10-15 DIAGNOSIS — I73.9 PAD (PERIPHERAL ARTERY DISEASE): ICD-10-CM

## 2020-10-15 PROCEDURE — 99214 OFFICE O/P EST MOD 30 MIN: CPT | Mod: S$PBB,,, | Performed by: INTERNAL MEDICINE

## 2020-10-15 PROCEDURE — 99214 OFFICE O/P EST MOD 30 MIN: CPT | Mod: PBBFAC,PO | Performed by: INTERNAL MEDICINE

## 2020-10-15 PROCEDURE — 99999 PR PBB SHADOW E&M-EST. PATIENT-LVL IV: ICD-10-PCS | Mod: PBBFAC,,, | Performed by: INTERNAL MEDICINE

## 2020-10-15 PROCEDURE — 99999 PR PBB SHADOW E&M-EST. PATIENT-LVL IV: CPT | Mod: PBBFAC,,, | Performed by: INTERNAL MEDICINE

## 2020-10-15 PROCEDURE — 99214 PR OFFICE/OUTPT VISIT, EST, LEVL IV, 30-39 MIN: ICD-10-PCS | Mod: S$PBB,,, | Performed by: INTERNAL MEDICINE

## 2020-10-15 NOTE — PROGRESS NOTES
"Subjective:    Patient ID:  Katy Soto is a 80 y.o. female who presents for evaluation of Hypertension      HPI    79 y/o female with hx of PAD s/p PTA to RPTA with resolution of severe Balta IIb lifestyle limiting claudication, HTN, HLD who presents for f/u. Has not been seen since 2017.  Initially seen for claudication, intervention performed to RLE with resolution of claudication. No claudication of LLE. Back then she was confused as to her meds and claims to have had a nosebleed from Diltiazem and had not been taking Dilt or Pletal (I suspect it was Pletal that caused the nosebleed). Had Covid infection 4/2020 with full recovery.   She has been having uncontrolled BP and is here with family member. Again she is confused as to her meds and how/when she takes them. Family states that she has been noncompliant and after discussion, she admits to med noncompliance and dietary noncompliance. Record reviewed and she has been on multiple regimens. She continues to admit to dietary indiscretion with high salt intake. BP again is high today in clinic. Does not exercise regularly and does not smoke. Did not take lasix today. Family member states she does not take her meds at times bc she "feels good." Denies CP, SOB/SPIVEY, orthopnea, PND, syncope, palps, claudication.    Review of Systems   Constitution: Negative for malaise/fatigue.   HENT: Negative for congestion.    Eyes: Negative for blurred vision.   Cardiovascular: Positive for dyspnea on exertion and leg swelling. Negative for chest pain, claudication, cyanosis, irregular heartbeat, near-syncope, orthopnea, palpitations, paroxysmal nocturnal dyspnea and syncope.   Respiratory: Negative for shortness of breath.    Endocrine: Negative for polyuria.   Hematologic/Lymphatic: Negative for bleeding problem.   Skin: Negative for itching and rash.   Musculoskeletal: Negative for joint swelling, muscle cramps and muscle weakness.   Gastrointestinal: Negative for " abdominal pain, hematemesis, hematochezia, melena, nausea and vomiting.   Genitourinary: Negative for dysuria and hematuria.   Neurological: Negative for dizziness, focal weakness, headaches, light-headedness, loss of balance and weakness.   Psychiatric/Behavioral: Negative for depression. The patient is not nervous/anxious.         Objective:    Physical Exam   Constitutional: She is oriented to person, place, and time. She appears well-developed and well-nourished.   HENT:   Head: Normocephalic and atraumatic.   Neck: Neck supple. No JVD present.   Cardiovascular: Normal rate, regular rhythm and normal heart sounds.   Pulses:       Carotid pulses are 2+ on the right side and 2+ on the left side.       Radial pulses are 2+ on the right side and 2+ on the left side.        Femoral pulses are 2+ on the right side and 2+ on the left side.  Monophasic doppler RDP  Biphasic doppler RPT  Biphasic doppler LDP and LPT      Pulmonary/Chest: Effort normal and breath sounds normal.   Abdominal: Soft. Bowel sounds are normal.   Musculoskeletal:         General: Edema present.   Neurological: She is alert and oriented to person, place, and time.   Skin: Skin is warm and dry.   Psychiatric: She has a normal mood and affect. Her behavior is normal. Thought content normal.         Assessment:       1. SOB (shortness of breath)    2. Essential hypertension    3. PAD (peripheral artery disease)    4. Hyperlipidemia, unspecified hyperlipidemia type    5. Atherosclerosis of aorta    6. Obesity (BMI 30.0-34.9)    7. Bilateral leg edema      79 y/o pt with hx and presentation as above. BP uncontrolled likely from noncompliance. Adding new meds at this time will not control BP if she does not take them. Had an exhaustive discussion on med compliance, compliance with meds/diet, and long term potential risks of uncontrolled HTN including CVA, CKD/HD, MI. Will obtain 2DE to eval for HTN heart disease and for SOB. PAD stable and no symptoms  - cont ASA/statin. Discussed the etiology, evaluation, and management of HTN, PAD, HLD, ATH, obesity, leg edema. Discussed the importance of med compliance, heart healthy diet, and regular exercise.        Plan:       -2DE  -med compliance  -f/u in 1 month

## 2020-10-15 NOTE — LETTER
October 15, 2020      Felice Paez MD  [address]  [address]  [place] [address]           [facility]  [address], [address]  [facility] [place] [address]  Phone: [phone]  Fax: [phone]          Patient: Katy Soto   MR Number: [id number]   YOB: 1940   Date of Visit: 10/15/2020       Dear Dr. Felice Paez:    Thank you for referring Katy Soto to me for evaluation. Attached you will find relevant portions of my assessment and plan of care.    If you have questions, please do not hesitate to call me. I look forward to following Katy Soto along with you.    Sincerely,    David Fried MD    Enclosure  CC:  No Recipients    If you would like to receive this communication electronically, please contact [email or web address] or [phone] to request more information on [employer] access.    For providers and/or their staff who would like to refer a patient to [facility], please contact us through our one-stop-shop provider referral line, [facility], at [phone].    If you feel you have received this communication in error or would no longer like to receive these types of communications, please e-mail [email or web address]

## 2020-10-21 ENCOUNTER — HOSPITAL ENCOUNTER (OUTPATIENT)
Dept: CARDIOLOGY | Facility: HOSPITAL | Age: 80
Discharge: HOME | End: 2020-10-21
Attending: INTERNAL MEDICINE
Payer: MEDICARE

## 2020-10-21 VITALS — WEIGHT: 196 LBS | BODY MASS INDEX: 33.46 KG/M2 | HEIGHT: 64 IN

## 2020-10-21 DIAGNOSIS — I10 ESSENTIAL HYPERTENSION: ICD-10-CM

## 2020-10-21 DIAGNOSIS — R06.02 SOB (SHORTNESS OF BREATH): ICD-10-CM

## 2020-10-21 LAB
AORTIC ROOT ANNULUS: 2.18 CM
AORTIC VALVE CUSP SEPERATION: 1.69 CM
AV INDEX (PROSTH): 0.87
AV MEAN GRADIENT: 8 MMHG
AV PEAK GRADIENT: 11 MMHG
AV VALVE AREA: 3.34 CM2
AV VELOCITY RATIO: 0.7
BSA FOR ECHO PROCEDURE: 2 M2
CV ECHO LV RWT: 0.97 CM
DOP CALC AO PEAK VEL: 1.69 M/S
DOP CALC AO VTI: 24.91 CM
DOP CALC LVOT AREA: 3.8 CM2
DOP CALC LVOT DIAMETER: 2.21 CM
DOP CALC LVOT PEAK VEL: 1.19 M/S
DOP CALC LVOT STROKE VOLUME: 83.08 CM3
DOP CALCLVOT PEAK VEL VTI: 21.67 CM
E WAVE DECELERATION TIME: 111.17 MSEC
E/A RATIO: 0.5
E/E' RATIO: 10 M/S
ECHO LV POSTERIOR WALL: 1.44 CM (ref 0.6–1.1)
FRACTIONAL SHORTENING: 31 % (ref 28–44)
INTERVENTRICULAR SEPTUM: 1.52 CM (ref 0.6–1.1)
IVC PROX: 0.8 CM
LA MAJOR: 4.04 CM
LA MINOR: 4.07 CM
LA WIDTH: 3.77 CM
LEFT ATRIUM SIZE: 4.16 CM
LEFT ATRIUM VOLUME INDEX: 27.9 ML/M2
LEFT ATRIUM VOLUME: 54.06 CM3
LEFT INTERNAL DIMENSION IN SYSTOLE: 2.05 CM (ref 2.1–4)
LEFT VENTRICLE DIASTOLIC VOLUME INDEX: 17.53 ML/M2
LEFT VENTRICLE DIASTOLIC VOLUME: 34 ML
LEFT VENTRICLE MASS INDEX: 78 G/M2
LEFT VENTRICLE SYSTOLIC VOLUME INDEX: 7 ML/M2
LEFT VENTRICLE SYSTOLIC VOLUME: 13.57 ML
LEFT VENTRICULAR INTERNAL DIMENSION IN DIASTOLE: 2.96 CM (ref 3.5–6)
LEFT VENTRICULAR MASS: 151.64 G
LV LATERAL E/E' RATIO: 8.33 M/S
LV SEPTAL E/E' RATIO: 12.5 M/S
MV A" WAVE DURATION": 121 MSEC
MV PEAK A VEL: 1.51 M/S
MV PEAK E VEL: 0.75 M/S
MV STENOSIS PRESSURE HALF TIME: 32.24 MS
MV VALVE AREA P 1/2 METHOD: 6.82 CM2
PISA MRMAX VEL: 0.05 M/S
PISA TR MAX VEL: 2.49 M/S
PULM VEIN A" WAVE DURATION": 131 MSEC
PULM VEIN S/D RATIO: 2.59
PV PEAK D VEL: 0.32 M/S
PV PEAK S VEL: 0.83 M/S
PV PEAK VELOCITY: 1.06 CM/S
RA MAJOR: 3.86 CM
RA PRESSURE: 3 MMHG
RA WIDTH: 2.91 CM
RIGHT VENTRICULAR END-DIASTOLIC DIMENSION: 2.25 CM
TDI LATERAL: 0.09 M/S
TDI SEPTAL: 0.06 M/S
TDI: 0.08 M/S
TR MAX PG: 25 MMHG
TRICUSPID ANNULAR PLANE SYSTOLIC EXCURSION: 1.67 CM
TV REST PULMONARY ARTERY PRESSURE: 28 MMHG

## 2020-10-21 PROCEDURE — 93306 ECHO (CUPID ONLY): ICD-10-PCS | Mod: 26,,, | Performed by: INTERNAL MEDICINE

## 2020-10-21 PROCEDURE — 93306 TTE W/DOPPLER COMPLETE: CPT | Mod: 26,,, | Performed by: INTERNAL MEDICINE

## 2020-10-21 PROCEDURE — 93306 TTE W/DOPPLER COMPLETE: CPT | Mod: PO

## 2020-10-26 ENCOUNTER — PATIENT OUTREACH (OUTPATIENT)
Dept: ADMINISTRATIVE | Facility: HOSPITAL | Age: 80
End: 2020-10-26

## 2020-11-09 ENCOUNTER — OFFICE VISIT (OUTPATIENT)
Dept: FAMILY MEDICINE | Facility: CLINIC | Age: 80
End: 2020-11-09
Payer: MEDICARE

## 2020-11-09 VITALS
DIASTOLIC BLOOD PRESSURE: 76 MMHG | TEMPERATURE: 98 F | HEIGHT: 64 IN | SYSTOLIC BLOOD PRESSURE: 144 MMHG | BODY MASS INDEX: 33.69 KG/M2 | OXYGEN SATURATION: 96 % | HEART RATE: 88 BPM | WEIGHT: 197.31 LBS

## 2020-11-09 DIAGNOSIS — H91.90 HEARING LOSS, UNSPECIFIED HEARING LOSS TYPE, UNSPECIFIED LATERALITY: ICD-10-CM

## 2020-11-09 DIAGNOSIS — I10 ESSENTIAL HYPERTENSION: Primary | ICD-10-CM

## 2020-11-09 DIAGNOSIS — I73.9 PAD (PERIPHERAL ARTERY DISEASE): ICD-10-CM

## 2020-11-09 DIAGNOSIS — I70.0 ATHEROSCLEROSIS OF AORTA: ICD-10-CM

## 2020-11-09 DIAGNOSIS — F41.9 ANXIETY: ICD-10-CM

## 2020-11-09 DIAGNOSIS — R04.0 FREQUENT NOSEBLEEDS: ICD-10-CM

## 2020-11-09 DIAGNOSIS — E78.5 HYPERLIPIDEMIA, UNSPECIFIED HYPERLIPIDEMIA TYPE: ICD-10-CM

## 2020-11-09 DIAGNOSIS — E66.9 OBESITY (BMI 30.0-34.9): ICD-10-CM

## 2020-11-09 PROCEDURE — G0008 FLU VACCINE - QUADRIVALENT - ADJUVANTED: ICD-10-PCS | Mod: S$GLB,,, | Performed by: FAMILY MEDICINE

## 2020-11-09 PROCEDURE — 99213 PR OFFICE/OUTPT VISIT, EST, LEVL III, 20-29 MIN: ICD-10-PCS | Mod: 25,S$GLB,, | Performed by: FAMILY MEDICINE

## 2020-11-09 PROCEDURE — 90694 VACC AIIV4 NO PRSRV 0.5ML IM: CPT | Mod: S$GLB,,, | Performed by: FAMILY MEDICINE

## 2020-11-09 PROCEDURE — G0008 ADMIN INFLUENZA VIRUS VAC: HCPCS | Mod: S$GLB,,, | Performed by: FAMILY MEDICINE

## 2020-11-09 PROCEDURE — 99213 OFFICE O/P EST LOW 20 MIN: CPT | Mod: 25,S$GLB,, | Performed by: FAMILY MEDICINE

## 2020-11-09 PROCEDURE — 90694 FLU VACCINE - QUADRIVALENT - ADJUVANTED: ICD-10-PCS | Mod: S$GLB,,, | Performed by: FAMILY MEDICINE

## 2020-11-09 RX ORDER — TERAZOSIN 1 MG/1
1 CAPSULE ORAL NIGHTLY
Qty: 90 CAPSULE | Refills: 3 | Status: SHIPPED | OUTPATIENT
Start: 2020-11-09 | End: 2020-11-30

## 2020-11-09 RX ORDER — ACETAMINOPHEN 500 MG
5000 TABLET ORAL DAILY
Qty: 90 TABLET | Refills: 3 | Status: SHIPPED | OUTPATIENT
Start: 2020-11-09 | End: 2022-03-17 | Stop reason: SDUPTHER

## 2020-11-09 RX ORDER — FUROSEMIDE 40 MG/1
TABLET ORAL
Qty: 90 TABLET | Refills: 3 | Status: SHIPPED | OUTPATIENT
Start: 2020-11-09 | End: 2022-02-07

## 2020-11-09 NOTE — PROGRESS NOTES
"Subjective:      Patient ID: Katy Soto is a 80 y.o. female.    Chief Complaint: Follow-up, Hypertension, Medication Problem (to make med adjustments), and Shortness of Breath (when walking)      Vitals:    11/09/20 0932   BP: (!) 144/76   Pulse: 88   Temp: 98.3 °F (36.8 °C)   TempSrc: Oral   SpO2: 96%   Weight: 89.5 kg (197 lb 5 oz)   Height: 5' 4" (1.626 m)        HPI   bp up, saw cardiologist, willing to take flu vaccine; lives alone; taking lasix and "thery are working"  Holger with her says opt wasn't taking them correctly  Had a wellness visit and found out not taking medicine correctly  Wearing her hearing aids now  Got them for free  Saw abdias Smith, about 3 weeks ago; note read  ECHO good, LVH only  Won't take ASA due to nose bleeds  Holger is her niece; pt has one son  Saw DR Paez and is due back in January with repeat CT for enlarged LN            Problem List  Patient Active Problem List   Diagnosis    Hypertension    Hyperlipidemia    Arthritis    PAD (peripheral artery disease)    Bilateral leg edema    Anxiety    Obesity (BMI 30.0-34.9)    Osteopenia    Atherosclerosis of aorta    Elevated sed rate    Vitamin D insufficiency    LVH (left ventricular hypertrophy)        ALLERGIES:   Review of patient's allergies indicates:   Allergen Reactions    Cilostazol      [id number] and stomach cramps    Clonidine     Coreg [carvedilol]      Cramps stomach and nausea    Diltiazem      nauseana d stoamch cramps    Lexapro [escitalopram oxalate]      Dizzy, nausea and weak    Shellfish containing products Hives       MEDS:   Current Outpatient Medications:     atorvastatin (LIPITOR) 40 MG tablet, TAKE 1 TABLET(40 MG) BY MOUTH EVERY DAY, Disp: 90 tablet, Rfl: 3    cholecalciferol, vitamin D3, (VITAMIN D3) 125 mcg (5,000 unit) Tab, Take 1 tablet (5,000 Units total) by mouth once daily., Disp: 90 tablet, Rfl: 3    furosemide (LASIX) 40 MG tablet, TAKE 1 TABLET(40 MG) BY MOUTH EVERY DAY " FOR LEG SWELLING, Disp: 90 tablet, Rfl: 3    irbesartan (AVAPRO) 300 MG tablet, TAKE 1 TABLET(300 MG) BY MOUTH EVERY DAY, Disp: 90 tablet, Rfl: 3    isosorbide mononitrate (IMDUR) 60 MG 24 hr tablet, TAKE 1 TABLET(60 MG) BY MOUTH EVERY DAY, Disp: 90 tablet, Rfl: 1    potassium chloride SA (K-DUR,KLOR-CON) 20 MEQ tablet, TAKE 1 TABLET BY MOUTH ONCE DAILY WITH LASIX AS NEEDED, Disp: 90 tablet, Rfl: 3    sucralfate (CARAFATE) 1 gram tablet, Take 1 tablet (1 g total) by mouth 3 (three) times daily., Disp: 90 tablet, Rfl: 5    aspirin (ECOTRIN) 81 MG EC tablet, Take 81 mg by mouth once daily., Disp: , Rfl:     ondansetron (ZOFRAN) 4 MG tablet, Take 2 tablets (8 mg total) by mouth every 8 (eight) hours as needed for Nausea., Disp: 12 tablet, Rfl: 0    terazosin (HYTRIN) 5 MG capsule, Take 1 capsule (5 mg total) by mouth every evening. For blood pressure, Disp: 90 capsule, Rfl: 3      History:  Current Providers as of 2020  PCP: Rudy Cruz MD  Care Team Provider: New Mehta MD  Care Team Provider: Mo Cleary LPN  Care Team Provider: David Fried MD  Care Team Provider: Mo Cleary LPN  Encounter Provider: Rudy Cruz MD, starting on  12:00 AM  Referring Provider: not found, starting on  12:00 AM  Consulting Physician: Rudy Cruz MD, starting on   9:21 AM (Active)   Past Medical History:   Diagnosis Date    Hyperlipidemia     Hypertension      Past Surgical History:   Procedure Laterality Date     SECTION      x1    HERNIA REPAIR      umbilical    LEG SURGERY Right 2017    stents placed     Social History     Tobacco Use    Smoking status: Never Smoker    Smokeless tobacco: Never Used   Substance Use Topics    Alcohol use: No    Drug use: No         Review of Systems   Constitutional: Negative.    HENT: Negative.    Respiratory: Negative.    Cardiovascular: Negative.    Gastrointestinal: Negative.    Endocrine:  Negative.    Genitourinary: Negative.    Musculoskeletal: Negative.    Psychiatric/Behavioral: Negative.    All other systems reviewed and are negative.    Objective:     Physical Exam  Vitals signs and nursing note reviewed.   Constitutional:       Appearance: She is well-developed.   HENT:      Head: Normocephalic.   Eyes:      Conjunctiva/sclera: Conjunctivae normal.      Pupils: Pupils are equal, round, and reactive to light.   Neck:      Musculoskeletal: Normal range of motion and neck supple.   Cardiovascular:      Rate and Rhythm: Normal rate and regular rhythm.      Heart sounds: Normal heart sounds.   Pulmonary:      Effort: Pulmonary effort is normal.      Breath sounds: Normal breath sounds.   Musculoskeletal: Normal range of motion.   Skin:     General: Skin is warm and dry.   Neurological:      Mental Status: She is alert and oriented to person, place, and time.      Deep Tendon Reflexes: Reflexes are normal and symmetric.   Psychiatric:         Behavior: Behavior normal.         Thought Content: Thought content normal.         Judgment: Judgment normal.             Assessment:     1. Essential hypertension    2. PAD (peripheral artery disease)    3. Hyperlipidemia, unspecified hyperlipidemia type    4. Anxiety    5. Atherosclerosis of aorta    6. Obesity (BMI 30.0-34.9)    7. Hearing loss, unspecified hearing loss type, unspecified laterality    8. Frequent nosebleeds      Plan:        Medication List          Accurate as of November 9, 2020 11:59 PM. If you have any questions, ask your nurse or doctor.            CHANGE how you take these medications    terazosin 1 MG capsule  Commonly known as: HYTRIN  Take 1 capsule (1 mg total) by mouth every evening. For blood pressure  What changed: additional instructions  Changed by: Rudy Cruz MD        CONTINUE taking these medications    aspirin 81 MG EC tablet  Commonly known as: ECOTRIN     atorvastatin 40 MG tablet  Commonly known as: LIPITOR  TAKE 1  TABLET(40 MG) BY MOUTH EVERY DAY     cholecalciferol (vitamin D3) 125 mcg (5,000 unit) Tab  Commonly known as: VITAMIN D3  Take 1 tablet (5,000 Units total) by mouth once daily.     furosemide 40 MG tablet  Commonly known as: LASIX  TAKE 1 TABLET(40 MG) BY MOUTH EVERY DAY FOR LEG SWELLING     irbesartan 300 MG tablet  Commonly known as: AVAPRO  TAKE 1 TABLET(300 MG) BY MOUTH EVERY DAY     isosorbide mononitrate 60 MG 24 hr tablet  Commonly known as: IMDUR  TAKE 1 TABLET(60 MG) BY MOUTH EVERY DAY     ondansetron 4 MG tablet  Commonly known as: ZOFRAN  Take 2 tablets (8 mg total) by mouth every 8 (eight) hours as needed for Nausea.     potassium chloride SA 20 MEQ tablet  Commonly known as: K-DUR,KLOR-CON  TAKE 1 TABLET BY MOUTH ONCE DAILY WITH LASIX AS NEEDED     sucralfate 1 gram tablet  Commonly known as: CARAFATE  Take 1 tablet (1 g total) by mouth 3 (three) times daily.           Where to Get Your Medications      These medications were sent to [id number] - [facility] [address] [facility] AT [facility] [address]  [address] [facility] [address]    Phone: [phone]   · cholecalciferol (vitamin D3) 125 mcg (5,000 unit) Tab  · furosemide 40 MG tablet  · terazosin 1 MG capsule       Essential hypertension    PAD (peripheral artery disease)    Hyperlipidemia, unspecified hyperlipidemia type    Anxiety    Atherosclerosis of aorta    Obesity (BMI 30.0-34.9)    Hearing loss, unspecified hearing loss type, unspecified laterality    Frequent nosebleeds    Other orders  -     Cancel: Influenza - High Dose (65+) (PF) (IM)  -     furosemide (LASIX) 40 MG tablet; TAKE 1 TABLET(40 MG) BY MOUTH EVERY DAY FOR LEG SWELLING  Dispense: 90 tablet; Refill: 3  -     cholecalciferol, vitamin D3, (VITAMIN D3) 125 mcg (5,000 unit) Tab; Take 1 tablet (5,000 Units total) by mouth once daily.  Dispense: 90 tablet; Refill: 3  -     Discontinue: terazosin (HYTRIN) 1 MG capsule; Take 1 capsule (1 mg  total) by mouth every evening. For blood pressure  Dispense: 90 capsule; Refill: 3  -     Influenza (FLUAD) - Quadrivalent (Adjuvanted) *Preferred* (65+) (PF)      Monitor bp dfaioly at ho0me and bring in nuymbers in one months  rx up todate  Not demented, did fine clock and quesitonairre

## 2020-11-13 ENCOUNTER — OUTPATIENT CASE MANAGEMENT (OUTPATIENT)
Dept: ADMINISTRATIVE | Facility: OTHER | Age: 80
End: 2020-11-13

## 2020-11-13 NOTE — LETTER
November 18, 2020    Katy Soto  [address]  [place] [address]             [facility] [address] [place] [address] Dear Ms Soto,    I work with [facility]'s Outpatient Case Management Department. We received a referral to call you to discuss your medical history.These services are free of charge and are offered to [facility] patients who have recently been discharged from any of our facilities or who have complex medical conditions that may require the skill of a nurse to assist with management.             I am a Registered Nurse who specializes in connecting patients with available medical and financial resources as well as addressing any educational needs that may be indicated.      I attempted to reach you by telephone, but I was unsuccessful. Please call our department so that we can go over some questions with you regarding your health.    The Outpatient Case Management Department can be reached at [phone] from 8:00AM to 4:30 PM on Monday thru Friday. [facility] also has a program where a nurse is available 24/7 to answer questions or provide medical advice, their number is [phone].    Thanks,      Emilie Greene, RN Case Manager  Outpatient Complex Case Management/Disease Management   [phone]

## 2020-11-18 NOTE — PROGRESS NOTES
11/18/20-Attempted to contact patient to complete initial assessment for [facility]. Unable to reach patient. Letter has been sent to patient with [facility] contact information should any needs arise in the future. Closing case at this time.

## 2020-11-30 ENCOUNTER — CLINICAL SUPPORT (OUTPATIENT)
Dept: FAMILY MEDICINE | Facility: CLINIC | Age: 80
End: 2020-11-30
Payer: MEDICARE

## 2020-11-30 ENCOUNTER — TELEPHONE (OUTPATIENT)
Dept: FAMILY MEDICINE | Facility: CLINIC | Age: 80
End: 2020-11-30

## 2020-11-30 VITALS
DIASTOLIC BLOOD PRESSURE: 84 MMHG | TEMPERATURE: 97 F | SYSTOLIC BLOOD PRESSURE: 154 MMHG | HEART RATE: 88 BPM | OXYGEN SATURATION: 95 %

## 2020-11-30 PROCEDURE — 99499 NO LOS: ICD-10-PCS | Mod: S$GLB,,, | Performed by: FAMILY MEDICINE

## 2020-11-30 PROCEDURE — 99499 UNLISTED E&M SERVICE: CPT | Mod: S$GLB,,, | Performed by: FAMILY MEDICINE

## 2020-11-30 RX ORDER — TERAZOSIN 5 MG/1
5 CAPSULE ORAL NIGHTLY
Qty: 90 CAPSULE | Refills: 3 | Status: SHIPPED | OUTPATIENT
Start: 2020-11-30 | End: 2022-03-17 | Stop reason: SDUPTHER

## 2020-11-30 RX ORDER — TERAZOSIN 1 MG/1
1 CAPSULE ORAL 2 TIMES DAILY
Qty: 180 CAPSULE | Refills: 3 | Status: SHIPPED | OUTPATIENT
Start: 2020-11-30 | End: 2020-11-30

## 2020-11-30 NOTE — TELEPHONE ENCOUNTER
Pt has been notified and verbalized understanding that Terazosin has been increased to twice daily.

## 2020-11-30 NOTE — TELEPHONE ENCOUNTER
Pt presented to clinic today for a 4 week BP check. Pt's BP is 154/84 with a 88 pulse. She denies SOB, CP, etc. Pt stated that her medications were taken at approximately 8 am today.     Medications:     Lasix 40 mg daily  Irbesartan 300 mg daily  Imdur 60 mg daily  Terazosin 1 mg daily    BP log of home readings have been scanned into pt's media tab for review.     Please let me know how to proceed.    Pt's cell: [phone]

## 2020-11-30 NOTE — PROGRESS NOTES
Katy Soto 80 y.o. female is here today for Blood Pressure check.   History of HTN yes.    Review of patient's allergies indicates:   Allergen Reactions    Cilostazol      [id number] and stomach cramps    Clonidine     Coreg [carvedilol]      Cramps stomach and nausea    Diltiazem      nauseana d stoamch cramps    Lexapro [escitalopram oxalate]      Dizzy, nausea and weak    Shellfish containing products Hives     Creatinine   Date Value Ref Range Status   05/12/2020 1.00 0.50 - 1.40 mg/dL Final     Sodium   Date Value Ref Range Status   05/12/2020 141 136 - 145 mmol/L Final     Potassium   Date Value Ref Range Status   05/12/2020 4.2 3.5 - 5.1 mmol/L Final   ]  Patient verifies taking blood pressure medications on a regular basis at the same time of the day.     Current Outpatient Medications:     aspirin (ECOTRIN) 81 MG EC tablet, Take 81 mg by mouth once daily., Disp: , Rfl:     atorvastatin (LIPITOR) 40 MG tablet, TAKE 1 TABLET(40 MG) BY MOUTH EVERY DAY, Disp: 90 tablet, Rfl: 3    cholecalciferol, vitamin D3, (VITAMIN D3) 125 mcg (5,000 unit) Tab, Take 1 tablet (5,000 Units total) by mouth once daily., Disp: 90 tablet, Rfl: 3    furosemide (LASIX) 40 MG tablet, TAKE 1 TABLET(40 MG) BY MOUTH EVERY DAY FOR LEG SWELLING, Disp: 90 tablet, Rfl: 3    irbesartan (AVAPRO) 300 MG tablet, TAKE 1 TABLET(300 MG) BY MOUTH EVERY DAY, Disp: 90 tablet, Rfl: 3    isosorbide mononitrate (IMDUR) 60 MG 24 hr tablet, TAKE 1 TABLET(60 MG) BY MOUTH EVERY DAY, Disp: 90 tablet, Rfl: 1    ondansetron (ZOFRAN) 4 MG tablet, Take 2 tablets (8 mg total) by mouth every 8 (eight) hours as needed for Nausea., Disp: 12 tablet, Rfl: 0    potassium chloride SA (K-DUR,KLOR-CON) 20 MEQ tablet, TAKE 1 TABLET BY MOUTH ONCE DAILY WITH LASIX AS NEEDED, Disp: 90 tablet, Rfl: 3    sucralfate (CARAFATE) 1 gram tablet, Take 1 tablet (1 g total) by mouth 3 (three) times daily., Disp: 90 tablet, Rfl: 5    terazosin (HYTRIN) 1 MG capsule,  Take 1 capsule (1 mg total) by mouth every evening. For blood pressure, Disp: 90 capsule, Rfl: 3  Does patient have record of home blood pressure readings yes. Readings have been averaging 160/70.   Last dose of blood pressure medication was taken at 8 am.  Patient is asymptomatic.       BP: (!) 154/84 , Pulse: 88 .    Blood pressure reading after 15 minutes was 154/84, Pulse 88.  Dr. Cruz notified.

## 2020-12-01 NOTE — TELEPHONE ENCOUNTER
Dr. Thorne reviewed my note from pt's BP check visit. She has sent in Terazosin 1 mg BID for pt in addition to her other BP meds. Are you in agreement with this?

## 2020-12-01 NOTE — TELEPHONE ENCOUNTER
Blood pressure still too high; increasing nightime terazosin to 5 mg nightly; continue to monitor blood pressure for next month

## 2020-12-01 NOTE — TELEPHONE ENCOUNTER
----- Message from Emilie Khoury sent at 11/30/2020  9:41 AM CST -----   - blood pressure readings log

## 2020-12-02 NOTE — TELEPHONE ENCOUNTER
Okay. I've spoken with Diana at [facility]'s pharmacy and confirmed with her that the pt should be taking Terazosin 1 mg BID. Diana has placed the Hytrin 5 mg on hold and will not dispense it at this time.

## 2020-12-03 ENCOUNTER — OFFICE VISIT (OUTPATIENT)
Dept: CARDIOLOGY | Facility: CLINIC | Age: 80
End: 2020-12-03
Payer: MEDICARE

## 2020-12-03 VITALS
BODY MASS INDEX: 33.81 KG/M2 | WEIGHT: 197 LBS | HEART RATE: 88 BPM | DIASTOLIC BLOOD PRESSURE: 100 MMHG | SYSTOLIC BLOOD PRESSURE: 162 MMHG | OXYGEN SATURATION: 98 %

## 2020-12-03 DIAGNOSIS — I73.9 PAD (PERIPHERAL ARTERY DISEASE): Primary | ICD-10-CM

## 2020-12-03 DIAGNOSIS — I70.0 ATHEROSCLEROSIS OF AORTA: ICD-10-CM

## 2020-12-03 DIAGNOSIS — I51.7 LVH (LEFT VENTRICULAR HYPERTROPHY): ICD-10-CM

## 2020-12-03 DIAGNOSIS — E66.9 OBESITY (BMI 30.0-34.9): ICD-10-CM

## 2020-12-03 DIAGNOSIS — E78.5 HYPERLIPIDEMIA, UNSPECIFIED HYPERLIPIDEMIA TYPE: ICD-10-CM

## 2020-12-03 DIAGNOSIS — I10 ESSENTIAL HYPERTENSION: ICD-10-CM

## 2020-12-03 PROCEDURE — 99999 PR PBB SHADOW E&M-EST. PATIENT-LVL IV: CPT | Mod: PBBFAC,,, | Performed by: INTERNAL MEDICINE

## 2020-12-03 PROCEDURE — 99214 OFFICE O/P EST MOD 30 MIN: CPT | Mod: S$PBB,,, | Performed by: INTERNAL MEDICINE

## 2020-12-03 PROCEDURE — 99214 PR OFFICE/OUTPT VISIT, EST, LEVL IV, 30-39 MIN: ICD-10-PCS | Mod: S$PBB,,, | Performed by: INTERNAL MEDICINE

## 2020-12-03 PROCEDURE — 99999 PR PBB SHADOW E&M-EST. PATIENT-LVL IV: ICD-10-PCS | Mod: PBBFAC,,, | Performed by: INTERNAL MEDICINE

## 2020-12-03 PROCEDURE — 99214 OFFICE O/P EST MOD 30 MIN: CPT | Mod: PBBFAC,PO | Performed by: INTERNAL MEDICINE

## 2020-12-16 ENCOUNTER — PES CALL (OUTPATIENT)
Dept: ADMINISTRATIVE | Facility: CLINIC | Age: 80
End: 2020-12-16

## 2020-12-31 RX ORDER — SUCRALFATE 1 G/1
1 TABLET ORAL 3 TIMES DAILY
Qty: 90 TABLET | Refills: 5 | Status: SHIPPED | OUTPATIENT
Start: 2020-12-31 | End: 2021-11-24 | Stop reason: SDUPTHER

## 2021-01-13 ENCOUNTER — HOSPITAL ENCOUNTER (OUTPATIENT)
Dept: RADIOLOGY | Facility: HOSPITAL | Age: 81
Discharge: HOME | End: 2021-01-13
Attending: INTERNAL MEDICINE
Payer: MEDICARE

## 2021-01-13 DIAGNOSIS — R59.9 ENLARGED LYMPH NODES: ICD-10-CM

## 2021-01-13 PROCEDURE — 25500020 PHARM REV CODE 255: Mod: PO | Performed by: INTERNAL MEDICINE

## 2021-01-13 PROCEDURE — 74177 CT ABD & PELVIS W/CONTRAST: CPT | Mod: TC,PO

## 2021-01-13 RX ADMIN — IOHEXOL 100 ML: 350 INJECTION, SOLUTION INTRAVENOUS at 03:01

## 2021-01-13 RX ADMIN — IOHEXOL 30 ML: 300 INJECTION, SOLUTION INTRAVENOUS at 03:01

## 2021-01-19 ENCOUNTER — TELEPHONE (OUTPATIENT)
Dept: HEMATOLOGY/ONCOLOGY | Facility: CLINIC | Age: 81
End: 2021-01-19

## 2021-02-23 ENCOUNTER — OFFICE VISIT (OUTPATIENT)
Dept: HEMATOLOGY/ONCOLOGY | Facility: CLINIC | Age: 81
End: 2021-02-23
Payer: MEDICARE

## 2021-02-23 VITALS
SYSTOLIC BLOOD PRESSURE: 188 MMHG | BODY MASS INDEX: 34.89 KG/M2 | DIASTOLIC BLOOD PRESSURE: 82 MMHG | RESPIRATION RATE: 18 BRPM | OXYGEN SATURATION: 99 % | WEIGHT: 203.25 LBS | TEMPERATURE: 98 F | HEART RATE: 80 BPM

## 2021-02-23 DIAGNOSIS — R59.9 ENLARGED LYMPH NODES: Primary | ICD-10-CM

## 2021-02-23 PROCEDURE — 99999 PR PBB SHADOW E&M-EST. PATIENT-LVL III: ICD-10-PCS | Mod: PBBFAC,,, | Performed by: INTERNAL MEDICINE

## 2021-02-23 PROCEDURE — 99213 PR OFFICE/OUTPT VISIT, EST, LEVL III, 20-29 MIN: ICD-10-PCS | Mod: S$PBB,,, | Performed by: INTERNAL MEDICINE

## 2021-02-23 PROCEDURE — 99999 PR PBB SHADOW E&M-EST. PATIENT-LVL III: CPT | Mod: PBBFAC,,, | Performed by: INTERNAL MEDICINE

## 2021-02-23 PROCEDURE — 99213 OFFICE O/P EST LOW 20 MIN: CPT | Mod: S$PBB,,, | Performed by: INTERNAL MEDICINE

## 2021-02-23 PROCEDURE — 99213 OFFICE O/P EST LOW 20 MIN: CPT | Mod: PBBFAC,PO | Performed by: INTERNAL MEDICINE

## 2021-04-08 ENCOUNTER — HOSPITAL ENCOUNTER (EMERGENCY)
Facility: HOSPITAL | Age: 81
Discharge: HOME | End: 2021-04-08
Attending: EMERGENCY MEDICINE
Payer: MEDICARE

## 2021-04-08 VITALS
TEMPERATURE: 99 F | DIASTOLIC BLOOD PRESSURE: 69 MMHG | RESPIRATION RATE: 18 BRPM | OXYGEN SATURATION: 98 % | HEART RATE: 68 BPM | BODY MASS INDEX: 34.83 KG/M2 | WEIGHT: 204 LBS | HEIGHT: 64 IN | SYSTOLIC BLOOD PRESSURE: 150 MMHG

## 2021-04-08 DIAGNOSIS — S00.81XA ABRASION OF FACE, INITIAL ENCOUNTER: ICD-10-CM

## 2021-04-08 DIAGNOSIS — S00.83XA FACIAL CONTUSION, INITIAL ENCOUNTER: ICD-10-CM

## 2021-04-08 DIAGNOSIS — T14.90XA INJURY: ICD-10-CM
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
## 2021-04-08 DIAGNOSIS — S02.32XA FRACTURE OF ORBITAL FLOOR, LEFT SIDE, INITIAL ENCOUNTER FOR CLOSED FRACTURE: Primary | ICD-10-CM

## 2021-04-08 PROCEDURE — 25000003 PHARM REV CODE 250: Mod: ER | Performed by: EMERGENCY MEDICINE

## 2021-04-08 PROCEDURE — 99284 EMERGENCY DEPT VISIT MOD MDM: CPT | Mod: 25,ER

## 2021-04-08 RX ORDER — OXYCODONE AND ACETAMINOPHEN 5; 325 MG/1; MG/1
1 TABLET ORAL EVERY 4 HOURS PRN
Qty: 7 TABLET | Refills: 0 | Status: SHIPPED | OUTPATIENT
Start: 2021-04-08 | End: 2021-09-23 | Stop reason: SDUPTHER

## 2021-04-08 RX ORDER — OXYCODONE AND ACETAMINOPHEN 5; 325 MG/1; MG/1
1 TABLET ORAL
Status: COMPLETED | OUTPATIENT
Start: 2021-04-08 | End: 2021-04-08

## 2021-04-08 RX ADMIN — OXYCODONE HYDROCHLORIDE AND ACETAMINOPHEN 1 TABLET: 5; 325 TABLET ORAL at 08:04

## 2021-04-28 ENCOUNTER — PES CALL (OUTPATIENT)
Dept: ADMINISTRATIVE | Facility: CLINIC | Age: 81
End: 2021-04-28

## 2021-05-02 RX ORDER — ISOSORBIDE MONONITRATE 60 MG/1
TABLET, EXTENDED RELEASE ORAL
Qty: 90 TABLET | Refills: 1 | Status: SHIPPED | OUTPATIENT
Start: 2021-05-02 | End: 2021-10-29

## 2021-05-02 RX ORDER — POTASSIUM CHLORIDE 20 MEQ/1
TABLET, EXTENDED RELEASE ORAL
Qty: 90 TABLET | Refills: 3 | Status: SHIPPED | OUTPATIENT
Start: 2021-05-02 | End: 2022-03-17 | Stop reason: SDUPTHER

## 2021-07-08 ENCOUNTER — PES CALL (OUTPATIENT)
Dept: ADMINISTRATIVE | Facility: CLINIC | Age: 81
End: 2021-07-08

## 2021-08-04 ENCOUNTER — PES CALL (OUTPATIENT)
Dept: ADMINISTRATIVE | Facility: CLINIC | Age: 81
End: 2021-08-04

## 2021-09-23 ENCOUNTER — HOSPITAL ENCOUNTER (EMERGENCY)
Facility: HOSPITAL | Age: 81
Discharge: HOME | End: 2021-09-23
Attending: EMERGENCY MEDICINE
Payer: MEDICARE

## 2021-09-23 VITALS
TEMPERATURE: 98 F | OXYGEN SATURATION: 98 % | DIASTOLIC BLOOD PRESSURE: 85 MMHG | HEART RATE: 86 BPM | SYSTOLIC BLOOD PRESSURE: 188 MMHG | HEIGHT: 64 IN | WEIGHT: 204 LBS | RESPIRATION RATE: 16 BRPM | BODY MASS INDEX: 34.83 KG/M2

## 2021-09-23 DIAGNOSIS — M25.579 ANKLE PAIN: ICD-10-CM

## 2021-09-23 DIAGNOSIS — M54.9 BACK PAIN: ICD-10-CM

## 2021-09-23 DIAGNOSIS — M54.2 NECK PAIN: ICD-10-CM

## 2021-09-23 PROCEDURE — 99284 EMERGENCY DEPT VISIT MOD MDM: CPT | Mod: 25,ER

## 2021-09-23 PROCEDURE — 25000003 PHARM REV CODE 250: Mod: ER | Performed by: EMERGENCY MEDICINE

## 2021-09-23 RX ORDER — HYDROCODONE BITARTRATE AND ACETAMINOPHEN 10; 325 MG/1; MG/1
1 TABLET ORAL
Status: COMPLETED | OUTPATIENT
Start: 2021-09-23 | End: 2021-09-23

## 2021-09-23 RX ORDER — OXYCODONE AND ACETAMINOPHEN 5; 325 MG/1; MG/1
1 TABLET ORAL EVERY 4 HOURS PRN
Qty: 7 TABLET | Refills: 0 | Status: SHIPPED | OUTPATIENT
Start: 2021-09-23 | End: 2022-03-17

## 2021-09-23 RX ORDER — LIDOCAINE 50 MG/G
1 PATCH TOPICAL DAILY
Qty: 5 PATCH | Refills: 0 | Status: SHIPPED | OUTPATIENT
Start: 2021-09-23 | End: 2022-03-17

## 2021-09-23 RX ORDER — LIDOCAINE 50 MG/G
1 PATCH TOPICAL
Status: DISCONTINUED | OUTPATIENT
Start: 2021-09-23 | End: 2021-09-23 | Stop reason: HOSPADM

## 2021-09-23 RX ADMIN — HYDROCODONE BITARTRATE AND ACETAMINOPHEN 1 TABLET: 10; 325 TABLET ORAL at 01:09

## 2021-09-23 RX ADMIN — LIDOCAINE 1 PATCH: 50 PATCH TOPICAL at 01:09

## 2021-10-01 ENCOUNTER — PES CALL (OUTPATIENT)
Dept: ADMINISTRATIVE | Facility: CLINIC | Age: 81
End: 2021-10-01

## 2021-10-07 ENCOUNTER — PES CALL (OUTPATIENT)
Dept: ADMINISTRATIVE | Facility: CLINIC | Age: 81
End: 2021-10-07

## 2021-11-10 ENCOUNTER — PES CALL (OUTPATIENT)
Dept: ADMINISTRATIVE | Facility: CLINIC | Age: 81
End: 2021-11-10
Payer: MEDICARE

## 2022-01-12 ENCOUNTER — PES CALL (OUTPATIENT)
Dept: ADMINISTRATIVE | Facility: CLINIC | Age: 82
End: 2022-01-12
Payer: MEDICARE

## 2022-02-06 DIAGNOSIS — H91.90 HEARING LOSS, UNSPECIFIED HEARING LOSS TYPE, UNSPECIFIED LATERALITY: ICD-10-CM

## 2022-02-06 DIAGNOSIS — Z78.0 ASYMPTOMATIC POSTMENOPAUSAL STATUS: ICD-10-CM

## 2022-02-06 DIAGNOSIS — E78.5 HYPERLIPIDEMIA, UNSPECIFIED HYPERLIPIDEMIA TYPE: ICD-10-CM

## 2022-02-06 DIAGNOSIS — E11.9 TYPE 2 DIABETES MELLITUS WITHOUT COMPLICATION, WITHOUT LONG-TERM CURRENT USE OF INSULIN: ICD-10-CM

## 2022-02-06 DIAGNOSIS — E03.4 HYPOTHYROIDISM DUE TO ACQUIRED ATROPHY OF THYROID: ICD-10-CM

## 2022-02-06 DIAGNOSIS — I10 ESSENTIAL HYPERTENSION: ICD-10-CM

## 2022-02-06 NOTE — TELEPHONE ENCOUNTER
Care Due:                  Date            Visit Type   Department     Provider  --------------------------------------------------------------------------------                                EP -                              PRIMARY      [facility] FAMILY  Last Visit: 11-      CARE ([facility])   MEDICINE       Rudy Cruz  Next Visit: None Scheduled  None         None Found                                                            Last  Test          Frequency    Reason                     Performed    Due Date  --------------------------------------------------------------------------------    Office Visit  12 months..  atorvastatin, irbesartan,   11- 11-                             isosorbide...............    CMP.........  12 months..  atorvastatin, irbesartan.  Not Found    Overdue    Lipid Panel.  12 months..  atorvastatin.............  Not Found    Overdue    Powered by [employer]. Reference number: [id number].   2/06/2022 10:17:11 AM CST

## 2022-02-07 RX ORDER — FUROSEMIDE 40 MG/1
TABLET ORAL
Qty: 90 TABLET | Refills: 0 | Status: SHIPPED | OUTPATIENT
Start: 2022-02-07 | End: 2022-03-17 | Stop reason: SDUPTHER

## 2022-02-07 RX ORDER — IRBESARTAN 300 MG/1
TABLET ORAL
Qty: 90 TABLET | Refills: 0 | Status: SHIPPED | OUTPATIENT
Start: 2022-02-07 | End: 2022-03-02

## 2022-02-09 ENCOUNTER — TELEPHONE (OUTPATIENT)
Dept: FAMILY MEDICINE | Facility: CLINIC | Age: 82
End: 2022-02-09
Payer: MEDICARE

## 2022-02-09 NOTE — TELEPHONE ENCOUNTER
Called and scheduled appt with niece for 3/17. She verbalized understanding and stated that she would notify the pt

## 2022-02-09 NOTE — TELEPHONE ENCOUNTER
----- Message from Mirtha Greene sent at 2/9/2022  8:32 AM CST -----  Contact: rojas  Type:  Patient Returning Call    Who Called Rojas (medina)  Would the patient rather a call back or a response via [facility]? Call   Best Call Back Number:[phone]  Additional Information:     Rojas (niece) stated her mother is running low on her meds  She stated she would like an awv before May

## 2022-03-01 DIAGNOSIS — E78.5 HYPERLIPIDEMIA, UNSPECIFIED HYPERLIPIDEMIA TYPE: ICD-10-CM

## 2022-03-01 DIAGNOSIS — Z78.0 ASYMPTOMATIC POSTMENOPAUSAL STATUS: ICD-10-CM

## 2022-03-01 DIAGNOSIS — I10 ESSENTIAL HYPERTENSION: ICD-10-CM

## 2022-03-01 DIAGNOSIS — E03.4 HYPOTHYROIDISM DUE TO ACQUIRED ATROPHY OF THYROID: ICD-10-CM

## 2022-03-01 DIAGNOSIS — H91.90 HEARING LOSS, UNSPECIFIED HEARING LOSS TYPE, UNSPECIFIED LATERALITY: ICD-10-CM

## 2022-03-01 DIAGNOSIS — E11.9 TYPE 2 DIABETES MELLITUS WITHOUT COMPLICATION, WITHOUT LONG-TERM CURRENT USE OF INSULIN: ICD-10-CM

## 2022-03-01 NOTE — TELEPHONE ENCOUNTER
Care Due:                  Date            Visit Type   Department     Provider  --------------------------------------------------------------------------------                                EP -                              PRIMARY      [facility] FAMILY  Last Visit: 11-      CARE ([facility])   YOHANA Cruz                              EP -                              PRIMARY      [facility] FAMILY  Next Visit: 03-      CARE ([facility])   YOHANA Cruz                                                            Last  Test          Frequency    Reason                     Performed    Due Date  --------------------------------------------------------------------------------    CMP.........  12 months..  atorvastatin, irbesartan.  01- 01-    Powered by [email or web address]. Reference number: [id number].   3/01/2022 5:55:31 AM CST

## 2022-03-02 RX ORDER — IRBESARTAN 300 MG/1
TABLET ORAL
Qty: 90 TABLET | Refills: 3 | Status: SHIPPED | OUTPATIENT
Start: 2022-03-02 | End: 2023-05-09

## 2022-03-17 ENCOUNTER — LAB VISIT (OUTPATIENT)
Dept: LAB | Facility: HOSPITAL | Age: 82
End: 2022-03-17
Attending: FAMILY MEDICINE
Payer: MEDICARE

## 2022-03-17 ENCOUNTER — OFFICE VISIT (OUTPATIENT)
Dept: FAMILY MEDICINE | Facility: CLINIC | Age: 82
End: 2022-03-17
Payer: MEDICARE

## 2022-03-17 ENCOUNTER — TELEPHONE (OUTPATIENT)
Dept: FAMILY MEDICINE | Facility: CLINIC | Age: 82
End: 2022-03-17

## 2022-03-17 VITALS
OXYGEN SATURATION: 98 % | WEIGHT: 209.56 LBS | SYSTOLIC BLOOD PRESSURE: 132 MMHG | HEIGHT: 64 IN | RESPIRATION RATE: 16 BRPM | TEMPERATURE: 98 F | BODY MASS INDEX: 35.77 KG/M2 | DIASTOLIC BLOOD PRESSURE: 82 MMHG | HEART RATE: 101 BPM

## 2022-03-17 DIAGNOSIS — I73.9 PAD (PERIPHERAL ARTERY DISEASE): ICD-10-CM

## 2022-03-17 DIAGNOSIS — I51.7 LVH (LEFT VENTRICULAR HYPERTROPHY): ICD-10-CM

## 2022-03-17 DIAGNOSIS — I10 PRIMARY HYPERTENSION: ICD-10-CM

## 2022-03-17 DIAGNOSIS — E66.9 OBESITY (BMI 30.0-34.9): ICD-10-CM

## 2022-03-17 DIAGNOSIS — I70.0 ATHEROSCLEROSIS OF AORTA: ICD-10-CM

## 2022-03-17 DIAGNOSIS — R60.0 BILATERAL LEG EDEMA: ICD-10-CM

## 2022-03-17 DIAGNOSIS — Z78.0 ASYMPTOMATIC POSTMENOPAUSAL STATUS: ICD-10-CM

## 2022-03-17 DIAGNOSIS — S02.32XA FRACTURE OF ORBITAL FLOOR, LEFT SIDE, INITIAL ENCOUNTER FOR CLOSED FRACTURE: ICD-10-CM

## 2022-03-17 DIAGNOSIS — M19.90 ARTHRITIS: ICD-10-CM

## 2022-03-17 DIAGNOSIS — E78.5 HYPERLIPIDEMIA, UNSPECIFIED HYPERLIPIDEMIA TYPE: ICD-10-CM

## 2022-03-17 DIAGNOSIS — E11.9 TYPE 2 DIABETES MELLITUS WITHOUT COMPLICATION, WITHOUT LONG-TERM CURRENT USE OF INSULIN: Primary | ICD-10-CM

## 2022-03-17 DIAGNOSIS — R70.0 ELEVATED SED RATE: ICD-10-CM

## 2022-03-17 DIAGNOSIS — E55.9 VITAMIN D INSUFFICIENCY: ICD-10-CM

## 2022-03-17 DIAGNOSIS — F41.9 ANXIETY: ICD-10-CM

## 2022-03-17 DIAGNOSIS — E66.01 SEVERE OBESITY (BMI 35.0-39.9) WITH COMORBIDITY: ICD-10-CM

## 2022-03-17 DIAGNOSIS — M85.80 OSTEOPENIA, UNSPECIFIED LOCATION: ICD-10-CM

## 2022-03-17 DIAGNOSIS — E11.9 TYPE 2 DIABETES MELLITUS WITHOUT COMPLICATION, WITHOUT LONG-TERM CURRENT USE OF INSULIN: ICD-10-CM

## 2022-03-17 DIAGNOSIS — H91.90 HEARING LOSS, UNSPECIFIED HEARING LOSS TYPE, UNSPECIFIED LATERALITY: ICD-10-CM

## 2022-03-17 LAB
25(OH)D3+25(OH)D2 SERPL-MCNC: 47 NG/ML (ref 30–96)
ALBUMIN SERPL BCP-MCNC: 3.9 G/DL (ref 3.5–5.2)
ALBUMIN/CREAT UR: 12.9 UG/MG (ref 0–30)
ALP SERPL-CCNC: 151 U/L (ref 38–126)
ALT SERPL W/O P-5'-P-CCNC: 14 U/L (ref 10–44)
ANION GAP SERPL CALC-SCNC: 10 MMOL/L (ref 8–16)
AST SERPL-CCNC: 21 U/L (ref 15–46)
BACTERIA #/AREA URNS AUTO: ABNORMAL /HPF
BASOPHILS # BLD AUTO: 0.03 K/UL (ref 0–0.2)
BASOPHILS NFR BLD: 0.5 % (ref 0–1.9)
BILIRUB SERPL-MCNC: 0.9 MG/DL (ref 0.1–1)
BILIRUB UR QL STRIP: NEGATIVE
CALCIUM SERPL-MCNC: 9.3 MG/DL (ref 8.7–10.5)
CAOX CRY UR QL COMP ASSIST: ABNORMAL
CHLORIDE SERPL-SCNC: 99 MMOL/L (ref 95–110)
CHOLEST SERPL-MCNC: 162 MG/DL (ref 120–199)
CHOLEST/HDLC SERPL: 4.6 {RATIO} (ref 2–5)
CLARITY UR REFRACT.AUTO: ABNORMAL
CO2 SERPL-SCNC: 29 MMOL/L (ref 23–29)
COLOR UR AUTO: YELLOW
CREAT SERPL-MCNC: 1.06 MG/DL (ref 0.5–1.4)
CREAT UR-MCNC: 210 MG/DL (ref 15–325)
DIFFERENTIAL METHOD: ABNORMAL
EOSINOPHIL # BLD AUTO: 0.1 K/UL (ref 0–0.5)
EOSINOPHIL NFR BLD: 2.1 % (ref 0–8)
ERYTHROCYTE [DISTWIDTH] IN BLOOD BY AUTOMATED COUNT: 12.8 % (ref 11.5–14.5)
EST. GFR  (AFRICAN AMERICAN): 56.5 ML/MIN/1.73 M^2
EST. GFR  (NON AFRICAN AMERICAN): 49 ML/MIN/1.73 M^2
ESTIMATED AVG GLUCOSE: 154 MG/DL (ref 68–131)
GLUCOSE SERPL-MCNC: 197 MG/DL (ref 70–110)
GLUCOSE UR QL STRIP: NEGATIVE
HBA1C MFR BLD: 7 % (ref 4–5.6)
HCT VFR BLD AUTO: 38 % (ref 37–48.5)
HDLC SERPL-MCNC: 35 MG/DL (ref 40–75)
HDLC SERPL: 21.6 % (ref 20–50)
HGB BLD-MCNC: 12.1 G/DL (ref 12–16)
HGB UR QL STRIP: ABNORMAL
IMM GRANULOCYTES # BLD AUTO: 0.01 K/UL (ref 0–0.04)
IMM GRANULOCYTES NFR BLD AUTO: 0.2 % (ref 0–0.5)
KETONES UR QL STRIP: NEGATIVE
LDLC SERPL CALC-MCNC: 90.2 MG/DL (ref 63–159)
LEUKOCYTE ESTERASE UR QL STRIP: ABNORMAL
LYMPHOCYTES # BLD AUTO: 2.2 K/UL (ref 1–4.8)
LYMPHOCYTES NFR BLD: 34.8 % (ref 18–48)
MCH RBC QN AUTO: 29.9 PG (ref 27–31)
MCHC RBC AUTO-ENTMCNC: 31.8 G/DL (ref 32–36)
MCV RBC AUTO: 94 FL (ref 82–98)
MICROALBUMIN UR DL<=1MG/L-MCNC: 27 UG/ML
MICROSCOPIC COMMENT: ABNORMAL
MONOCYTES # BLD AUTO: 0.5 K/UL (ref 0.3–1)
MONOCYTES NFR BLD: 8.6 % (ref 4–15)
NEUTROPHILS # BLD AUTO: 3.4 K/UL (ref 1.8–7.7)
NEUTROPHILS NFR BLD: 53.8 % (ref 38–73)
NITRITE UR QL STRIP: NEGATIVE
NONHDLC SERPL-MCNC: 127 MG/DL
NRBC BLD-RTO: 0 /100 WBC
NT-PROBNP SERPL-MCNC: 321 PG/ML (ref 5–1800)
PH UR STRIP: 5 [PH] (ref 5–8)
PLATELET # BLD AUTO: 233 K/UL (ref 150–450)
PMV BLD AUTO: 10.3 FL (ref 9.2–12.9)
POTASSIUM SERPL-SCNC: 3.7 MMOL/L (ref 3.5–5.1)
PROT SERPL-MCNC: 8.3 G/DL (ref 6–8.4)
PROT UR QL STRIP: ABNORMAL
RBC # BLD AUTO: 4.05 M/UL (ref 4–5.4)
RBC #/AREA URNS AUTO: 4 /HPF (ref 0–4)
SODIUM SERPL-SCNC: 138 MMOL/L (ref 136–145)
SP GR UR STRIP: 1.02 (ref 1–1.03)
T4 FREE SERPL-MCNC: 0.85 NG/DL (ref 0.71–1.51)
TRIGL SERPL-MCNC: 184 MG/DL (ref 30–150)
TSH SERPL DL<=0.005 MIU/L-ACNC: 4.92 UIU/ML (ref 0.4–4)
URN SPEC COLLECT METH UR: ABNORMAL
UROBILINOGEN UR STRIP-ACNC: NEGATIVE EU/DL
UUN UR-MCNC: 17 MG/DL (ref 7–17)
WBC # BLD AUTO: 6.29 K/UL (ref 3.9–12.7)
WBC #/AREA URNS AUTO: 10 /HPF (ref 0–5)
YEAST UR QL AUTO: ABNORMAL

## 2022-03-17 PROCEDURE — 99214 OFFICE O/P EST MOD 30 MIN: CPT | Mod: S$GLB,,, | Performed by: FAMILY MEDICINE

## 2022-03-17 PROCEDURE — 85025 COMPLETE CBC W/AUTO DIFF WBC: CPT | Mod: PO | Performed by: FAMILY MEDICINE

## 2022-03-17 PROCEDURE — 80053 COMPREHEN METABOLIC PANEL: CPT | Mod: PO | Performed by: FAMILY MEDICINE

## 2022-03-17 PROCEDURE — 81000 URINALYSIS NONAUTO W/SCOPE: CPT | Mod: PO | Performed by: FAMILY MEDICINE

## 2022-03-17 PROCEDURE — 83036 HEMOGLOBIN GLYCOSYLATED A1C: CPT | Performed by: FAMILY MEDICINE

## 2022-03-17 PROCEDURE — 82570 ASSAY OF URINE CREATININE: CPT | Mod: PO | Performed by: FAMILY MEDICINE

## 2022-03-17 PROCEDURE — 99214 PR OFFICE/OUTPT VISIT, EST, LEVL IV, 30-39 MIN: ICD-10-PCS | Mod: S$GLB,,, | Performed by: FAMILY MEDICINE

## 2022-03-17 PROCEDURE — 82043 UR ALBUMIN QUANTITATIVE: CPT | Mod: PO | Performed by: FAMILY MEDICINE

## 2022-03-17 PROCEDURE — 84439 ASSAY OF FREE THYROXINE: CPT | Performed by: FAMILY MEDICINE

## 2022-03-17 PROCEDURE — 83880 ASSAY OF NATRIURETIC PEPTIDE: CPT | Mod: PO | Performed by: FAMILY MEDICINE

## 2022-03-17 PROCEDURE — 80061 LIPID PANEL: CPT | Performed by: FAMILY MEDICINE

## 2022-03-17 PROCEDURE — 84443 ASSAY THYROID STIM HORMONE: CPT | Mod: PO | Performed by: FAMILY MEDICINE

## 2022-03-17 PROCEDURE — 82306 VITAMIN D 25 HYDROXY: CPT | Mod: PO | Performed by: FAMILY MEDICINE

## 2022-03-17 PROCEDURE — 36415 COLL VENOUS BLD VENIPUNCTURE: CPT | Mod: PO | Performed by: FAMILY MEDICINE

## 2022-03-17 RX ORDER — ATORVASTATIN CALCIUM 40 MG/1
40 TABLET, FILM COATED ORAL DAILY
Qty: 90 TABLET | Refills: 3 | Status: SHIPPED | OUTPATIENT
Start: 2022-03-17 | End: 2023-03-01

## 2022-03-17 RX ORDER — ISOSORBIDE MONONITRATE 60 MG/1
60 TABLET, EXTENDED RELEASE ORAL DAILY
Qty: 90 TABLET | Refills: 0 | Status: SHIPPED | OUTPATIENT
Start: 2022-03-17 | End: 2022-05-26

## 2022-03-17 RX ORDER — POTASSIUM CHLORIDE 20 MEQ/1
20 TABLET, EXTENDED RELEASE ORAL DAILY
Qty: 90 TABLET | Refills: 3 | Status: SHIPPED | OUTPATIENT
Start: 2022-03-17 | End: 2023-02-19

## 2022-03-17 RX ORDER — SUCRALFATE 1 G/1
TABLET ORAL
Qty: 270 TABLET | Refills: 3 | Status: SHIPPED | OUTPATIENT
Start: 2022-03-17 | End: 2022-07-18

## 2022-03-17 RX ORDER — TERAZOSIN 5 MG/1
5 CAPSULE ORAL NIGHTLY
Qty: 90 CAPSULE | Refills: 3 | Status: SHIPPED | OUTPATIENT
Start: 2022-03-17 | End: 2022-06-24 | Stop reason: SDUPTHER

## 2022-03-17 RX ORDER — ACETAMINOPHEN 500 MG
5000 TABLET ORAL DAILY
Qty: 90 TABLET | Refills: 3 | Status: SHIPPED | OUTPATIENT
Start: 2022-03-17

## 2022-03-17 RX ORDER — FUROSEMIDE 40 MG/1
40 TABLET ORAL DAILY
Qty: 90 TABLET | Refills: 3 | Status: SHIPPED | OUTPATIENT
Start: 2022-03-17 | End: 2022-05-26

## 2022-03-17 NOTE — PROGRESS NOTES
"Subjective:      Patient ID: Katy Soto is a 81 y.o. female.    Chief Complaint: Annual Exam and Medication Refill (Atorvastatin 90 days)      Vitals:    03/17/22 0842   BP: 132/82   Pulse: 101   Resp: 16   Temp: 98.2 °F (36.8 °C)   TempSrc: Oral   SpO2: 98%   Weight: 95.1 kg (209 lb 8.8 oz)   Height: 5' 4" (1.626 m)        HPI first visit with me since Nov 2020    Pt is deaf; can't hear, wants to find ear doctor, lost hearing aid  Pt hear alone  Have to take my mask off for her to read lips  Saw Dr Paez one year ago for enlarge lymph nodes no further w/u indicated    Saw card over one year ago claudicartion, HLD an d HtN  Was rtold to f/u in oneyear, over one year now    Everyone in her family seems to have strokes  Had angioplasty right lwoer leg with Dr Fried over one year ago    Problem List  Patient Active Problem List   Diagnosis    Hypertension    Hyperlipidemia    Arthritis    PAD (peripheral artery disease)    Bilateral leg edema    Anxiety    Obesity (BMI 30.0-34.9)    Osteopenia    Atherosclerosis of aorta    Elevated sed rate    Vitamin D insufficiency    LVH (left ventricular hypertrophy)    Type 2 diabetes mellitus without complication, without long-term current use of insulin    Severe obesity (BMI 35.0-39.9) with comorbidity    Fracture of orbital floor, left side, initial encounter for closed fracture        ALLERGIES:   Review of patient's allergies indicates:   Allergen Reactions    Cilostazol      [id number] and stomach cramps    Clonidine     Coreg [carvedilol]      Cramps stomach and nausea    Diltiazem      nauseana d stoamch cramps    Lexapro [escitalopram oxalate]      Dizzy, nausea and weak    Shellfish containing products Hives       MEDS:   Current Outpatient Medications:     aspirin (ECOTRIN) 81 MG EC tablet, Take 81 mg by mouth once daily., Disp: , Rfl:     irbesartan (AVAPRO) 300 MG tablet, TAKE 1 TABLET(300 MG) BY MOUTH EVERY DAY, Disp: 90 tablet, Rfl: " 3    atorvastatin (LIPITOR) 40 MG tablet, Take 1 tablet (40 mg total) by mouth once daily. For cholesterol, Disp: 90 tablet, Rfl: 3    cholecalciferol, vitamin D3, (VITAMIN D3) 125 mcg (5,000 unit) Tab, Take 1 tablet (5,000 Units total) by mouth once daily., Disp: 90 tablet, Rfl: 3    furosemide (LASIX) 40 MG tablet, Take 1 tablet (40 mg total) by mouth once daily. For fluid, Disp: 90 tablet, Rfl: 3    isosorbide mononitrate (IMDUR) 60 MG 24 hr tablet, Take 1 tablet (60 mg total) by mouth once daily., Disp: 90 tablet, Rfl: 0    potassium chloride SA (K-DUR,KLOR-CON) 20 MEQ tablet, Take 1 tablet (20 mEq total) by mouth once daily., Disp: 90 tablet, Rfl: 3    sucralfate (CARAFATE) 1 gram tablet, TAKE 1 TABLET(1 GRAM) BY MOUTH THREE TIMES DAILY, Disp: 270 tablet, Rfl: 3    terazosin (HYTRIN) 5 MG capsule, Take 1 capsule (5 mg total) by mouth every evening. For blood pressure, Disp: 90 capsule, Rfl: 3      History:  Current Providers as of 3/17/2022  PCP: Rudy Cruz MD  Care Team Provider: New Mehta MD  Care Team Provider: David Fried MD  Encounter Provider: Rudy Cruz MD, starting on Thu Mar 17, 2022 12:00 AM  Referring Provider: not found, starting on Thu Mar 17, 2022 12:00 AM  Consulting Physician: Rudy Cruz MD, starting on Thu Mar 17, 2022  8:39 AM (Active)   Past Medical History:   Diagnosis Date    Hyperlipidemia     Hypertension     Type 2 diabetes mellitus without complication, without long-term current use of insulin 3/17/2022     Past Surgical History:   Procedure Laterality Date     SECTION      x1    HERNIA REPAIR      umbilical    LEG SURGERY Right 2017    stents placed     Social History     Tobacco Use    Smoking status: Never Smoker    Smokeless tobacco: Never Used   Substance Use Topics    Alcohol use: No    Drug use: No         Review of Systems   Constitutional: Negative.    HENT: Positive for hearing loss.    Respiratory: Positive for  shortness of breath.    Cardiovascular: Negative.  Negative for chest pain.   Gastrointestinal: Negative.    Endocrine: Negative.    Genitourinary: Negative.    Musculoskeletal: Negative.         Occ leg cramps   Psychiatric/Behavioral: Negative.    All other systems reviewed and are negative.    Objective:     Physical Exam  Vitals and nursing note reviewed.   Constitutional:       Appearance: She is well-developed.   HENT:      Head: Normocephalic.   Eyes:      Conjunctiva/sclera: Conjunctivae normal.      Pupils: Pupils are equal, round, and reactive to light.   Cardiovascular:      Rate and Rhythm: Normal rate and regular rhythm.      Heart sounds: Normal heart sounds.   Pulmonary:      Effort: Pulmonary effort is normal.      Breath sounds: Normal breath sounds.   Musculoskeletal:         General: Normal range of motion.      Cervical back: Normal range of motion and neck supple.   Skin:     General: Skin is warm and dry.   Neurological:      General: No focal deficit present.      Mental Status: She is alert and oriented to person, place, and time. Mental status is at baseline.      Cranial Nerves: No cranial nerve deficit.      Sensory: No sensory deficit.      Motor: No weakness.      Coordination: Coordination normal.      Gait: Gait normal.      Deep Tendon Reflexes: Reflexes are normal and symmetric. Reflexes normal.   Psychiatric:         Mood and Affect: Mood normal.         Behavior: Behavior normal.         Thought Content: Thought content normal.         Judgment: Judgment normal.             Assessment:     1. Type 2 diabetes mellitus without complication, without long-term current use of insulin    2. Severe obesity (BMI 35.0-39.9) with comorbidity    3. Fracture of orbital floor, left side, initial encounter for closed fracture    4. Atherosclerosis of aorta    5. Anxiety    6. Hearing loss, unspecified hearing loss type, unspecified laterality    7. Hyperlipidemia, unspecified hyperlipidemia type     8. Primary hypertension    9. PAD (peripheral artery disease)    10. LVH (left ventricular hypertrophy)    11. Asymptomatic postmenopausal status (age-related) (natural)    12. Elevated sed rate    13. Obesity (BMI 30.0-34.9)    14. Vitamin D insufficiency    15. Arthritis    16. Osteopenia, unspecified location    17. Bilateral leg edema      Plan:        Medication List          Accurate as of March 17, 2022  9:02 AM. If you have any questions, ask your nurse or doctor.            CHANGE how you take these medications    atorvastatin 40 MG tablet  Commonly known as: LIPITOR  Take 1 tablet (40 mg total) by mouth once daily. For cholesterol  What changed: additional instructions  Changed by: Ruyd Cruz MD     furosemide 40 MG tablet  Commonly known as: LASIX  Take 1 tablet (40 mg total) by mouth once daily. For fluid  What changed: See the new instructions.  Changed by: Rudy Cruz MD     potassium chloride SA 20 MEQ tablet  Commonly known as: K-DUR,KLOR-CON  Take 1 tablet (20 mEq total) by mouth once daily.  What changed: See the new instructions.  Changed by: Rudy Cruz MD        CONTINUE taking these medications    aspirin 81 MG EC tablet  Commonly known as: ECOTRIN     cholecalciferol (vitamin D3) 125 mcg (5,000 unit) Tab  Commonly known as: VITAMIN D3  Take 1 tablet (5,000 Units total) by mouth once daily.     irbesartan 300 MG tablet  Commonly known as: AVAPRO  TAKE 1 TABLET(300 MG) BY MOUTH EVERY DAY     isosorbide mononitrate 60 MG 24 hr tablet  Commonly known as: IMDUR  Take 1 tablet (60 mg total) by mouth once daily.     sucralfate 1 gram tablet  Commonly known as: CARAFATE  TAKE 1 TABLET(1 GRAM) BY MOUTH THREE TIMES DAILY     terazosin 5 MG capsule  Commonly known as: HYTRIN  Take 1 capsule (5 mg total) by mouth every evening. For blood pressure        STOP taking these medications    LIDOcaine 5 %  Commonly known as: LIDODERM  Stopped by: Rudy Cruz MD     ondansetron 4 MG  tablet  Commonly known as: ZOFRAN  Stopped by: Rudy Cruz MD     oxyCODONE-acetaminophen 5-325 mg per tablet  Commonly known as: PERCOCET  Stopped by: Rudy Cruz MD           Where to Get Your Medications      These medications were sent to [id number] - [place], [place] - [address] [facility] AT [facility] [address]  [address] [facility] [place] [address]    Phone: [phone]   · atorvastatin 40 MG tablet  · cholecalciferol (vitamin D3) 125 mcg (5,000 unit) Tab  · furosemide 40 MG tablet  · isosorbide mononitrate 60 MG 24 hr tablet  · potassium chloride SA 20 MEQ tablet  · sucralfate 1 gram tablet  · terazosin 5 MG capsule       Type 2 diabetes mellitus without complication, without long-term current use of insulin  -     BNP; Future; Expected date: 03/17/2022  -     CBC Auto Differential; Future; Expected date: 03/17/2022  -     Comprehensive Metabolic Panel; Future; Expected date: 03/17/2022  -     Hemoglobin A1C; Future  -     Lipid Panel; Future  -     Microalbumin/Creatinine Ratio, Urine; Future  -     TSH; Future  -     Urinalysis; Future  -     Vitamin D; Future    Severe obesity (BMI 35.0-39.9) with comorbidity  -     BNP; Future; Expected date: 03/17/2022  -     CBC Auto Differential; Future; Expected date: 03/17/2022  -     Comprehensive Metabolic Panel; Future; Expected date: 03/17/2022  -     Hemoglobin A1C; Future  -     Lipid Panel; Future  -     Microalbumin/Creatinine Ratio, Urine; Future  -     TSH; Future  -     Urinalysis; Future  -     Vitamin D; Future    Fracture of orbital floor, left side, initial encounter for closed fracture  -     BNP; Future; Expected date: 03/17/2022  -     CBC Auto Differential; Future; Expected date: 03/17/2022  -     Comprehensive Metabolic Panel; Future; Expected date: 03/17/2022  -     Hemoglobin A1C; Future  -     Lipid Panel; Future  -     Microalbumin/Creatinine Ratio, Urine; Future  -     TSH; Future  -     Urinalysis;  Future  -     Vitamin D; Future    Atherosclerosis of aorta  -     BNP; Future; Expected date: 03/17/2022  -     CBC Auto Differential; Future; Expected date: 03/17/2022  -     Comprehensive Metabolic Panel; Future; Expected date: 03/17/2022  -     Hemoglobin A1C; Future  -     Lipid Panel; Future  -     Microalbumin/Creatinine Ratio, Urine; Future  -     TSH; Future  -     Urinalysis; Future  -     Vitamin D; Future    Anxiety  -     BNP; Future; Expected date: 03/17/2022  -     CBC Auto Differential; Future; Expected date: 03/17/2022  -     Comprehensive Metabolic Panel; Future; Expected date: 03/17/2022  -     Hemoglobin A1C; Future  -     Lipid Panel; Future  -     Microalbumin/Creatinine Ratio, Urine; Future  -     TSH; Future  -     Urinalysis; Future  -     Vitamin D; Future    Hearing loss, unspecified hearing loss type, unspecified laterality  -     BNP; Future; Expected date: 03/17/2022  -     CBC Auto Differential; Future; Expected date: 03/17/2022  -     Comprehensive Metabolic Panel; Future; Expected date: 03/17/2022  -     Hemoglobin A1C; Future  -     Lipid Panel; Future  -     Microalbumin/Creatinine Ratio, Urine; Future  -     TSH; Future  -     Urinalysis; Future  -     Vitamin D; Future    Hyperlipidemia, unspecified hyperlipidemia type  -     BNP; Future; Expected date: 03/17/2022  -     CBC Auto Differential; Future; Expected date: 03/17/2022  -     Comprehensive Metabolic Panel; Future; Expected date: 03/17/2022  -     Hemoglobin A1C; Future  -     Lipid Panel; Future  -     Microalbumin/Creatinine Ratio, Urine; Future  -     TSH; Future  -     Urinalysis; Future  -     Vitamin D; Future    Primary hypertension  -     BNP; Future; Expected date: 03/17/2022  -     CBC Auto Differential; Future; Expected date: 03/17/2022  -     Comprehensive Metabolic Panel; Future; Expected date: 03/17/2022  -     Hemoglobin A1C; Future  -     Lipid Panel; Future  -     Microalbumin/Creatinine Ratio, Urine;  Future  -     TSH; Future  -     Urinalysis; Future  -     Vitamin D; Future    PAD (peripheral artery disease)  -     BNP; Future; Expected date: 03/17/2022  -     CBC Auto Differential; Future; Expected date: 03/17/2022  -     Comprehensive Metabolic Panel; Future; Expected date: 03/17/2022  -     Hemoglobin A1C; Future  -     Lipid Panel; Future  -     Microalbumin/Creatinine Ratio, Urine; Future  -     TSH; Future  -     Urinalysis; Future  -     Vitamin D; Future    LVH (left ventricular hypertrophy)  -     BNP; Future; Expected date: 03/17/2022  -     CBC Auto Differential; Future; Expected date: 03/17/2022  -     Comprehensive Metabolic Panel; Future; Expected date: 03/17/2022  -     Hemoglobin A1C; Future  -     Lipid Panel; Future  -     Microalbumin/Creatinine Ratio, Urine; Future  -     TSH; Future  -     Urinalysis; Future  -     Vitamin D; Future    Asymptomatic postmenopausal status (age-related) (natural)  -     BNP; Future; Expected date: 03/17/2022  -     CBC Auto Differential; Future; Expected date: 03/17/2022  -     Comprehensive Metabolic Panel; Future; Expected date: 03/17/2022  -     Hemoglobin A1C; Future  -     Lipid Panel; Future  -     Microalbumin/Creatinine Ratio, Urine; Future  -     TSH; Future  -     Urinalysis; Future  -     Vitamin D; Future    Elevated sed rate  -     BNP; Future; Expected date: 03/17/2022  -     CBC Auto Differential; Future; Expected date: 03/17/2022  -     Comprehensive Metabolic Panel; Future; Expected date: 03/17/2022  -     Hemoglobin A1C; Future  -     Lipid Panel; Future  -     Microalbumin/Creatinine Ratio, Urine; Future  -     TSH; Future  -     Urinalysis; Future  -     Vitamin D; Future    Obesity (BMI 30.0-34.9)  -     BNP; Future; Expected date: 03/17/2022  -     CBC Auto Differential; Future; Expected date: 03/17/2022  -     Comprehensive Metabolic Panel; Future; Expected date: 03/17/2022  -     Hemoglobin A1C; Future  -     Lipid Panel; Future  -      Microalbumin/Creatinine Ratio, Urine; Future  -     TSH; Future  -     Urinalysis; Future  -     Vitamin D; Future    Vitamin D insufficiency  -     BNP; Future; Expected date: 03/17/2022  -     CBC Auto Differential; Future; Expected date: 03/17/2022  -     Comprehensive Metabolic Panel; Future; Expected date: 03/17/2022  -     Hemoglobin A1C; Future  -     Lipid Panel; Future  -     Microalbumin/Creatinine Ratio, Urine; Future  -     TSH; Future  -     Urinalysis; Future  -     Vitamin D; Future    Arthritis  -     BNP; Future; Expected date: 03/17/2022  -     CBC Auto Differential; Future; Expected date: 03/17/2022  -     Comprehensive Metabolic Panel; Future; Expected date: 03/17/2022  -     Hemoglobin A1C; Future  -     Lipid Panel; Future  -     Microalbumin/Creatinine Ratio, Urine; Future  -     TSH; Future  -     Urinalysis; Future  -     Vitamin D; Future    Osteopenia, unspecified location  -     BNP; Future; Expected date: 03/17/2022  -     CBC Auto Differential; Future; Expected date: 03/17/2022  -     Comprehensive Metabolic Panel; Future; Expected date: 03/17/2022  -     Hemoglobin A1C; Future  -     Lipid Panel; Future  -     Microalbumin/Creatinine Ratio, Urine; Future  -     TSH; Future  -     Urinalysis; Future  -     Vitamin D; Future    Bilateral leg edema  -     BNP; Future; Expected date: 03/17/2022  -     CBC Auto Differential; Future; Expected date: 03/17/2022  -     Comprehensive Metabolic Panel; Future; Expected date: 03/17/2022  -     Hemoglobin A1C; Future  -     Lipid Panel; Future  -     Microalbumin/Creatinine Ratio, Urine; Future  -     TSH; Future  -     Urinalysis; Future  -     Vitamin D; Future    Other orders  -     atorvastatin (LIPITOR) 40 MG tablet; Take 1 tablet (40 mg total) by mouth once daily. For cholesterol  Dispense: 90 tablet; Refill: 3  -     cholecalciferol, vitamin D3, (VITAMIN D3) 125 mcg (5,000 unit) Tab; Take 1 tablet (5,000 Units total) by mouth once daily.   Dispense: 90 tablet; Refill: 3  -     furosemide (LASIX) 40 MG tablet; Take 1 tablet (40 mg total) by mouth once daily. For fluid  Dispense: 90 tablet; Refill: 3  -     isosorbide mononitrate (IMDUR) 60 MG 24 hr tablet; Take 1 tablet (60 mg total) by mouth once daily.  Dispense: 90 tablet; Refill: 0  -     potassium chloride SA (K-DUR,KLOR-CON) 20 MEQ tablet; Take 1 tablet (20 mEq total) by mouth once daily.  Dispense: 90 tablet; Refill: 3  -     sucralfate (CARAFATE) 1 gram tablet; TAKE 1 TABLET(1 GRAM) BY MOUTH THREE TIMES DAILY  Dispense: 270 tablet; Refill: 3  -     terazosin (HYTRIN) 5 MG capsule; Take 1 capsule (5 mg total) by mouth every evening. For blood pressure  Dispense: 90 capsule; Refill: 3

## 2022-03-17 NOTE — TELEPHONE ENCOUNTER
Appt has been scheduled for pt to see Dr. Thaddeus Aiken on March 31 at 10 am in the [place] office.    Address is [address] [place] [address] [address] [place]

## 2022-03-24 ENCOUNTER — TELEPHONE (OUTPATIENT)
Dept: FAMILY MEDICINE | Facility: CLINIC | Age: 82
End: 2022-03-24
Payer: MEDICARE

## 2022-03-24 ENCOUNTER — NURSE TRIAGE (OUTPATIENT)
Dept: ADMINISTRATIVE | Facility: CLINIC | Age: 82
End: 2022-03-24
Payer: MEDICARE

## 2022-03-24 NOTE — TELEPHONE ENCOUNTER
Patient c/o abdominal pain 5/10 and spitting up blood clots. Advised per protocol to go ot the nearest ED now. Caller ANGELINA.   Reason for Disposition   Pain lasting > 10 minutes and over 50 years old    Additional Information   Negative: Passed out (i.e., fainted, collapsed and was not responding)   Negative: Shock suspected (e.g., cold/pale/clammy skin, too weak to stand, low BP, rapid pulse)   Negative: Visible sweat on face or sweat is dripping down   Negative: SEVERE abdominal pain (e.g., excruciating)    Protocols used: ABDOMINAL PAIN - UPPER-A-OH

## 2022-03-24 NOTE — TELEPHONE ENCOUNTER
----- Message from Rudy Cruz MD sent at 3/20/2022  8:59 PM CDT -----  Recheck TSH and A1C 3 months; rest of labs good

## 2022-04-26 ENCOUNTER — PES CALL (OUTPATIENT)
Dept: ADMINISTRATIVE | Facility: CLINIC | Age: 82
End: 2022-04-26
Payer: MEDICARE

## 2022-05-17 ENCOUNTER — PES CALL (OUTPATIENT)
Dept: ADMINISTRATIVE | Facility: CLINIC | Age: 82
End: 2022-05-17
Payer: MEDICARE

## 2022-05-25 RX ORDER — POTASSIUM CHLORIDE 20 MEQ/1
TABLET, EXTENDED RELEASE ORAL
Qty: 90 TABLET | Refills: 3 | OUTPATIENT
Start: 2022-05-25

## 2022-05-25 NOTE — TELEPHONE ENCOUNTER
No new care gaps identified.  [facility] Embedded Care Gaps. Reference number: [id number]. 5/25/2022   5:14:29 PM CDT

## 2022-05-25 NOTE — TELEPHONE ENCOUNTER
Refill Authorization Note   Katy Soto  is requesting a refill authorization.  Brief Assessment and Rationale for Refill:  Quick Discontinue     Medication Therapy Plan:  sent to pharmacy 90 days + 3 refills with 1qd directions on (3/17/2022  9:02 AM CDT)    Medication Reconciliation Completed: No   Comments:     No Care Gaps recommended.     Note composed:5:32 PM 05/25/2022

## 2022-05-26 RX ORDER — FUROSEMIDE 40 MG/1
TABLET ORAL
Qty: 90 TABLET | Refills: 3 | Status: SHIPPED | OUTPATIENT
Start: 2022-05-26 | End: 2022-08-29

## 2022-05-26 RX ORDER — ISOSORBIDE MONONITRATE 60 MG/1
TABLET, EXTENDED RELEASE ORAL
Qty: 90 TABLET | Refills: 3 | Status: SHIPPED | OUTPATIENT
Start: 2022-05-26 | End: 2022-06-24

## 2022-05-26 NOTE — TELEPHONE ENCOUNTER
Refill Authorization Note   Katy Soto  is requesting a refill authorization.  Brief Assessment and Rationale for Refill:  Approve     Medication Therapy Plan:       Medication Reconciliation Completed: No   Comments:     No Care Gaps recommended.     Note composed:11:53 AM 05/26/2022

## 2022-05-26 NOTE — TELEPHONE ENCOUNTER
Refill Authorization Note   Katy Soto  is requesting a refill authorization.  Brief Assessment and Rationale for Refill:  Approve     Medication Therapy Plan:       Medication Reconciliation Completed: No   Comments:     No Care Gaps recommended.     Note composed:2:14 PM 05/26/2022

## 2022-05-26 NOTE — TELEPHONE ENCOUNTER
No new care gaps identified.  [facility] Embedded Care Gaps. Reference number: [id number]. 5/26/2022   8:33:44 AM MANUELT

## 2022-05-26 NOTE — TELEPHONE ENCOUNTER
No new care gaps identified.  [facility] Embedded Care Gaps. Reference number: [id number]. 5/26/2022   12:34:58 PM CDT

## 2022-06-14 ENCOUNTER — PES CALL (OUTPATIENT)
Dept: ADMINISTRATIVE | Facility: CLINIC | Age: 82
End: 2022-06-14
Payer: MEDICARE

## 2022-06-16 ENCOUNTER — HOSPITAL ENCOUNTER (INPATIENT)
Facility: HOSPITAL | Age: 82
LOS: 1 days | Discharge: HOME HEALTH | DRG: 304 | End: 2022-06-19
Attending: EMERGENCY MEDICINE | Admitting: FAMILY MEDICINE
Payer: MEDICARE

## 2022-06-16 DIAGNOSIS — I63.9 STROKE: ICD-10-CM

## 2022-06-16 DIAGNOSIS — I10 HYPERTENSION, UNSPECIFIED TYPE: ICD-10-CM

## 2022-06-16 DIAGNOSIS — I63.9 CEREBRAL INFARCTION, LEFT HEMISPHERE: Primary | ICD-10-CM

## 2022-06-16 DIAGNOSIS — H53.9 VISION ABNORMALITIES: ICD-10-CM

## 2022-06-16 DIAGNOSIS — I16.1 HYPERTENSIVE EMERGENCY: ICD-10-CM

## 2022-06-16 DIAGNOSIS — R07.9 CHEST PAIN: ICD-10-CM

## 2022-06-16 PROBLEM — H54.7 REDUCED VISUAL ACUITY: Status: ACTIVE | Noted: 2022-06-16

## 2022-06-16 LAB
ALBUMIN SERPL BCP-MCNC: 3.8 G/DL (ref 3.5–5.2)
ALP SERPL-CCNC: 150 U/L (ref 38–126)
ALT SERPL W/O P-5'-P-CCNC: 20 U/L (ref 10–44)
ANION GAP SERPL CALC-SCNC: 8 MMOL/L (ref 8–16)
AST SERPL-CCNC: 23 U/L (ref 15–46)
BASOPHILS # BLD AUTO: 0.06 K/UL (ref 0–0.2)
BASOPHILS NFR BLD: 0.9 % (ref 0–1.9)
BILIRUB SERPL-MCNC: 0.8 MG/DL (ref 0.1–1)
CALCIUM SERPL-MCNC: 8.5 MG/DL (ref 8.7–10.5)
CHLORIDE SERPL-SCNC: 103 MMOL/L (ref 95–110)
CO2 SERPL-SCNC: 26 MMOL/L (ref 23–29)
CREAT SERPL-MCNC: 0.95 MG/DL (ref 0.5–1.4)
CTP QC/QA: YES
DIFFERENTIAL METHOD: ABNORMAL
EOSINOPHIL # BLD AUTO: 0.2 K/UL (ref 0–0.5)
EOSINOPHIL NFR BLD: 2.7 % (ref 0–8)
ERYTHROCYTE [DISTWIDTH] IN BLOOD BY AUTOMATED COUNT: 12.8 % (ref 11.5–14.5)
EST. GFR  (AFRICAN AMERICAN): >60 ML/MIN/1.73 M^2
EST. GFR  (NON AFRICAN AMERICAN): 55.6 ML/MIN/1.73 M^2
GLUCOSE SERPL-MCNC: 218 MG/DL (ref 70–110)
HCT VFR BLD AUTO: 35.6 % (ref 37–48.5)
HGB BLD-MCNC: 11.4 G/DL (ref 12–16)
IMM GRANULOCYTES # BLD AUTO: 0.02 K/UL (ref 0–0.04)
IMM GRANULOCYTES NFR BLD AUTO: 0.3 % (ref 0–0.5)
LYMPHOCYTES # BLD AUTO: 2.5 K/UL (ref 1–4.8)
LYMPHOCYTES NFR BLD: 35.7 % (ref 18–48)
MCH RBC QN AUTO: 30.2 PG (ref 27–31)
MCHC RBC AUTO-ENTMCNC: 32 G/DL (ref 32–36)
MCV RBC AUTO: 94 FL (ref 82–98)
MONOCYTES # BLD AUTO: 0.6 K/UL (ref 0.3–1)
MONOCYTES NFR BLD: 8.9 % (ref 4–15)
NEUTROPHILS # BLD AUTO: 3.6 K/UL (ref 1.8–7.7)
NEUTROPHILS NFR BLD: 51.5 % (ref 38–73)
NRBC BLD-RTO: 0 /100 WBC
PLATELET # BLD AUTO: 214 K/UL (ref 150–450)
PMV BLD AUTO: 10.4 FL (ref 9.2–12.9)
POCT GLUCOSE: 183 MG/DL (ref 70–110)
POTASSIUM SERPL-SCNC: 3.9 MMOL/L (ref 3.5–5.1)
PROT SERPL-MCNC: 7.8 G/DL (ref 6–8.4)
RBC # BLD AUTO: 3.78 M/UL (ref 4–5.4)
SARS-COV-2 RDRP RESP QL NAA+PROBE: NEGATIVE
SODIUM SERPL-SCNC: 137 MMOL/L (ref 136–145)
UUN UR-MCNC: 14 MG/DL (ref 7–17)
WBC # BLD AUTO: 6.95 K/UL (ref 3.9–12.7)

## 2022-06-16 PROCEDURE — G0378 HOSPITAL OBSERVATION PER HR: HCPCS

## 2022-06-16 PROCEDURE — 63600175 PHARM REV CODE 636 W HCPCS: Mod: ER | Performed by: EMERGENCY MEDICINE

## 2022-06-16 PROCEDURE — 25000003 PHARM REV CODE 250: Mod: ER | Performed by: EMERGENCY MEDICINE

## 2022-06-16 PROCEDURE — 99285 EMERGENCY DEPT VISIT HI MDM: CPT | Mod: 25,ER

## 2022-06-16 PROCEDURE — 96376 TX/PRO/DX INJ SAME DRUG ADON: CPT | Mod: ER

## 2022-06-16 PROCEDURE — 85025 COMPLETE CBC W/AUTO DIFF WBC: CPT | Mod: ER | Performed by: EMERGENCY MEDICINE

## 2022-06-16 PROCEDURE — U0002 COVID-19 LAB TEST NON-CDC: HCPCS | Mod: ER

## 2022-06-16 PROCEDURE — 80053 COMPREHEN METABOLIC PANEL: CPT | Mod: ER | Performed by: EMERGENCY MEDICINE

## 2022-06-16 PROCEDURE — 96375 TX/PRO/DX INJ NEW DRUG ADDON: CPT | Mod: ER

## 2022-06-16 PROCEDURE — 96374 THER/PROPH/DIAG INJ IV PUSH: CPT | Mod: ER

## 2022-06-16 RX ORDER — PRAZOSIN HYDROCHLORIDE 1 MG/1
2 CAPSULE ORAL 2 TIMES DAILY
Refills: 3 | Status: DISCONTINUED | OUTPATIENT
Start: 2022-06-17 | End: 2022-06-19 | Stop reason: HOSPADM

## 2022-06-16 RX ORDER — ACETAMINOPHEN 325 MG/1
650 TABLET ORAL EVERY 4 HOURS PRN
Status: DISCONTINUED | OUTPATIENT
Start: 2022-06-16 | End: 2022-06-19 | Stop reason: HOSPADM

## 2022-06-16 RX ORDER — HYDROCHLOROTHIAZIDE 25 MG/1
25 TABLET ORAL ONCE
Status: COMPLETED | OUTPATIENT
Start: 2022-06-16 | End: 2022-06-16

## 2022-06-16 RX ORDER — ISOSORBIDE MONONITRATE 30 MG/1
60 TABLET, EXTENDED RELEASE ORAL DAILY
Status: DISCONTINUED | OUTPATIENT
Start: 2022-06-17 | End: 2022-06-19 | Stop reason: HOSPADM

## 2022-06-16 RX ORDER — SIMETHICONE 80 MG
1 TABLET,CHEWABLE ORAL 4 TIMES DAILY PRN
Status: DISCONTINUED | OUTPATIENT
Start: 2022-06-16 | End: 2022-06-19 | Stop reason: HOSPADM

## 2022-06-16 RX ORDER — IBUPROFEN 200 MG
16 TABLET ORAL
Status: DISCONTINUED | OUTPATIENT
Start: 2022-06-16 | End: 2022-06-19 | Stop reason: HOSPADM

## 2022-06-16 RX ORDER — TALC
6 POWDER (GRAM) TOPICAL NIGHTLY PRN
Status: DISCONTINUED | OUTPATIENT
Start: 2022-06-16 | End: 2022-06-19 | Stop reason: HOSPADM

## 2022-06-16 RX ORDER — HYDRALAZINE HYDROCHLORIDE 20 MG/ML
10 INJECTION INTRAMUSCULAR; INTRAVENOUS EVERY 6 HOURS PRN
Status: DISCONTINUED | OUTPATIENT
Start: 2022-06-17 | End: 2022-06-19 | Stop reason: HOSPADM

## 2022-06-16 RX ORDER — LOSARTAN POTASSIUM 50 MG/1
100 TABLET ORAL DAILY
Refills: 3 | Status: DISCONTINUED | OUTPATIENT
Start: 2022-06-17 | End: 2022-06-19 | Stop reason: HOSPADM

## 2022-06-16 RX ORDER — TETRACAINE HYDROCHLORIDE 5 MG/ML
2 SOLUTION OPHTHALMIC
Status: COMPLETED | OUTPATIENT
Start: 2022-06-16 | End: 2022-06-16

## 2022-06-16 RX ORDER — ONDANSETRON 2 MG/ML
8 INJECTION INTRAMUSCULAR; INTRAVENOUS
Status: COMPLETED | OUTPATIENT
Start: 2022-06-16 | End: 2022-06-16

## 2022-06-16 RX ORDER — FUROSEMIDE 10 MG/ML
40 INJECTION INTRAMUSCULAR; INTRAVENOUS
Status: COMPLETED | OUTPATIENT
Start: 2022-06-16 | End: 2022-06-16

## 2022-06-16 RX ORDER — HYDRALAZINE HYDROCHLORIDE 20 MG/ML
10 INJECTION INTRAMUSCULAR; INTRAVENOUS
Status: COMPLETED | OUTPATIENT
Start: 2022-06-16 | End: 2022-06-16

## 2022-06-16 RX ORDER — ATORVASTATIN CALCIUM 40 MG/1
40 TABLET, FILM COATED ORAL DAILY
Status: DISCONTINUED | OUTPATIENT
Start: 2022-06-17 | End: 2022-06-19 | Stop reason: HOSPADM

## 2022-06-16 RX ORDER — IPRATROPIUM BROMIDE AND ALBUTEROL SULFATE 2.5; .5 MG/3ML; MG/3ML
3 SOLUTION RESPIRATORY (INHALATION) EVERY 6 HOURS PRN
Status: DISCONTINUED | OUTPATIENT
Start: 2022-06-16 | End: 2022-06-19 | Stop reason: HOSPADM

## 2022-06-16 RX ORDER — SODIUM CHLORIDE 0.9 % (FLUSH) 0.9 %
10 SYRINGE (ML) INJECTION EVERY 8 HOURS PRN
Status: DISCONTINUED | OUTPATIENT
Start: 2022-06-16 | End: 2022-06-19 | Stop reason: HOSPADM

## 2022-06-16 RX ORDER — ENOXAPARIN SODIUM 100 MG/ML
40 INJECTION SUBCUTANEOUS EVERY 24 HOURS
Status: DISCONTINUED | OUTPATIENT
Start: 2022-06-16 | End: 2022-06-19 | Stop reason: HOSPADM

## 2022-06-16 RX ORDER — MAG HYDROX/ALUMINUM HYD/SIMETH 200-200-20
30 SUSPENSION, ORAL (FINAL DOSE FORM) ORAL 4 TIMES DAILY PRN
Status: DISCONTINUED | OUTPATIENT
Start: 2022-06-16 | End: 2022-06-19 | Stop reason: HOSPADM

## 2022-06-16 RX ORDER — LOSARTAN POTASSIUM 25 MG/1
25 TABLET ORAL
Status: COMPLETED | OUTPATIENT
Start: 2022-06-16 | End: 2022-06-16

## 2022-06-16 RX ORDER — HYDRALAZINE HYDROCHLORIDE 20 MG/ML
2 INJECTION INTRAMUSCULAR; INTRAVENOUS
Status: COMPLETED | OUTPATIENT
Start: 2022-06-16 | End: 2022-06-16

## 2022-06-16 RX ORDER — ONDANSETRON 2 MG/ML
4 INJECTION INTRAMUSCULAR; INTRAVENOUS EVERY 8 HOURS PRN
Status: DISCONTINUED | OUTPATIENT
Start: 2022-06-16 | End: 2022-06-19 | Stop reason: HOSPADM

## 2022-06-16 RX ORDER — ASPIRIN 81 MG/1
81 TABLET ORAL DAILY
Status: DISCONTINUED | OUTPATIENT
Start: 2022-06-17 | End: 2022-06-19 | Stop reason: HOSPADM

## 2022-06-16 RX ORDER — ACETAMINOPHEN 500 MG
1000 TABLET ORAL
Status: COMPLETED | OUTPATIENT
Start: 2022-06-16 | End: 2022-06-16

## 2022-06-16 RX ORDER — IBUPROFEN 200 MG
24 TABLET ORAL
Status: DISCONTINUED | OUTPATIENT
Start: 2022-06-16 | End: 2022-06-19 | Stop reason: HOSPADM

## 2022-06-16 RX ORDER — INSULIN ASPART 100 [IU]/ML
0-5 INJECTION, SOLUTION INTRAVENOUS; SUBCUTANEOUS
Status: DISCONTINUED | OUTPATIENT
Start: 2022-06-16 | End: 2022-06-19 | Stop reason: HOSPADM

## 2022-06-16 RX ORDER — HYDROCHLOROTHIAZIDE 25 MG/1
25 TABLET ORAL DAILY
Status: DISCONTINUED | OUTPATIENT
Start: 2022-06-16 | End: 2022-06-16

## 2022-06-16 RX ORDER — ASPIRIN 325 MG
325 TABLET, DELAYED RELEASE (ENTERIC COATED) ORAL
Status: COMPLETED | OUTPATIENT
Start: 2022-06-16 | End: 2022-06-16

## 2022-06-16 RX ORDER — GLUCAGON 1 MG
1 KIT INJECTION
Status: DISCONTINUED | OUTPATIENT
Start: 2022-06-16 | End: 2022-06-19 | Stop reason: HOSPADM

## 2022-06-16 RX ADMIN — TETRACAINE HYDROCHLORIDE 2 DROP: 5 SOLUTION OPHTHALMIC at 12:06

## 2022-06-16 RX ADMIN — FLUORESCEIN SODIUM 1 EACH: 1 STRIP OPHTHALMIC at 12:06

## 2022-06-16 RX ADMIN — FUROSEMIDE 40 MG: 10 INJECTION, SOLUTION INTRAMUSCULAR; INTRAVENOUS at 07:06

## 2022-06-16 RX ADMIN — HYDRALAZINE HYDROCHLORIDE 2 MG: 20 INJECTION, SOLUTION INTRAMUSCULAR; INTRAVENOUS at 01:06

## 2022-06-16 RX ADMIN — ONDANSETRON 8 MG: 2 INJECTION INTRAMUSCULAR; INTRAVENOUS at 08:06

## 2022-06-16 RX ADMIN — HYDROCHLOROTHIAZIDE 25 MG: 25 TABLET ORAL at 02:06

## 2022-06-16 RX ADMIN — ASPIRIN 325 MG: 325 TABLET, COATED ORAL at 02:06

## 2022-06-16 RX ADMIN — ACETAMINOPHEN 1000 MG: 500 TABLET ORAL at 12:06

## 2022-06-16 RX ADMIN — HYDRALAZINE HYDROCHLORIDE 2 MG: 20 INJECTION, SOLUTION INTRAMUSCULAR; INTRAVENOUS at 02:06

## 2022-06-16 RX ADMIN — LOSARTAN POTASSIUM 25 MG: 25 TABLET, FILM COATED ORAL at 07:06

## 2022-06-16 RX ADMIN — HYDRALAZINE HYDROCHLORIDE 10 MG: 20 INJECTION, SOLUTION INTRAMUSCULAR; INTRAVENOUS at 07:06

## 2022-06-16 NOTE — ED PROVIDER NOTES
Encounter Date: 2022       History     Chief Complaint   Patient presents with    Eye Problem     For one week now my eyes have been bothering me. My right eye is swollen now. I thought maybe it was from painting but then 4 days ago it got worse.      82-year-old  female with history of hypertension, hyperlipidemia and diabetes who presents to the ED with complaints of pain under her right eye concern for swelling as well as a mild headache.  Patient states that last night she had some blurred vision but that has resolved today.  She states that she has pain over her right eye and underneath it and concerned that it may look a little swollen.  She states that she was painting 4 days ago and thinks that this may have caused her symptoms.  She states that the headache wraps from the middle overhead along with the front of her forehead.  She states she did not take her blood pressure medications today because she was in a rash.  Denies any associated weakness, numbness, tingling, chest pain, shortness of breath or other complaints.        Review of patient's allergies indicates:   Allergen Reactions    Cilostazol      [id number] and stomach cramps    Clonidine     Coreg [carvedilol]      Cramps stomach and nausea    Diltiazem      nauseana d stoamch cramps    Lexapro [escitalopram oxalate]      Dizzy, nausea and weak    Shellfish containing products Hives     Past Medical History:   Diagnosis Date    Hyperlipidemia     Hypertension     Type 2 diabetes mellitus without complication, without long-term current use of insulin 3/17/2022     Past Surgical History:   Procedure Laterality Date     SECTION      x1    HERNIA REPAIR      umbilical    LEG SURGERY Right 2017    stents placed     Family History   Problem Relation Age of Onset    Diabetes Mother     Hypertension Mother     Stroke Father     Heart disease Father     Heart attack Father     Cancer Sister         Breast  cancer    Breast cancer Sister     No Known Problems Son     Kidney failure Sister         Kidney transplant x18 years    Lupus Sister     Hypertension Sister     Diabetes Sister     Arthritis Sister     Hypertension Sister     Cancer Brother      Social History     Tobacco Use    Smoking status: Never Smoker    Smokeless tobacco: Never Used   Substance Use Topics    Alcohol use: No    Drug use: No     Review of Systems   Constitutional: Negative for fever.   HENT: Negative for congestion and sore throat.    Eyes: Positive for pain.   Respiratory: Negative for shortness of breath.    Cardiovascular: Negative for chest pain.   Gastrointestinal: Negative for abdominal pain, nausea and vomiting.   Genitourinary: Negative for dysuria.   Musculoskeletal: Negative for back pain.   Skin: Negative for rash.   Neurological: Positive for headaches. Negative for weakness.   Hematological: Does not bruise/bleed easily.   All other systems reviewed and are negative.      Physical Exam     Initial Vitals [06/16/22 1144]   BP Pulse Resp Temp SpO2   (!) 223/95 63 16 98.3 °F (36.8 °C) 100 %      MAP       --         Physical Exam    Nursing note and vitals reviewed.  Constitutional: She appears well-developed and well-nourished. She is not diaphoretic. No distress.   HENT:   Head: Normocephalic and atraumatic.   Eyes: Conjunctivae and EOM are normal. Pupils are equal, round, and reactive to light.   Visual acuity: R eye 20/200  L eye 20/70   B eye 20/50    No fluorescein uptake noted on exam.  Lavell-Pen exam deferred secondary to equipment malfunction.   Neck: Neck supple.   No signs of nuchal rigidity or meningismus.   Normal range of motion.  Cardiovascular: Normal rate, regular rhythm, normal heart sounds and intact distal pulses. Exam reveals no gallop and no friction rub.    No murmur heard.  Pulmonary/Chest: Breath sounds normal. No stridor. No respiratory distress. She has no wheezes. She has no rhonchi. She has  no rales. She exhibits no tenderness.   Abdominal: Abdomen is soft. Bowel sounds are normal. She exhibits no distension and no mass. There is no abdominal tenderness. There is no rebound and no guarding.   Musculoskeletal:         General: No tenderness or edema. Normal range of motion.      Cervical back: Normal range of motion and neck supple.     Neurological: She is alert and oriented to person, place, and time.   No focal deficits noted on gross neuro exam.   Skin: Skin is warm and dry.   Psychiatric: She has a normal mood and affect. Her behavior is normal. Thought content normal.         ED Course   Procedures  Labs Reviewed   CBC W/ AUTO DIFFERENTIAL - Abnormal; Notable for the following components:       Result Value    RBC 3.78 (*)     Hemoglobin 11.4 (*)     Hematocrit 35.6 (*)     All other components within normal limits   COMPREHENSIVE METABOLIC PANEL - Abnormal; Notable for the following components:    Glucose 218 (*)     Calcium 8.5 (*)     Alkaline Phosphatase 150 (*)     eGFR if non  55.6 (*)     All other components within normal limits   SARS-COV-2 RDRP GENE          Imaging Results          CT Head Without Contrast (Final result)  Result time 06/16/22 12:43:18    Final result by Farhat Owens MD (06/16/22 12:43:18)                 Impression:      Chronic microvascular ischemic changes.    All CT scans at this facility use dose modulation, iterative reconstruction, and/or weight based dosing when appropriate to reduce radiation dose to as low as reasonable achievable.      Electronically signed by: Farhat Owens MD  Date:    06/16/2022  Time:    12:43             Narrative:    EXAMINATION:  CT HEAD WITHOUT CONTRAST    CLINICAL HISTORY:  headache;    TECHNIQUE:  Low dose axial CT images obtained throughout the head without intravenous contrast. Sagittal and coronal reconstructions were performed.    COMPARISON:  09/23/2021    FINDINGS:  Intracranial compartment:    The brain  parenchyma demonstrates areas of decreased attenuation with moderate periventricular white matter consistent with chronic microvascular ischemic changes..  No parenchymal mass, hemorrhage, edema or major vascular distribution infarct.  Vascular calcifications are noted.    Mild prominence of the sulci and ventricles are consistent with age-related involutional changes.    No extra-axial blood or fluid collections.    Skull/extracranial contents (limited evaluation): No fracture. Mastoid air cells and paranasal sinuses are essentially clear.                                 Medications   sodium chloride 0.9% flush 10 mL (has no administration in time range)   albuterol-ipratropium 2.5 mg-0.5 mg/3 mL nebulizer solution 3 mL (has no administration in time range)   melatonin tablet 6 mg (has no administration in time range)   ondansetron injection 4 mg (has no administration in time range)   simethicone chewable tablet 80 mg (has no administration in time range)   aluminum-magnesium hydroxide-simethicone 200-200-20 mg/5 mL suspension 30 mL (has no administration in time range)   acetaminophen tablet 650 mg (has no administration in time range)   insulin aspart U-100 pen 0-5 Units (has no administration in time range)   glucose chewable tablet 16 g (has no administration in time range)   glucose chewable tablet 24 g (has no administration in time range)   glucagon (human recombinant) injection 1 mg (has no administration in time range)   enoxaparin injection 40 mg (has no administration in time range)   TETRAcaine HCl (PF) 0.5 % Drop 2 drop (2 drops Right Eye Given by Other 6/16/22 1227)   fluorescein ophthalmic strip 1 each (1 each Right Eye Given by Other 6/16/22 1226)   acetaminophen tablet 1,000 mg (1,000 mg Oral Given 6/16/22 1227)   hydrALAZINE injection 2 mg (2 mg Intravenous Given 6/16/22 1343)   aspirin EC tablet 325 mg (325 mg Oral Given 6/16/22 1407)   hydrALAZINE injection 2 mg (2 mg Intravenous Given 6/16/22  1416)   hydroCHLOROthiazide tablet 25 mg (25 mg Oral Given 6/16/22 1447)     Medical Decision Making:   Initial Assessment:   82-year-old  female with history of hypertension, hyperlipidemia and diabetes who presents to the ED with complaints of pain under her right eye concern for swelling as well as a mild headache.  Differential Diagnosis:   Includes but not limited to corneal abrasion versus tension headache versus intracranial pathology versus glaucoma.  ED Management:  Will get CT head and treat blood pressure.  Will treat symptoms and reassess.  Disposition pending results.    12:25 PM 6/16/2022  Cony Berrios MD    Update note:  With reassessment and further questioning with family in room, there was concern that patient may have had blurred vision prior to today versus recent onset of blurred vision.  Patient feels that her vision got worse over the last day or 2. Patient states that she has not been taking her blood pressure medications over the last month.  Patient has allergies noted to the blood pressure medications.  Will give hydralazine at this time and broad workup with labs.  CT head without any acute changes.  Will reassess.    1:32 PM 6/16/2022  Cony Berrios MD    Update note:  With reassessment, blood pressures improved.  Patient feels that her vision is improved.  Spoke with transfer center neurology consult, Dr. Singh, regarding the patient who recommends getting an MRI patient does not feel back to baseline with repeat visual acuity.  Will repeat visual acuity at this time.    4:48 PM 6/16/2022  Cony Berrios MD    Update note:  Repeat visual acuity in right eye unchanged.  Discussed case with Chata Louise NP with Hospital Medicine regarding the patient and discussion with tele neurology.  Hospital Medicine will admit at this time with Dr. Chapman as the accepting MD.  Patient aware, updated on plan and agrees with plan of care.  Will await EMS  transfer.    5:31 PM 6/16/2022  Cony Berrios MD                       DISCLAIMER: This note was prepared with [email or web address] voice recognition transcription software. Garbled syntax, mangled pronouns, and other bizarre constructions may be attributed to that software system                         Clinical Impression:   Final diagnoses:  [I10] Hypertension, unspecified type (Primary)  [H53.9] Vision abnormalities          ED Disposition Condition    Observation               Cony Berrios MD  06/27/22 0736

## 2022-06-17 ENCOUNTER — TELEPHONE (OUTPATIENT)
Dept: PHARMACY | Facility: CLINIC | Age: 82
End: 2022-06-17
Payer: MEDICARE

## 2022-06-17 LAB
ALBUMIN SERPL BCP-MCNC: 3.2 G/DL (ref 3.5–5.2)
ALP SERPL-CCNC: 150 U/L (ref 55–135)
ALT SERPL W/O P-5'-P-CCNC: 16 U/L (ref 10–44)
ANION GAP SERPL CALC-SCNC: 14 MMOL/L (ref 8–16)
AORTIC ROOT ANNULUS: 2.72 CM
ASCENDING AORTA: 2.77 CM
AST SERPL-CCNC: 20 U/L (ref 10–40)
AV INDEX (PROSTH): 0.64
AV MEAN GRADIENT: 9 MMHG
AV PEAK GRADIENT: 17 MMHG
AV VALVE AREA: 1.94 CM2
AV VELOCITY RATIO: 0.75
BASOPHILS # BLD AUTO: 0.03 K/UL (ref 0–0.2)
BASOPHILS NFR BLD: 0.4 % (ref 0–1.9)
BILIRUB SERPL-MCNC: 1.1 MG/DL (ref 0.1–1)
BSA FOR ECHO PROCEDURE: 2.02 M2
BUN SERPL-MCNC: 14 MG/DL (ref 8–23)
CALCIUM SERPL-MCNC: 9.3 MG/DL (ref 8.7–10.5)
CHLORIDE SERPL-SCNC: 102 MMOL/L (ref 95–110)
CO2 SERPL-SCNC: 24 MMOL/L (ref 23–29)
CREAT SERPL-MCNC: 1.1 MG/DL (ref 0.5–1.4)
CV ECHO LV RWT: 0.47 CM
DIFFERENTIAL METHOD: ABNORMAL
DOP CALC AO PEAK VEL: 2.05 M/S
DOP CALC AO VTI: 36.73 CM
DOP CALC LVOT AREA: 3 CM2
DOP CALC LVOT DIAMETER: 1.96 CM
DOP CALC LVOT PEAK VEL: 1.54 M/S
DOP CALC LVOT STROKE VOLUME: 71.08 CM3
DOP CALC MV VTI: 27.48 CM
DOP CALCLVOT PEAK VEL VTI: 23.57 CM
E WAVE DECELERATION TIME: 191.18 MSEC
E/A RATIO: 0.57
E/E' RATIO: 12.6 M/S
ECHO LV POSTERIOR WALL: 1.03 CM (ref 0.6–1.1)
EJECTION FRACTION: 75 %
EOSINOPHIL # BLD AUTO: 0.1 K/UL (ref 0–0.5)
EOSINOPHIL NFR BLD: 0.9 % (ref 0–8)
ERYTHROCYTE [DISTWIDTH] IN BLOOD BY AUTOMATED COUNT: 13 % (ref 11.5–14.5)
EST. GFR  (AFRICAN AMERICAN): 54 ML/MIN/1.73 M^2
EST. GFR  (NON AFRICAN AMERICAN): 47 ML/MIN/1.73 M^2
ESTIMATED AVG GLUCOSE: 180 MG/DL (ref 68–131)
FRACTIONAL SHORTENING: 42 % (ref 28–44)
GLUCOSE SERPL-MCNC: 161 MG/DL (ref 70–110)
HBA1C MFR BLD: 7.9 % (ref 4–5.6)
HCT VFR BLD AUTO: 38.8 % (ref 37–48.5)
HGB BLD-MCNC: 12.6 G/DL (ref 12–16)
IMM GRANULOCYTES # BLD AUTO: 0.02 K/UL (ref 0–0.04)
IMM GRANULOCYTES NFR BLD AUTO: 0.3 % (ref 0–0.5)
INTERVENTRICULAR SEPTUM: 1.06 CM (ref 0.6–1.1)
LA MAJOR: 4.94 CM
LA MINOR: 5.47 CM
LA WIDTH: 3.96 CM
LEFT ATRIUM SIZE: 3.19 CM
LEFT ATRIUM VOLUME INDEX MOD: 23.2 ML/M2
LEFT ATRIUM VOLUME INDEX: 28.4 ML/M2
LEFT ATRIUM VOLUME MOD: 45.43 CM3
LEFT ATRIUM VOLUME: 55.74 CM3
LEFT INTERNAL DIMENSION IN SYSTOLE: 2.52 CM (ref 2.1–4)
LEFT VENTRICLE DIASTOLIC VOLUME INDEX: 43.81 ML/M2
LEFT VENTRICLE DIASTOLIC VOLUME: 85.86 ML
LEFT VENTRICLE MASS INDEX: 79 G/M2
LEFT VENTRICLE SYSTOLIC VOLUME INDEX: 11.7 ML/M2
LEFT VENTRICLE SYSTOLIC VOLUME: 22.86 ML
LEFT VENTRICULAR INTERNAL DIMENSION IN DIASTOLE: 4.36 CM (ref 3.5–6)
LEFT VENTRICULAR MASS: 154.9 G
LV LATERAL E/E' RATIO: 15.75 M/S
LV SEPTAL E/E' RATIO: 10.5 M/S
LYMPHOCYTES # BLD AUTO: 1.1 K/UL (ref 1–4.8)
LYMPHOCYTES NFR BLD: 15.5 % (ref 18–48)
MCH RBC QN AUTO: 29.9 PG (ref 27–31)
MCHC RBC AUTO-ENTMCNC: 32.5 G/DL (ref 32–36)
MCV RBC AUTO: 92 FL (ref 82–98)
MONOCYTES # BLD AUTO: 0.5 K/UL (ref 0.3–1)
MONOCYTES NFR BLD: 6.7 % (ref 4–15)
MV PEAK A VEL: 1.11 M/S
MV PEAK E VEL: 0.63 M/S
MV PEAK GRADIENT: 7 MMHG
MV STENOSIS PRESSURE HALF TIME: 55.44 MS
MV VALVE AREA BY CONTINUITY EQUATION: 2.59 CM2
MV VALVE AREA P 1/2 METHOD: 3.97 CM2
NEUTROPHILS # BLD AUTO: 5.2 K/UL (ref 1.8–7.7)
NEUTROPHILS NFR BLD: 76.2 % (ref 38–73)
NRBC BLD-RTO: 0 /100 WBC
PISA MRMAX VEL: 0.03 M/S
PISA TR MAX VEL: 1.34 M/S
PLATELET # BLD AUTO: 218 K/UL (ref 150–450)
PMV BLD AUTO: 10.4 FL (ref 9.2–12.9)
POCT GLUCOSE: 187 MG/DL (ref 70–110)
POCT GLUCOSE: 230 MG/DL (ref 70–110)
POCT GLUCOSE: 234 MG/DL (ref 70–110)
POCT GLUCOSE: 252 MG/DL (ref 70–110)
POCT GLUCOSE: 300 MG/DL (ref 70–110)
POTASSIUM SERPL-SCNC: 3.7 MMOL/L (ref 3.5–5.1)
PROT SERPL-MCNC: 8.1 G/DL (ref 6–8.4)
PV PEAK VELOCITY: 1.02 CM/S
RA MAJOR: 3.57 CM
RA WIDTH: 3.21 CM
RBC # BLD AUTO: 4.21 M/UL (ref 4–5.4)
RIGHT VENTRICULAR END-DIASTOLIC DIMENSION: 2.14 CM
RV TISSUE DOPPLER FREE WALL SYSTOLIC VELOCITY 1 (APICAL 4 CHAMBER VIEW): 20.55 CM/S
SODIUM SERPL-SCNC: 140 MMOL/L (ref 136–145)
STJ: 2.31 CM
TDI LATERAL: 0.04 M/S
TDI SEPTAL: 0.06 M/S
TDI: 0.05 M/S
TR MAX PG: 7 MMHG
WBC # BLD AUTO: 6.86 K/UL (ref 3.9–12.7)

## 2022-06-17 PROCEDURE — 99214 PR OFFICE/OUTPT VISIT, EST, LEVL IV, 30-39 MIN: ICD-10-PCS | Mod: ,,, | Performed by: NURSE PRACTITIONER

## 2022-06-17 PROCEDURE — 93010 EKG 12-LEAD: ICD-10-PCS | Mod: ,,, | Performed by: INTERNAL MEDICINE

## 2022-06-17 PROCEDURE — 85025 COMPLETE CBC W/AUTO DIFF WBC: CPT

## 2022-06-17 PROCEDURE — G0378 HOSPITAL OBSERVATION PER HR: HCPCS

## 2022-06-17 PROCEDURE — 36415 COLL VENOUS BLD VENIPUNCTURE: CPT

## 2022-06-17 PROCEDURE — 99214 OFFICE O/P EST MOD 30 MIN: CPT | Mod: ,,, | Performed by: NURSE PRACTITIONER

## 2022-06-17 PROCEDURE — 93005 ELECTROCARDIOGRAM TRACING: CPT

## 2022-06-17 PROCEDURE — 80053 COMPREHEN METABOLIC PANEL: CPT

## 2022-06-17 PROCEDURE — 25000003 PHARM REV CODE 250: Performed by: NURSE PRACTITIONER

## 2022-06-17 PROCEDURE — 63600175 PHARM REV CODE 636 W HCPCS

## 2022-06-17 PROCEDURE — 94761 N-INVAS EAR/PLS OXIMETRY MLT: CPT

## 2022-06-17 PROCEDURE — 93010 ELECTROCARDIOGRAM REPORT: CPT | Mod: ,,, | Performed by: INTERNAL MEDICINE

## 2022-06-17 PROCEDURE — 25500020 PHARM REV CODE 255: Performed by: FAMILY MEDICINE

## 2022-06-17 PROCEDURE — 96372 THER/PROPH/DIAG INJ SC/IM: CPT

## 2022-06-17 PROCEDURE — 63600175 PHARM REV CODE 636 W HCPCS: Performed by: NURSE PRACTITIONER

## 2022-06-17 PROCEDURE — 25000003 PHARM REV CODE 250

## 2022-06-17 PROCEDURE — 36415 COLL VENOUS BLD VENIPUNCTURE: CPT | Performed by: FAMILY MEDICINE

## 2022-06-17 PROCEDURE — 83036 HEMOGLOBIN GLYCOSYLATED A1C: CPT | Performed by: FAMILY MEDICINE

## 2022-06-17 PROCEDURE — 99900035 HC TECH TIME PER 15 MIN (STAT)

## 2022-06-17 RX ORDER — METFORMIN HYDROCHLORIDE 500 MG/1
500 TABLET ORAL 2 TIMES DAILY WITH MEALS
Qty: 180 TABLET | Refills: 3 | Status: SHIPPED | OUTPATIENT
Start: 2022-06-17 | End: 2022-06-21 | Stop reason: SINTOL

## 2022-06-17 RX ORDER — FUROSEMIDE 40 MG/1
40 TABLET ORAL DAILY
Status: DISCONTINUED | OUTPATIENT
Start: 2022-06-17 | End: 2022-06-19 | Stop reason: HOSPADM

## 2022-06-17 RX ADMIN — ONDANSETRON 4 MG: 2 INJECTION INTRAMUSCULAR; INTRAVENOUS at 12:06

## 2022-06-17 RX ADMIN — Medication 6 MG: at 12:06

## 2022-06-17 RX ADMIN — INSULIN ASPART 3 UNITS: 100 INJECTION, SOLUTION INTRAVENOUS; SUBCUTANEOUS at 12:06

## 2022-06-17 RX ADMIN — IOHEXOL 100 ML: 350 INJECTION, SOLUTION INTRAVENOUS at 01:06

## 2022-06-17 RX ADMIN — ISOSORBIDE MONONITRATE 60 MG: 30 TABLET, EXTENDED RELEASE ORAL at 09:06

## 2022-06-17 RX ADMIN — INSULIN ASPART 0 UNITS: 100 INJECTION, SOLUTION INTRAVENOUS; SUBCUTANEOUS at 09:06

## 2022-06-17 RX ADMIN — HYDRALAZINE HYDROCHLORIDE 10 MG: 20 INJECTION, SOLUTION INTRAMUSCULAR; INTRAVENOUS at 12:06

## 2022-06-17 RX ADMIN — PRAZOSIN HYDROCHLORIDE 2 MG: 1 CAPSULE ORAL at 08:06

## 2022-06-17 RX ADMIN — INSULIN ASPART 2 UNITS: 100 INJECTION, SOLUTION INTRAVENOUS; SUBCUTANEOUS at 05:06

## 2022-06-17 RX ADMIN — ASPIRIN 81 MG: 81 TABLET, COATED ORAL at 09:06

## 2022-06-17 RX ADMIN — ENOXAPARIN SODIUM 40 MG: 100 INJECTION SUBCUTANEOUS at 12:06

## 2022-06-17 RX ADMIN — LOSARTAN POTASSIUM 100 MG: 50 TABLET, FILM COATED ORAL at 09:06

## 2022-06-17 RX ADMIN — PRAZOSIN HYDROCHLORIDE 2 MG: 1 CAPSULE ORAL at 09:06

## 2022-06-17 RX ADMIN — ATORVASTATIN CALCIUM 40 MG: 40 TABLET, FILM COATED ORAL at 09:06

## 2022-06-17 RX ADMIN — ENOXAPARIN SODIUM 40 MG: 100 INJECTION SUBCUTANEOUS at 04:06

## 2022-06-17 NOTE — DISCHARGE INSTRUCTIONS
Take all medications as prescribed, adhere to recommended dietary and exercise guidelines. Keep all follow up appointments.

## 2022-06-17 NOTE — ASSESSMENT & PLAN NOTE
-Hypertensive emergency noted in the ED   -Patient states that she has been compliant with medications except for clonidine which she informed PCP that it makes her sick and she cannot take it. Per chart review, patient is followed by Dr. Fried outpatient. Per chart review patient is noncompliant with medications  -Patient takes irbesartan, Lasix, terazosin at home per chart however she is not familiar with her medications   -Given hydralazine 10 mg, lasix 40 mg, & losartan 25 mg in ED with improvement  -PRN hydralazine for SBP >170 or DBP >110  -Resume home meds and monitor

## 2022-06-17 NOTE — PLAN OF CARE
06/16/22 2333   Admission   Initial VN Admission Questions Complete   Communication Issues? Patient Hearing   Shift   Virtual Nurse - Rounding Complete   Pain Management Interventions quiet environment facilitated;relaxation techniques promoted   Virtual Nurse - Patient Verbalized Approval Of VN Rounding;Camera Use   Type of Frequent Check   Type Patient Rounds   Safety/Activity   Patient Rounds bed in low position;placement of personal items at bedside;bed wheels locked;call light in patient/parent reach;clutter free environment maintained;ID band on;visualized patient   Safety Promotion/Fall Prevention assistive device/personal item within reach;bed alarm set;side rails raised x 2;nonskid shoes/socks when out of bed;room near unit station   Safety Precautions emergency equipment at bedside   Positioning   Body Position position changed independently   Head of Bed (HOB) Positioning HOB elevated   Positioning/Transfer Devices pillows;in use    VN cued into room to complete admit assessment. VIP model introduced; VN working alongside bedside treatment team.  Plan of care reviewed with patient. Patient informed of fall risk, fall precautions, call light within reach, side rails x2 elevated. Patient notified to ask staff for assistance. Patient verbalized complete understanding. Time allowed for questions. Will continue to monitor and intervene as needed.

## 2022-06-17 NOTE — PLAN OF CARE
At time of d/c pt is to be transported back to her home by Mara [phone].  Pt has PCC appointment scheduled and SW requested Cards follow up. Pt lives at home alone however is visited daily by Mara to assist with any needs. Pt is reported independent with ADL's.        06/17/22 1151   Final Note   Anticipated Discharge Disposition Home   What phone number can be called within the next 1-3 days to see how you are doing after discharge? [phone]   Hospital Resources/Appts/Education Provided Appointments scheduled and added to AVS   Post-Acute Status   Discharge Delays None known at this time     Future Appointments   Date Time Provider Department Center   6/28/2022 10:00 AM Meredith Doty MD [facility]   6/28/2022  2:30 PM January Mckeon NP [facility] C[facility]   9/19/2022 10:00 AM Rudy Cruz MD [facility] Management  [phone]

## 2022-06-17 NOTE — SUBJECTIVE & OBJECTIVE
Interval History: awake and alert, not in any distress.   Symptoms has completely resolved.   BP much improved, resume all home meds.  HbA1c elevated at 7- not on home med- start Januvia  possible discharge today    Review of Systems   Respiratory:  Negative for shortness of breath.    Cardiovascular:  Negative for chest pain.   Gastrointestinal:  Negative for nausea and vomiting.   Genitourinary:  Negative for dysuria.   Musculoskeletal:  Negative for back pain.   Skin:  Negative for color change.   Neurological:  Negative for weakness, numbness and headaches.   Psychiatric/Behavioral:  Negative for agitation.    Objective:     Vital Signs (Most Recent):  Temp: 98.9 °F (37.2 °C) (06/17/22 0626)  Pulse: 72 (06/17/22 0800)  Resp: 18 (06/17/22 0626)  BP: (!) 158/70 (06/17/22 0626)  SpO2: 98 % (06/17/22 0626)   Vital Signs (24h Range):  Temp:  [96.7 °F (35.9 °C)-98.9 °F (37.2 °C)] 98.9 °F (37.2 °C)  Pulse:  [55-97] 72  Resp:  [16-22] 18  SpO2:  [98 %-100 %] 98 %  BP: (117-248)/() 158/70     Weight: 91.1 kg (200 lb 13.4 oz)  Body mass index is 34.47 kg/m².  No intake or output data in the 24 hours ending 06/17/22 0911   Physical Exam  Vitals and nursing note reviewed.   Constitutional:       General: She is not in acute distress.     Appearance: Normal appearance. She is obese. She is not ill-appearing, toxic-appearing or diaphoretic.   HENT:      Head: Normocephalic and atraumatic.   Eyes:      Comments: Reports blurry vision resolved   Cardiovascular:      Rate and Rhythm: Normal rate and regular rhythm.      Pulses: Normal pulses.      Heart sounds: Normal heart sounds. No murmur heard.  Pulmonary:      Effort: Pulmonary effort is normal. No respiratory distress.      Breath sounds: Normal breath sounds.   Abdominal:      General: Bowel sounds are normal. There is no distension.      Palpations: Abdomen is soft.      Tenderness: There is no abdominal tenderness.   Musculoskeletal:         General: Normal range  of motion.      Cervical back: Normal range of motion.   Skin:     General: Skin is warm.      Capillary Refill: Capillary refill takes 2 to 3 seconds.   Neurological:      General: No focal deficit present.      Mental Status: She is alert and oriented to person, place, and time. Mental status is at baseline.   Psychiatric:         Mood and Affect: Mood normal.         Behavior: Behavior normal.       Significant Labs: A1C:   Recent Labs   Lab 03/17/22  0949   HGBA1C 7.0*     ABGs: No results for input(s): PH, PCO2, HCO3, POCSATURATED, BE, TOTALHB, COHB, METHB, O2HB, POCFIO2, PO2 in the last 48 hours.  Blood Culture: No results for input(s): LABBLOO in the last 48 hours.  CBC:   Recent Labs   Lab 06/16/22  1351 06/17/22  0234   WBC 6.95 6.86   HGB 11.4* 12.6   HCT 35.6* 38.8    218     CMP:   Recent Labs   Lab 06/16/22  1351 06/17/22  0234    140   K 3.9 3.7    102   CO2 26 24   * 161*   BUN 14 14   CREATININE 0.95 1.1   CALCIUM 8.5* 9.3   PROT 7.8 8.1   ALBUMIN 3.8 3.2*   BILITOT 0.8 1.1*   ALKPHOS 150* 150*   AST 23 20   ALT 20 16   ANIONGAP 8 14   EGFRNONAA 55.6* 47*     Coagulation: No results for input(s): PT, INR, APTT in the last 48 hours.  Lactic Acid: No results for input(s): LACTATE in the last 48 hours.  Lipase: No results for input(s): LIPASE in the last 48 hours.  Lipid Panel: No results for input(s): CHOL, HDL, LDLCALC, TRIG, CHOLHDL in the last 48 hours.  Magnesium: No results for input(s): MG in the last 48 hours.  POCT Glucose:   Recent Labs   Lab 06/16/22  2251 06/17/22  0625   POCTGLUCOSE 183* 187*     Respiratory Culture: No results for input(s): GSRESP, RESPIRATORYC in the last 48 hours.  Troponin: No results for input(s): TROPONINI in the last 48 hours.  TSH:   Recent Labs   Lab 03/17/22  0949   TSH 4.920*     Urine Culture: No results for input(s): LABURIN in the last 48 hours.  Urine Studies: No results for input(s): COLORU, APPEARANCEUA, PHUR, SPECGRAV, PROTEINUA,  GLUCUA, KETONESU, BILIRUBINUA, OCCULTUA, NITRITE, UROBILINOGEN, LEUKOCYTESUR, RBCUA, WBCUA, BACTERIA, SQUAMEPITHEL, HYALINECASTS in the last 48 hours.    Invalid input(s): NORAH    Significant Imaging: I have reviewed all pertinent imaging results/findings within the past 24 hours.

## 2022-06-17 NOTE — ASSESSMENT & PLAN NOTE
-Hypertensive emergency noted in the ED   -Patient states that she has been compliant with medications except for clonidine which she informed PCP that it makes her sick and she cannot take it. Per chart review, patient is followed by Dr. Fried outpatient. Per chart review patient is noncompliant with medications  -Patient takes irbesartan, terazosin at home per chart however she is not familiar with her medications   -Given hydralazine 10 mg, lasix 40 mg, & losartan 25 mg in ED with improvement  -PRN hydralazine for SBP >170 or DBP >110  -Resume home meds and monitor

## 2022-06-17 NOTE — PLAN OF CARE
VN note: VN completed AVS and attachments and notified bedside nurseRex. Will cont to be available and intervene prn.

## 2022-06-17 NOTE — SUBJECTIVE & OBJECTIVE
Past Medical History:   Diagnosis Date    Hyperlipidemia     Hypertension     Type 2 diabetes mellitus without complication, without long-term current use of insulin 3/17/2022       Past Surgical History:   Procedure Laterality Date     SECTION      x1    HERNIA REPAIR      umbilical    LEG SURGERY Right 2017    stents placed       Review of patient's allergies indicates:   Allergen Reactions    Cilostazol      [id number] and stomach cramps    Clonidine     Coreg [carvedilol]      Cramps stomach and nausea    Diltiazem      nauseana d stoamch cramps    Lexapro [escitalopram oxalate]      Dizzy, nausea and weak    Shellfish containing products Hives       No current facility-administered medications on file prior to encounter.     Current Outpatient Medications on File Prior to Encounter   Medication Sig    aspirin (ECOTRIN) 81 MG EC tablet Take 81 mg by mouth once daily.    atorvastatin (LIPITOR) 40 MG tablet Take 1 tablet (40 mg total) by mouth once daily. For cholesterol    cholecalciferol, vitamin D3, (VITAMIN D3) 125 mcg (5,000 unit) Tab Take 1 tablet (5,000 Units total) by mouth once daily.    furosemide (LASIX) 40 MG tablet TAKE 1 TABLET(40 MG) BY MOUTH EVERY DAY FOR LEG SWELLING    irbesartan (AVAPRO) 300 MG tablet TAKE 1 TABLET(300 MG) BY MOUTH EVERY DAY    isosorbide mononitrate (IMDUR) 60 MG 24 hr tablet TAKE 1 TABLET(60 MG) BY MOUTH EVERY DAY    potassium chloride SA (K-DUR,KLOR-CON) 20 MEQ tablet Take 1 tablet (20 mEq total) by mouth once daily.    sucralfate (CARAFATE) 1 gram tablet TAKE 1 TABLET(1 GRAM) BY MOUTH THREE TIMES DAILY    terazosin (HYTRIN) 5 MG capsule Take 1 capsule (5 mg total) by mouth every evening. For blood pressure     Family History       Problem Relation (Age of Onset)    Arthritis Sister    Breast cancer Sister    Cancer Sister, Brother    Diabetes Mother, Sister    Heart attack Father    Heart disease Father    Hypertension Mother, Sister, Sister     Kidney failure Sister    Lupus Sister    No Known Problems Son    Stroke Father          Tobacco Use    Smoking status: Never Smoker    Smokeless tobacco: Never Used   Substance and Sexual Activity    Alcohol use: No    Drug use: No    Sexual activity: Not Currently     Partners: Male     Review of Systems   Constitutional: Negative.    HENT: Negative.     Eyes:  Positive for visual disturbance.   Respiratory: Negative.     Cardiovascular: Negative.    Gastrointestinal:  Positive for nausea and vomiting.   Endocrine: Negative.    Genitourinary: Negative.    Musculoskeletal: Negative.    Skin: Negative.    Neurological:  Positive for headaches.   Psychiatric/Behavioral: Negative.     Objective:     Vital Signs (Most Recent):  Temp: 97.7 °F (36.5 °C) (06/16/22 2241)  Pulse: 87 (06/16/22 2241)  Resp: 20 (06/16/22 2241)  BP: (!) 200/88 (06/16/22 2241)  SpO2: 99 % (06/16/22 2241)   Vital Signs (24h Range):  Temp:  [97.7 °F (36.5 °C)-98.3 °F (36.8 °C)] 97.7 °F (36.5 °C)  Pulse:  [55-97] 87  Resp:  [16-22] 20  SpO2:  [98 %-100 %] 99 %  BP: (117-248)/() 200/88     Weight: 91.1 kg (200 lb 13.4 oz)  Body mass index is 34.47 kg/m².    Physical Exam  Vitals and nursing note reviewed.   Constitutional:       General: She is not in acute distress.     Appearance: Normal appearance. She is obese. She is not ill-appearing, toxic-appearing or diaphoretic.   HENT:      Head: Normocephalic and atraumatic.      Mouth/Throat:      Mouth: Mucous membranes are moist.   Eyes:      Extraocular Movements: Extraocular movements intact.      Pupils: Pupils are equal, round, and reactive to light.      Comments: Reports blurry vision resolved   Cardiovascular:      Rate and Rhythm: Normal rate and regular rhythm.      Pulses: Normal pulses.      Heart sounds: Normal heart sounds. No murmur heard.  Pulmonary:      Effort: Pulmonary effort is normal. No respiratory distress.      Breath sounds: Normal breath sounds.   Abdominal:       General: Bowel sounds are normal. There is no distension.      Palpations: Abdomen is soft.      Tenderness: There is no abdominal tenderness.   Musculoskeletal:         General: Normal range of motion.      Cervical back: Normal range of motion.      Comments: Mild BLE swelling   Skin:     General: Skin is warm.      Capillary Refill: Capillary refill takes 2 to 3 seconds.   Neurological:      General: No focal deficit present.      Mental Status: She is alert and oriented to person, place, and time. Mental status is at baseline.   Psychiatric:         Mood and Affect: Mood normal.         Behavior: Behavior normal.         Thought Content: Thought content normal.         Judgment: Judgment normal.         CRANIAL NERVES     CN III, IV, VI   Pupils are equal, round, and reactive to light.     Significant Labs: All pertinent labs within the past 24 hours have been reviewed.    Significant Imaging: I have reviewed all pertinent imaging results/findings within the past 24 hours.

## 2022-06-17 NOTE — ASSESSMENT & PLAN NOTE
-Patient reports her vision has been blurry since Monday and worsened PTA where she could not make out objects that were right in front of her.  -Decreased visual acuity may be related to uncontrolled HTN  -CTH: Chronic microvascular ischemic changes  -On exam pupils equal and reactive, EOM intact, gait normal, no focal deficits noted, reports blurred vision resolved  -Neurology consulted-will defer MRI pending consultation

## 2022-06-17 NOTE — CONSULTS
Spoke with Dr. Noonan. Pt with AMS would not be a candidate for tPA and does not need telestroke consult.

## 2022-06-17 NOTE — HPI
Katy Soto is an 82-year-old female with history of hypertension, hyperlipidemia and diabetes who presented to the ED with complaints of pain under her right eye concern for swelling as well as a mild headache. Patient states that she has had blurred vision and headache since Monday which last night worsened prompting her to come to ED but she reports now has resolved. She states that the headache wrapped from the middle overhead along with the front of her forehead. She states she is compliant with medications yet did not take her blood pressure medications today because she was in a rush. Denies any associated weakness, numbness, tingling, chest pain, shortness of breath or other complaints.    In the ED: decrease visual acuity to the right eye noted. Patient hypertensive with improvement after hydralazine, losartan, and Lasix. CTH: Chronic microvascular ischemic changes. Per ED to Neurology: recommended maybe getting MRI for worsening symptoms. Admitted to [facility] for further evaluation and neurology consultation.

## 2022-06-17 NOTE — PROGRESS NOTES
[facility]                    Pharmacy       Discharge Medication Education    Patient ACCEPTED medication education. Pharmacy has provided education on the name, indication, and possible side effects of the medication(s) prescribed, using teach-back method.     The following medications have also been discussed, during this admission.        Medication List        START taking these medications      SITagliptin 25 MG Tab  Commonly known as: JANUVIA  Take 1 tablet (25 mg total) by mouth once daily.            CONTINUE taking these medications      aspirin 81 MG EC tablet  Commonly known as: ECOTRIN     atorvastatin 40 MG tablet  Commonly known as: LIPITOR  Take 1 tablet (40 mg total) by mouth once daily. For cholesterol     cholecalciferol (vitamin D3) 125 mcg (5,000 unit) Tab  Commonly known as: VITAMIN D3  Take 1 tablet (5,000 Units total) by mouth once daily.     furosemide 40 MG tablet  Commonly known as: LASIX  TAKE 1 TABLET(40 MG) BY MOUTH EVERY DAY FOR LEG SWELLING     irbesartan 300 MG tablet  Commonly known as: AVAPRO  TAKE 1 TABLET(300 MG) BY MOUTH EVERY DAY     isosorbide mononitrate 60 MG 24 hr tablet  Commonly known as: IMDUR  TAKE 1 TABLET(60 MG) BY MOUTH EVERY DAY     potassium chloride SA 20 MEQ tablet  Commonly known as: K-DUR,KLOR-CON  Take 1 tablet (20 mEq total) by mouth once daily.     sucralfate 1 gram tablet  Commonly known as: CARAFATE  TAKE 1 TABLET(1 GRAM) BY MOUTH THREE TIMES DAILY     terazosin 5 MG capsule  Commonly known as: HYTRIN  Take 1 capsule (5 mg total) by mouth every evening. For blood pressure               Where to Get Your Medications        These medications were sent to [facility]  [address], [place] [address]      Hours: Mon-Fri, 8a-5:30p Phone: [phone]   SITagliptin 25 MG Tab          Thank you  Renny Saha, PharmD  [phone]

## 2022-06-17 NOTE — PROGRESS NOTES
[facility]  Progress Note    Patient Name: Katy Soto  MRN: [id number]  Patient Class: OP- Observation   Admission Date: 6/16/2022  Length of Stay: 0 days  Attending Physician: Unique Okeefe*  Primary Care Provider: Rudy Cruz MD        Subjective:     Principal Problem:Hypertensive emergency        HPI:  Katy Soto is an 82-year-old female with history of hypertension, hyperlipidemia and diabetes who presented to the ED with complaints of pain under her right eye concern for swelling as well as a mild headache. Patient states that she has had blurred vision and headache since Monday which last night worsened prompting her to come to ED but she reports now has resolved. She states that the headache wrapped from the middle overhead along with the front of her forehead. She states she is compliant with medications yet did not take her blood pressure medications today because she was in a rush. Denies any associated weakness, numbness, tingling, chest pain, shortness of breath or other complaints.    In the ED: decrease visual acuity to the right eye noted. Patient hypertensive with improvement after hydralazine, losartan, and Lasix. CTH: Chronic microvascular ischemic changes. Per ED to Neurology: recommended maybe getting MRI for worsening symptoms. Admitted to [facility] for further evaluation and neurology consultation.      Overview/Hospital Course:  No notes on file    Interval History: awake and alert, not in any distress.   Symptoms has completely resolved.   BP much improved, resume all home meds.  HbA1c elevated at 7- not on home med- start Januvia  possible discharge today    Review of Systems   Respiratory:  Negative for shortness of breath.    Cardiovascular:  Negative for chest pain.   Gastrointestinal:  Negative for nausea and vomiting.   Genitourinary:  Negative for dysuria.   Musculoskeletal:  Negative for back pain.   Skin:  Negative  for color change.   Neurological:  Negative for weakness, numbness and headaches.   Psychiatric/Behavioral:  Negative for agitation.    Objective:     Vital Signs (Most Recent):  Temp: 98.9 °F (37.2 °C) (06/17/22 0626)  Pulse: 72 (06/17/22 0800)  Resp: 18 (06/17/22 0626)  BP: (!) 158/70 (06/17/22 0626)  SpO2: 98 % (06/17/22 0626)   Vital Signs (24h Range):  Temp:  [96.7 °F (35.9 °C)-98.9 °F (37.2 °C)] 98.9 °F (37.2 °C)  Pulse:  [55-97] 72  Resp:  [16-22] 18  SpO2:  [98 %-100 %] 98 %  BP: (117-248)/() 158/70     Weight: 91.1 kg (200 lb 13.4 oz)  Body mass index is 34.47 kg/m².  No intake or output data in the 24 hours ending 06/17/22 0911   Physical Exam  Vitals and nursing note reviewed.   Constitutional:       General: She is not in acute distress.     Appearance: Normal appearance. She is obese. She is not ill-appearing, toxic-appearing or diaphoretic.   HENT:      Head: Normocephalic and atraumatic.   Eyes:      Comments: Reports blurry vision resolved   Cardiovascular:      Rate and Rhythm: Normal rate and regular rhythm.      Pulses: Normal pulses.      Heart sounds: Normal heart sounds. No murmur heard.  Pulmonary:      Effort: Pulmonary effort is normal. No respiratory distress.      Breath sounds: Normal breath sounds.   Abdominal:      General: Bowel sounds are normal. There is no distension.      Palpations: Abdomen is soft.      Tenderness: There is no abdominal tenderness.   Musculoskeletal:         General: Normal range of motion.      Cervical back: Normal range of motion.   Skin:     General: Skin is warm.      Capillary Refill: Capillary refill takes 2 to 3 seconds.   Neurological:      General: No focal deficit present.      Mental Status: She is alert and oriented to person, place, and time. Mental status is at baseline.   Psychiatric:         Mood and Affect: Mood normal.         Behavior: Behavior normal.       Significant Labs: A1C:   Recent Labs   Lab 03/17/22  0949   HGBA1C 7.0*      ABGs: No results for input(s): PH, PCO2, HCO3, POCSATURATED, BE, TOTALHB, COHB, METHB, O2HB, POCFIO2, PO2 in the last 48 hours.  Blood Culture: No results for input(s): LABBLOO in the last 48 hours.  CBC:   Recent Labs   Lab 06/16/22  1351 06/17/22  0234   WBC 6.95 6.86   HGB 11.4* 12.6   HCT 35.6* 38.8    218     CMP:   Recent Labs   Lab 06/16/22  1351 06/17/22  0234    140   K 3.9 3.7    102   CO2 26 24   * 161*   BUN 14 14   CREATININE 0.95 1.1   CALCIUM 8.5* 9.3   PROT 7.8 8.1   ALBUMIN 3.8 3.2*   BILITOT 0.8 1.1*   ALKPHOS 150* 150*   AST 23 20   ALT 20 16   ANIONGAP 8 14   EGFRNONAA 55.6* 47*     Coagulation: No results for input(s): PT, INR, APTT in the last 48 hours.  Lactic Acid: No results for input(s): LACTATE in the last 48 hours.  Lipase: No results for input(s): LIPASE in the last 48 hours.  Lipid Panel: No results for input(s): CHOL, HDL, LDLCALC, TRIG, CHOLHDL in the last 48 hours.  Magnesium: No results for input(s): MG in the last 48 hours.  POCT Glucose:   Recent Labs   Lab 06/16/22  2251 06/17/22  0625   POCTGLUCOSE 183* 187*     Respiratory Culture: No results for input(s): GSRESP, RESPIRATORYC in the last 48 hours.  Troponin: No results for input(s): TROPONINI in the last 48 hours.  TSH:   Recent Labs   Lab 03/17/22  0949   TSH 4.920*     Urine Culture: No results for input(s): LABURIN in the last 48 hours.  Urine Studies: No results for input(s): COLORU, APPEARANCEUA, PHUR, SPECGRAV, PROTEINUA, GLUCUA, KETONESU, BILIRUBINUA, OCCULTUA, NITRITE, UROBILINOGEN, LEUKOCYTESUR, RBCUA, WBCUA, BACTERIA, SQUAMEPITHEL, HYALINECASTS in the last 48 hours.    Invalid input(s): WRIGHTSUR    Significant Imaging: I have reviewed all pertinent imaging results/findings within the past 24 hours.      Assessment/Plan:      * Hypertensive emergency  -Hypertensive emergency noted in the ED   -Patient states that she has been compliant with medications except for clonidine which she  informed PCP that it makes her sick and she cannot take it. Per chart review, patient is followed by Dr. Fried outpatient. Per chart review patient is noncompliant with medications  -Patient takes irbesartan, Lasix, terazosin at home per chart however she is not familiar with her medications   -Given hydralazine 10 mg, lasix 40 mg, & losartan 25 mg in ED with improvement  -PRN hydralazine for SBP >170 or DBP >110  -Resume home meds and monitor      Reduced visual acuity  -Patient reports her vision has been blurry since Monday and worsened PTA where she could not make out objects that were right in front of her.  -Decreased visual acuity may be related to uncontrolled HTN  -CTH: Chronic microvascular ischemic changes  -On exam pupils equal and reactive, EOM intact, gait normal, no focal deficits noted, reports blurred vision resolved  -Neurology consulted-will defer MRI pending consultation    Severe obesity (BMI 35.0-39.9) with comorbidity  -encourage lifestyle modifications (helathy diet, exercise)       Type 2 diabetes mellitus without complication, without long-term current use of insulin  Hemoglobin A1C   Date Value Ref Range Status   03/17/2022 7.0 (H) 4.0 - 5.6 % Final   -low dose SSI, accuchecks ACHS, diabetic diet  Not on medication  Start Januvia on discharge      Obesity (BMI 30.0-34.9)  -encourage lifestyle modifications (helathy diet, exercise)       PAD (peripheral artery disease)  -Resume home ASA, Lipitor, BB, Imdur      Hyperlipidemia  -Resume home statin        VTE Risk Mitigation (From admission, onward)         Ordered     enoxaparin injection 40 mg  Daily         06/16/22 1729     IP VTE HIGH RISK PATIENT  Once         06/16/22 1729     Place sequential compression device  Until discontinued         06/16/22 1729                Discharge Planning   KENY: 6/17/2022     Code Status: Full Code   Is the patient medically ready for discharge?:     Reason for patient still in hospital (select all  that apply): Pending disposition                     Unique Noonan-MD Wilian  Department of Hospital Medicine   [facility]

## 2022-06-17 NOTE — ASSESSMENT & PLAN NOTE
In setting of emotional upset  Improved with home medications   Continue Lasix, Avapro, Imdur, Terazosin  Follow up with PCP or Dr Fried for close monitoring and consider digital HTN clinic upon discharge  EKG and TTE pending

## 2022-06-17 NOTE — ASSESSMENT & PLAN NOTE
Hemoglobin A1C   Date Value Ref Range Status   03/17/2022 7.0 (H) 4.0 - 5.6 % Final   -low dose SSI, accuchecks ACHS, diabetic diet  Not on medication  Start Januvia on discharge

## 2022-06-17 NOTE — SIGNIFICANT EVENT
CODE STROKE DOCUMENTATION  [facility]   PROVIDER LEAD      Code Stroke called:1320  Time arrived to pt's room:1321    Reason for Code Stroke as reported by bedside nurse: slurred speech, lethargy, and left sided facial droop.  Time last seen normal (less than or equal to 3 hours = inclusion criteria for tPA): 1 hour ago on nursing rounds    PMHx:   Hyperlipidemia      Hypertension      Type 2 diabetes mellitus without complication, without long-term current use of insulin 3/17/2022        Condition of patient on my arrival: left sided facial droop noted. Not following commands. Also noted with weakness.    Blood pressure (SBP > 185 or DBP > 110 excludes for tPA): 112/57  Blood glucose (>400 excludes for tPA):  300  EKG: NSR  CT head: Chronic microvascular ischemic changes.  PT > 15 seconds is contraindication (Do not collect if don't expect to use tPA): n/a  INR > 1.7 is contraindication (Do not collect if don't expect to use tPA): n/a  Creatinine: 1.1  AM labs reviewed:  - Platelet count (< 100K excludes for tPA): 218    Time of last anticoagulant (< 24 hours excludes for tPA): 6/17/2022 0006    Vascular Neurologist via telemedicine: Rasheed Singh MD     Impression: 82-year-old female with hypertension, PAD, hyperlipidemia and diabetes. Patient presented to the ED with right eye pain, swelling, blurred vision and headache since Monday admitted with HTN emergency.     Plan:  CT head ordered  CTA head and neck pending  Vascular Neurologist was called via phone and spoke to Dr. Noonan. MDs agreed that vascular neurology could follow up on rounding.     Code Stroke ended: 1    Critical care time spent on the evaluation and treatment of severe organ dysfunction, review of pertinent labs and imaging studies, discussions with consulting providers and discussions with patient/family: 50 minutes.    Isabelle Sears NP  [facility]  Pager: [phone]

## 2022-06-17 NOTE — HPI
Katy Soto is an 82-year-old female with hypertension, PAD, hyperlipidemia and diabetes. Patient presented to the ED with right eye pain, swelling, blurred vision and headache since Monday. Patient reports that she received news that a child that she raised had . She reports her BP elevates when she is upset and she has understandably been upset since receiving this news. Patient denies any chest pain, SOB, diaphoresis, dizziness, syncope, SPIVEY. Her symptoms worsened prompting her to proceed to the ED. She reports compliance with her medications. CT head negative for acute changes. SBP >240 on admission and improved with antihypertensives. Home medications resumed. TTE and EKG pending.

## 2022-06-17 NOTE — ASSESSMENT & PLAN NOTE
Hemoglobin A1C   Date Value Ref Range Status   03/17/2022 7.0 (H) 4.0 - 5.6 % Final   -low dose SSI, accuchecks ACHS, diabetic diet

## 2022-06-17 NOTE — CONSULTS
[facility] - Telemetry  Cardiology  Consult Note    Patient Name: Katy Soto  MRN: [id number]  Admission Date: 2022  Hospital Length of Stay: 0 days  Code Status: Full Code   Attending Provider: Unique Okeefe*   Consulting Provider: Favio Luu NP  Primary Care Physician: Rudy Cruz MD  Principal Problem:Hypertensive emergency    Patient information was obtained from patient, past medical records and ER records.     Inpatient consult to [facility]  Consult performed by: Favio Luu NP  Consult ordered by: Lacy Louise NP        Subjective:     Chief Complaint:  Vision loss, headache      HPI:   Katy Soto is an 82-year-old female with hypertension, PAD, hyperlipidemia and diabetes. Patient presented to the ED with right eye pain, swelling, blurred vision and headache since Monday. Patient reports that she received news that a child that she raised had . She reports her BP elevates when she is upset and she has understandably been upset since receiving this news. Patient denies any chest pain, SOB, diaphoresis, dizziness, syncope, SPIVEY. Her symptoms worsened prompting her to proceed to the ED. She reports compliance with her medications. CT head negative for acute changes. SBP >240 on admission and improved with antihypertensives. Home medications resumed. TTE and EKG pending.       Past Medical History:   Diagnosis Date    Hyperlipidemia     Hypertension     Type 2 diabetes mellitus without complication, without long-term current use of insulin 3/17/2022       Past Surgical History:   Procedure Laterality Date     SECTION      x1    HERNIA REPAIR      umbilical    LEG SURGERY Right 2017    stents placed       Review of patient's allergies indicates:   Allergen Reactions    Cilostazol      [id number] and stomach cramps    Clonidine     Coreg [carvedilol]      Cramps stomach and nausea    Diltiazem      nauseana d stoamch cramps     Lexapro [escitalopram oxalate]      Dizzy, nausea and weak    Shellfish containing products Hives       No current facility-administered medications on file prior to encounter.     Current Outpatient Medications on File Prior to Encounter   Medication Sig    aspirin (ECOTRIN) 81 MG EC tablet Take 81 mg by mouth once daily.    atorvastatin (LIPITOR) 40 MG tablet Take 1 tablet (40 mg total) by mouth once daily. For cholesterol    cholecalciferol, vitamin D3, (VITAMIN D3) 125 mcg (5,000 unit) Tab Take 1 tablet (5,000 Units total) by mouth once daily.    furosemide (LASIX) 40 MG tablet TAKE 1 TABLET(40 MG) BY MOUTH EVERY DAY FOR LEG SWELLING    irbesartan (AVAPRO) 300 MG tablet TAKE 1 TABLET(300 MG) BY MOUTH EVERY DAY    isosorbide mononitrate (IMDUR) 60 MG 24 hr tablet TAKE 1 TABLET(60 MG) BY MOUTH EVERY DAY    potassium chloride SA (K-DUR,KLOR-CON) 20 MEQ tablet Take 1 tablet (20 mEq total) by mouth once daily.    sucralfate (CARAFATE) 1 gram tablet TAKE 1 TABLET(1 GRAM) BY MOUTH THREE TIMES DAILY    terazosin (HYTRIN) 5 MG capsule Take 1 capsule (5 mg total) by mouth every evening. For blood pressure     Family History       Problem Relation (Age of Onset)    Arthritis Sister    Breast cancer Sister    Cancer Sister, Brother    Diabetes Mother, Sister    Heart attack Father    Heart disease Father    Hypertension Mother, Sister, Sister    Kidney failure Sister    Lupus Sister    No Known Problems Son    Stroke Father          Tobacco Use    Smoking status: Never Smoker    Smokeless tobacco: Never Used   Substance and Sexual Activity    Alcohol use: No    Drug use: No    Sexual activity: Not Currently     Partners: Male     Review of Systems   Constitutional: Negative. Negative for diaphoresis.   HENT: Negative.     Eyes:  Positive for visual disturbance.   Cardiovascular:  Positive for leg swelling (chronic, stable). Negative for chest pain, dyspnea on exertion, irregular heartbeat, near-syncope,  orthopnea, palpitations, paroxysmal nocturnal dyspnea and syncope.   Respiratory: Negative.  Negative for cough and shortness of breath.    Endocrine: Negative.    Hematologic/Lymphatic: Negative.    Skin: Negative.    Musculoskeletal: Negative.    Gastrointestinal: Negative.  Negative for nausea and vomiting.   Genitourinary: Negative.    Neurological:  Positive for headaches. Negative for focal weakness.   Psychiatric/Behavioral: Negative.     Allergic/Immunologic: Negative.    Objective:     Vital Signs (Most Recent):  Temp: 98.9 °F (37.2 °C) (06/17/22 0626)  Pulse: 72 (06/17/22 0800)  Resp: 18 (06/17/22 0626)  BP: (!) 158/70 (06/17/22 0626)  SpO2: 98 % (06/17/22 0626)   Vital Signs (24h Range):  Temp:  [96.7 °F (35.9 °C)-98.9 °F (37.2 °C)] 98.9 °F (37.2 °C)  Pulse:  [55-97] 72  Resp:  [16-22] 18  SpO2:  [98 %-100 %] 98 %  BP: (117-248)/() 158/70     Weight: 91.1 kg (200 lb 13.4 oz)  Body mass index is 34.47 kg/m².    SpO2: 98 %  O2 Device (Oxygen Therapy): room air    No intake or output data in the 24 hours ending 06/17/22 1021    Lines/Drains/Airways       Peripheral Intravenous Line  Duration                  Peripheral IV - Single Lumen 06/16/22 1340 20 G Left Hand <1 day                    Physical Exam  Constitutional:       General: She is not in acute distress.     Appearance: She is not diaphoretic.   HENT:      Head: Atraumatic.   Eyes:      General:         Right eye: No discharge.         Left eye: No discharge.   Cardiovascular:      Rate and Rhythm: Normal rate and regular rhythm.      Heart sounds: No murmur heard.    No friction rub. No gallop.   Pulmonary:      Effort: Pulmonary effort is normal.      Breath sounds: Normal breath sounds.   Abdominal:      General: Bowel sounds are normal.      Palpations: Abdomen is soft.   Skin:     General: Skin is warm and dry.      Capillary Refill: Capillary refill takes less than 2 seconds.   Neurological:      Mental Status: She is alert and  "oriented to person, place, and time.   Psychiatric:         Mood and Affect: Mood normal.         Behavior: Behavior normal.         Thought Content: Thought content normal.         Judgment: Judgment normal.       Significant Labs: BMP:   Recent Labs   Lab 06/16/22  1351 06/17/22  0234   * 161*    140   K 3.9 3.7    102   CO2 26 24   BUN 14 14   CREATININE 0.95 1.1   CALCIUM 8.5* 9.3   , CMP   Recent Labs   Lab 06/16/22  1351 06/17/22  0234    140   K 3.9 3.7    102   CO2 26 24   * 161*   BUN 14 14   CREATININE 0.95 1.1   CALCIUM 8.5* 9.3   PROT 7.8 8.1   ALBUMIN 3.8 3.2*   BILITOT 0.8 1.1*   ALKPHOS 150* 150*   AST 23 20   ALT 20 16   ANIONGAP 8 14   ESTGFRAFRICA >60.0 54*   EGFRNONAA 55.6* 47*   , CBC   Recent Labs   Lab 06/16/22  1351 06/17/22  0234   WBC 6.95 6.86   HGB 11.4* 12.6   HCT 35.6* 38.8    218   , INR No results for input(s): INR, PROTIME in the last 48 hours., Lipid Panel No results for input(s): CHOL, HDL, LDLCALC, TRIG, CHOLHDL in the last 48 hours., Troponin No results for input(s): TROPONINI in the last 48 hours., and All pertinent lab results from the last 24 hours have been reviewed.    Significant Imaging: Echocardiogram: Transthoracic echo (TTE) complete (Cupid Only):   Results for orders placed or performed during the hospital encounter of 10/21/20   Echo Color Flow Doppler? Yes   Result Value Ref Range    BSA 2 m2    IVC proximal 0.8 cm    MV "A" wave duration 121.00 msec    Pulm vein "A" wave 131.00 msec    LA WIDTH 3.77 cm    AORTIC VALVE CUSP SEPERATION 1.69 cm    PV PEAK VELOCITY 1.06 cm/s    LVIDd 2.96 (A) 3.5 - 6.0 cm    IVS 1.52 (A) 0.6 - 1.1 cm    Posterior Wall 1.44 (A) 0.6 - 1.1 cm    Ao root annulus 2.18 cm    LVIDs 2.05 (A) 2.1 - 4.0 cm    FS 31 28 - 44 %    LA volume 54.06 cm3    LV mass 151.64 g    LA size 4.16 cm    RVDD 2.25 cm    TAPSE 1.67 cm    Left Ventricle Relative Wall Thickness 0.97 cm    AV mean gradient 8 mmHg    AV " valve area 3.34 cm2    AV Velocity Ratio 0.70     AV index (prosthetic) 0.87     MV valve area p 1/2 method 6.82 cm2    E/A ratio 0.50     E wave deceleration time 111.17 msec    Pulm vein S/D ratio 2.59     LVOT diameter 2.21 cm    LVOT area 3.8 cm2    LVOT peak john 1.19 m/s    LVOT peak VTI 21.67 cm    Ao peak john 1.69 m/s    Ao VTI 24.91 cm    Mr max john 0.05 m/s    LVOT stroke volume 83.08 cm3    AV peak gradient 11 mmHg    MV Peak E John 0.75 m/s    TR Max John 2.49 m/s    MV stenosis pressure 1/2 time 32.24 ms    MV Peak A John 1.51 m/s    PV Peak S John 0.83 m/s    PV Peak D John 0.32 m/s    LV Systolic Volume 13.57 mL    LV Systolic Volume Index 7.0 mL/m2    LV Diastolic Volume 34.00 mL    LV Diastolic Volume Index 17.53 mL/m2    LA Volume Index 27.9 mL/m2    LV Mass Index 78 g/m2    RA Major Axis 3.86 cm    Left Atrium Minor Axis 4.07 cm    Left Atrium Major Axis 4.04 cm    Triscuspid Valve Regurgitation Peak Gradient 25 mmHg    RA Width 2.91 cm    TDI SEPTAL 0.06 m/s    LV LATERAL E/E' RATIO 8.33 m/s    LV SEPTAL E/E' RATIO 12.50 m/s    Right Atrial Pressure (from IVC) 3 mmHg    TDI LATERAL 0.09 m/s    Mean e' 0.08 m/s    TV rest pulmonary artery pressure 28 mmHg    E/E' ratio 10.00 m/s    Narrative    · There is left ventricular concentric hypertrophy.  · With hyperdynamic systolic function. The estimated ejection fraction is   75%.  · Normal left ventricular diastolic function.  · Normal right ventricular systolic function.  · Normal central venous pressure (3 mmHg).  · The estimated PA systolic pressure is 28 mmHg.        Assessment and Plan:     * Hypertensive emergency  In setting of emotional upset  Improved with home medications   Continue Lasix, Avapro, Imdur, Terazosin  Follow up with PCP or Dr Fried for close monitoring and consider digital HTN clinic upon discharge  EKG and TTE pending     Reduced visual acuity  CT negative for any acute changes  Resolved with BP control     Obesity (BMI  30.0-34.9)  Weight loss encouraged     PAD (peripheral artery disease)  Stable  Continue asa, statin, ARB     Hyperlipidemia  Continue statin         VTE Risk Mitigation (From admission, onward)         Ordered     enoxaparin injection 40 mg  Daily         06/16/22 1729     IP VTE HIGH RISK PATIENT  Once         06/16/22 1729     Place sequential compression device  Until discontinued         06/16/22 1729                Thank you for your consult. I will follow-up with patient. Please contact us if you have any additional questions.    Favio Luu NP  Cardiology   [place] - Telemetry

## 2022-06-17 NOTE — NURSING
"RAPID RESPONSE NURSE NOTE     Admit Date: 2022  LOS: 0  Code Status: Full Code   Date of Consult: 2022  : 1940  Age: 82 y.o.  Weight:   Wt Readings from Last 1 Encounters:   22 91.1 kg (200 lb 13.4 oz)     Sex: female  Race: Black or    Bed: K455/K455 A:   MRN: [id number]  Time Rapid Response Team page Received: 1314  Time Rapid Response Team at Bedside: 1316  Time Rapid Response Team left Bedside: 1350  Was the patient discharged from an ICU this admission?   no  Was the patient discharged from a PACU within last 24 hours?  no  Did the patient receive conscious sedation/general anesthesia within last 24 hours?  no  Was the patient in the ED within the past 24 hours?  no  Was the patient started on NIPPV within the past 24 hours?  no  Did this progress into an ARC or CPA:  no  Attending Physician: Unique Okeefe*  Primary Service: Networked reference to record PCT   Consult Requested By: Unique Okeefe*     SITUATION     Reason for Call: AMS  Called to evaluate the patient for Neuro    BACKGROUND     Why is the patient in the hospital?: Hypertensive emergency    Patient has a past medical history of Hyperlipidemia, Hypertension, and Type 2 diabetes mellitus without complication, without long-term current use of insulin.    ASSESSMENT/INTERVENTIONS     BP (!) 127/58   Pulse 87   Temp 97.6 °F (36.4 °C)   Resp 16   Ht 5' 4" (1.626 m)   Wt 91.1 kg (200 lb 13.4 oz)   SpO2 99%   Breastfeeding No   BMI 34.47 kg/m²     What did you find: On arrival, patient was lethargic, had a left sided facial droop with slurred speech and confusion. BP was 110s/50s. Patient is normally hypertensive.     RECOMMENDATIONS     We recommend: Reccommended changing MET to code stroke and giving IVF to increase BP. Code stroke activated. IVF bolus started. Patient brought down for CTA head/neck. Tele stroke cart placed at bedside.     FOLLOW-UP/CONTINGENCY PLAN     Patient needs a " second visit at : n/a    Call the Rapid Response Nurse, Kristin Moser RN at x [phone] for additional questions or concerns.    PHYSICIAN ESCALATION     Orders received and case discussed with Dr. Noonan.    Disposition: Remain in room 455.

## 2022-06-17 NOTE — SUBJECTIVE & OBJECTIVE
Past Medical History:   Diagnosis Date    Hyperlipidemia     Hypertension     Type 2 diabetes mellitus without complication, without long-term current use of insulin 3/17/2022       Past Surgical History:   Procedure Laterality Date     SECTION      x1    HERNIA REPAIR      umbilical    LEG SURGERY Right 2017    stents placed       Review of patient's allergies indicates:   Allergen Reactions    Cilostazol      [id number] and stomach cramps    Clonidine     Coreg [carvedilol]      Cramps stomach and nausea    Diltiazem      nauseana d stoamch cramps    Lexapro [escitalopram oxalate]      Dizzy, nausea and weak    Shellfish containing products Hives       No current facility-administered medications on file prior to encounter.     Current Outpatient Medications on File Prior to Encounter   Medication Sig    aspirin (ECOTRIN) 81 MG EC tablet Take 81 mg by mouth once daily.    atorvastatin (LIPITOR) 40 MG tablet Take 1 tablet (40 mg total) by mouth once daily. For cholesterol    cholecalciferol, vitamin D3, (VITAMIN D3) 125 mcg (5,000 unit) Tab Take 1 tablet (5,000 Units total) by mouth once daily.    furosemide (LASIX) 40 MG tablet TAKE 1 TABLET(40 MG) BY MOUTH EVERY DAY FOR LEG SWELLING    irbesartan (AVAPRO) 300 MG tablet TAKE 1 TABLET(300 MG) BY MOUTH EVERY DAY    isosorbide mononitrate (IMDUR) 60 MG 24 hr tablet TAKE 1 TABLET(60 MG) BY MOUTH EVERY DAY    potassium chloride SA (K-DUR,KLOR-CON) 20 MEQ tablet Take 1 tablet (20 mEq total) by mouth once daily.    sucralfate (CARAFATE) 1 gram tablet TAKE 1 TABLET(1 GRAM) BY MOUTH THREE TIMES DAILY    terazosin (HYTRIN) 5 MG capsule Take 1 capsule (5 mg total) by mouth every evening. For blood pressure     Family History       Problem Relation (Age of Onset)    Arthritis Sister    Breast cancer Sister    Cancer Sister, Brother    Diabetes Mother, Sister    Heart attack Father    Heart disease Father    Hypertension Mother, Sister, Sister    Kidney failure  Sister    Lupus Sister    No Known Problems Son    Stroke Father          Tobacco Use    Smoking status: Never Smoker    Smokeless tobacco: Never Used   Substance and Sexual Activity    Alcohol use: No    Drug use: No    Sexual activity: Not Currently     Partners: Male     Review of Systems   Constitutional: Negative. Negative for diaphoresis.   HENT: Negative.     Eyes:  Positive for visual disturbance.   Cardiovascular:  Positive for leg swelling (chronic, stable). Negative for chest pain, dyspnea on exertion, irregular heartbeat, near-syncope, orthopnea, palpitations, paroxysmal nocturnal dyspnea and syncope.   Respiratory: Negative.  Negative for cough and shortness of breath.    Endocrine: Negative.    Hematologic/Lymphatic: Negative.    Skin: Negative.    Musculoskeletal: Negative.    Gastrointestinal: Negative.  Negative for nausea and vomiting.   Genitourinary: Negative.    Neurological:  Positive for headaches. Negative for focal weakness.   Psychiatric/Behavioral: Negative.     Allergic/Immunologic: Negative.    Objective:     Vital Signs (Most Recent):  Temp: 98.9 °F (37.2 °C) (06/17/22 0626)  Pulse: 72 (06/17/22 0800)  Resp: 18 (06/17/22 0626)  BP: (!) 158/70 (06/17/22 0626)  SpO2: 98 % (06/17/22 0626)   Vital Signs (24h Range):  Temp:  [96.7 °F (35.9 °C)-98.9 °F (37.2 °C)] 98.9 °F (37.2 °C)  Pulse:  [55-97] 72  Resp:  [16-22] 18  SpO2:  [98 %-100 %] 98 %  BP: (117-248)/() 158/70     Weight: 91.1 kg (200 lb 13.4 oz)  Body mass index is 34.47 kg/m².    SpO2: 98 %  O2 Device (Oxygen Therapy): room air    No intake or output data in the 24 hours ending 06/17/22 1021    Lines/Drains/Airways       Peripheral Intravenous Line  Duration                  Peripheral IV - Single Lumen 06/16/22 1340 20 G Left Hand <1 day                    Physical Exam  Constitutional:       General: She is not in acute distress.     Appearance: She is not diaphoretic.   HENT:      Head: Atraumatic.   Eyes:      General:          Right eye: No discharge.         Left eye: No discharge.   Cardiovascular:      Rate and Rhythm: Normal rate and regular rhythm.      Heart sounds: No murmur heard.    No friction rub. No gallop.   Pulmonary:      Effort: Pulmonary effort is normal.      Breath sounds: Normal breath sounds.   Abdominal:      General: Bowel sounds are normal.      Palpations: Abdomen is soft.   Skin:     General: Skin is warm and dry.      Capillary Refill: Capillary refill takes less than 2 seconds.   Neurological:      Mental Status: She is alert and oriented to person, place, and time.   Psychiatric:         Mood and Affect: Mood normal.         Behavior: Behavior normal.         Thought Content: Thought content normal.         Judgment: Judgment normal.       Significant Labs: BMP:   Recent Labs   Lab 06/16/22  1351 06/17/22  0234   * 161*    140   K 3.9 3.7    102   CO2 26 24   BUN 14 14   CREATININE 0.95 1.1   CALCIUM 8.5* 9.3   , CMP   Recent Labs   Lab 06/16/22  1351 06/17/22  0234    140   K 3.9 3.7    102   CO2 26 24   * 161*   BUN 14 14   CREATININE 0.95 1.1   CALCIUM 8.5* 9.3   PROT 7.8 8.1   ALBUMIN 3.8 3.2*   BILITOT 0.8 1.1*   ALKPHOS 150* 150*   AST 23 20   ALT 20 16   ANIONGAP 8 14   ESTGFRAFRICA >60.0 54*   EGFRNONAA 55.6* 47*   , CBC   Recent Labs   Lab 06/16/22  1351 06/17/22  0234   WBC 6.95 6.86   HGB 11.4* 12.6   HCT 35.6* 38.8    218   , INR No results for input(s): INR, PROTIME in the last 48 hours., Lipid Panel No results for input(s): CHOL, HDL, LDLCALC, TRIG, CHOLHDL in the last 48 hours., Troponin No results for input(s): TROPONINI in the last 48 hours., and All pertinent lab results from the last 24 hours have been reviewed.    Significant Imaging: Echocardiogram: Transthoracic echo (TTE) complete (Cupid Only):   Results for orders placed or performed during the hospital encounter of 10/21/20   Echo Color Flow Doppler? Yes   Result Value Ref Range    BSA  "2 m2    IVC proximal 0.8 cm    MV "A" wave duration 121.00 msec    Pulm vein "A" wave 131.00 msec    LA WIDTH 3.77 cm    AORTIC VALVE CUSP SEPERATION 1.69 cm    PV PEAK VELOCITY 1.06 cm/s    LVIDd 2.96 (A) 3.5 - 6.0 cm    IVS 1.52 (A) 0.6 - 1.1 cm    Posterior Wall 1.44 (A) 0.6 - 1.1 cm    Ao root annulus 2.18 cm    LVIDs 2.05 (A) 2.1 - 4.0 cm    FS 31 28 - 44 %    LA volume 54.06 cm3    LV mass 151.64 g    LA size 4.16 cm    RVDD 2.25 cm    TAPSE 1.67 cm    Left Ventricle Relative Wall Thickness 0.97 cm    AV mean gradient 8 mmHg    AV valve area 3.34 cm2    AV Velocity Ratio 0.70     AV index (prosthetic) 0.87     MV valve area p 1/2 method 6.82 cm2    E/A ratio 0.50     E wave deceleration time 111.17 msec    Pulm vein S/D ratio 2.59     LVOT diameter 2.21 cm    LVOT area 3.8 cm2    LVOT peak john 1.19 m/s    LVOT peak VTI 21.67 cm    Ao peak john 1.69 m/s    Ao VTI 24.91 cm    Mr max john 0.05 m/s    LVOT stroke volume 83.08 cm3    AV peak gradient 11 mmHg    MV Peak E John 0.75 m/s    TR Max John 2.49 m/s    MV stenosis pressure 1/2 time 32.24 ms    MV Peak A John 1.51 m/s    PV Peak S John 0.83 m/s    PV Peak D John 0.32 m/s    LV Systolic Volume 13.57 mL    LV Systolic Volume Index 7.0 mL/m2    LV Diastolic Volume 34.00 mL    LV Diastolic Volume Index 17.53 mL/m2    LA Volume Index 27.9 mL/m2    LV Mass Index 78 g/m2    RA Major Axis 3.86 cm    Left Atrium Minor Axis 4.07 cm    Left Atrium Major Axis 4.04 cm    Triscuspid Valve Regurgitation Peak Gradient 25 mmHg    RA Width 2.91 cm    TDI SEPTAL 0.06 m/s    LV LATERAL E/E' RATIO 8.33 m/s    LV SEPTAL E/E' RATIO 12.50 m/s    Right Atrial Pressure (from IVC) 3 mmHg    TDI LATERAL 0.09 m/s    Mean e' 0.08 m/s    TV rest pulmonary artery pressure 28 mmHg    E/E' ratio 10.00 m/s    Narrative    · There is left ventricular concentric hypertrophy.  · With hyperdynamic systolic function. The estimated ejection fraction is   75%.  · Normal left ventricular diastolic " function.  · Normal right ventricular systolic function.  · Normal central venous pressure (3 mmHg).  · The estimated PA systolic pressure is 28 mmHg.

## 2022-06-17 NOTE — PLAN OF CARE
"CHUN met with pt at bedside to complete assessment. Pt is AxO 3  and able to verbally answer assessment questions. Pt confirmed demographic information and informed SW to call Mara for following assessments questions. SW was able to call Mara [phone] who identified self as pt "daughter". Mara reported being main support for pt as pt was Mara's mother's best friend. At time of d/c Mara will transport pt back to pt home where pt lives alone. Mara reports to visit pt daily to assist with any needs. Pt is reported to be independent of ADL's and have no DME in the home. Mara reports assisting with meal prep or showering for safety/support at times. Mara reports pt is heard of hearing. Pt has medicaid and able to afford medication. Mara assist in transporting to and from all needed appointments. Mara reports being aware of [facility] authority and resources they can provide. CHUN informed Mara of benefits of medical alert for pt since pt lives alone. CHUN informed Mara medical alert can be programmed to call her cell instead of 911 as well. CHUN also informed Mara of ability to be paid caregiver, current waiting list.  Pt has PCC appointment scheduled and cards requested. CHUN updated whiteboard with CHUN name and contact information. CHUN confirmed pt understanding of Observation unit and expected discharge plan. CHUN will continue to follow pt throughout care and assist with any discharge needs.         06/17/22 1108   Discharge Planning   Assessment Type Discharge Planning Brief Assessment   Resource/Environmental Concerns none   Support Systems Children;Friends/neighbors   Equipment Currently Used at Home none   Current Living Arrangements home/apartment/condo   Patient/Family Anticipates Transition to home   Patient/Family Anticipated Services at Transition none   DME Needed Upon Discharge  none   Discharge Plan A Home     Future Appointments   Date Time Provider Department Center   6/28/2022 10:00 AM Meredith " GLORIA Doty MD [facility]   9/19/2022 10:00 AM Rudy Cruz MD [facility]      Vanita Jones, W  [facility]  [phone]

## 2022-06-17 NOTE — H&P
[facility] Medicine  History & Physical    Patient Name: Katy Soto  MRN: [id number]  Patient Class: OP- Observation  Admission Date: 6/16/2022  Attending Physician: Kota Chapman MD   Primary Care Provider: Rudy Cruz MD         Patient information was obtained from patient, past medical records and ER records.     Subjective:     Principal Problem:Hypertensive emergency    Chief Complaint:   Chief Complaint   Patient presents with    Eye Problem     For one week now my eyes have been bothering me. My right eye is swollen now. I thought maybe it was from painting but then 4 days ago it got worse.         HPI: Katy Soto is an 82-year-old female with history of hypertension, hyperlipidemia and diabetes who presented to the ED with complaints of pain under her right eye concern for swelling as well as a mild headache. Patient states that she has had blurred vision and headache since Monday which last night worsened prompting her to come to ED but she reports now has resolved. She states that the headache wrapped from the middle overhead along with the front of her forehead. She states she is compliant with medications yet did not take her blood pressure medications today because she was in a rush. Denies any associated weakness, numbness, tingling, chest pain, shortness of breath or other complaints.    In the ED: decrease visual acuity to the right eye noted. Patient hypertensive with improvement after hydralazine, losartan, and Lasix. CTH: Chronic microvascular ischemic changes. Per ED to Neurology: recommended maybe getting MRI for worsening symptoms. Admitted to [facility] for further evaluation and neurology consultation.      Past Medical History:   Diagnosis Date    Hyperlipidemia     Hypertension     Type 2 diabetes mellitus without complication, without long-term current use of insulin 3/17/2022       Past Surgical History:   Procedure Laterality Date      SECTION      x1    HERNIA REPAIR      umbilical    LEG SURGERY Right 2017    stents placed       Review of patient's allergies indicates:   Allergen Reactions    Cilostazol      [id number] and stomach cramps    Clonidine     Coreg [carvedilol]      Cramps stomach and nausea    Diltiazem      nauseana d stoamch cramps    Lexapro [escitalopram oxalate]      Dizzy, nausea and weak    Shellfish containing products Hives       No current facility-administered medications on file prior to encounter.     Current Outpatient Medications on File Prior to Encounter   Medication Sig    aspirin (ECOTRIN) 81 MG EC tablet Take 81 mg by mouth once daily.    atorvastatin (LIPITOR) 40 MG tablet Take 1 tablet (40 mg total) by mouth once daily. For cholesterol    cholecalciferol, vitamin D3, (VITAMIN D3) 125 mcg (5,000 unit) Tab Take 1 tablet (5,000 Units total) by mouth once daily.    furosemide (LASIX) 40 MG tablet TAKE 1 TABLET(40 MG) BY MOUTH EVERY DAY FOR LEG SWELLING    irbesartan (AVAPRO) 300 MG tablet TAKE 1 TABLET(300 MG) BY MOUTH EVERY DAY    isosorbide mononitrate (IMDUR) 60 MG 24 hr tablet TAKE 1 TABLET(60 MG) BY MOUTH EVERY DAY    potassium chloride SA (K-DUR,KLOR-CON) 20 MEQ tablet Take 1 tablet (20 mEq total) by mouth once daily.    sucralfate (CARAFATE) 1 gram tablet TAKE 1 TABLET(1 GRAM) BY MOUTH THREE TIMES DAILY    terazosin (HYTRIN) 5 MG capsule Take 1 capsule (5 mg total) by mouth every evening. For blood pressure     Family History       Problem Relation (Age of Onset)    Arthritis Sister    Breast cancer Sister    Cancer Sister, Brother    Diabetes Mother, Sister    Heart attack Father    Heart disease Father    Hypertension Mother, Sister, Sister    Kidney failure Sister    Lupus Sister    No Known Problems Son    Stroke Father          Tobacco Use    Smoking status: Never Smoker    Smokeless tobacco: Never Used   Substance and Sexual Activity    Alcohol use: No    Drug use: No     Sexual activity: Not Currently     Partners: Male     Review of Systems   Constitutional: Negative.    HENT: Negative.     Eyes:  Positive for visual disturbance.   Respiratory: Negative.     Cardiovascular: Negative.    Gastrointestinal:  Positive for nausea and vomiting.   Endocrine: Negative.    Genitourinary: Negative.    Musculoskeletal: Negative.    Skin: Negative.    Neurological:  Positive for headaches.   Psychiatric/Behavioral: Negative.     Objective:     Vital Signs (Most Recent):  Temp: 97.7 °F (36.5 °C) (06/16/22 2241)  Pulse: 87 (06/16/22 2241)  Resp: 20 (06/16/22 2241)  BP: (!) 200/88 (06/16/22 2241)  SpO2: 99 % (06/16/22 2241)   Vital Signs (24h Range):  Temp:  [97.7 °F (36.5 °C)-98.3 °F (36.8 °C)] 97.7 °F (36.5 °C)  Pulse:  [55-97] 87  Resp:  [16-22] 20  SpO2:  [98 %-100 %] 99 %  BP: (117-248)/() 200/88     Weight: 91.1 kg (200 lb 13.4 oz)  Body mass index is 34.47 kg/m².    Physical Exam  Vitals and nursing note reviewed.   Constitutional:       General: She is not in acute distress.     Appearance: Normal appearance. She is obese. She is not ill-appearing, toxic-appearing or diaphoretic.   HENT:      Head: Normocephalic and atraumatic.      Mouth/Throat:      Mouth: Mucous membranes are moist.   Eyes:      Extraocular Movements: Extraocular movements intact.      Pupils: Pupils are equal, round, and reactive to light.      Comments: Reports blurry vision resolved   Cardiovascular:      Rate and Rhythm: Normal rate and regular rhythm.      Pulses: Normal pulses.      Heart sounds: Normal heart sounds. No murmur heard.  Pulmonary:      Effort: Pulmonary effort is normal. No respiratory distress.      Breath sounds: Normal breath sounds.   Abdominal:      General: Bowel sounds are normal. There is no distension.      Palpations: Abdomen is soft.      Tenderness: There is no abdominal tenderness.   Musculoskeletal:         General: Normal range of motion.      Cervical back: Normal range of  motion.      Comments: Mild BLE swelling   Skin:     General: Skin is warm.      Capillary Refill: Capillary refill takes 2 to 3 seconds.   Neurological:      General: No focal deficit present.      Mental Status: She is alert and oriented to person, place, and time. Mental status is at baseline.   Psychiatric:         Mood and Affect: Mood normal.         Behavior: Behavior normal.         Thought Content: Thought content normal.         Judgment: Judgment normal.         CRANIAL NERVES     CN III, IV, VI   Pupils are equal, round, and reactive to light.     Significant Labs: All pertinent labs within the past 24 hours have been reviewed.    Significant Imaging: I have reviewed all pertinent imaging results/findings within the past 24 hours.    Assessment/Plan:     * Hypertensive emergency  -Hypertensive emergency noted in the ED   -Patient states that she has been compliant with medications except for clonidine which she informed PCP that it makes her sick and she cannot take it. Per chart review, patient is followed by Dr. Fried outpatient. Per chart review patient is noncompliant with medications  -Patient takes irbesartan, terazosin at home per chart however she is not familiar with her medications   -Given hydralazine 10 mg, lasix 40 mg, & losartan 25 mg in ED with improvement  -PRN hydralazine for SBP >170 or DBP >110  -Resume home meds and monitor      Reduced visual acuity  -Patient reports her vision has been blurry since Monday and worsened PTA where she could not make out objects that were right in front of her.  -Decreased visual acuity may be related to uncontrolled HTN  -CTH: Chronic microvascular ischemic changes  -On exam pupils equal and reactive, EOM intact, gait normal, no focal deficits noted, reports blurred vision resolved  -Neurology consulted-will defer MRI pending consultation    Severe obesity (BMI 35.0-39.9) with comorbidity  -encourage lifestyle modifications (helathy diet, exercise)        Type 2 diabetes mellitus without complication, without long-term current use of insulin  Hemoglobin A1C   Date Value Ref Range Status   03/17/2022 7.0 (H) 4.0 - 5.6 % Final   -low dose SSI, accuchecks ACHS, diabetic diet        Obesity (BMI 30.0-34.9)  -encourage lifestyle modifications (helathy diet, exercise)       PAD (peripheral artery disease)  -Resume home ASA, Lipitor, BB, Imdur      Hyperlipidemia  -Resume home statin      VTE Risk Mitigation (From admission, onward)         Ordered     enoxaparin injection 40 mg  Daily         06/16/22 1729     IP VTE HIGH RISK PATIENT  Once         06/16/22 1729     Place sequential compression device  Until discontinued         06/16/22 1729                 Lacy Louise DNP, [facility]-BC  Hospitalist   Department of Hospital Medicine   [facility]   Office #: [phone]

## 2022-06-17 NOTE — TREATMENT PLAN
[facility] RN met with patient's caregiver Mara regarding [facility] hospital follow up upon discharge. Pt's caregiver agreeable to hospital follow up .RN informed pt of scheduled appointment and that appointment will also appear on d/c AVS. Patient informed to bring portable home O2 if used and all medication bottles to [facility] follow up appointment.  [facility] information handout, appointment letter and folder provided to patient's caregiver. Caregiver Mara will provide transportation to appointment.    Patient Contact info:[phone] .    Person providing transportation contact info: Mara [phone]    Barriers to attending P[facility] visit: none noted.    Future Appointments   Date Time Provider Department Center   6/28/2022 10:00 AM Meredith Doty MD [facility] IMPRI [place] [facility]   6/28/2022  2:30 PM January Mckeon NP [facility] CARDIO [place] [facility]   9/19/2022 10:00 AM Rudy Cruz MD [facility]

## 2022-06-18 PROBLEM — G45.9 TIA (TRANSIENT ISCHEMIC ATTACK): Status: ACTIVE | Noted: 2022-06-18

## 2022-06-18 LAB
AORTIC ROOT ANNULUS: 2.88 CM
AV INDEX (PROSTH): 0.82
AV MEAN GRADIENT: 7 MMHG
AV PEAK GRADIENT: 11 MMHG
AV VALVE AREA: 2.49 CM2
AV VELOCITY RATIO: 0.67
BSA FOR ECHO PROCEDURE: 2.02 M2
CV ECHO LV RWT: 0.77 CM
DOP CALC AO PEAK VEL: 1.65 M/S
DOP CALC AO VTI: 32.88 CM
DOP CALC LVOT AREA: 3 CM2
DOP CALC LVOT DIAMETER: 1.97 CM
DOP CALC LVOT PEAK VEL: 1.1 M/S
DOP CALC LVOT STROKE VOLUME: 81.98 CM3
DOP CALC MV VTI: 28.38 CM
DOP CALCLVOT PEAK VEL VTI: 26.91 CM
E WAVE DECELERATION TIME: 240.08 MSEC
E/A RATIO: 0.69
E/E' RATIO: 6.58 M/S
ECHO LV POSTERIOR WALL: 1.36 CM (ref 0.6–1.1)
EJECTION FRACTION: 75 %
FRACTIONAL SHORTENING: 31 % (ref 28–44)
INTERVENTRICULAR SEPTUM: 1.65 CM (ref 0.6–1.1)
IVRT: 137.01 MSEC
LA MAJOR: 4.23 CM
LA MINOR: 4.98 CM
LA WIDTH: 3.81 CM
LEFT ATRIUM SIZE: 3.4 CM
LEFT ATRIUM VOLUME INDEX: 25.7 ML/M2
LEFT ATRIUM VOLUME: 50.37 CM3
LEFT INTERNAL DIMENSION IN SYSTOLE: 2.43 CM (ref 2.1–4)
LEFT VENTRICLE DIASTOLIC VOLUME INDEX: 26.37 ML/M2
LEFT VENTRICLE DIASTOLIC VOLUME: 51.68 ML
LEFT VENTRICLE MASS INDEX: 100 G/M2
LEFT VENTRICLE SYSTOLIC VOLUME INDEX: 10.6 ML/M2
LEFT VENTRICLE SYSTOLIC VOLUME: 20.85 ML
LEFT VENTRICULAR INTERNAL DIMENSION IN DIASTOLE: 3.52 CM (ref 3.5–6)
LEFT VENTRICULAR MASS: 195.98 G
LV LATERAL E/E' RATIO: 11.29 M/S
LV SEPTAL E/E' RATIO: 4.65 M/S
MV A" WAVE DURATION": 11.04 MSEC
MV MEAN GRADIENT: 1 MMHG
MV PEAK A VEL: 1.14 M/S
MV PEAK E VEL: 0.79 M/S
MV PEAK GRADIENT: 5 MMHG
MV STENOSIS PRESSURE HALF TIME: 72.94 MS
MV VALVE AREA BY CONTINUITY EQUATION: 2.89 CM2
MV VALVE AREA P 1/2 METHOD: 3.02 CM2
POCT GLUCOSE: 173 MG/DL (ref 70–110)
POCT GLUCOSE: 175 MG/DL (ref 70–110)
POCT GLUCOSE: 214 MG/DL (ref 70–110)
POCT GLUCOSE: 253 MG/DL (ref 70–110)
PULM VEIN S/D RATIO: 2.29
PV PEAK D VEL: 0.24 M/S
PV PEAK S VEL: 0.55 M/S
RA MAJOR: 4.43 CM
RA WIDTH: 2.96 CM
RIGHT VENTRICULAR END-DIASTOLIC DIMENSION: 1.91 CM
TDI LATERAL: 0.07 M/S
TDI SEPTAL: 0.17 M/S
TDI: 0.12 M/S

## 2022-06-18 PROCEDURE — 96372 THER/PROPH/DIAG INJ SC/IM: CPT

## 2022-06-18 PROCEDURE — 99900035 HC TECH TIME PER 15 MIN (STAT)

## 2022-06-18 PROCEDURE — 25000003 PHARM REV CODE 250: Performed by: NURSE PRACTITIONER

## 2022-06-18 PROCEDURE — 97162 PT EVAL MOD COMPLEX 30 MIN: CPT | Performed by: PHYSICAL THERAPIST

## 2022-06-18 PROCEDURE — 97166 OT EVAL MOD COMPLEX 45 MIN: CPT

## 2022-06-18 PROCEDURE — 25000003 PHARM REV CODE 250: Performed by: FAMILY MEDICINE

## 2022-06-18 PROCEDURE — 94761 N-INVAS EAR/PLS OXIMETRY MLT: CPT

## 2022-06-18 PROCEDURE — 11000001 HC ACUTE MED/SURG PRIVATE ROOM

## 2022-06-18 PROCEDURE — 63600175 PHARM REV CODE 636 W HCPCS

## 2022-06-18 PROCEDURE — 99233 SBSQ HOSP IP/OBS HIGH 50: CPT | Mod: 25,,, | Performed by: INTERNAL MEDICINE

## 2022-06-18 PROCEDURE — 94760 N-INVAS EAR/PLS OXIMETRY 1: CPT

## 2022-06-18 PROCEDURE — 99233 PR SUBSEQUENT HOSPITAL CARE,LEVL III: ICD-10-PCS | Mod: 25,,, | Performed by: INTERNAL MEDICINE

## 2022-06-18 RX ORDER — CLOPIDOGREL BISULFATE 75 MG/1
75 TABLET ORAL DAILY
Status: DISCONTINUED | OUTPATIENT
Start: 2022-06-18 | End: 2022-06-19 | Stop reason: HOSPADM

## 2022-06-18 RX ADMIN — ISOSORBIDE MONONITRATE 60 MG: 30 TABLET, EXTENDED RELEASE ORAL at 09:06

## 2022-06-18 RX ADMIN — LOSARTAN POTASSIUM 100 MG: 50 TABLET, FILM COATED ORAL at 09:06

## 2022-06-18 RX ADMIN — ENOXAPARIN SODIUM 40 MG: 100 INJECTION SUBCUTANEOUS at 04:06

## 2022-06-18 RX ADMIN — PRAZOSIN HYDROCHLORIDE 2 MG: 1 CAPSULE ORAL at 09:06

## 2022-06-18 RX ADMIN — FUROSEMIDE 40 MG: 40 TABLET ORAL at 09:06

## 2022-06-18 RX ADMIN — ATORVASTATIN CALCIUM 40 MG: 40 TABLET, FILM COATED ORAL at 09:06

## 2022-06-18 RX ADMIN — ASPIRIN 81 MG: 81 TABLET, COATED ORAL at 09:06

## 2022-06-18 RX ADMIN — PRAZOSIN HYDROCHLORIDE 2 MG: 1 CAPSULE ORAL at 08:06

## 2022-06-18 RX ADMIN — CLOPIDOGREL 75 MG: 75 TABLET, FILM COATED ORAL at 09:06

## 2022-06-18 RX ADMIN — INSULIN ASPART 2 UNITS: 100 INJECTION, SOLUTION INTRAVENOUS; SUBCUTANEOUS at 12:06

## 2022-06-18 NOTE — PT/OT/SLP EVAL
Physical Therapy Evaluation    Patient Name:  Katy Soto   MRN:  [id number]    Recommendations:     Discharge Recommendations:  home health PT, home health OT   Discharge Equipment Recommendations: walker, rolling   Barriers to discharge: Decreased caregiver support and decreased functional mobility safety    Assessment:     Katy Soto is a 82 y.o. female admitted with a medical diagnosis of Hypertensive emergency.  She presents with the following impairments/functional limitations:  weakness, gait instability, impaired cardiopulmonary response to activity, impaired endurance, impaired balance, decreased lower extremity function, impaired self care skills, impaired functional mobilty. Pt sit to stand with RW and ambulated 5' with CG slowly with short step length.  Pt unable to  LLE to simone socks Indepedendenttly without losing her balance posteriorly. Pt went sit to supine with CG and scooted to HOB with supervision.    Rehab Prognosis: Good; patient would benefit from acute skilled PT services to address these deficits and reach maximum level of function.    Recent Surgery: * No surgery found *      Plan:     During this hospitalization, patient to be seen  (5-6x/week) to address the identified rehab impairments via gait training, therapeutic activities, therapeutic groups, neuromuscular re-education and progress toward the following goals:    · Plan of Care Expires:  07/14/22    Subjective     Chief Complaint: concerned her siblings have had strokes and now maybe she did  Patient/Family Comments/goals: none stated  Pain/Comfort:  · Pain Rating 1: 0/10    Patients cultural, spiritual, Catholic conflicts given the current situation: no    Living Environment:  Pt lived alone previously but her sisters brought her food a lot.    Prior to admission, patients level of function was Ind ambulation.  Equipment used at home: none.  DME owned (not currently used): none.  Upon discharge, patient will have  assistance from TBD.    Objective:     Communicated with nurse prior to session.  Patient found in w/c having just returned from MRI. with peripheral IV  upon PT entry to room.    General Precautions: Standard, fall   Orthopedic Precautions:    Braces:    Respiratory Status: Room air    Exams:  · Pt with some word finding difficulty but appears oriented x 4.  Pt grossly has BLE AROM WFL.  Pt not following directions well to allow manual muscle testing.    Functional Mobility:   Pt sit to stand with RW and ambulated 5' with CG slowly with short step length.  Pt unable to  LLE to simone socks Indepedendenttly without losing her balance posteriorly. Pt went sit to supine with CG and scooted to HOB with supervision.      AM-PAC 6 CLICK MOBILITY  Total Score:19     Patient left HOB elevated with all lines intact, call button in reach, bed alarm on and nurse notified.    GOALS:   Multidisciplinary Problems     Physical Therapy Goals        Problem: Physical Therapy    Goal Priority Disciplines Outcome Goal Variances Interventions   Physical Therapy Goal     PT, PT/OT Ongoing, Progressing     Description: Goals to be met by: 7/15/22     Patient will increase functional independence with mobility by performin.  Supine to/from sit with Mod Ind  2.  Bed to/from chair with Mod Ind  3.  Ambulate 200' with RW with supervision  4.  Up in chair 2 hours                     History:     Past Medical History:   Diagnosis Date    Hyperlipidemia     Hypertension     Type 2 diabetes mellitus without complication, without long-term current use of insulin 3/17/2022       Past Surgical History:   Procedure Laterality Date     SECTION      x1    HERNIA REPAIR      umbilical    LEG SURGERY Right 2017    stents placed       Time Tracking:     PT Received On: 22  PT Start Time: 1502     PT Stop Time: 1515  PT Total Time (min): 13 min with OT    Billable Minutes: Evaluation 13      2022

## 2022-06-18 NOTE — PLAN OF CARE
Problem: Physical Therapy  Goal: Physical Therapy Goal  Description: Goals to be met by: 7/15/22     Patient will increase functional independence with mobility by performin.  Supine to/from sit with Mod Ind  2.  Bed to/from chair with Mod Ind  3.  Ambulate 200' with RW with supervision  4.  Up in chair 2 hours    Outcome: Ongoing, Progressing
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
## 2022-06-18 NOTE — PLAN OF CARE
Problem: Occupational Therapy  Goal: Occupational Therapy Goal  Description: Goals to be met by: 07/18/2022      Patient will increase functional independence with ADLs by performing:    UE Dressing with Stand-by Assistance.  LE Dressing with Moderate Assistance.  Grooming while standing with Stand-by Assistance.  Toileting from toilet or bedside commode with Stand-by Assistance for hygiene and clothing management.   Toilet transfer to toilet or bedside commode with Stand-by Assistance.  Increased functional strength to WFL for self care.  Upper extremity exercise program x10 reps per handout, with assistance as needed.      Outcome: Ongoing, Progressing   Pt would benefit from continued OT to address deficits in self care and functional mobility. Recommending possibly SNF 2/2 pt's confusion and possibly limited support; DME needs Likely RW. TTB vs SC (pending evaluation of home set up as pt confused throughout session, may be poor historian.

## 2022-06-18 NOTE — PROGRESS NOTES
[facility] Medicine  Progress Note    Patient Name: Katy Soto  MRN: [id number]  Patient Class: OP- Observation   Admission Date: 6/16/2022  Length of Stay: 0 days  Attending Physician: Unique Okeefe*  Primary Care Provider: Rudy Cruz MD        Subjective:     Principal Problem:Hypertensive emergency        HPI:  Katy Soto is an 82-year-old female with history of hypertension, hyperlipidemia and diabetes who presented to the ED with complaints of pain under her right eye concern for swelling as well as a mild headache. Patient states that she has had blurred vision and headache since Monday which last night worsened prompting her to come to ED but she reports now has resolved. She states that the headache wrapped from the middle overhead along with the front of her forehead. She states she is compliant with medications yet did not take her blood pressure medications today because she was in a rush. Denies any associated weakness, numbness, tingling, chest pain, shortness of breath or other complaints.    In the ED: decrease visual acuity to the right eye noted. Patient hypertensive with improvement after hydralazine, losartan, and Lasix. CTH: Chronic microvascular ischemic changes. Per ED to Neurology: recommended maybe getting MRI for worsening symptoms. Admitted to [facility] for further evaluation and neurology consultation.      Overview/Hospital Course:  No notes on file    Interval History: awake and alert, not in any distress.   Symptoms has completely resolved.   BP much improved, resume all home meds.  Appreciates vascular neurology - patient not a tPA candidate. Continue palvix x 21 days and ASA  Awaiting MRI and TTE       Review of Systems   Respiratory:  Negative for shortness of breath.    Cardiovascular:  Negative for chest pain.   Gastrointestinal:  Negative for nausea and vomiting.   Genitourinary:  Negative for dysuria.   Musculoskeletal:   Negative for back pain.   Skin:  Negative for color change.   Neurological:  Negative for weakness, numbness and headaches.   Psychiatric/Behavioral:  Negative for agitation.    Objective:     Vital Signs (Most Recent):  Temp: 97.6 °F (36.4 °C) (06/18/22 1036)  Pulse: 88 (06/18/22 1036)  Resp: 16 (06/18/22 1036)  BP: (!) 140/60 (06/18/22 1036)  SpO2: 98 % (06/18/22 1036)   Vital Signs (24h Range):  Temp:  [97.6 °F (36.4 °C)-98.6 °F (37 °C)] 97.6 °F (36.4 °C)  Pulse:  [64-92] 88  Resp:  [16-19] 16  SpO2:  [97 %-100 %] 98 %  BP: (107-158)/(53-70) 140/60     Weight: 90.7 kg (200 lb)  Body mass index is 34.33 kg/m².    Intake/Output Summary (Last 24 hours) at 6/18/2022 1113  Last data filed at 6/17/2022 1836  Gross per 24 hour   Intake 200 ml   Output --   Net 200 ml        Physical Exam  Vitals and nursing note reviewed.   Constitutional:       General: She is not in acute distress.     Appearance: Normal appearance. She is obese. She is not ill-appearing, toxic-appearing or diaphoretic.   HENT:      Head: Normocephalic and atraumatic.   Eyes:      Comments: Reports blurry vision resolved   Cardiovascular:      Rate and Rhythm: Normal rate and regular rhythm.      Pulses: Normal pulses.      Heart sounds: Normal heart sounds. No murmur heard.  Pulmonary:      Effort: Pulmonary effort is normal. No respiratory distress.      Breath sounds: Normal breath sounds.   Abdominal:      General: Bowel sounds are normal. There is no distension.      Palpations: Abdomen is soft.      Tenderness: There is no abdominal tenderness.   Musculoskeletal:         General: Normal range of motion.      Cervical back: Normal range of motion.   Skin:     General: Skin is warm.      Capillary Refill: Capillary refill takes 2 to 3 seconds.   Neurological:      General: No focal deficit present.      Mental Status: She is alert and oriented to person, place, and time. Mental status is at baseline.   Psychiatric:         Mood and Affect: Mood  normal.         Behavior: Behavior normal.       Significant Labs: A1C:   Recent Labs   Lab 03/17/22  0949 06/17/22  0946   HGBA1C 7.0* 7.9*       ABGs: No results for input(s): PH, PCO2, HCO3, POCSATURATED, BE, TOTALHB, COHB, METHB, O2HB, POCFIO2, PO2 in the last 48 hours.  Blood Culture: No results for input(s): LABBLOO in the last 48 hours.  CBC:   Recent Labs   Lab 06/16/22  1351 06/17/22  0234   WBC 6.95 6.86   HGB 11.4* 12.6   HCT 35.6* 38.8    218       CMP:   Recent Labs   Lab 06/16/22  1351 06/17/22  0234    140   K 3.9 3.7    102   CO2 26 24   * 161*   BUN 14 14   CREATININE 0.95 1.1   CALCIUM 8.5* 9.3   PROT 7.8 8.1   ALBUMIN 3.8 3.2*   BILITOT 0.8 1.1*   ALKPHOS 150* 150*   AST 23 20   ALT 20 16   ANIONGAP 8 14   EGFRNONAA 55.6* 47*       Coagulation: No results for input(s): PT, INR, APTT in the last 48 hours.  Lactic Acid: No results for input(s): LACTATE in the last 48 hours.  Lipase: No results for input(s): LIPASE in the last 48 hours.  Lipid Panel: No results for input(s): CHOL, HDL, LDLCALC, TRIG, CHOLHDL in the last 48 hours.  Magnesium: No results for input(s): MG in the last 48 hours.  POCT Glucose:   Recent Labs   Lab 06/17/22  1955 06/18/22  0346 06/18/22  1038   POCTGLUCOSE 230* 173* 214*       Respiratory Culture: No results for input(s): GSRESP, RESPIRATORYC in the last 48 hours.  Troponin: No results for input(s): TROPONINI in the last 48 hours.  TSH:   Recent Labs   Lab 03/17/22  0949   TSH 4.920*       Urine Culture: No results for input(s): LABURIN in the last 48 hours.  Urine Studies: No results for input(s): COLORU, APPEARANCEUA, PHUR, SPECGRAV, PROTEINUA, GLUCUA, KETONESU, BILIRUBINUA, OCCULTUA, NITRITE, UROBILINOGEN, LEUKOCYTESUR, RBCUA, WBCUA, BACTERIA, SQUAMEPITHEL, HYALINECASTS in the last 48 hours.    Invalid input(s): NORAH    Significant Imaging: I have reviewed all pertinent imaging results/findings within the past 24  hours.      Assessment/Plan:      * Hypertensive emergency  -Hypertensive emergency noted in the ED   -Patient states that she has been compliant with medications except for clonidine which she informed PCP that it makes her sick and she cannot take it. Per chart review, patient is followed by Dr. Fried outpatient. Per chart review patient is noncompliant with medications  -Patient takes irbesartan, Lasix, terazosin at home per chart however she is not familiar with her medications   -Given hydralazine 10 mg, lasix 40 mg, & losartan 25 mg in ED with improvement  -PRN hydralazine for SBP >170 or DBP >110  -Resume home meds and monitor      TIA (transient ischemic attack)  MET called on 6/17 due to left facial droop with AMS    CT head No evidence for acute intracranial hemorrhage or definite abnormal parenchymal enhancement. Generalized cerebral volume loss with patchy and confluent decreased attenuation supratentorial white matter which may be sequela of chronic ischemic change.  CTA head: Atherosclerotic disease cavernous and supraclinoid ICAs with at least moderate stenosis right distal cavernous ICA.  CTA neck: No significant carotid stenosis throughout the neck.  There is moderate to high-grade stenosis in the left proximal subclavian artery.  Consult vascular neurology - patient not a tPA candidate  Consult neurology- continue ASA and add Plavix x 21 day. Allow slightly higher SBP (goal 120-160) to maintain cerebral perfusion      Reduced visual acuity  -Patient reports her vision has been blurry since Monday and worsened PTA where she could not make out objects that were right in front of her.  -Decreased visual acuity may be related to uncontrolled HTN  -CTH: Chronic microvascular ischemic changes  -On exam pupils equal and reactive, EOM intact, gait normal, no focal deficits noted, reports blurred vision resolved  -Neurology consulted-will defer MRI pending consultation    Severe obesity (BMI 35.0-39.9)  with comorbidity  -encourage lifestyle modifications (helathy diet, exercise)       Type 2 diabetes mellitus without complication, without long-term current use of insulin  Hemoglobin A1C   Date Value Ref Range Status   03/17/2022 7.0 (H) 4.0 - 5.6 % Final   -low dose SSI, accuchecks ACHS, diabetic diet  Not on medication  Start Januvia on discharge      Obesity (BMI 30.0-34.9)  -encourage lifestyle modifications (helathy diet, exercise)       PAD (peripheral artery disease)  -Resume home ASA, Lipitor, BB, Imdur      Hyperlipidemia  -Resume home statin        VTE Risk Mitigation (From admission, onward)         Ordered     enoxaparin injection 40 mg  Daily         06/16/22 1729     IP VTE HIGH RISK PATIENT  Once         06/16/22 1729     Place sequential compression device  Until discontinued         06/16/22 1729                Discharge Planning   KENY: 6/17/2022     Code Status: Full Code   Is the patient medically ready for discharge?:     Reason for patient still in hospital (select all that apply): Pending disposition  Discharge Plan A: Home   Discharge Delays: None known at this time              Unique Okeefe MD  Department of Hospital Medicine   [facility]

## 2022-06-18 NOTE — PROGRESS NOTES
NEUROLOGY FLOOR CONSULT    Reason for consult:  blurred vison vs vision loss    CC:  Blurred vision    HPI:   Katy Soto is a 82 y.o. w/ Hx of HTN, HLD, DM2 who presented with pain under her R eye with associated swelling and headache. In the ED, she reported that she had blurred vision night prior to arrival but was resolved on day of presentation. Patient recalled that she was painting 4 days prior to presentation and believes that focusing her eyes may have precipitated her symptoms. Regarding her headache, she stated that it involved primarily the front an top parts of her head on both sides. In the ED, she was noted to have . Patient was given antihypertensives. CTH shows chronic microvascular changes. Patient was admitted for further risk factor modification and [facility] neurology was consulted.     On my entrance into room, MET was called with reduced level of arousal. Patient appearing more confused than usual per RN & family. Stat CTH & CTA was obtained, no LVO per my read, pending official read. Patient accompanied by 3 family members. Patient appearing slightly confused - subjective offered per family is consistent with above subjective in that she has been having blurry vision over the past several days with headache. Per patient now, she has no eye pain, eye swelling, headache, but still has blurry vision. No nausea/vomiting.     SUBJECTIVE   Patient was seen and examined at the bedside. She is alert and oriented to place and person, following commands. No noted weakness or numbness in upper extremities. Bilateral weakness in lower extremities. CTA showed atherosclerosis of cavernous and supraclinoid ICAs, mod narrowing L proximal M2 MCA, and high grade stenosis R P1P2 junction of the PCA. MRI brain showed acute infarct in L cerebral hemisphere and R occipital lobe cortical and subcortical multifocal punctuate infarcts.        ROS: As per HPI    Histories:     Allergies:  Cilostazol, Clonidine,  Coreg [carvedilol], Diltiazem, Lexapro [escitalopram oxalate], and Shellfish containing products    Current Medications:    Current Facility-Administered Medications   Medication Dose Route Frequency Provider Last Rate Last Admin    acetaminophen tablet 650 mg  650 mg Oral Q4H PRN Chata EMMIE Ashley NP        albuterol-ipratropium 2.5 mg-0.5 mg/3 mL nebulizer solution 3 mL  3 mL Nebulization Q6H PRN Chata EMMIE Ashley NP        aluminum-magnesium hydroxide-simethicone 200-200-20 mg/5 mL suspension 30 mL  30 mL Oral QID PRN Chata EMMIE Ashley NP        aspirin EC tablet 81 mg  81 mg Oral Daily Lacy Louise NP   81 mg at 06/18/22 0907    atorvastatin tablet 40 mg  40 mg Oral Daily Lacy Louise NP   40 mg at 06/18/22 0907    clopidogreL tablet 75 mg  75 mg Oral Daily Unique Okeefe MD   75 mg at 06/18/22 0907    enoxaparin injection 40 mg  40 mg Subcutaneous Daily Chata EMMIE Ashley NP   40 mg at 06/17/22 1658    furosemide tablet 40 mg  40 mg Oral Daily Unique Okeefe MD   40 mg at 06/18/22 0907    glucagon (human recombinant) injection 1 mg  1 mg Intramuscular PRN Chata EMMIE Ashley NP        glucose chewable tablet 16 g  16 g Oral PRN Chata EMMIE Ashley NP        glucose chewable tablet 24 g  24 g Oral PRN Chata EMMIE Ashley NP        hydrALAZINE injection 10 mg  10 mg Intravenous Q6H PRN Lacy Louise NP   10 mg at 06/17/22 0006    insulin aspart U-100 pen 0-5 Units  0-5 Units Subcutaneous QID (AC + HS) PRN Chata EMMIE Ashley NP   2 Units at 06/18/22 1257    isosorbide mononitrate 24 hr tablet 60 mg  60 mg Oral Daily Lacy Louise NP   60 mg at 06/18/22 0907    losartan tablet 100 mg  100 mg Oral Daily Lacy Louise NP   100 mg at 06/18/22 0907    melatonin tablet 6 mg  6 mg Oral Nightly PRN Chata CHARLES. Thompson-Aman, REGINA   6 mg at 06/17/22 0006    ondansetron injection 4 mg  4 mg Intravenous Q8H PRN Chata Ashley,  NP   4 mg at 22 1206    prazosin capsule 2 mg  2 mg Oral BID Lacy Louise, NP   2 mg at 22 0907    simethicone chewable tablet 80 mg  1 tablet Oral QID PRN Chata Ashley, NP        sodium chloride 0.9% flush 10 mL  10 mL Intravenous Q8H PRN Chataashly Ashley, REGINA           Past Medical/Surgical/Family History:  Medical:   Past Medical History:   Diagnosis Date    Hyperlipidemia     Hypertension     Type 2 diabetes mellitus without complication, without long-term current use of insulin 3/17/2022      Surgeries:   Past Surgical History:   Procedure Laterality Date     SECTION      x1    HERNIA REPAIR      umbilical    LEG SURGERY Right 2017    stents placed      Family:   Family History   Problem Relation Age of Onset    Diabetes Mother     Hypertension Mother     Stroke Father     Heart disease Father     Heart attack Father     Cancer Sister         Breast cancer    Breast cancer Sister     No Known Problems Son     Kidney failure Sister         Kidney transplant x18 years    Lupus Sister     Hypertension Sister     Diabetes Sister     Arthritis Sister     Hypertension Sister     Cancer Brother      Social History:  Substance Abuse/Dependence History:  Tobacco: denied  EtOH: none  Ilicits: None    Current Evaluation:     Vital Signs:   Vitals:    22 1036   BP: (!) 140/60   Pulse: 88   Resp: 16   Temp: 97.6 °F (36.4 °C)        Neurological Examination   Orientation  Alert, awake, oriented to self, place and year   Memory  Recent memory imparied  Language  No dysarthria, No aphasia.   Cranial Nerves  PERRL, R lower quadrantopia, reduced visual acuity ~20/100, EOMI, V1-V3 intact, symmetric facial expression, hearing grossly intact, SCM & TPZ 5/5, tongue midline, symmetric palate elevation.  Motor  Normal Bulk  Normal Tone  5/5 strength in 4 extremities  Sensory  Normal to light touch throughout  DTR   +2  symmetric  Cerebellar/Gait  Deferred    LABORATORY STUDIES:  Thyroid 4.9; T4 WNL  HgA1C%:  7.9  Vit B12: *  Folate:  *  HDL  35  LDL  90 - 3/17/22    RADIOLOGY STUDIES:  I have personally reviewed the images performed.     HEAD CT (6/17/22)  The brain parenchyma demonstrates areas of decreased attenuation with moderate periventricular white matter consistent with chronic microvascular ischemic changes..  No parenchymal mass, hemorrhage, edema or major vascular distribution infarct.  Vascular calcifications are noted.  Mild prominence of the sulci and ventricles are consistent with age-related involutional changes.  No extra-axial blood or fluid collections.  Skull/extracranial contents (limited evaluation): No fracture. Mastoid air cells and paranasal sinuses are essentially clear.  Impression:  Chronic microvascular ischemic changes.      CTA Head/Neck (6/17/22):   Pending    Assessment:  LAYLA Soto is a 82 y.o. w/ Hx of HTN, HLD, DM2 who presented with blurred vision and eye pain with an SBP 240s and a CTH with moderate chronic microvascular changes. Neurology consulted for possible MRI with vision changes and microangiopathic changes noted on CTH. MET called day following presentation with decreased level of arousal - notably SBP 100s-120s.  CTH showed no acute abnormality. CTA showed atherosclerosis of cavernous and supraclinoid ICAs, mod narrowing L proximal M2 MCA, and high grade stenosis R P1P2 junction of the PCA. 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 Given the degree of intracranial atherosclerosis patient would benefit from long term DAPT  - Neurochecks q4hr  - PT/OT/SLP  -Follow up with Neurology   - Stroke counseling provided: Mediterranean diet, increase dietary fiber, limit EtOH intake. Moderate exercise of 12.5hr weekly. If signs/symptoms of stroke arise in future (weakness, sensory changes, acute vision changes, difficulty speaking), immediately notify EMS.    Case to be discussed with Dr. Hurtado     Will continue to follow.    Darshan Dyer   [facility]- PGY-III      Attending addendum:  I examined the patient and reviewed all studies.  We recommend to repeat the echo with bubble study to workup source of emboli.  Continue aspirin and plavix.

## 2022-06-18 NOTE — PHARMACY MED REC
"[facility]           Pharmacy  Admission Medication Reconciliation     The home medication history was taken by Renny Saha PharmD.      Medication history obtained from Medications listed below were obtained from: Patient/family    Based on information gathered for medication list, you may go to "Admission" then "Reconcile Home Medications" tabs to review and/or act upon those items.      The home medication list has been updated by the Pharmacy department.    Please read ALL comments highlighted in yellow.    Please address this information as you see fit.     Feel free to contact us if you have any questions or require assistance.    The current inpatient medication list has been compared to the home medication list and the following discrepancies were noted:     Patient reports he/she IS TAKING the following which was not ordered upon admit  o Irbesartan 300mg daily  o Potassium chloride SA 20meq daily  o Sucralfate 1g 3 times daily  o Terazosin 5mg daily      No current facility-administered medications on file prior to encounter.     Current Outpatient Medications on File Prior to Encounter   Medication Sig Dispense Refill    aspirin (ECOTRIN) 81 MG EC tablet Take 81 mg by mouth once daily.      atorvastatin (LIPITOR) 40 MG tablet Take 1 tablet (40 mg total) by mouth once daily. For cholesterol 90 tablet 3    cholecalciferol, vitamin D3, (VITAMIN D3) 125 mcg (5,000 unit) Tab Take 1 tablet (5,000 Units total) by mouth once daily. 90 tablet 3    furosemide (LASIX) 40 MG tablet TAKE 1 TABLET(40 MG) BY MOUTH EVERY DAY FOR LEG SWELLING 90 tablet 3    irbesartan (AVAPRO) 300 MG tablet TAKE 1 TABLET(300 MG) BY MOUTH EVERY DAY 90 tablet 3    isosorbide mononitrate (IMDUR) 60 MG 24 hr tablet TAKE 1 TABLET(60 MG) BY MOUTH EVERY DAY 90 tablet 3    potassium chloride SA (K-DUR,KLOR-CON) 20 MEQ tablet Take 1 tablet (20 mEq total) by mouth once daily. 90 tablet 3    sucralfate " (CARAFATE) 1 gram tablet TAKE 1 TABLET(1 GRAM) BY MOUTH THREE TIMES DAILY 270 tablet 3    terazosin (HYTRIN) 5 MG capsule Take 1 capsule (5 mg total) by mouth every evening. For blood pressure 90 capsule 3       Please address this information as you see fit.  Feel free to contact us if you have any questions or require assistance.    Renny Saha, PharmD  [phone]                  .

## 2022-06-18 NOTE — PLAN OF CARE
Patient on room air tolerating well with no distress.   No shortness of breath or wheezing detected at this time. PRN treatment not needed and will continue to monitor.

## 2022-06-18 NOTE — PROGRESS NOTES
"[facility]  Cardiology  Progress Note    Patient Name: Katy Soto  MRN: [id number]  Admission Date: 2022  Hospital Length of Stay: 0 days  Code Status: Full Code   Attending Physician: Unique Okeefe*   Primary Care Physician: Rudy Cruz MD  Expected Discharge Date: 2022  Principal Problem:Hypertensive emergency    Subjective:     Per HPI  "Katy Soto is an 82-year-old female with hypertension, PAD, hyperlipidemia and diabetes. Patient presented to the ED with right eye pain, swelling, blurred vision and headache since Monday. Patient reports that she received news that a child that she raised had . She reports her BP elevates when she is upset and she has understandably been upset since receiving this news. Patient denies any chest pain, SOB, diaphoresis, dizziness, syncope, SPIVEY. Her symptoms worsened prompting her to proceed to the ED. She reports compliance with her medications. CT head negative for acute changes. SBP >240 on admission and improved with antihypertensives. Home medications resumed. TTE and EKG pending."    22: Code stroke activated yesterday for "slurred speech, lethargy, and left sided facial droop" in the setting of "SBP 100s-120s". Symptoms resolved. Evaluated by neurology. She denies any symptoms today.        Review of Systems   Cardiovascular: Negative for chest pain.   Respiratory: Negative for shortness of breath.    Hematologic/Lymphatic: Negative for bleeding problem.     Objective:     Vital Signs (Most Recent):  Temp: 97.6 °F (36.4 °C) (22 1036)  Pulse: 88 (22 1036)  Resp: 16 (22 1036)  BP: (!) 140/60 (22 1036)  SpO2: 98 % (22 1036) Vital Signs (24h Range):  Temp:  [97.6 °F (36.4 °C)-98.6 °F (37 °C)] 97.6 °F (36.4 °C)  Pulse:  [64-92] 88  Resp:  [16-19] 16  SpO2:  [97 %-100 %] 98 %  BP: (107-158)/(53-70) 140/60     Weight: 90.7 kg (200 lb)  Body mass index is 34.33 kg/m².    SpO2: 98 %  O2 Device (Oxygen " "Therapy): room air      Intake/Output Summary (Last 24 hours) at 6/18/2022 1139  Last data filed at 6/17/2022 1836  Gross per 24 hour   Intake 200 ml   Output --   Net 200 ml       Lines/Drains/Airways     Peripheral Intravenous Line  Duration                Peripheral IV - Single Lumen 06/16/22 1340 20 G Left Hand 1 day                Physical Exam  Constitutional:       General: She is not in acute distress.     Appearance: She is well-developed. She is not diaphoretic.   HENT:      Head: Normocephalic.   Neck:      Vascular: No JVD.   Cardiovascular:      Rate and Rhythm: Normal rate and regular rhythm.      Heart sounds: No murmur heard.    No friction rub. No gallop.   Pulmonary:      Effort: Pulmonary effort is normal. No respiratory distress.      Breath sounds: Normal breath sounds.   Abdominal:      Palpations: Abdomen is soft.      Tenderness: There is no abdominal tenderness.   Musculoskeletal:         General: No swelling.      Cervical back: Normal range of motion.   Skin:     General: Skin is warm.   Neurological:      Mental Status: She is alert.   Psychiatric:         Mood and Affect: Mood normal.         Significant Labs:   CMP   Recent Labs   Lab 06/16/22  1351 06/17/22  0234    140   K 3.9 3.7    102   CO2 26 24   * 161*   BUN 14 14   CREATININE 0.95 1.1   CALCIUM 8.5* 9.3   PROT 7.8 8.1   ALBUMIN 3.8 3.2*   BILITOT 0.8 1.1*   ALKPHOS 150* 150*   AST 23 20   ALT 20 16   ANIONGAP 8 14   ESTGFRAFRICA >60.0 54*   EGFRNONAA 55.6* 47*   , CBC   Recent Labs   Lab 06/16/22  1351 06/17/22  0234   WBC 6.95 6.86   HGB 11.4* 12.6   HCT 35.6* 38.8    218     Assessment and Plan:     Hypertension  - Home medications resumed ISMN, losartan, prazosin, furosemide  - Qs compliance at home given response to medications in the hospital (although reports compliance). Per Sherman KAPOOR notes: "Uncontrolled BP and confused as to her meds and how/when she takes them. Family stated that she had " "been noncompliant and after discussion, admitted to med noncompliance and dietary noncompliance"    PAD  Followed by Dr Fried: "PAD s/p PTA to RPTA with resolution of severe Balta IIb lifestyle limiting claudication"  Aspirin, clopidogrel, statin    HLD  Statin      Active Diagnoses:    Diagnosis Date Noted POA    PRINCIPAL PROBLEM:  Hypertensive emergency [I16.1] 03/01/2017 Yes    TIA (transient ischemic attack) [G45.9] 06/18/2022 Yes    Reduced visual acuity [H54.7] 06/16/2022 Yes    Severe obesity (BMI 35.0-39.9) with comorbidity [E66.01] 03/17/2022 Yes    Type 2 diabetes mellitus without complication, without long-term current use of insulin [E11.9] 03/17/2022 Yes    Obesity (BMI 30.0-34.9) [E66.9] 12/14/2018 Yes    PAD (peripheral artery disease) [I73.9] 07/30/2017 Yes    Hyperlipidemia [E78.5] 03/01/2017 Yes      Problems Resolved During this Admission:       VTE Risk Mitigation (From admission, onward)         Ordered     enoxaparin injection 40 mg  Daily         06/16/22 1729     IP VTE HIGH RISK PATIENT  Once         06/16/22 1729     Place sequential compression device  Until discontinued         06/16/22 1729                Lucy Al MD  Cardiology  [place] - Telemetry  "

## 2022-06-18 NOTE — ASSESSMENT & PLAN NOTE
MET called on 6/17 due to left facial droop with AMS    CT head No evidence for acute intracranial hemorrhage or definite abnormal parenchymal enhancement. Generalized cerebral volume loss with patchy and confluent decreased attenuation supratentorial white matter which may be sequela of chronic ischemic change.  CTA head: Atherosclerotic disease cavernous and supraclinoid ICAs with at least moderate stenosis right distal cavernous ICA.  CTA neck: No significant carotid stenosis throughout the neck.  There is moderate to high-grade stenosis in the left proximal subclavian artery.  Consult vascular neurology - patient not a tPA candidate  Consult neurology- continue ASA and add Plavix x 21 day. Allow slightly higher SBP (goal 120-160) to maintain cerebral perfusion

## 2022-06-18 NOTE — SUBJECTIVE & OBJECTIVE
Interval History: awake and alert, not in any distress.   Symptoms has completely resolved.   BP much improved, resume all home meds.  Appreciates vascular neurology - patient not a tPA candidate. Continue palvix x 21 days and ASA  Awaiting MRI and TTE       Review of Systems   Respiratory:  Negative for shortness of breath.    Cardiovascular:  Negative for chest pain.   Gastrointestinal:  Negative for nausea and vomiting.   Genitourinary:  Negative for dysuria.   Musculoskeletal:  Negative for back pain.   Skin:  Negative for color change.   Neurological:  Negative for weakness, numbness and headaches.   Psychiatric/Behavioral:  Negative for agitation.    Objective:     Vital Signs (Most Recent):  Temp: 97.6 °F (36.4 °C) (06/18/22 1036)  Pulse: 88 (06/18/22 1036)  Resp: 16 (06/18/22 1036)  BP: (!) 140/60 (06/18/22 1036)  SpO2: 98 % (06/18/22 1036)   Vital Signs (24h Range):  Temp:  [97.6 °F (36.4 °C)-98.6 °F (37 °C)] 97.6 °F (36.4 °C)  Pulse:  [64-92] 88  Resp:  [16-19] 16  SpO2:  [97 %-100 %] 98 %  BP: (107-158)/(53-70) 140/60     Weight: 90.7 kg (200 lb)  Body mass index is 34.33 kg/m².    Intake/Output Summary (Last 24 hours) at 6/18/2022 1113  Last data filed at 6/17/2022 1836  Gross per 24 hour   Intake 200 ml   Output --   Net 200 ml        Physical Exam  Vitals and nursing note reviewed.   Constitutional:       General: She is not in acute distress.     Appearance: Normal appearance. She is obese. She is not ill-appearing, toxic-appearing or diaphoretic.   HENT:      Head: Normocephalic and atraumatic.   Eyes:      Comments: Reports blurry vision resolved   Cardiovascular:      Rate and Rhythm: Normal rate and regular rhythm.      Pulses: Normal pulses.      Heart sounds: Normal heart sounds. No murmur heard.  Pulmonary:      Effort: Pulmonary effort is normal. No respiratory distress.      Breath sounds: Normal breath sounds.   Abdominal:      General: Bowel sounds are normal. There is no distension.       Palpations: Abdomen is soft.      Tenderness: There is no abdominal tenderness.   Musculoskeletal:         General: Normal range of motion.      Cervical back: Normal range of motion.   Skin:     General: Skin is warm.      Capillary Refill: Capillary refill takes 2 to 3 seconds.   Neurological:      General: No focal deficit present.      Mental Status: She is alert and oriented to person, place, and time. Mental status is at baseline.   Psychiatric:         Mood and Affect: Mood normal.         Behavior: Behavior normal.       Significant Labs: A1C:   Recent Labs   Lab 03/17/22  0949 06/17/22  0946   HGBA1C 7.0* 7.9*       ABGs: No results for input(s): PH, PCO2, HCO3, POCSATURATED, BE, TOTALHB, COHB, METHB, O2HB, POCFIO2, PO2 in the last 48 hours.  Blood Culture: No results for input(s): LABBLOO in the last 48 hours.  CBC:   Recent Labs   Lab 06/16/22  1351 06/17/22  0234   WBC 6.95 6.86   HGB 11.4* 12.6   HCT 35.6* 38.8    218       CMP:   Recent Labs   Lab 06/16/22  1351 06/17/22  0234    140   K 3.9 3.7    102   CO2 26 24   * 161*   BUN 14 14   CREATININE 0.95 1.1   CALCIUM 8.5* 9.3   PROT 7.8 8.1   ALBUMIN 3.8 3.2*   BILITOT 0.8 1.1*   ALKPHOS 150* 150*   AST 23 20   ALT 20 16   ANIONGAP 8 14   EGFRNONAA 55.6* 47*       Coagulation: No results for input(s): PT, INR, APTT in the last 48 hours.  Lactic Acid: No results for input(s): LACTATE in the last 48 hours.  Lipase: No results for input(s): LIPASE in the last 48 hours.  Lipid Panel: No results for input(s): CHOL, HDL, LDLCALC, TRIG, CHOLHDL in the last 48 hours.  Magnesium: No results for input(s): MG in the last 48 hours.  POCT Glucose:   Recent Labs   Lab 06/17/22  1955 06/18/22  0346 06/18/22  1038   POCTGLUCOSE 230* 173* 214*       Respiratory Culture: No results for input(s): GSRESP, RESPIRATORYC in the last 48 hours.  Troponin: No results for input(s): TROPONINI in the last 48 hours.  TSH:   Recent Labs   Lab 03/17/22  0949    TSH 4.920*       Urine Culture: No results for input(s): LABURIN in the last 48 hours.  Urine Studies: No results for input(s): COLORU, APPEARANCEUA, PHUR, SPECGRAV, PROTEINUA, GLUCUA, KETONESU, BILIRUBINUA, OCCULTUA, NITRITE, UROBILINOGEN, LEUKOCYTESUR, RBCUA, WBCUA, BACTERIA, SQUAMEPITHEL, HYALINECASTS in the last 48 hours.    Invalid input(s): NORAH    Significant Imaging: I have reviewed all pertinent imaging results/findings within the past 24 hours.

## 2022-06-19 VITALS
HEART RATE: 84 BPM | BODY MASS INDEX: 34.15 KG/M2 | RESPIRATION RATE: 18 BRPM | SYSTOLIC BLOOD PRESSURE: 177 MMHG | OXYGEN SATURATION: 98 % | WEIGHT: 200 LBS | HEIGHT: 64 IN | DIASTOLIC BLOOD PRESSURE: 77 MMHG | TEMPERATURE: 99 F

## 2022-06-19 DIAGNOSIS — I63.9 OCCIPITAL STROKE: Primary | ICD-10-CM

## 2022-06-19 LAB
POCT GLUCOSE: 156 MG/DL (ref 70–110)
POCT GLUCOSE: 216 MG/DL (ref 70–110)

## 2022-06-19 PROCEDURE — 25000003 PHARM REV CODE 250: Performed by: NURSE PRACTITIONER

## 2022-06-19 PROCEDURE — 99900035 HC TECH TIME PER 15 MIN (STAT)

## 2022-06-19 PROCEDURE — 25000003 PHARM REV CODE 250: Performed by: FAMILY MEDICINE

## 2022-06-19 PROCEDURE — 94761 N-INVAS EAR/PLS OXIMETRY MLT: CPT

## 2022-06-19 RX ORDER — CLOPIDOGREL BISULFATE 75 MG/1
75 TABLET ORAL DAILY
Qty: 21 TABLET | Refills: 0 | Status: SHIPPED | OUTPATIENT
Start: 2022-06-19 | End: 2022-06-19 | Stop reason: SDUPTHER

## 2022-06-19 RX ADMIN — CLOPIDOGREL 75 MG: 75 TABLET, FILM COATED ORAL at 09:06

## 2022-06-19 RX ADMIN — ISOSORBIDE MONONITRATE 60 MG: 30 TABLET, EXTENDED RELEASE ORAL at 09:06

## 2022-06-19 RX ADMIN — ATORVASTATIN CALCIUM 40 MG: 40 TABLET, FILM COATED ORAL at 09:06

## 2022-06-19 RX ADMIN — ASPIRIN 81 MG: 81 TABLET, COATED ORAL at 09:06

## 2022-06-19 RX ADMIN — FUROSEMIDE 40 MG: 40 TABLET ORAL at 09:06

## 2022-06-19 RX ADMIN — LOSARTAN POTASSIUM 100 MG: 50 TABLET, FILM COATED ORAL at 09:06

## 2022-06-19 RX ADMIN — PRAZOSIN HYDROCHLORIDE 2 MG: 1 CAPSULE ORAL at 09:06

## 2022-06-19 NOTE — PROGRESS NOTES
[facility]  Progress Note    Patient Name: Katy Soto  MRN: [id number]  Patient Class: IP- Inpatient   Admission Date: 6/16/2022  Length of Stay: 1 days  Attending Physician: Unique Okeefe*  Primary Care Provider: Rudy Cruz MD        Subjective:     Principal Problem:Cerebral infarction, left hemisphere        HPI:  Katy Soto is an 82-year-old female with history of hypertension, hyperlipidemia and diabetes who presented to the ED with complaints of pain under her right eye concern for swelling as well as a mild headache. Patient states that she has had blurred vision and headache since Monday which last night worsened prompting her to come to ED but she reports now has resolved. She states that the headache wrapped from the middle overhead along with the front of her forehead. She states she is compliant with medications yet did not take her blood pressure medications today because she was in a rush. Denies any associated weakness, numbness, tingling, chest pain, shortness of breath or other complaints.    In the ED: decrease visual acuity to the right eye noted. Patient hypertensive with improvement after hydralazine, losartan, and Lasix. CTH: Chronic microvascular ischemic changes. Per ED to Neurology: recommended maybe getting MRI for worsening symptoms. Admitted to [facility] for further evaluation and neurology consultation.      Overview/Hospital Course:  No notes on file    Interval History: awake and alert, not in any distress.   Symptoms has completely resolved.   BP much improved, resume all home meds.  Appreciates vascular neurology - patient not a tPA candidate. Continue palvix x 21 days and ASA  MRI with acute left Cerebral hemisphere infarct.       Review of Systems   Respiratory:  Negative for shortness of breath.    Cardiovascular:  Negative for chest pain.   Gastrointestinal:  Negative for nausea and vomiting.   Genitourinary:   Negative for dysuria.   Musculoskeletal:  Negative for back pain.   Skin:  Negative for color change.   Neurological:  Negative for weakness, numbness and headaches.   Psychiatric/Behavioral:  Negative for agitation.    Objective:     Vital Signs (Most Recent):  Temp: 98.8 °F (37.1 °C) (06/19/22 0603)  Pulse: 81 (06/19/22 0603)  Resp: 18 (06/19/22 0603)  BP: (!) 157/72 (06/19/22 0603)  SpO2: 99 % (06/19/22 0802)   Vital Signs (24h Range):  Temp:  [97.6 °F (36.4 °C)-99.1 °F (37.3 °C)] 98.8 °F (37.1 °C)  Pulse:  [72-94] 81  Resp:  [16-18] 18  SpO2:  [97 %-100 %] 99 %  BP: (120-173)/(57-76) 157/72     Weight: 90.7 kg (200 lb)  Body mass index is 34.33 kg/m².  No intake or output data in the 24 hours ending 06/19/22 0913     Physical Exam  Vitals and nursing note reviewed.   Constitutional:       General: She is not in acute distress.     Appearance: Normal appearance. She is obese. She is not ill-appearing, toxic-appearing or diaphoretic.   HENT:      Head: Normocephalic and atraumatic.   Eyes:      Comments: Reports blurry vision resolved   Cardiovascular:      Rate and Rhythm: Normal rate and regular rhythm.      Pulses: Normal pulses.      Heart sounds: Normal heart sounds. No murmur heard.  Pulmonary:      Effort: Pulmonary effort is normal. No respiratory distress.      Breath sounds: Normal breath sounds.   Abdominal:      General: Bowel sounds are normal. There is no distension.      Palpations: Abdomen is soft.      Tenderness: There is no abdominal tenderness.   Musculoskeletal:         General: Normal range of motion.      Cervical back: Normal range of motion.   Skin:     General: Skin is warm.      Capillary Refill: Capillary refill takes 2 to 3 seconds.   Neurological:      General: No focal deficit present.      Mental Status: She is alert and oriented to person, place, and time. Mental status is at baseline.   Psychiatric:         Mood and Affect: Mood normal.         Behavior: Behavior normal.        Significant Labs: A1C:   Recent Labs   Lab 03/17/22  0949 06/17/22  0946   HGBA1C 7.0* 7.9*       ABGs: No results for input(s): PH, PCO2, HCO3, POCSATURATED, BE, TOTALHB, COHB, METHB, O2HB, POCFIO2, PO2 in the last 48 hours.  Blood Culture: No results for input(s): LABBLOO in the last 48 hours.  CBC:   No results for input(s): WBC, HGB, HCT, PLT in the last 48 hours.    CMP:   No results for input(s): NA, K, CL, CO2, GLU, BUN, CREATININE, CALCIUM, PROT, ALBUMIN, BILITOT, ALKPHOS, AST, ALT, ANIONGAP, EGFRNONAA in the last 48 hours.    Invalid input(s): ESTGFAFRICA    Coagulation: No results for input(s): PT, INR, APTT in the last 48 hours.  Lactic Acid: No results for input(s): LACTATE in the last 48 hours.  Lipase: No results for input(s): LIPASE in the last 48 hours.  Lipid Panel: No results for input(s): CHOL, HDL, LDLCALC, TRIG, CHOLHDL in the last 48 hours.  Magnesium: No results for input(s): MG in the last 48 hours.  POCT Glucose:   Recent Labs   Lab 06/18/22  1557 06/18/22  1948 06/19/22  0602   POCTGLUCOSE 175* 253* 156*       Respiratory Culture: No results for input(s): GSRESP, RESPIRATORYC in the last 48 hours.  Troponin: No results for input(s): TROPONINI in the last 48 hours.  TSH:   Recent Labs   Lab 03/17/22  0949   TSH 4.920*       Urine Culture: No results for input(s): LABURIN in the last 48 hours.  Urine Studies: No results for input(s): COLORU, APPEARANCEUA, PHUR, SPECGRAV, PROTEINUA, GLUCUA, KETONESU, BILIRUBINUA, OCCULTUA, NITRITE, UROBILINOGEN, LEUKOCYTESUR, RBCUA, WBCUA, BACTERIA, SQUAMEPITHEL, HYALINECASTS in the last 48 hours.    Invalid input(s): WRIGHTSUR    Significant Imaging: I have reviewed all pertinent imaging results/findings within the past 24 hours.      Assessment/Plan:      * Cerebral infarction, left hemisphere  Right Occipital stroke  TIA    MET called on 6/17 due to left facial droop with AMS    CT head No evidence for acute intracranial hemorrhage or definite  abnormal parenchymal enhancement. Generalized cerebral volume loss with patchy and confluent decreased attenuation supratentorial white matter which may be sequela of chronic ischemic change.  CTA head: Atherosclerotic disease cavernous and supraclinoid ICAs with at least moderate stenosis right distal cavernous ICA.  CTA neck: No significant carotid stenosis throughout the neck.  There is moderate to high-grade stenosis in the left proximal subclavian artery.  Consult vascular neurology - patient not a tPA candidate  Consult neurology- continue ASA and add Plavix x 21 day. Allow slightly higher SBP (goal 120-160) to maintain cerebral perfusion  MRI: Left cerebral hemisphere and right occipital lobe cortical and subcortical white matter multifocal punctate acute infarcts.  No acute intracranial hemorrhage or acute large vascular territory infarct.  TTE : neg for shunt      TIA (transient ischemic attack)        Reduced visual acuity  -Patient reports her vision has been blurry since Monday and worsened PTA where she could not make out objects that were right in front of her.  -Decreased visual acuity may be related to uncontrolled HTN  -CTH: Chronic microvascular ischemic changes  -On exam pupils equal and reactive, EOM intact, gait normal, no focal deficits noted, reports blurred vision resolved  -Neurology consulted-appreciates rec's     Severe obesity (BMI 35.0-39.9) with comorbidity  -encourage lifestyle modifications (helathy diet, exercise)       Type 2 diabetes mellitus without complication, without long-term current use of insulin  Hemoglobin A1C   Date Value Ref Range Status   03/17/2022 7.0 (H) 4.0 - 5.6 % Final   -low dose SSI, accuchecks ACHS, diabetic diet  Not on medication  Start Januvia on discharge      Obesity (BMI 30.0-34.9)  -encourage lifestyle modifications (helathy diet, exercise)       PAD (peripheral artery disease)  -Resume home ASA, Lipitor, BB, Imdur      Hyperlipidemia  -Resume home  statin      Hypertensive emergency  -Hypertensive emergency noted in the ED   -Patient states that she has been compliant with medications except for clonidine which she informed PCP that it makes her sick and she cannot take it. Per chart review, patient is followed by Dr. Fried outpatient. Per chart review patient is noncompliant with medications  -Patient takes irbesartan, Lasix, terazosin at home per chart however she is not familiar with her medications   -Given hydralazine 10 mg, lasix 40 mg, & losartan 25 mg in ED with improvement  -PRN hydralazine for SBP >170 or DBP >110  -Resume home meds and monitor        VTE Risk Mitigation (From admission, onward)         Ordered     enoxaparin injection 40 mg  Daily         06/16/22 1729     IP VTE HIGH RISK PATIENT  Once         06/16/22 1729     Place sequential compression device  Until discontinued         06/16/22 1729                Discharge Planning   KENY: 6/19/2022     Code Status: Full Code   Is the patient medically ready for discharge?:     Reason for patient still in hospital (select all that apply): Pending disposition  Discharge Plan A: Home   Discharge Delays: None known at this time          Unique Okeefe MD  Department of Hospital Medicine   [facility]

## 2022-06-19 NOTE — PLAN OF CARE
SW notified of discharge orders. SW spoke with pt at bedside. Pt states her niece will transport home. SW sent referral to [facility] ([id number]), IBETH Gutierrez, Jillian Arrieta, for review for Home Health Orders.: [phone]  Fax Us: [phone] Pt will be notified Monday of details for Home Health. No further CM needs noted at this time.     Mera Patel Sister     [phone]         Daria Bravo Other     [phone]         Kelin Kohler Relative [phone]             Kenisha Hart Relative [phone]           Future Appointments   Date Time Provider Department Center   6/28/2022 10:00 AM Meredith Doty MD [facility]   6/28/2022  2:30 PM January Mckeon NP [facility] C[facility]   9/19/2022 10:00 AM Rudy Cruz MD [facility]        06/19/22 1015   Post-Acute Status   Post-Acute Authorization Placement   Post-Acute Placement Status Referrals Sent   Hospital Resources/Appts/Education Provided Appointments scheduled by Navigator/Coordinator   Discharge Delays None known at this time   Discharge Plan   Discharge Plan A Home Health   Discharge Plan B Home with family   Jacquelyn [place]ino [facility] [place]  [phone]

## 2022-06-19 NOTE — SUBJECTIVE & OBJECTIVE
Interval History: awake and alert, not in any distress.   Symptoms has completely resolved.   BP much improved, resume all home meds.  Appreciates vascular neurology - patient not a tPA candidate. Continue palvix x 21 days and ASA  MRI with acute left Cerebral hemisphere infarct.       Review of Systems   Respiratory:  Negative for shortness of breath.    Cardiovascular:  Negative for chest pain.   Gastrointestinal:  Negative for nausea and vomiting.   Genitourinary:  Negative for dysuria.   Musculoskeletal:  Negative for back pain.   Skin:  Negative for color change.   Neurological:  Negative for weakness, numbness and headaches.   Psychiatric/Behavioral:  Negative for agitation.    Objective:     Vital Signs (Most Recent):  Temp: 98.8 °F (37.1 °C) (06/19/22 0603)  Pulse: 81 (06/19/22 0603)  Resp: 18 (06/19/22 0603)  BP: (!) 157/72 (06/19/22 0603)  SpO2: 99 % (06/19/22 0802)   Vital Signs (24h Range):  Temp:  [97.6 °F (36.4 °C)-99.1 °F (37.3 °C)] 98.8 °F (37.1 °C)  Pulse:  [72-94] 81  Resp:  [16-18] 18  SpO2:  [97 %-100 %] 99 %  BP: (120-173)/(57-76) 157/72     Weight: 90.7 kg (200 lb)  Body mass index is 34.33 kg/m².  No intake or output data in the 24 hours ending 06/19/22 0913     Physical Exam  Vitals and nursing note reviewed.   Constitutional:       General: She is not in acute distress.     Appearance: Normal appearance. She is obese. She is not ill-appearing, toxic-appearing or diaphoretic.   HENT:      Head: Normocephalic and atraumatic.   Eyes:      Comments: Reports blurry vision resolved   Cardiovascular:      Rate and Rhythm: Normal rate and regular rhythm.      Pulses: Normal pulses.      Heart sounds: Normal heart sounds. No murmur heard.  Pulmonary:      Effort: Pulmonary effort is normal. No respiratory distress.      Breath sounds: Normal breath sounds.   Abdominal:      General: Bowel sounds are normal. There is no distension.      Palpations: Abdomen is soft.      Tenderness: There is no  abdominal tenderness.   Musculoskeletal:         General: Normal range of motion.      Cervical back: Normal range of motion.   Skin:     General: Skin is warm.      Capillary Refill: Capillary refill takes 2 to 3 seconds.   Neurological:      General: No focal deficit present.      Mental Status: She is alert and oriented to person, place, and time. Mental status is at baseline.   Psychiatric:         Mood and Affect: Mood normal.         Behavior: Behavior normal.       Significant Labs: A1C:   Recent Labs   Lab 03/17/22  0949 06/17/22  0946   HGBA1C 7.0* 7.9*       ABGs: No results for input(s): PH, PCO2, HCO3, POCSATURATED, BE, TOTALHB, COHB, METHB, O2HB, POCFIO2, PO2 in the last 48 hours.  Blood Culture: No results for input(s): LABBLOO in the last 48 hours.  CBC:   No results for input(s): WBC, HGB, HCT, PLT in the last 48 hours.    CMP:   No results for input(s): NA, K, CL, CO2, GLU, BUN, CREATININE, CALCIUM, PROT, ALBUMIN, BILITOT, ALKPHOS, AST, ALT, ANIONGAP, EGFRNONAA in the last 48 hours.    Invalid input(s): ESTGFAFRICA    Coagulation: No results for input(s): PT, INR, APTT in the last 48 hours.  Lactic Acid: No results for input(s): LACTATE in the last 48 hours.  Lipase: No results for input(s): LIPASE in the last 48 hours.  Lipid Panel: No results for input(s): CHOL, HDL, LDLCALC, TRIG, CHOLHDL in the last 48 hours.  Magnesium: No results for input(s): MG in the last 48 hours.  POCT Glucose:   Recent Labs   Lab 06/18/22  1557 06/18/22  1948 06/19/22  0602   POCTGLUCOSE 175* 253* 156*       Respiratory Culture: No results for input(s): GSRESP, RESPIRATORYC in the last 48 hours.  Troponin: No results for input(s): TROPONINI in the last 48 hours.  TSH:   Recent Labs   Lab 03/17/22  0949   TSH 4.920*       Urine Culture: No results for input(s): LABURIN in the last 48 hours.  Urine Studies: No results for input(s): COLORU, APPEARANCEUA, PHUR, SPECGRAV, PROTEINUA, GLUCUA, KETONESU, BILIRUBINUA, OCCULTUA,  NITRITE, UROBILINOGEN, LEUKOCYTESUR, RBCUA, WBCUA, BACTERIA, SQUAMEPITHEL, HYALINECASTS in the last 48 hours.    Invalid input(s): NORAH    Significant Imaging: I have reviewed all pertinent imaging results/findings within the past 24 hours.

## 2022-06-19 NOTE — PROGRESS NOTES
06/19/22 1316   AVS Confirmation   Discharge instructions and AVS given to and reviewed with patient and/or significant other. Yes   AVS printed and handed to patient by bedside nurse. VN reviewed discharge instructions with patient and daughter using teachback method. Allowed time for questions, all questions answered. Daughter verbalized complete understanding of discharge instructions and voices no concerns.    Discharge instructions complete. Bedside nurse notified.

## 2022-06-19 NOTE — PLAN OF CARE
[facility] - Telemetry      HOME HEALTH ORDERS  FACE TO FACE ENCOUNTER    Patient Name: Katy Soto  YOB: 1940    PCP: Rudy Cruz MD   PCP Address: [address] [place] [address] [place] [address]  PCP Phone Number: [phone]  PCP Fax: [phone]    Encounter Date: 6/16/22    Admit to Home Health    Diagnoses:  Active Hospital Problems    Diagnosis  POA    *Cerebral infarction, left hemisphere [I63.9]  Yes    right Occipital stroke [I63.9]  Yes    TIA (transient ischemic attack) [G45.9]  Yes    Reduced visual acuity [H54.7]  Yes    Severe obesity (BMI 35.0-39.9) with comorbidity [E66.01]  Yes    Type 2 diabetes mellitus without complication, without long-term current use of insulin [E11.9]  Yes    Obesity (BMI 30.0-34.9) [E66.9]  Yes     Current BMI 34.28.       PAD (peripheral artery disease) [I73.9]  Yes    Hypertensive emergency [I16.1]  Yes    Hyperlipidemia [E78.5]  Yes      Resolved Hospital Problems   No resolved problems to display.       Follow Up Appointments:  Future Appointments   Date Time Provider Department Center   6/28/2022 10:00 AM Meredith Doty MD [facility] IMPRI [facility]   6/28/2022  2:30 PM January Mckeon NP [facility] CARDIO [place] [facility]   9/19/2022 10:00 AM Rudy Cruz MD [facility] MED [place] [facility]       Allergies:  Review of patient's allergies indicates:   Allergen Reactions    Cilostazol      nvqwi2l and stomach cramps    Clonidine     Coreg [carvedilol]      Cramps stomach and nausea    Diltiazem      nauseana d stoamch cramps    Lexapro [escitalopram oxalate]      Dizzy, nausea and weak    Shellfish containing products Hives       Medications: Review discharge medications with patient and family and provide education.    Current Facility-Administered Medications   Medication Dose Route Frequency Provider Last Rate Last Admin    acetaminophen tablet 650 mg  650 mg Oral Q4H PRN Chata Ashley NP        albuterol-ipratropium 2.5 mg-0.5 mg/3 mL nebulizer  solution 3 mL  3 mL Nebulization Q6H PRN Chata EMMIE Ashley NP        aluminum-magnesium hydroxide-simethicone 200-200-20 mg/5 mL suspension 30 mL  30 mL Oral QID PRN Chata EMMIE Ashley NP        aspirin EC tablet 81 mg  81 mg Oral Daily Lacy Louise NP   81 mg at 06/18/22 0907    atorvastatin tablet 40 mg  40 mg Oral Daily Lacy Louise NP   40 mg at 06/18/22 0907    clopidogreL tablet 75 mg  75 mg Oral Daily Unique Okeefe MD   75 mg at 06/18/22 0907    enoxaparin injection 40 mg  40 mg Subcutaneous Daily Chataashly Ashlye NP   40 mg at 06/18/22 1603    furosemide tablet 40 mg  40 mg Oral Daily Unique Okeefe MD   40 mg at 06/18/22 0907    glucagon (human recombinant) injection 1 mg  1 mg Intramuscular PRN Chata EMMIE Ashley NP        glucose chewable tablet 16 g  16 g Oral PRN Chata EMMIE Ashley NP        glucose chewable tablet 24 g  24 g Oral PRN Chata EMMIE Ashley NP        hydrALAZINE injection 10 mg  10 mg Intravenous Q6H PRN Lacy Louise NP   10 mg at 06/17/22 0006    insulin aspart U-100 pen 0-5 Units  0-5 Units Subcutaneous QID (AC + HS) PRN Chata EMMIE Ashley NP   2 Units at 06/18/22 1257    isosorbide mononitrate 24 hr tablet 60 mg  60 mg Oral Daily Lacy Louise NP   60 mg at 06/18/22 0907    losartan tablet 100 mg  100 mg Oral Daily Lacy Louise NP   100 mg at 06/18/22 0907    melatonin tablet 6 mg  6 mg Oral Nightly PRN Chata EMMIE Ashley NP   6 mg at 06/17/22 0006    ondansetron injection 4 mg  4 mg Intravenous Q8H PRN Chata EMMIE Ashley NP   4 mg at 06/17/22 1206    prazosin capsule 2 mg  2 mg Oral BID Lacy Louise NP   2 mg at 06/18/22 2042    simethicone chewable tablet 80 mg  1 tablet Oral QID PRN Chata Ashley NP        sodium chloride 0.9% flush 10 mL  10 mL Intravenous Q8H PRN Chata Ashley NP         Current Discharge Medication List      START taking these  medications    Details   clopidogreL (PLAVIX) 75 mg tablet Take 1 tablet (75 mg total) by mouth once daily. for 21 days  Qty: 21 tablet, Refills: 0      metFORMIN (GLUCOPHAGE) 500 MG tablet Take 1 tablet (500 mg total) by mouth 2 (two) times daily with meals.  Qty: 180 tablet, Refills: 3         CONTINUE these medications which have NOT CHANGED    Details   aspirin (ECOTRIN) 81 MG EC tablet Take 81 mg by mouth once daily.      atorvastatin (LIPITOR) 40 MG tablet Take 1 tablet (40 mg total) by mouth once daily. For cholesterol  Qty: 90 tablet, Refills: 3      cholecalciferol, vitamin D3, (VITAMIN D3) 125 mcg (5,000 unit) Tab Take 1 tablet (5,000 Units total) by mouth once daily.  Qty: 90 tablet, Refills: 3      furosemide (LASIX) 40 MG tablet TAKE 1 TABLET(40 MG) BY MOUTH EVERY DAY FOR LEG SWELLING  Qty: 90 tablet, Refills: 3      irbesartan (AVAPRO) 300 MG tablet TAKE 1 TABLET(300 MG) BY MOUTH EVERY DAY  Qty: 90 tablet, Refills: 3    Associated Diagnoses: Essential hypertension; Hearing loss, unspecified hearing loss type, unspecified laterality; Type 2 diabetes mellitus without complication, without long-term current use of insulin; Hyperlipidemia, unspecified hyperlipidemia type; Hypothyroidism due to acquired atrophy of thyroid; Asymptomatic postmenopausal status      isosorbide mononitrate (IMDUR) 60 MG 24 hr tablet TAKE 1 TABLET(60 MG) BY MOUTH EVERY DAY  Qty: 90 tablet, Refills: 3      potassium chloride SA (K-DUR,KLOR-CON) 20 MEQ tablet Take 1 tablet (20 mEq total) by mouth once daily.  Qty: 90 tablet, Refills: 3      sucralfate (CARAFATE) 1 gram tablet TAKE 1 TABLET(1 GRAM) BY MOUTH THREE TIMES DAILY  Qty: 270 tablet, Refills: 3    Comments: **Patient requests 90 days supply**      terazosin (HYTRIN) 5 MG capsule Take 1 capsule (5 mg total) by mouth every evening. For blood pressure  Qty: 90 capsule, Refills: 3               I have seen and examined this patient within the last 30 days. My clinical findings  that support the need for the home health skilled services and home bound status are the following:no   Weakness/numbness causing balance and gait disturbance due to Stroke and Weakness/Debility making it taxing to leave home.  Requiring assistive device to leave home due to unsteady gait caused by  Stroke and Weakness/Debility.     Diet:   cardiac diet and diabetic diet 2000 calorie        Referrals/ Consults  Physical Therapy to evaluate and treat. Evaluate for home safety and equipment needs; Establish/upgrade home exercise program. Perform / instruct on therapeutic exercises, gait training, transfer training, and Range of Motion.  Occupational Therapy to evaluate and treat. Evaluate home environment for safety and equipment needs. Perform/Instruct on transfers, ADL training, ROM, and therapeutic exercises.    Activities:   activity as tolerated    Nursing:   Agency to admit patient within 24 hours of hospital discharge unless specified on physician order or at patient request    SN to complete comprehensive assessment including routine vital signs. Instruct on disease process and s/s of complications to report to MD. Review/verify medication list sent home with the patient at time of discharge  and instruct patient/caregiver as needed. Frequency may be adjusted depending on start of care date.     Skilled nurse to perform up to 3 visits PRN for symptoms related to diagnosis    Notify MD if SBP > 160 or < 90; DBP > 90 or < 50; HR > 120 or < 50; Temp > 101; O2 < 88%; Other:       Ok to schedule additional visits based on staff availability and patient request on consecutive days within the home health episode.    When multiple disciplines ordered:    Start of Care occurs on Sunday - Wednesday schedule remaining discipline evaluations as ordered on separate consecutive days following the start of care.    Thursday SOC -schedule subsequent evaluations Friday and Monday the following week.     Friday - Saturday SOC -  schedule subsequent discipline evaluations on consecutive days starting Monday of the following week.    For all post-discharge communication and subsequent orders please contact patient's primary care physician. If unable to reach primary care physician or do not receive response within 30 minutes, please contact  for clinical staff order clarification        Home Health Aide:  Nursing Three times weekly, Physical Therapy Three times weekly and Occupational Therapy Three times weekly      I certify that this patient is confined to her home and needs intermittent skilled nursing care, physical therapy and occupational therapy.

## 2022-06-19 NOTE — ASSESSMENT & PLAN NOTE
Right Occipital stroke  TIA    MET called on 6/17 due to left facial droop with AMS    CT head No evidence for acute intracranial hemorrhage or definite abnormal parenchymal enhancement. Generalized cerebral volume loss with patchy and confluent decreased attenuation supratentorial white matter which may be sequela of chronic ischemic change.  CTA head: Atherosclerotic disease cavernous and supraclinoid ICAs with at least moderate stenosis right distal cavernous ICA.  CTA neck: No significant carotid stenosis throughout the neck.  There is moderate to high-grade stenosis in the left proximal subclavian artery.  Consult vascular neurology - patient not a tPA candidate  Consult neurology- continue ASA and add Plavix x 21 day. Allow slightly higher SBP (goal 120-160) to maintain cerebral perfusion  MRI: Left cerebral hemisphere and right occipital lobe cortical and subcortical white matter multifocal punctate acute infarcts.  No acute intracranial hemorrhage or acute large vascular territory infarct.  TTE : neg for shunt

## 2022-06-19 NOTE — ASSESSMENT & PLAN NOTE
-Patient reports her vision has been blurry since Monday and worsened PTA where she could not make out objects that were right in front of her.  -Decreased visual acuity may be related to uncontrolled HTN  -CTH: Chronic microvascular ischemic changes  -On exam pupils equal and reactive, EOM intact, gait normal, no focal deficits noted, reports blurred vision resolved  -Neurology consulted-appreciates rec's

## 2022-06-19 NOTE — DISCHARGE SUMMARY
[facility]  Discharge Summary      Patient Name: Katy Soto  MRN: [id number]  Patient Class: IP- Inpatient  Admission Date: 6/16/2022  Hospital Length of Stay: 1 days  Discharge Date and Time: 6/19/2022  2:23 PM  Attending Physician: Unique Okeefe*   Discharging Provider: Unique Okeefe MD  Primary Care Provider: Rudy Cruz MD      HPI:   Katy Soto is an 82-year-old female with history of hypertension, hyperlipidemia and diabetes who presented to the ED with complaints of pain under her right eye concern for swelling as well as a mild headache. Patient states that she has had blurred vision and headache since Monday which last night worsened prompting her to come to ED but she reports now has resolved. She states that the headache wrapped from the middle overhead along with the front of her forehead. She states she is compliant with medications yet did not take her blood pressure medications today because she was in a rush. Denies any associated weakness, numbness, tingling, chest pain, shortness of breath or other complaints.    In the ED: decrease visual acuity to the right eye noted. Patient hypertensive with improvement after hydralazine, losartan, and Lasix. CTH: Chronic microvascular ischemic changes. Per ED to Neurology: recommended maybe getting MRI for worsening symptoms. Admitted to [facility] for further evaluation and neurology consultation.      * No surgery found *      Hospital Course:   No notes on file     Goals of Care Treatment Preferences:  Code Status: Full Code    Living Will: Yes              Consults:   Consults (From admission, onward)          Status Ordering Provider     Inpatient consult to Cardiology-[facility]  Once        Provider:  (Not yet assigned)    Completed OBINNA DUMONT     Inpatient consult to neurology  Once        Provider:  (Not yet assigned)    Completed ROMA HOLLEY            *  Cerebral infarction, left hemisphere  Right Occipital stroke  TIA    MET called on 6/17 due to left facial droop with AMS    CT head No evidence for acute intracranial hemorrhage or definite abnormal parenchymal enhancement. Generalized cerebral volume loss with patchy and confluent decreased attenuation supratentorial white matter which may be sequela of chronic ischemic change.  CTA head: Atherosclerotic disease cavernous and supraclinoid ICAs with at least moderate stenosis right distal cavernous ICA.  CTA neck: No significant carotid stenosis throughout the neck.  There is moderate to high-grade stenosis in the left proximal subclavian artery.  Consult vascular neurology - patient not a tPA candidate  Consult neurology- continue ASA and add Plavix x 21 day. Allow slightly higher SBP (goal 120-160) to maintain cerebral perfusion  MRI: Left cerebral hemisphere and right occipital lobe cortical and subcortical white matter multifocal punctate acute infarcts.  No acute intracranial hemorrhage or acute large vascular territory infarct.  TTE : neg for shunt      TIA (transient ischemic attack)        Reduced visual acuity  -Patient reports her vision has been blurry since Monday and worsened PTA where she could not make out objects that were right in front of her.  -Decreased visual acuity may be related to uncontrolled HTN  -CTH: Chronic microvascular ischemic changes  -On exam pupils equal and reactive, EOM intact, gait normal, no focal deficits noted, reports blurred vision resolved  -Neurology consulted-appreciates rec's     Severe obesity (BMI 35.0-39.9) with comorbidity  -encourage lifestyle modifications (helathy diet, exercise)       Type 2 diabetes mellitus without complication, without long-term current use of insulin  Hemoglobin A1C   Date Value Ref Range Status   03/17/2022 7.0 (H) 4.0 - 5.6 % Final   -low dose SSI, accuchecks ACHS, diabetic diet  Not on medication  Start Januvia on discharge      Obesity  "(BMI 30.0-34.9)  -encourage lifestyle modifications (helathy diet, exercise)       PAD (peripheral artery disease)  -Resume home ASA, Lipitor, BB, Imdur      Hypertensive emergency  -Hypertensive emergency noted in the ED   -Patient states that she has been compliant with medications except for clonidine which she informed PCP that it makes her sick and she cannot take it. Per chart review, patient is followed by Dr. Corky wing. Per chart review patient is noncompliant with medications  -Patient takes irbesartan, Lasix, terazosin at home per chart however she is not familiar with her medications   -Given hydralazine 10 mg, lasix 40 mg, & losartan 25 mg in ED with improvement  -PRN hydralazine for SBP >170 or DBP >110  -Resume home meds and monitor        Final Active Diagnoses:    Diagnosis Date Noted POA    PRINCIPAL PROBLEM:  Cerebral infarction, left hemisphere [I63.9] 06/19/2022 Yes    right Occipital stroke [I63.9] 06/19/2022 Yes    TIA (transient ischemic attack) [G45.9] 06/18/2022 Yes    Reduced visual acuity [H54.7] 06/16/2022 Yes    Severe obesity (BMI 35.0-39.9) with comorbidity [E66.01] 03/17/2022 Yes    Type 2 diabetes mellitus without complication, without long-term current use of insulin [E11.9] 03/17/2022 Yes    Obesity (BMI 30.0-34.9) [E66.9] 12/14/2018 Yes    PAD (peripheral artery disease) [I73.9] 07/30/2017 Yes    Hypertensive emergency [I16.1] 03/01/2017 Yes    Hyperlipidemia [E78.5] 03/01/2017 Yes      Problems Resolved During this Admission:       Discharged Condition: stable    Disposition: Home-Health Care [facility]    Follow Up:   Follow-up Information       Rudy Cruz MD Follow up in 1 week(s).    Specialty: Family Medicine  Contact information:  [address] [place] [id number] [phone]                           Patient Instructions:      WALKER FOR HOME USE     Order Specific Question Answer Comments   Type of Walker: Adult (5'4"-6'6")    With wheels? Yes    Height: 5' 4" (1.626 " m)    Weight: 90.7 kg (200 lb)    Length of need (1-99 months): 99    Please check all that apply: Patient's condition impairs ambulation.    Please check all that apply: Patient is unable to safely ambulate without equipment.      Ambulatory referral/consult to Neurology   Standing Status: Future   Referral Priority: Routine Referral Type: Consultation   Referral Reason: Specialty Services Required   Requested Specialty: Neurology   Number of Visits Requested: 1     Ambulatory referral/consult to Ophthalmology   Standing Status: Future   Referral Priority: Routine Referral Type: Consultation   Referral Reason: Specialty Services Required   Requested Specialty: Ophthalmology   Number of Visits Requested: 1     Diet Cardiac     Diet diabetic     Diet diabetic     Diet Cardiac     Activity as tolerated     Activity as tolerated         Pending Diagnostic Studies:       Procedure Component Value Units Date/Time    CT Head Without Contrast [[id number]]     Order Status: Sent Lab Status: No result            Medications:  Reconciled Home Medications:      Medication List        START taking these medications      clopidogreL 75 mg tablet  Commonly known as: PLAVIX  Take 1 tablet (75 mg total) by mouth once daily. for 21 days     metFORMIN 500 MG tablet  Commonly known as: GLUCOPHAGE  Take 1 tablet (500 mg total) by mouth 2 (two) times daily with meals.            CONTINUE taking these medications      aspirin 81 MG EC tablet  Commonly known as: ECOTRIN  Take 81 mg by mouth once daily.     atorvastatin 40 MG tablet  Commonly known as: LIPITOR  Take 1 tablet (40 mg total) by mouth once daily. For cholesterol     cholecalciferol (vitamin D3) 125 mcg (5,000 unit) Tab  Commonly known as: VITAMIN D3  Take 1 tablet (5,000 Units total) by mouth once daily.     furosemide 40 MG tablet  Commonly known as: LASIX  TAKE 1 TABLET(40 MG) BY MOUTH EVERY DAY FOR LEG SWELLING     irbesartan 300 MG tablet  Commonly known as: AVAPRO  TAKE 1  TABLET(300 MG) BY MOUTH EVERY DAY     isosorbide mononitrate 60 MG 24 hr tablet  Commonly known as: IMDUR  TAKE 1 TABLET(60 MG) BY MOUTH EVERY DAY     potassium chloride SA 20 MEQ tablet  Commonly known as: K-DUR,KLOR-CON  Take 1 tablet (20 mEq total) by mouth once daily.     sucralfate 1 gram tablet  Commonly known as: CARAFATE  TAKE 1 TABLET(1 GRAM) BY MOUTH THREE TIMES DAILY     terazosin 5 MG capsule  Commonly known as: HYTRIN  Take 1 capsule (5 mg total) by mouth every evening. For blood pressure              Indwelling Lines/Drains at time of discharge:   Lines/Drains/Airways       None                   Time spent on the discharge of patient: 35 minutes         Unique Okeefe MD  Department of Hospital Medicine  [facility]

## 2022-06-20 ENCOUNTER — TELEPHONE (OUTPATIENT)
Dept: FAMILY MEDICINE | Facility: CLINIC | Age: 82
End: 2022-06-20
Payer: MEDICARE

## 2022-06-20 ENCOUNTER — HOSPITAL ENCOUNTER (EMERGENCY)
Facility: HOSPITAL | Age: 82
Discharge: HOME | End: 2022-06-21
Attending: EMERGENCY MEDICINE
Payer: MEDICARE

## 2022-06-20 DIAGNOSIS — E86.0 DEHYDRATION: Primary | ICD-10-CM

## 2022-06-20 DIAGNOSIS — R11.10 VOMITING: ICD-10-CM

## 2022-06-20 LAB
ALBUMIN SERPL BCP-MCNC: 3.2 G/DL (ref 3.5–5.2)
ALP SERPL-CCNC: 125 U/L (ref 55–135)
ALT SERPL W/O P-5'-P-CCNC: 27 U/L (ref 10–44)
ANION GAP SERPL CALC-SCNC: 11 MMOL/L (ref 8–16)
AST SERPL-CCNC: 33 U/L (ref 10–40)
BASOPHILS # BLD AUTO: 0.03 K/UL (ref 0–0.2)
BASOPHILS NFR BLD: 0.4 % (ref 0–1.9)
BILIRUB SERPL-MCNC: 0.8 MG/DL (ref 0.1–1)
BNP SERPL-MCNC: 47 PG/ML (ref 0–99)
BUN SERPL-MCNC: 26 MG/DL (ref 8–23)
CALCIUM SERPL-MCNC: 9.7 MG/DL (ref 8.7–10.5)
CHLORIDE SERPL-SCNC: 101 MMOL/L (ref 95–110)
CO2 SERPL-SCNC: 25 MMOL/L (ref 23–29)
CREAT SERPL-MCNC: 1.5 MG/DL (ref 0.5–1.4)
DIFFERENTIAL METHOD: ABNORMAL
EOSINOPHIL # BLD AUTO: 0.1 K/UL (ref 0–0.5)
EOSINOPHIL NFR BLD: 1 % (ref 0–8)
ERYTHROCYTE [DISTWIDTH] IN BLOOD BY AUTOMATED COUNT: 13.4 % (ref 11.5–14.5)
EST. GFR  (AFRICAN AMERICAN): 37 ML/MIN/1.73 M^2
EST. GFR  (NON AFRICAN AMERICAN): 32 ML/MIN/1.73 M^2
GLUCOSE SERPL-MCNC: 197 MG/DL (ref 70–110)
HCT VFR BLD AUTO: 33.4 % (ref 37–48.5)
HGB BLD-MCNC: 10.7 G/DL (ref 12–16)
IMM GRANULOCYTES # BLD AUTO: 0.04 K/UL (ref 0–0.04)
IMM GRANULOCYTES NFR BLD AUTO: 0.5 % (ref 0–0.5)
LIPASE SERPL-CCNC: 15 U/L (ref 4–60)
LYMPHOCYTES # BLD AUTO: 1.5 K/UL (ref 1–4.8)
LYMPHOCYTES NFR BLD: 17.6 % (ref 18–48)
MAGNESIUM SERPL-MCNC: 1.9 MG/DL (ref 1.6–2.6)
MCH RBC QN AUTO: 30.4 PG (ref 27–31)
MCHC RBC AUTO-ENTMCNC: 32 G/DL (ref 32–36)
MCV RBC AUTO: 95 FL (ref 82–98)
MONOCYTES # BLD AUTO: 0.7 K/UL (ref 0.3–1)
MONOCYTES NFR BLD: 8.6 % (ref 4–15)
NEUTROPHILS # BLD AUTO: 6 K/UL (ref 1.8–7.7)
NEUTROPHILS NFR BLD: 71.9 % (ref 38–73)
NRBC BLD-RTO: 0 /100 WBC
PLATELET # BLD AUTO: 220 K/UL (ref 150–450)
PMV BLD AUTO: 10.3 FL (ref 9.2–12.9)
POTASSIUM SERPL-SCNC: 4.4 MMOL/L (ref 3.5–5.1)
PROT SERPL-MCNC: 8 G/DL (ref 6–8.4)
RBC # BLD AUTO: 3.52 M/UL (ref 4–5.4)
SODIUM SERPL-SCNC: 137 MMOL/L (ref 136–145)
TROPONIN I SERPL DL<=0.01 NG/ML-MCNC: 0.01 NG/ML (ref 0–0.03)
WBC # BLD AUTO: 8.26 K/UL (ref 3.9–12.7)

## 2022-06-20 PROCEDURE — 85025 COMPLETE CBC W/AUTO DIFF WBC: CPT | Performed by: EMERGENCY MEDICINE

## 2022-06-20 PROCEDURE — 96361 HYDRATE IV INFUSION ADD-ON: CPT

## 2022-06-20 PROCEDURE — G0180 PR HOME HEALTH MD CERTIFICATION: ICD-10-PCS | Mod: ,,, | Performed by: FAMILY MEDICINE

## 2022-06-20 PROCEDURE — 96360 HYDRATION IV INFUSION INIT: CPT

## 2022-06-20 PROCEDURE — 93005 ELECTROCARDIOGRAM TRACING: CPT

## 2022-06-20 PROCEDURE — 99285 EMERGENCY DEPT VISIT HI MDM: CPT | Mod: 25

## 2022-06-20 PROCEDURE — 25000003 PHARM REV CODE 250: Performed by: EMERGENCY MEDICINE

## 2022-06-20 PROCEDURE — 80053 COMPREHEN METABOLIC PANEL: CPT | Performed by: EMERGENCY MEDICINE

## 2022-06-20 PROCEDURE — 84484 ASSAY OF TROPONIN QUANT: CPT | Performed by: EMERGENCY MEDICINE

## 2022-06-20 PROCEDURE — 83735 ASSAY OF MAGNESIUM: CPT | Performed by: EMERGENCY MEDICINE

## 2022-06-20 PROCEDURE — 83690 ASSAY OF LIPASE: CPT | Performed by: EMERGENCY MEDICINE

## 2022-06-20 PROCEDURE — 81000 URINALYSIS NONAUTO W/SCOPE: CPT | Performed by: EMERGENCY MEDICINE

## 2022-06-20 PROCEDURE — 83880 ASSAY OF NATRIURETIC PEPTIDE: CPT | Performed by: EMERGENCY MEDICINE

## 2022-06-20 PROCEDURE — G0180 MD CERTIFICATION HHA PATIENT: HCPCS | Mod: ,,, | Performed by: FAMILY MEDICINE

## 2022-06-20 PROCEDURE — 93010 ELECTROCARDIOGRAM REPORT: CPT | Mod: ,,, | Performed by: INTERNAL MEDICINE

## 2022-06-20 PROCEDURE — 93010 EKG 12-LEAD: ICD-10-PCS | Mod: ,,, | Performed by: INTERNAL MEDICINE

## 2022-06-20 RX ADMIN — SODIUM CHLORIDE 500 ML: 0.9 INJECTION, SOLUTION INTRAVENOUS at 11:06

## 2022-06-20 NOTE — TELEPHONE ENCOUNTER
----- Message from Faustinadelbert Sharma sent at 6/20/2022  3:35 PM CDT -----  Type:  Sooner Apoointment Request    Caller is requesting a sooner appointment.  Caller declined first available appointment listed below.  Caller will not accept being placed on the waitlist and is requesting a message be sent to doctor.  Name of Caller pt's step daughter Mercedes  When is the first available appointment?   Symptoms: hosp [facility] discharge  Would the patient rather a call back or [facility]? call  Best Call Back Number:[phone]  Additional Information:

## 2022-06-20 NOTE — PLAN OF CARE
CHUN notified telephonically via K[facility] that pt is accepted for [facility] ([id number]) · [address]. [place] [address] · [phone]. CHUN closed out the referral in the [facility] chart for pt.       06/20/22 6666   Post-Acute Status   Post-Acute Authorization Home Health   Home Health Status Set-up Complete/Auth obtained       Jacquelyn Aquino, [facility] [place]  [phone]

## 2022-06-21 ENCOUNTER — TELEPHONE (OUTPATIENT)
Dept: FAMILY MEDICINE | Facility: CLINIC | Age: 82
End: 2022-06-21
Payer: MEDICARE

## 2022-06-21 VITALS
WEIGHT: 160 LBS | BODY MASS INDEX: 27.46 KG/M2 | DIASTOLIC BLOOD PRESSURE: 76 MMHG | OXYGEN SATURATION: 100 % | HEART RATE: 65 BPM | TEMPERATURE: 98 F | RESPIRATION RATE: 21 BRPM | SYSTOLIC BLOOD PRESSURE: 177 MMHG

## 2022-06-21 DIAGNOSIS — E11.9 TYPE 2 DIABETES MELLITUS WITHOUT COMPLICATION, WITHOUT LONG-TERM CURRENT USE OF INSULIN: Primary | ICD-10-CM

## 2022-06-21 LAB
BACTERIA #/AREA URNS HPF: ABNORMAL /HPF
BILIRUB UR QL STRIP: NEGATIVE
CLARITY UR: ABNORMAL
COLOR UR: YELLOW
GLUCOSE UR QL STRIP: NEGATIVE
HGB UR QL STRIP: ABNORMAL
HYALINE CASTS #/AREA URNS LPF: 21 /LPF
KETONES UR QL STRIP: NEGATIVE
LEUKOCYTE ESTERASE UR QL STRIP: ABNORMAL
MICROSCOPIC COMMENT: ABNORMAL
NITRITE UR QL STRIP: NEGATIVE
PH UR STRIP: 5 [PH] (ref 5–8)
PROT UR QL STRIP: NEGATIVE
RBC #/AREA URNS HPF: 5 /HPF (ref 0–4)
SP GR UR STRIP: 1.02 (ref 1–1.03)
SQUAMOUS #/AREA URNS HPF: 1 /HPF
URN SPEC COLLECT METH UR: ABNORMAL
UROBILINOGEN UR STRIP-ACNC: NEGATIVE EU/DL
WBC #/AREA URNS HPF: 6 /HPF (ref 0–5)

## 2022-06-21 NOTE — DISCHARGE INSTRUCTIONS
Please follow up with your primary doctor. If you cannot stop vomiting, have low blood pressure, or other concerning findings, come back immediately to the ED.

## 2022-06-21 NOTE — ED PROVIDER NOTES
Encounter Date: 2022       History     Chief Complaint   Patient presents with    Weakness     Pt arrives via EMS from home with c/o increased lethargy and hypotension. Pt was recently discharged from our facility. EMS reports initial bp 120/53; pt is alert and oriented. Pt arrives asleep; denies any complaints at this time.      HPI   82f recently admitted for stroke pw hypotension, not feeling well, vomiting, diarrhea   Pt denies abd pain, only when she has to vomit  Denies f/c/CP/SOB/abd pain/urinary sxs    Review of patient's allergies indicates:   Allergen Reactions    Cilostazol      czcoq7c and stomach cramps    Clonidine     Coreg [carvedilol]      Cramps stomach and nausea    Diltiazem      nauseana d stoamch cramps    Lexapro [escitalopram oxalate]      Dizzy, nausea and weak    Shellfish containing products Hives     Past Medical History:   Diagnosis Date    Hyperlipidemia     Hypertension     Type 2 diabetes mellitus without complication, without long-term current use of insulin 3/17/2022     Past Surgical History:   Procedure Laterality Date     SECTION      x1    HERNIA REPAIR      umbilical    LEG SURGERY Right 2017    stents placed     Family History   Problem Relation Age of Onset    Diabetes Mother     Hypertension Mother     Stroke Father     Heart disease Father     Heart attack Father     Cancer Sister         Breast cancer    Breast cancer Sister     No Known Problems Son     Kidney failure Sister         Kidney transplant x18 years    Lupus Sister     Hypertension Sister     Diabetes Sister     Arthritis Sister     Hypertension Sister     Cancer Brother      Social History     Tobacco Use    Smoking status: Never Smoker    Smokeless tobacco: Never Used   Substance Use Topics    Alcohol use: No    Drug use: No     Review of Systems   Constitutional: Negative for appetite change, chills, diaphoresis and fever.   HENT: Negative for congestion,  nosebleeds, sore throat, trouble swallowing and voice change.    Eyes: Negative for photophobia, pain, redness and itching.   Respiratory: Negative for cough, chest tightness and shortness of breath.    Cardiovascular: Negative for chest pain and leg swelling.   Gastrointestinal: Positive for diarrhea, nausea and vomiting. Negative for abdominal pain and constipation.   Genitourinary: Negative for decreased urine volume, difficulty urinating, dysuria and frequency.   Musculoskeletal: Negative for gait problem.   Skin: Negative for color change, rash and wound.   Neurological: Negative for dizziness, facial asymmetry, speech difficulty and headaches.   Psychiatric/Behavioral: Negative for agitation, confusion and suicidal ideas.   All other systems reviewed and are negative.      Physical Exam     Initial Vitals [06/20/22 2006]   BP Pulse Resp Temp SpO2   (!) 121/57 80 16 97.6 °F (36.4 °C) 97 %      MAP       --         Physical Exam    Nursing note and vitals reviewed.  Constitutional:   EXAM  General: Awake, alert and oriented. Tired appearing.     Head: normocephalic and atraumatic     Eyes: Conjunctivae are clear without exudates or hemorrhage. Sclera is non-icteric. EOM are intact. Eyelids are normal in appearance without swelling or lesions.     Ears: The external ear and ear canal are non-tender and without swelling. The canal is clear without discharge. Hearing intact.     Nose: Nares are patent bilaterally.     Neck: The neck is supple. Trachea is midline. Full ROM.     Cardiac: Regular rate.     Respiratory: No signs of respiratory distress. No audible wheezes.     Abdominal: Non-distended. No pain with palpation.      Extremities: Upper and lower extremities are atraumatic in appearance without tenderness or deformity. No swelling or erythema. Full range of motion.     Skin: Appropriate color for ethnicity.     Neurological: The patient is awake, alert and oriented to person, place, and time with normal  speech.     Psychiatric: Appropriate mood and affect.     In light of current/ongoing global covid-19 pandemic, all my encounters w pt were with full ppe including but not limited to gown, gloves, n95, eye protection OR from >6 ft away.             ED Course   Procedures  Labs Reviewed   CBC W/ AUTO DIFFERENTIAL - Abnormal; Notable for the following components:       Result Value    RBC 3.52 (*)     Hemoglobin 10.7 (*)     Hematocrit 33.4 (*)     Lymph % 17.6 (*)     All other components within normal limits   COMPREHENSIVE METABOLIC PANEL - Abnormal; Notable for the following components:    Glucose 197 (*)     BUN 26 (*)     Creatinine 1.5 (*)     Albumin 3.2 (*)     eGFR if  37 (*)     eGFR if non  32 (*)     All other components within normal limits   URINALYSIS, REFLEX TO URINE CULTURE - Abnormal; Notable for the following components:    Appearance, UA Hazy (*)     Occult Blood UA 1+ (*)     Leukocytes, UA 1+ (*)     All other components within normal limits    Narrative:     Specimen Source->Urine   URINALYSIS MICROSCOPIC - Abnormal; Notable for the following components:    RBC, UA 5 (*)     WBC, UA 6 (*)     Bacteria Moderate (*)     Hyaline Casts, UA 21 (*)     All other components within normal limits    Narrative:     Specimen Source->Urine   LIPASE   TROPONIN I   MAGNESIUM   B-TYPE NATRIURETIC PEPTIDE     EKG Readings: (Independently Interpreted)   Normal sinus rhythm, rate 73, no signs of ischemia, normal intervals       Imaging Results          X-Ray Chest AP Portable (Final result)  Result time 06/20/22 23:33:04    Final result by Eric Ellis DO (06/20/22 23:33:04)                 Impression:      Nonspecific coarse interstitial prominence in the bilateral lungs without large focal consolidation.  Mild pulmonary edema or an atypical infectious process are not excluded.      Electronically signed by: Eric Ellis  Date:    06/20/2022  Time:    23:33              Narrative:    EXAMINATION:  XR CHEST AP PORTABLE    CLINICAL HISTORY:  Vomiting, unspecified    TECHNIQUE:  Single frontal view of the chest was performed.    COMPARISON:  04/14/2020.    FINDINGS:  The lungs are well expanded.  There are coarse interstitial opacities in the bilateral lungs.  There is no large focal consolidation.  Mild left basilar streaky opacities may represent atelectasis or scarring.  The pleural spaces are clear.    The cardiac silhouette is unremarkable.  There are atherosclerotic calcifications of the aortic arch.    The visualized osseous structures demonstrate degenerative changes.                                 Medications   sodium chloride 0.9% bolus 500 mL (0 mLs Intravenous Stopped 6/21/22 0023)     Medical Decision Making:   ED Management:  Pt has slight SANGEETA. Possibly due to metformin, which is a new med for her. Says her sisters also had similar rxns and did not tolerate it either. Was given fluids and pt tolerated po. Really wanted to go home, and has a fu appt with her PCP Friday. msg sent to her PCP that I told the pt to stop the metformin, and to return if her sxs persisted.ua shows only 1+ leuks w/o c/o dysuria or other UTI sxs. Will await cx to treat. No prior cx results to compare. Strict return precautions discussed with patient expressing understanding of instructions, and all questions answered.                         Clinical Impression:   Final diagnoses:  [R11.10] Vomiting  [E86.0] Dehydration (Primary)          ED Disposition Condition    Discharge Stable        ED Prescriptions     None        Follow-up Information     Follow up With Specialties Details Why Contact Info    Rudy Cruz MD Family Medicine Schedule an appointment as soon as possible for a visit   [address] [place] [id number]  [phone]             Radha Clarke MD  06/21/22 0058

## 2022-06-21 NOTE — ED NOTES
Pt arrives via EMS, paramedics report family called 911 r/t hypotension w/ reading of 121/58, stating that is too low for pt. On arrival, pt c/o vomiting and diarrhea PTA. Vomit noted to pts gown and inside pts bra. Pt cleaned and changed into gown. Perianal care provided. Pt denies nausea, pain, and SOB. Pt discharged yesterday following a stroke. Denies numbness, weakness, and vision changes. Pt AAOx3. Respirations even and unlabored. Skin is warm and dry. NAD noted. CB within each.    APPEARANCE: Alert, oriented and in no acute distress.  CARDIAC: Normal rate. Pt denies chest pain.   PERIPHERAL VASCULAR: Normal cap refill. No edema. Warm to touch.    RESPIRATORY: Respirations are even and unlabored. Normal rate. Pt denies SOB. Symmetrical chest expansion bilaterally. No obvious signs of distress.  GASTRO: Nausea, vomiting, diarrhea. Abdomen soft, non-tender, no distention.  MUSC: Full ROM. No bony tenderness or soft tissue tenderness. No obvious deformity.  SKIN: Skin is warm and dry.  NEURO: Luisito coma scale: eyes open spontaneously-4, oriented & converses-5, obeys commands-6. No neurological abnormalities.   MENTAL STATUS: Awake, alert and aware of environment.

## 2022-06-23 ENCOUNTER — TELEPHONE (OUTPATIENT)
Dept: OPHTHALMOLOGY | Facility: CLINIC | Age: 82
End: 2022-06-23
Payer: MEDICARE

## 2022-06-23 NOTE — TELEPHONE ENCOUNTER
----- Message from Kassandra Roach sent at 6/22/2022  5:26 PM CDT -----    ----- Message -----  From: Mercedes Valenzuela  Sent: 6/22/2022   1:39 PM CDT  To: [facility] Ophthalmology Clinical Support Staff    Good afternoon,    The patient has a referral from telemetry with a diagnosis of Cerebral infarction, left hemisphere/Blurry/Blurred Vision. Please assist with scheduling the patient?    Thank you

## 2022-06-24 ENCOUNTER — OFFICE VISIT (OUTPATIENT)
Dept: FAMILY MEDICINE | Facility: CLINIC | Age: 82
End: 2022-06-24
Payer: MEDICARE

## 2022-06-24 ENCOUNTER — LAB VISIT (OUTPATIENT)
Dept: LAB | Facility: HOSPITAL | Age: 82
End: 2022-06-24
Attending: FAMILY MEDICINE
Payer: MEDICARE

## 2022-06-24 VITALS
SYSTOLIC BLOOD PRESSURE: 138 MMHG | DIASTOLIC BLOOD PRESSURE: 86 MMHG | TEMPERATURE: 99 F | WEIGHT: 206.81 LBS | HEART RATE: 77 BPM | HEIGHT: 64 IN | OXYGEN SATURATION: 99 % | BODY MASS INDEX: 35.31 KG/M2

## 2022-06-24 DIAGNOSIS — H91.90 HEARING LOSS, UNSPECIFIED HEARING LOSS TYPE, UNSPECIFIED LATERALITY: Primary | ICD-10-CM

## 2022-06-24 DIAGNOSIS — I77.1 SUBCLAVIAN ARTERY STENOSIS, LEFT: ICD-10-CM

## 2022-06-24 DIAGNOSIS — E11.9 TYPE 2 DIABETES MELLITUS WITHOUT COMPLICATION, WITHOUT LONG-TERM CURRENT USE OF INSULIN: ICD-10-CM

## 2022-06-24 LAB
ANION GAP SERPL CALC-SCNC: 7 MMOL/L (ref 8–16)
CALCIUM SERPL-MCNC: 8.9 MG/DL (ref 8.7–10.5)
CHLORIDE SERPL-SCNC: 102 MMOL/L (ref 95–110)
CO2 SERPL-SCNC: 25 MMOL/L (ref 23–29)
CREAT SERPL-MCNC: 0.96 MG/DL (ref 0.5–1.4)
EST. GFR  (AFRICAN AMERICAN): >60 ML/MIN/1.73 M^2
EST. GFR  (NON AFRICAN AMERICAN): 54.9 ML/MIN/1.73 M^2
GLUCOSE SERPL-MCNC: 190 MG/DL (ref 70–110)
POTASSIUM SERPL-SCNC: 4.1 MMOL/L (ref 3.5–5.1)
SODIUM SERPL-SCNC: 134 MMOL/L (ref 136–145)
UUN UR-MCNC: 13 MG/DL (ref 7–17)

## 2022-06-24 PROCEDURE — 80048 BASIC METABOLIC PNL TOTAL CA: CPT | Mod: PO | Performed by: FAMILY MEDICINE

## 2022-06-24 PROCEDURE — 99214 PR OFFICE/OUTPT VISIT, EST, LEVL IV, 30-39 MIN: ICD-10-PCS | Mod: S$GLB,,, | Performed by: FAMILY MEDICINE

## 2022-06-24 PROCEDURE — 99214 OFFICE O/P EST MOD 30 MIN: CPT | Mod: S$GLB,,, | Performed by: FAMILY MEDICINE

## 2022-06-24 PROCEDURE — 36415 COLL VENOUS BLD VENIPUNCTURE: CPT | Mod: PO | Performed by: FAMILY MEDICINE

## 2022-06-24 RX ORDER — DONEPEZIL HYDROCHLORIDE 5 MG/1
5 TABLET, FILM COATED ORAL NIGHTLY
Qty: 90 TABLET | Refills: 3 | Status: SHIPPED | OUTPATIENT
Start: 2022-06-24 | End: 2022-07-06 | Stop reason: SINTOL

## 2022-06-24 RX ORDER — TERAZOSIN 5 MG/1
5 CAPSULE ORAL NIGHTLY
Qty: 90 CAPSULE | Refills: 3 | Status: SHIPPED | OUTPATIENT
Start: 2022-06-24 | End: 2022-06-29 | Stop reason: DRUGHIGH

## 2022-06-24 RX ORDER — INSULIN PUMP SYRINGE, 3 ML
EACH MISCELLANEOUS
Qty: 1 EACH | Refills: 0 | Status: SHIPPED | OUTPATIENT
Start: 2022-06-24

## 2022-06-24 NOTE — PROGRESS NOTES
"Subjective:      Patient ID: Katy Soto is a 82 y.o. female.    Chief Complaint: Hospital Follow Up, Diabetes, Blood Pressure Check, and Blurred Vision      Vitals:    06/24/22 1321   BP: 138/86   Pulse: 77   Temp: 98.5 °F (36.9 °C)   TempSrc: Oral   SpO2: 99%   Weight: 93.8 kg (206 lb 12.7 oz)   Height: 5' 4" (1.626 m)        HPI   followup admit, here with family  Wants to drive, hearing and sight problems  [facility]  Refer to [place]    Problem List  Patient Active Problem List   Diagnosis    Hypertensive emergency    Hyperlipidemia    Arthritis    PAD (peripheral artery disease)    Bilateral leg edema    Anxiety    Obesity (BMI 30.0-34.9)    Osteopenia    Atherosclerosis of aorta    Elevated sed rate    Vitamin D insufficiency    LVH (left ventricular hypertrophy)    Type 2 diabetes mellitus without complication, without long-term current use of insulin    Severe obesity (BMI 35.0-39.9) with comorbidity    Fracture of orbital floor, left side, initial encounter for closed fracture    Reduced visual acuity    TIA (transient ischemic attack)    right Occipital stroke    Cerebral infarction, left hemisphere        ALLERGIES:   Review of patient's allergies indicates:   Allergen Reactions    Cilostazol      [id number] and stomach cramps    Clonidine     Coreg [carvedilol]      Cramps stomach and nausea    Diltiazem      nauseana d stoamch cramps    Lexapro [escitalopram oxalate]      Dizzy, nausea and weak    Shellfish containing products Hives       MEDS:   Current Outpatient Medications:     aspirin (ECOTRIN) 81 MG EC tablet, Take 81 mg by mouth once daily., Disp: , Rfl:     atorvastatin (LIPITOR) 40 MG tablet, Take 1 tablet (40 mg total) by mouth once daily. For cholesterol, Disp: 90 tablet, Rfl: 3    cholecalciferol, vitamin D3, (VITAMIN D3) 125 mcg (5,000 unit) Tab, Take 1 tablet (5,000 Units total) by mouth once daily. (Patient taking differently: Take 1,000 Units by mouth " once daily.), Disp: 90 tablet, Rfl: 3    furosemide (LASIX) 40 MG tablet, TAKE 1 TABLET(40 MG) BY MOUTH EVERY DAY FOR LEG SWELLING, Disp: 90 tablet, Rfl: 3    irbesartan (AVAPRO) 300 MG tablet, TAKE 1 TABLET(300 MG) BY MOUTH EVERY DAY, Disp: 90 tablet, Rfl: 3    potassium chloride SA (K-DUR,KLOR-CON) 20 MEQ tablet, Take 1 tablet (20 mEq total) by mouth once daily., Disp: 90 tablet, Rfl: 3    blood sugar diagnostic Strp, by Misc.(Non-Drug; Combo Route) route., Disp: , Rfl:     blood-glucose meter kit, Use daily, Disp: 1 each, Rfl: 0    clopidogreL (PLAVIX) 75 mg tablet, Take 1 tablet (75 mg total) by mouth once daily. for 21 days, Disp: 90 tablet, Rfl: 3    folic acid/multivit-min/lutein (CENTRUM SILVER ORAL), Take by mouth., Disp: , Rfl:     lancets 31 gauge Misc, by Misc.(Non-Drug; Combo Route) route., Disp: , Rfl:     memantine (NAMENDA) 5 MG Tab, Take 1 tablet (5 mg total) by mouth 2 (two) times daily. For memory, Disp: 180 tablet, Rfl: 3    SAXagliptin (ONGLYZA) 5 mg Tab tablet, Take 1 tablet (5 mg total) by mouth once daily., Disp: 90 tablet, Rfl: 3    sucralfate (CARAFATE) 1 gram tablet, TAKE 1 TABLET(1 GRAM) BY MOUTH THREE TIMES DAILY, Disp: 270 tablet, Rfl: 3    terazosin (HYTRIN) 10 MG capsule, Take 1 capsule (10 mg total) by mouth every evening., Disp: 30 capsule, Rfl: 11      History:  Current Providers as of 6/24/2022  PCP: Rudy Cruz MD  Care Team Provider: New Mehta MD  Care Team Provider: David Fried MD  Care Team Provider: Easton Johnson  Encounter Provider: Rudy Cruz MD, starting on Fri Jun 24, 2022 12:00 AM  Referring Provider: not found, starting on Fri Jun 24, 2022 12:00 AM  Consulting Physician: Rudy Cruz MD, starting on Fri Jun 24, 2022  1:15 PM, ending on Fri Jun 24, 2022  2:15 PM (Inactive)   Past Medical History:   Diagnosis Date    Hyperlipidemia     Hypertension     Type 2 diabetes mellitus without complication, without long-term current use of  insulin 3/17/2022     Past Surgical History:   Procedure Laterality Date     SECTION      x1    HERNIA REPAIR      umbilical    LEG SURGERY Right 2017    stents placed     Social History     Tobacco Use    Smoking status: Never Smoker    Smokeless tobacco: Never Used   Substance Use Topics    Alcohol use: No    Drug use: No         Review of Systems   Constitutional: Negative.    HENT: Positive for hearing loss.    Eyes: Positive for visual disturbance.   Respiratory: Negative.    Cardiovascular: Negative.    Gastrointestinal: Negative.    Endocrine: Negative.    Genitourinary: Negative.    Musculoskeletal: Negative.    Psychiatric/Behavioral: Positive for agitation.        Repeats self over and over   All other systems reviewed and are negative.    Objective:     Physical Exam  Vitals and nursing note reviewed.   Constitutional:       Appearance: She is well-developed.   HENT:      Head: Normocephalic.   Eyes:      Conjunctiva/sclera: Conjunctivae normal.      Pupils: Pupils are equal, round, and reactive to light.   Cardiovascular:      Rate and Rhythm: Normal rate and regular rhythm.      Heart sounds: Normal heart sounds.   Pulmonary:      Effort: Pulmonary effort is normal.      Breath sounds: Normal breath sounds.   Musculoskeletal:         General: Normal range of motion.      Cervical back: Normal range of motion and neck supple.   Skin:     General: Skin is warm and dry.   Neurological:      Mental Status: She is alert and oriented to person, place, and time.      Deep Tendon Reflexes: Reflexes are normal and symmetric.   Psychiatric:         Behavior: Behavior normal.         Thought Content: Thought content normal.         Judgment: Judgment normal.             Assessment:     1. Hearing loss, unspecified hearing loss type, unspecified laterality    2. Type 2 diabetes mellitus without complication, without long-term current use of insulin    3. Subclavian artery stenosis, left       Plan:        Medication List          Accurate as of June 24, 2022 11:59 PM. If you have any questions, ask your nurse or doctor.            START taking these medications    blood-glucose meter kit  Use daily  Started by: Rudy Cruz MD     donepeziL 5 MG tablet  Commonly known as: ARICEPT  Take 1 tablet (5 mg total) by mouth every evening. For BP  Started by: Rudy Cruz MD     SITagliptin 50 MG Tab  Commonly known as: JANUVIA  Take 1 tablet (50 mg total) by mouth once daily. For diabetes  Started by: Rudy Cruz MD        CHANGE how you take these medications    cholecalciferol (vitamin D3) 125 mcg (5,000 unit) Tab  Commonly known as: VITAMIN D3  Take 1 tablet (5,000 Units total) by mouth once daily.  What changed: how much to take        CONTINUE taking these medications    aspirin 81 MG EC tablet  Commonly known as: ECOTRIN     atorvastatin 40 MG tablet  Commonly known as: LIPITOR  Take 1 tablet (40 mg total) by mouth once daily. For cholesterol     clopidogreL 75 mg tablet  Commonly known as: PLAVIX  Take 1 tablet (75 mg total) by mouth once daily. for 21 days     furosemide 40 MG tablet  Commonly known as: LASIX  TAKE 1 TABLET(40 MG) BY MOUTH EVERY DAY FOR LEG SWELLING     irbesartan 300 MG tablet  Commonly known as: AVAPRO  TAKE 1 TABLET(300 MG) BY MOUTH EVERY DAY     potassium chloride SA 20 MEQ tablet  Commonly known as: K-DUR,KLOR-CON  Take 1 tablet (20 mEq total) by mouth once daily.     sucralfate 1 gram tablet  Commonly known as: CARAFATE  TAKE 1 TABLET(1 GRAM) BY MOUTH THREE TIMES DAILY     terazosin 5 MG capsule  Commonly known as: HYTRIN  Take 1 capsule (5 mg total) by mouth every evening. For blood pressure        STOP taking these medications    isosorbide mononitrate 60 MG 24 hr tablet  Commonly known as: IMDUR  Stopped by: Rudy Cruz MD           Where to Get Your Medications      These medications were sent to [id number] - [place] - [address] [facility]  AT [facility] OF [facility] [address]  [address] [place] [facility] [address]    Phone: [phone]   · blood-glucose meter kit  · donepeziL 5 MG tablet  · SITagliptin 50 MG Tab  · terazosin 5 MG capsule       Hearing loss, unspecified hearing loss type, unspecified laterality  -     Ambulatory referral/consult to ENT; Future; Expected date: 07/01/2022  -     Ambulatory referral/consult to Audiology; Future; Expected date: 07/01/2022    Type 2 diabetes mellitus without complication, without long-term current use of insulin    Subclavian artery stenosis, left    Other orders  -     Discontinue: donepeziL (ARICEPT) 5 MG tablet; Take 1 tablet (5 mg total) by mouth every evening. For BP  Dispense: 90 tablet; Refill: 3  -     Discontinue: terazosin (HYTRIN) 5 MG capsule; Take 1 capsule (5 mg total) by mouth every evening. For blood pressure  Dispense: 90 capsule; Refill: 3  -     blood-glucose meter kit; Use daily  Dispense: 1 each; Refill: 0  -     Discontinue: SITagliptin (JANUVIA) 50 MG Tab; Take 1 tablet (50 mg total) by mouth once daily. For diabetes (Patient not taking: Reported on 6/29/2022)  Dispense: 90 tablet; Refill: 3

## 2022-06-27 ENCOUNTER — TELEPHONE (OUTPATIENT)
Dept: FAMILY MEDICINE | Facility: CLINIC | Age: 82
End: 2022-06-27
Payer: MEDICARE

## 2022-06-27 DIAGNOSIS — I63.9 OCCIPITAL STROKE: ICD-10-CM

## 2022-06-27 DIAGNOSIS — E11.9 TYPE 2 DIABETES MELLITUS WITHOUT COMPLICATION, WITHOUT LONG-TERM CURRENT USE OF INSULIN: Primary | ICD-10-CM

## 2022-06-28 RX ORDER — CLOPIDOGREL BISULFATE 75 MG/1
75 TABLET ORAL DAILY
Qty: 21 TABLET | Refills: 0 | Status: SHIPPED | OUTPATIENT
Start: 2022-06-28 | End: 2022-07-02 | Stop reason: SDUPTHER

## 2022-06-29 ENCOUNTER — OFFICE VISIT (OUTPATIENT)
Dept: CARDIOLOGY | Facility: CLINIC | Age: 82
End: 2022-06-29
Payer: MEDICARE

## 2022-06-29 VITALS
HEART RATE: 76 BPM | DIASTOLIC BLOOD PRESSURE: 86 MMHG | SYSTOLIC BLOOD PRESSURE: 156 MMHG | BODY MASS INDEX: 35.19 KG/M2 | OXYGEN SATURATION: 99 % | HEIGHT: 64 IN | WEIGHT: 206.13 LBS

## 2022-06-29 DIAGNOSIS — I70.0 ATHEROSCLEROSIS OF AORTA: ICD-10-CM

## 2022-06-29 DIAGNOSIS — I73.9 PAD (PERIPHERAL ARTERY DISEASE): ICD-10-CM

## 2022-06-29 DIAGNOSIS — I51.7 LVH (LEFT VENTRICULAR HYPERTROPHY): ICD-10-CM

## 2022-06-29 DIAGNOSIS — G45.9 TIA (TRANSIENT ISCHEMIC ATTACK): Primary | ICD-10-CM

## 2022-06-29 DIAGNOSIS — I16.1 HYPERTENSIVE EMERGENCY: ICD-10-CM

## 2022-06-29 DIAGNOSIS — E66.9 OBESITY (BMI 30.0-34.9): ICD-10-CM

## 2022-06-29 DIAGNOSIS — F41.9 ANXIETY: ICD-10-CM

## 2022-06-29 DIAGNOSIS — E78.5 HYPERLIPIDEMIA, UNSPECIFIED HYPERLIPIDEMIA TYPE: ICD-10-CM

## 2022-06-29 PROCEDURE — 99999 PR PBB SHADOW E&M-EST. PATIENT-LVL III: ICD-10-PCS | Mod: PBBFAC,,, | Performed by: NURSE PRACTITIONER

## 2022-06-29 PROCEDURE — 99213 OFFICE O/P EST LOW 20 MIN: CPT | Mod: PBBFAC,PO | Performed by: NURSE PRACTITIONER

## 2022-06-29 PROCEDURE — 99999 PR PBB SHADOW E&M-EST. PATIENT-LVL III: CPT | Mod: PBBFAC,,, | Performed by: NURSE PRACTITIONER

## 2022-06-29 PROCEDURE — 99214 OFFICE O/P EST MOD 30 MIN: CPT | Mod: S$PBB,,, | Performed by: NURSE PRACTITIONER

## 2022-06-29 PROCEDURE — 99214 PR OFFICE/OUTPT VISIT, EST, LEVL IV, 30-39 MIN: ICD-10-PCS | Mod: S$PBB,,, | Performed by: NURSE PRACTITIONER

## 2022-06-29 RX ORDER — TERAZOSIN 10 MG/1
10 CAPSULE ORAL NIGHTLY
Qty: 30 CAPSULE | Refills: 11 | Status: SHIPPED | OUTPATIENT
Start: 2022-06-29 | End: 2023-05-24

## 2022-06-29 NOTE — PROGRESS NOTES
Cardiology Clinic note    Subjective:   Patient ID:  Katy Soto is a 82 y.o. female who presents for follow-up of hypertensive emergency/TIA.    Hx: hypertension, PAD, hyperlipidemia and diabetes    Present to ED w/ right eye pain, swelling, blurred vision and headache since Monday. Patient reports that she received news that a child that she raised had . She reports her BP elevates when she is upset and was upset since receiving this news. Her symptoms worsened prompting her to proceed to the ED. She reports compliance with her medications, however caregiver present and states BP was not taken for at least a month prior to hospital stay. CT head negative for acute changes. SBP >240 on admission and improved with antihypertensives. Home medications resumed.    Reports today for follow up. Reports  range at home.Takes Avapro and recently started on Hytrin at night, all previous BP meds/imdur stopped give complaints of diaphoresis and lethargy for  range. Recent visit with PCP and BP 130s with reports of feeling well.     Denies chest pain, SOB, diaphoresis, dizziness, syncope, SPIVEY.     HPI:   Katy Soto  has a past medical history of Hyperlipidemia, Hypertension, and Type 2 diabetes mellitus without complication, without long-term current use of insulin (3/17/2022).          Patient Active Problem List    Diagnosis Date Noted    right Occipital stroke 2022    Cerebral infarction, left hemisphere 2022    TIA (transient ischemic attack) 2022    Reduced visual acuity 2022    Type 2 diabetes mellitus without complication, without long-term current use of insulin 2022    Severe obesity (BMI 35.0-39.9) with comorbidity 2022    Fracture of orbital floor, left side, initial encounter for closed fracture 2022    LVH (left ventricular hypertrophy) 2020    Vitamin D insufficiency 01/10/2020     Vitamin d 13 (L) as noted on most recent vit  d lab dated 8/12/19.      Elevated sed rate 09/16/2019    Obesity (BMI 30.0-34.9) 12/14/2018     Current BMI 34.28.       Osteopenia 12/14/2018     Noted on DXA scan dated 4/4/2017.       Atherosclerosis of aorta 12/14/2018     Noted on cxr dated 10/31/2011.       Anxiety 12/21/2017    Bilateral leg edema 08/04/2017    PAD (peripheral artery disease) 07/30/2017    Arthritis 06/23/2017    Hypertensive emergency 03/01/2017    Hyperlipidemia 03/01/2017       Patient's Medications   New Prescriptions    No medications on file   Previous Medications    ASPIRIN (ECOTRIN) 81 MG EC TABLET    Take 81 mg by mouth once daily.    ATORVASTATIN (LIPITOR) 40 MG TABLET    Take 1 tablet (40 mg total) by mouth once daily. For cholesterol    BLOOD SUGAR DIAGNOSTIC (BLOOD GLUCOSE TEST) STRP    by Misc.(Non-Drug; Combo Route) route.    BLOOD-GLUCOSE METER KIT    Use daily    CHOLECALCIFEROL, VITAMIN D3, (VITAMIN D3) 125 MCG (5,000 UNIT) TAB    Take 1 tablet (5,000 Units total) by mouth once daily.    CLOPIDOGREL (PLAVIX) 75 MG TABLET    Take 1 tablet (75 mg total) by mouth once daily. for 21 days    DONEPEZIL (ARICEPT) 5 MG TABLET    Take 1 tablet (5 mg total) by mouth every evening. For BP    FUROSEMIDE (LASIX) 40 MG TABLET    TAKE 1 TABLET(40 MG) BY MOUTH EVERY DAY FOR LEG SWELLING    IRBESARTAN (AVAPRO) 300 MG TABLET    TAKE 1 TABLET(300 MG) BY MOUTH EVERY DAY    LANCETS 31 GAUGE MISC    by Misc.(Non-Drug; Combo Route) route.    POTASSIUM CHLORIDE SA (K-DUR,KLOR-CON) 20 MEQ TABLET    Take 1 tablet (20 mEq total) by mouth once daily.    SITAGLIPTIN (JANUVIA) 50 MG TAB    Take 1 tablet (50 mg total) by mouth once daily. For diabetes    SUCRALFATE (CARAFATE) 1 GRAM TABLET    TAKE 1 TABLET(1 GRAM) BY MOUTH THREE TIMES DAILY    TERAZOSIN (HYTRIN) 5 MG CAPSULE    Take 1 capsule (5 mg total) by mouth every evening. For blood pressure   Modified Medications    No medications on file   Discontinued Medications    No medications on  "file        Review of Systems   Constitutional: Negative for malaise/fatigue.   HENT: Negative for congestion.         Vision problems   Eyes: Negative for blurred vision.   Cardiovascular: Negative for chest pain, claudication, cyanosis, dyspnea on exertion, irregular heartbeat, leg swelling, near-syncope, orthopnea, palpitations, paroxysmal nocturnal dyspnea and syncope.   Respiratory: Negative for shortness of breath.    Endocrine: Negative for polyuria.   Hematologic/Lymphatic: Negative for bleeding problem.   Skin: Negative for itching and rash.   Musculoskeletal: Negative for joint swelling, muscle cramps and muscle weakness.   Gastrointestinal: Negative for abdominal pain, hematemesis, hematochezia, melena, nausea and vomiting.   Genitourinary: Negative for dysuria and hematuria.   Neurological: Positive for weakness. Negative for dizziness, focal weakness, headaches, light-headedness and loss of balance.   Psychiatric/Behavioral: Negative for depression. The patient is not nervous/anxious.          Objective:   Vitals  Vitals:    06/29/22 1115   BP: (!) 156/86   Pulse: 76   SpO2: 99%   Weight: 93.5 kg (206 lb 1.6 oz)   Height: 5' 4" (1.626 m)          Physical Exam  Constitutional:       General: She is not in acute distress.     Appearance: She is well-developed. She is not diaphoretic.   HENT:      Head: Normocephalic.   Neck:      Vascular: No JVD.   Cardiovascular:      Rate and Rhythm: Normal rate and regular rhythm.      Heart sounds: No murmur heard.    No friction rub. No gallop.   Pulmonary:      Effort: Pulmonary effort is normal. No respiratory distress.      Breath sounds: Normal breath sounds.   Abdominal:      Palpations: Abdomen is soft.      Tenderness: There is no abdominal tenderness.   Musculoskeletal:         General: No swelling.      Cervical back: Normal range of motion.   Skin:     General: Skin is warm.   Neurological:      Mental Status: She is alert.   Psychiatric:         Mood and " Affect: Mood normal.           Lab Results    Lab Results   Component Value Date    WBC 8.26 06/20/2022    WBC 0 10/01/2011    HGB 10.7 (L) 06/20/2022    HCT 33.4 (L) 06/20/2022    MCV 95 06/20/2022       Lab Results   Component Value Date     06/20/2022    INR 1.0 11/01/2017       Lab Results   Component Value Date    K 4.1 06/24/2022    MG 1.9 06/20/2022    BUN 13 06/24/2022    BUN 20 07/18/2017    CREATININE 0.96 06/24/2022       Lab Results   Component Value Date     (H) 06/24/2022    HGBA1C 7.9 (H) 06/17/2022       Lab Results   Component Value Date    AST 33 06/20/2022    ALT 27 06/20/2022    ALBUMIN 3.2 (L) 06/20/2022    PROT 8.0 06/20/2022       Lab Results   Component Value Date    CHOL 162 03/17/2022    HDL 35 (L) 03/17/2022    LDLCALC 90.2 03/17/2022    TRIG 184 (H) 03/17/2022       Lab Results   Component Value Date    CRP 28.10 (H) 10/01/2011    BNP 47 06/20/2022       Cardiac Studies  Significant Imaging: Echocardiogram:   Transthoracic echo (TTE) complete (Cupid Only):   Results for orders placed or performed during the hospital encounter of 06/16/22   Echo Saline Bubble? Yes   Result Value Ref Range    BSA 2.02 m2    AV mean gradient 7 mmHg    Ao peak john 1.65 m/s    Ao VTI 32.88 cm    IVRT 137.01 msec    IVS 1.65 (A) 0.6 - 1.1 cm    LA size 3.40 cm    Left Atrium Major Axis 4.23 cm    Left Atrium Minor Axis 4.98 cm    LVIDd 3.52 3.5 - 6.0 cm    LVIDs 2.43 2.1 - 4.0 cm    LVOT diameter 1.97 cm    LVOT peak VTI 26.91 cm    Posterior Wall 1.36 (A) 0.6 - 1.1 cm    MV Peak A John 1.14 m/s    E wave deceleration time 240.08 msec    MV Peak E John 0.79 m/s    PV Peak D John 0.24 m/s    PV Peak S John 0.55 m/s    RA Major Axis 4.43 cm    RA Width 2.96 cm    RVDD 1.91 cm    LA WIDTH 3.81 cm    Ao root annulus 2.88 cm    MV stenosis pressure 1/2 time 72.94 ms    LV Diastolic Volume 51.68 mL    LV Systolic Volume 20.85 mL    LVOT peak john 1.10 m/s    TDI LATERAL 0.07 m/s    TDI SEPTAL 0.17 m/s    MV  ""A" wave duration 11.04 msec    MV mean gradient 1 mmHg    MV peak gradient 5 mmHg    MV VTI 28.38 cm    LV LATERAL E/E' RATIO 11.29 m/s    LV SEPTAL E/E' RATIO 4.65 m/s    FS 31 %    LA volume 50.37 cm3    LV mass 195.98 g    Left Ventricle Relative Wall Thickness 0.77 cm    AV valve area 2.49 cm2    AV Velocity Ratio 0.67     AV index (prosthetic) 0.82     MV valve area p 1/2 method 3.02 cm2    MV valve area by continuity eq 2.89 cm2    E/A ratio 0.69     Mean e' 0.12 m/s    Pulm vein S/D ratio 2.29     LVOT area 3.0 cm2    LVOT stroke volume 81.98 cm3    AV peak gradient 11 mmHg    E/E' ratio 6.58 m/s    LV Systolic Volume Index 10.6 mL/m2    LV Diastolic Volume Index 26.37 mL/m2    LA Volume Index 25.7 mL/m2    LV Mass Index 100 g/m2    EF 75 %    Narrative    · Technically significantly limited echocardiogram.  · Concentric hypertrophy and hyperdynamic systolic function.  · The estimated ejection fraction is 75%.  · Left ventricular mid-cavity obstruction is present.  · Normal left ventricular diastolic function.  · Normal right ventricular size with normal right ventricular systolic   function.  · Trivial pericardial effusion.  · Unable to interpret bubble study due to the resolution of the images   obtained.            Assessment:     1. TIA (transient ischemic attack)    2. Anxiety    3. Hypertensive emergency    4. Hyperlipidemia, unspecified hyperlipidemia type    5. PAD (peripheral artery disease)    6. Atherosclerosis of aorta    7. LVH (left ventricular hypertrophy)    8. Obesity (BMI 30.0-34.9)        Plan:     Increase Hytrin to 10mg qhs for BP goal <130s  Monitor BP at home BID and return log at next visit    Will consider BB if BP allows  Continue with current medical plan and lifestyle changes.    No orders of the defined types were placed in this encounter.      Follow up in 4 weeks  Return sooner for concerns or questions. If symptoms persist go to the ED    She expressed verbal understanding and " agreed with the plan    Thank you for the opportunity to care for this patient. Will be available for questions if needed.     Total duration of face to face visit time 30 minutes.  Total time spent counseling greater than fifty percent of total visit time.  Counseling included discussion regarding imaging findings, diagnosis, possibilities, treatment options, risks and benefits.    January Mckeon, CHARBEL-C  Cardiology Clinic  [facility]

## 2022-07-02 RX ORDER — CLOPIDOGREL BISULFATE 75 MG/1
75 TABLET ORAL DAILY
Qty: 21 TABLET | Refills: 0 | Status: SHIPPED | OUTPATIENT
Start: 2022-07-02 | End: 2022-07-25 | Stop reason: SDUPTHER

## 2022-07-02 RX ORDER — SAXAGLIPTIN 5 MG/1
5 TABLET, FILM COATED ORAL DAILY
Qty: 90 TABLET | Refills: 3 | Status: SHIPPED | OUTPATIENT
Start: 2022-07-02 | End: 2023-03-29

## 2022-07-05 NOTE — TELEPHONE ENCOUNTER
----- Message from Emilie Khoury sent at 6/27/2022 11:04 AM CDT -----  Pt's niece(Yariel Kohler) stopped by office. Patient [facility] strips [facility] lancets  [facility] Also, Januvia is not covered by pt's insurance;alternative Rx needed    [phone]    
1. Formulary for Dm meds scanned into media to replace the januvia    2. Pt needs plavix 75 mg rx refilled to [facility]    Please notify pt mitch haney once new rx is sent to the pharmacy      
Lancets and test strips ordered for bid    A need a copy of her medical formulary from insurance co  
Spoke to patient's niece, Kelin. I explained that a replacement medication was sent in for the Januvia. I explained that the diabetic testing supplies were ordered through [facility].  
Spoke to patient's niece, Kelin. She explained that the pharmacy also need an Rx for lancets and testing strips. Added them to the medication list. Please advise as to how many times a day the patient needs to check her sugar levels.     Patient needs another oral diabetic medication sent in as the patient's insurance denied the Januvia.  
onglyza sent to replace januvia  
no

## 2022-07-06 ENCOUNTER — TELEPHONE (OUTPATIENT)
Dept: FAMILY MEDICINE | Facility: CLINIC | Age: 82
End: 2022-07-06
Payer: MEDICARE

## 2022-07-06 RX ORDER — MEMANTINE HYDROCHLORIDE 5 MG/1
5 TABLET ORAL 2 TIMES DAILY
Qty: 180 TABLET | Refills: 3 | Status: SHIPPED | OUTPATIENT
Start: 2022-07-06 | End: 2023-05-24

## 2022-07-06 NOTE — TELEPHONE ENCOUNTER
Jenni,  nurse, stated that pt is not willing to take Donepezil due to having diarrhea. Jenni would like to know if you would prescribe something else for pt's memory.

## 2022-07-06 NOTE — TELEPHONE ENCOUNTER
----- Message from Rome Bernstein sent at 7/6/2022  8:21 AM CDT -----  Contact: pt  .Type:  Needs Medical Advice    Who Called: home health nurse Jenni  Symptoms (please be specific): dirrahea  Would the patient rather a call back or a response via [facility]? Call back  Best Call Back Number: [phone]  Additional Information: Pt. Has stop the donepeziL (ARICEPT) 5 MG tablet because it gives her dirrahea.

## 2022-07-19 DIAGNOSIS — I63.9 CEREBRAL INFARCTION, LEFT HEMISPHERE: Primary | ICD-10-CM

## 2022-07-25 ENCOUNTER — OFFICE VISIT (OUTPATIENT)
Dept: FAMILY MEDICINE | Facility: CLINIC | Age: 82
End: 2022-07-25
Payer: MEDICARE

## 2022-07-25 ENCOUNTER — PATIENT OUTREACH (OUTPATIENT)
Dept: ADMINISTRATIVE | Facility: OTHER | Age: 82
End: 2022-07-25
Payer: MEDICARE

## 2022-07-25 VITALS
HEART RATE: 90 BPM | TEMPERATURE: 98 F | SYSTOLIC BLOOD PRESSURE: 124 MMHG | DIASTOLIC BLOOD PRESSURE: 70 MMHG | WEIGHT: 209.19 LBS | HEIGHT: 64 IN | BODY MASS INDEX: 35.71 KG/M2 | OXYGEN SATURATION: 98 %

## 2022-07-25 DIAGNOSIS — E55.9 VITAMIN D INSUFFICIENCY: ICD-10-CM

## 2022-07-25 DIAGNOSIS — I73.9 PAD (PERIPHERAL ARTERY DISEASE): ICD-10-CM

## 2022-07-25 DIAGNOSIS — I10 PRIMARY HYPERTENSION: ICD-10-CM

## 2022-07-25 DIAGNOSIS — I70.0 ATHEROSCLEROSIS OF AORTA: ICD-10-CM

## 2022-07-25 DIAGNOSIS — H91.90 HEARING LOSS, UNSPECIFIED HEARING LOSS TYPE, UNSPECIFIED LATERALITY: ICD-10-CM

## 2022-07-25 DIAGNOSIS — E78.5 HYPERLIPIDEMIA, UNSPECIFIED HYPERLIPIDEMIA TYPE: ICD-10-CM

## 2022-07-25 DIAGNOSIS — I63.9 OCCIPITAL STROKE: ICD-10-CM

## 2022-07-25 DIAGNOSIS — E11.9 TYPE 2 DIABETES MELLITUS WITHOUT COMPLICATION, WITHOUT LONG-TERM CURRENT USE OF INSULIN: Primary | ICD-10-CM

## 2022-07-25 DIAGNOSIS — E66.01 SEVERE OBESITY (BMI 35.0-39.9) WITH COMORBIDITY: ICD-10-CM

## 2022-07-25 DIAGNOSIS — R70.0 ELEVATED SED RATE: ICD-10-CM

## 2022-07-25 PROCEDURE — 99499 RISK ADDL DX/OHS AUDIT: ICD-10-PCS | Mod: S$GLB,,, | Performed by: FAMILY MEDICINE

## 2022-07-25 PROCEDURE — 99214 PR OFFICE/OUTPT VISIT, EST, LEVL IV, 30-39 MIN: ICD-10-PCS | Mod: S$GLB,,, | Performed by: FAMILY MEDICINE

## 2022-07-25 PROCEDURE — 99499 UNLISTED E&M SERVICE: CPT | Mod: S$GLB,,, | Performed by: FAMILY MEDICINE

## 2022-07-25 PROCEDURE — 99214 OFFICE O/P EST MOD 30 MIN: CPT | Mod: S$GLB,,, | Performed by: FAMILY MEDICINE

## 2022-07-25 RX ORDER — CLOPIDOGREL BISULFATE 75 MG/1
75 TABLET ORAL DAILY
Qty: 90 TABLET | Refills: 3 | Status: SHIPPED | OUTPATIENT
Start: 2022-07-25 | End: 2023-07-09

## 2022-07-25 NOTE — PROGRESS NOTES
"Subjective:      Patient ID: Katy Soto is a 82 y.o. female.    Chief Complaint: Follow-up      Vitals:    07/25/22 1152   BP: 124/70   Pulse: 90   Temp: 98.1 °F (36.7 °C)   TempSrc: Oral   SpO2: 98%   Weight: 94.9 kg (209 lb 3.5 oz)   Height: 5' 4" (1.626 m)        HPI   Here with geri again; pt can't see or hear and lives alone and her only child son is in long term  Needs labs  Done with PT/ST      Problem List  Patient Active Problem List   Diagnosis    Hypertensive emergency    Hyperlipidemia    Arthritis    PAD (peripheral artery disease)    Bilateral leg edema    Anxiety    Obesity (BMI 30.0-34.9)    Osteopenia    Atherosclerosis of aorta    Elevated sed rate    Vitamin D insufficiency    LVH (left ventricular hypertrophy)    Type 2 diabetes mellitus without complication, without long-term current use of insulin    Severe obesity (BMI 35.0-39.9) with comorbidity    Fracture of orbital floor, left side, initial encounter for closed fracture    Reduced visual acuity    TIA (transient ischemic attack)    right Occipital stroke    Cerebral infarction, left hemisphere        ALLERGIES:   Review of patient's allergies indicates:   Allergen Reactions    Cilostazol      [id number] and stomach cramps    Clonidine     Coreg [carvedilol]      Cramps stomach and nausea    Diltiazem      nauseana d stoamch cramps    Lexapro [escitalopram oxalate]      Dizzy, nausea and weak    Shellfish containing products Hives       MEDS:   Current Outpatient Medications:     aspirin (ECOTRIN) 81 MG EC tablet, Take 81 mg by mouth once daily., Disp: , Rfl:     atorvastatin (LIPITOR) 40 MG tablet, Take 1 tablet (40 mg total) by mouth once daily. For cholesterol, Disp: 90 tablet, Rfl: 3    blood sugar diagnostic Strp, by Misc.(Non-Drug; Combo Route) route., Disp: , Rfl:     blood-glucose meter kit, Use daily, Disp: 1 each, Rfl: 0    cholecalciferol, vitamin D3, (VITAMIN D3) 125 mcg (5,000 unit) Tab, Take 1 " tablet (5,000 Units total) by mouth once daily. (Patient taking differently: Take 1,000 Units by mouth once daily.), Disp: 90 tablet, Rfl: 3    folic acid/multivit-min/lutein (CENTRUM SILVER ORAL), Take by mouth., Disp: , Rfl:     furosemide (LASIX) 40 MG tablet, TAKE 1 TABLET(40 MG) BY MOUTH EVERY DAY FOR LEG SWELLING, Disp: 90 tablet, Rfl: 3    irbesartan (AVAPRO) 300 MG tablet, TAKE 1 TABLET(300 MG) BY MOUTH EVERY DAY, Disp: 90 tablet, Rfl: 3    lancets 31 gauge Misc, by Misc.(Non-Drug; Combo Route) route., Disp: , Rfl:     memantine (NAMENDA) 5 MG Tab, Take 1 tablet (5 mg total) by mouth 2 (two) times daily. For memory, Disp: 180 tablet, Rfl: 3    potassium chloride SA (K-DUR,KLOR-CON) 20 MEQ tablet, Take 1 tablet (20 mEq total) by mouth once daily., Disp: 90 tablet, Rfl: 3    SAXagliptin (ONGLYZA) 5 mg Tab tablet, Take 1 tablet (5 mg total) by mouth once daily., Disp: 90 tablet, Rfl: 3    sucralfate (CARAFATE) 1 gram tablet, TAKE 1 TABLET(1 GRAM) BY MOUTH THREE TIMES DAILY, Disp: 270 tablet, Rfl: 3    terazosin (HYTRIN) 10 MG capsule, Take 1 capsule (10 mg total) by mouth every evening., Disp: 30 capsule, Rfl: 11    clopidogreL (PLAVIX) 75 mg tablet, Take 1 tablet (75 mg total) by mouth once daily. for 21 days, Disp: 90 tablet, Rfl: 3      History:  Current Providers as of 7/25/2022  PCP: Rudy Cruz MD  Care Team Provider: New Mehta MD  Care Team Provider: David Fried MD  Care Team Provider: Easton Johnson  Encounter Provider: Rudy Cruz MD, starting on Mon Jul 25, 2022 12:00 AM  Referring Provider: not found, starting on Mon Jul 25, 2022 12:00 AM  Consulting Physician: Rudy Cruz MD, starting on Mon Jul 25, 2022 11:45 AM (Active)   Past Medical History:   Diagnosis Date    Hyperlipidemia     Hypertension     Type 2 diabetes mellitus without complication, without long-term current use of insulin 3/17/2022     Past Surgical History:   Procedure Laterality Date      SECTION      x1    HERNIA REPAIR      umbilical    LEG SURGERY Right 2017    stents placed     Social History     Tobacco Use    Smoking status: Never Smoker    Smokeless tobacco: Never Used   Substance Use Topics    Alcohol use: No    Drug use: No         Review of Systems   Constitutional: Negative.    HENT: Negative.    Respiratory: Negative.    Cardiovascular: Negative.    Gastrointestinal: Negative.    Endocrine: Negative.    Genitourinary: Negative.    Musculoskeletal: Negative.    Psychiatric/Behavioral: Negative.    All other systems reviewed and are negative.    Objective:     Physical Exam        Assessment:     1. Type 2 diabetes mellitus without complication, without long-term current use of insulin    2. right Occipital stroke    3. Hearing loss, unspecified hearing loss type, unspecified laterality    4. Severe obesity (BMI 35.0-39.9) with comorbidity    5. Atherosclerosis of aorta    6. Primary hypertension    7. PAD (peripheral artery disease)    8. Hyperlipidemia, unspecified hyperlipidemia type    9. Elevated sed rate    10. Vitamin D insufficiency      Plan:        Medication List          Accurate as of 2022 12:20 PM. If you have any questions, ask your nurse or doctor.            CHANGE how you take these medications    cholecalciferol (vitamin D3) 125 mcg (5,000 unit) Tab  Commonly known as: VITAMIN D3  Take 1 tablet (5,000 Units total) by mouth once daily.  What changed: how much to take        CONTINUE taking these medications    aspirin 81 MG EC tablet  Commonly known as: ECOTRIN     atorvastatin 40 MG tablet  Commonly known as: LIPITOR  Take 1 tablet (40 mg total) by mouth once daily. For cholesterol     blood sugar diagnostic Strp     blood-glucose meter kit  Use daily     CENTRUM SILVER ORAL     clopidogreL 75 mg tablet  Commonly known as: PLAVIX  Take 1 tablet (75 mg total) by mouth once daily. for 21 days     furosemide 40 MG tablet  Commonly known as:  LASIX  TAKE 1 TABLET(40 MG) BY MOUTH EVERY DAY FOR LEG SWELLING     irbesartan 300 MG tablet  Commonly known as: AVAPRO  TAKE 1 TABLET(300 MG) BY MOUTH EVERY DAY     lancets 31 gauge Misc     memantine 5 MG Tab  Commonly known as: NAMENDA  Take 1 tablet (5 mg total) by mouth 2 (two) times daily. For memory     potassium chloride SA 20 MEQ tablet  Commonly known as: K-DUR,KLOR-CON  Take 1 tablet (20 mEq total) by mouth once daily.     SAXagliptin 5 mg Tab tablet  Commonly known as: ONGLYZA  Take 1 tablet (5 mg total) by mouth once daily.     sucralfate 1 gram tablet  Commonly known as: CARAFATE  TAKE 1 TABLET(1 GRAM) BY MOUTH THREE TIMES DAILY     terazosin 10 MG capsule  Commonly known as: HYTRIN  Take 1 capsule (10 mg total) by mouth every evening.           Where to Get Your Medications      These medications were sent to [id number] - [facility] [email or web address] [facility] AT [facility] [address]  [address] [address]    Phone: [phone]   · clopidogreL 75 mg tablet       Type 2 diabetes mellitus without complication, without long-term current use of insulin  -     CBC Auto Differential; Future; Expected date: 07/25/2022  -     Comprehensive Metabolic Panel; Future; Expected date: 07/25/2022  -     Hemoglobin A1C; Future  -     Lipid Panel; Future  -     TSH; Future  -     Urinalysis; Future    right Occipital stroke  -     clopidogreL (PLAVIX) 75 mg tablet; Take 1 tablet (75 mg total) by mouth once daily. for 21 days  Dispense: 90 tablet; Refill: 3    Hearing loss, unspecified hearing loss type, unspecified laterality    Severe obesity (BMI 35.0-39.9) with comorbidity    Atherosclerosis of aorta    Primary hypertension    PAD (peripheral artery disease)    Hyperlipidemia, unspecified hyperlipidemia type    Elevated sed rate    Vitamin D insufficiency

## 2022-07-25 NOTE — PROGRESS NOTES
CHW - Initial Contact    This Community Health Worker completed the Social Determinant of Health questionnaire with MRN [id number] over the phone today.    Pt identified barriers of most importance are: Food Security   Referrals to community agencies completed with patient/caregiver consent outside of [facility] include: Meals on Wheals   Referrals were put through [facility] - No  Support and Services:   Other information discussed the patient needs / wants help with: No additional information. Patient's family member (Yariel) called on behalf of patient and will be in touch if patient's situation changes and additional assistance is required.   Follow up required: Yes   Follow- Up Outreach- Due: 08/02/22

## 2022-07-25 NOTE — PROGRESS NOTES
Health Maintenance:    Pt will have shingles vaccine and covid booster done at [facility] pharmacy.

## 2022-07-26 ENCOUNTER — EXTERNAL HOME HEALTH (OUTPATIENT)
Dept: HOME HEALTH SERVICES | Facility: HOSPITAL | Age: 82
End: 2022-07-26
Payer: MEDICARE

## 2022-07-26 LAB
CHOLEST SERPL-MSCNC: 137 MG/DL (ref 0–200)
HDLC SERPL-MCNC: 31 MG/DL (ref 35–70)
LDLC SERPL CALC-MCNC: 66 MG/DL (ref 0–160)
TRIGL SERPL-MCNC: 250 MG/DL (ref 40–160)

## 2022-07-28 ENCOUNTER — PATIENT OUTREACH (OUTPATIENT)
Dept: ADMINISTRATIVE | Facility: HOSPITAL | Age: 82
End: 2022-07-28
Payer: MEDICARE

## 2022-08-01 ENCOUNTER — OFFICE VISIT (OUTPATIENT)
Dept: CARDIOLOGY | Facility: CLINIC | Age: 82
End: 2022-08-01
Payer: MEDICARE

## 2022-08-01 VITALS
BODY MASS INDEX: 35.06 KG/M2 | HEIGHT: 64 IN | HEART RATE: 97 BPM | WEIGHT: 205.38 LBS | SYSTOLIC BLOOD PRESSURE: 142 MMHG | OXYGEN SATURATION: 98 % | DIASTOLIC BLOOD PRESSURE: 68 MMHG

## 2022-08-01 DIAGNOSIS — E66.9 OBESITY (BMI 30.0-34.9): ICD-10-CM

## 2022-08-01 DIAGNOSIS — G45.9 TIA (TRANSIENT ISCHEMIC ATTACK): ICD-10-CM

## 2022-08-01 DIAGNOSIS — E78.5 HYPERLIPIDEMIA, UNSPECIFIED HYPERLIPIDEMIA TYPE: ICD-10-CM

## 2022-08-01 DIAGNOSIS — I70.0 ATHEROSCLEROSIS OF AORTA: ICD-10-CM

## 2022-08-01 DIAGNOSIS — I73.9 PAD (PERIPHERAL ARTERY DISEASE): ICD-10-CM

## 2022-08-01 DIAGNOSIS — I10 HYPERTENSION, UNSPECIFIED TYPE: ICD-10-CM

## 2022-08-01 DIAGNOSIS — R60.0 BILATERAL LEG EDEMA: ICD-10-CM

## 2022-08-01 DIAGNOSIS — I63.9 OCCIPITAL STROKE: ICD-10-CM

## 2022-08-01 DIAGNOSIS — I51.7 LVH (LEFT VENTRICULAR HYPERTROPHY): ICD-10-CM

## 2022-08-01 DIAGNOSIS — I16.1 HYPERTENSIVE EMERGENCY: Primary | ICD-10-CM

## 2022-08-01 PROCEDURE — 99214 OFFICE O/P EST MOD 30 MIN: CPT | Mod: PBBFAC,PO | Performed by: NURSE PRACTITIONER

## 2022-08-01 PROCEDURE — 99999 PR PBB SHADOW E&M-EST. PATIENT-LVL IV: CPT | Mod: PBBFAC,,, | Performed by: NURSE PRACTITIONER

## 2022-08-01 PROCEDURE — 99999 PR PBB SHADOW E&M-EST. PATIENT-LVL IV: ICD-10-PCS | Mod: PBBFAC,,, | Performed by: NURSE PRACTITIONER

## 2022-08-01 PROCEDURE — 99214 PR OFFICE/OUTPT VISIT, EST, LEVL IV, 30-39 MIN: ICD-10-PCS | Mod: S$PBB,,, | Performed by: NURSE PRACTITIONER

## 2022-08-01 PROCEDURE — 99214 OFFICE O/P EST MOD 30 MIN: CPT | Mod: S$PBB,,, | Performed by: NURSE PRACTITIONER

## 2022-08-01 NOTE — PROGRESS NOTES
Cardiology Clinic note    Subjective:   Patient ID:  Katy Soto is a 82 y.o. female who presents for follow-up of HTN    Hx: hypertension, PAD, hyperlipidemia, diabetes, TIA, hypertensive emergency, atherosclerosis of aorta, LVH, PAD, obesity     Recently hospital admission for TIA 2022. SBP >240 and improved with antihypertensives. Patient reported she received new that someone , whom she knew, and the news upset her causing her BP to elevate. She reported compliance with her medications, however caregiver present and states medications were not taken for at least a month prior to hospital stay. CT head negative for acute changes. Home medications resumed; recent increase on Hytrin.      Reports today for follow up. Reports BP 140s range at home prior to BP log; encouraged at last visit to bring in log and take BP after medications, however BP log today. Caregiver states patient adds salt to food after cooking and had some swelling last week that resolved the following day. Takes Avapro, Hytrin, Lasix for BP mgt. Recent visit with PCP and BP 120s with reports of feeling well.      Denies chest pain, SOB, diaphoresis, dizziness, syncope, SPIVEY.      HPI:   Katy Soto  has a past medical history of Hyperlipidemia, Hypertension, and Type 2 diabetes mellitus without complication, without long-term current use of insulin (3/17/2022).          Patient Active Problem List    Diagnosis Date Noted    right Occipital stroke 2022    Cerebral infarction, left hemisphere 2022    TIA (transient ischemic attack) 2022    Reduced visual acuity 2022    Type 2 diabetes mellitus without complication, without long-term current use of insulin 2022    Severe obesity (BMI 35.0-39.9) with comorbidity 2022    Fracture of orbital floor, left side, initial encounter for closed fracture 2022    LVH (left ventricular hypertrophy) 2020    Vitamin D insufficiency  01/10/2020     Vitamin d 13 (L) as noted on most recent vit d lab dated 8/12/19.      Elevated sed rate 09/16/2019    Obesity (BMI 30.0-34.9) 12/14/2018     Current BMI 34.28.       Osteopenia 12/14/2018     Noted on DXA scan dated 4/4/2017.       Atherosclerosis of aorta 12/14/2018     Noted on cxr dated 10/31/2011.       Anxiety 12/21/2017    Bilateral leg edema 08/04/2017    PAD (peripheral artery disease) 07/30/2017    Arthritis 06/23/2017    Hypertensive emergency 03/01/2017    Hyperlipidemia 03/01/2017       Patient's Medications   New Prescriptions    No medications on file   Previous Medications    ASPIRIN (ECOTRIN) 81 MG EC TABLET    Take 81 mg by mouth once daily.    ATORVASTATIN (LIPITOR) 40 MG TABLET    Take 1 tablet (40 mg total) by mouth once daily. For cholesterol    BLOOD SUGAR DIAGNOSTIC STRP    by Misc.(Non-Drug; Combo Route) route.    BLOOD-GLUCOSE METER KIT    Use daily    CHOLECALCIFEROL, VITAMIN D3, (VITAMIN D3) 125 MCG (5,000 UNIT) TAB    Take 1 tablet (5,000 Units total) by mouth once daily.    CLOPIDOGREL (PLAVIX) 75 MG TABLET    Take 1 tablet (75 mg total) by mouth once daily. for 21 days    FOLIC ACID/MULTIVIT-MIN/LUTEIN (CENTRUM SILVER ORAL)    Take by mouth.    FUROSEMIDE (LASIX) 40 MG TABLET    TAKE 1 TABLET(40 MG) BY MOUTH EVERY DAY FOR LEG SWELLING    IRBESARTAN (AVAPRO) 300 MG TABLET    TAKE 1 TABLET(300 MG) BY MOUTH EVERY DAY    LANCETS 31 GAUGE MISC    by Misc.(Non-Drug; Combo Route) route.    MEMANTINE (NAMENDA) 5 MG TAB    Take 1 tablet (5 mg total) by mouth 2 (two) times daily. For memory    POTASSIUM CHLORIDE SA (K-DUR,KLOR-CON) 20 MEQ TABLET    Take 1 tablet (20 mEq total) by mouth once daily.    SAXAGLIPTIN (ONGLYZA) 5 MG TAB TABLET    Take 1 tablet (5 mg total) by mouth once daily.    SUCRALFATE (CARAFATE) 1 GRAM TABLET    TAKE 1 TABLET(1 GRAM) BY MOUTH THREE TIMES DAILY    TERAZOSIN (HYTRIN) 10 MG CAPSULE    Take 1 capsule (10 mg total) by mouth every evening.  "  Modified Medications    No medications on file   Discontinued Medications    No medications on file        Review of Systems   Constitutional: Negative for malaise/fatigue.   HENT: Negative for congestion.         Vision problems   Eyes: Negative for blurred vision.   Cardiovascular: Negative for chest pain, claudication, cyanosis, dyspnea on exertion, irregular heartbeat, leg swelling, near-syncope, orthopnea, palpitations, paroxysmal nocturnal dyspnea and syncope.   Respiratory: Negative for shortness of breath.    Endocrine: Negative for polyuria.   Hematologic/Lymphatic: Negative for bleeding problem.   Skin: Negative for itching and rash.   Musculoskeletal: Negative for joint swelling, muscle cramps and muscle weakness.   Gastrointestinal: Negative for abdominal pain, hematemesis, hematochezia, melena, nausea and vomiting.   Genitourinary: Negative for dysuria and hematuria.   Neurological: Positive for weakness. Negative for dizziness, focal weakness, headaches, light-headedness and loss of balance.   Psychiatric/Behavioral: Negative for depression. The patient is not nervous/anxious.          Objective:   Vitals  Vitals:    08/01/22 1000 08/01/22 1002   BP: (!) 140/70 (!) 142/68   Pulse: 97 97   SpO2: 98% 98%   Weight: 93.2 kg (205 lb 6.4 oz) 93.2 kg (205 lb 6.4 oz)   Height: 5' 4" (1.626 m) 5' 4" (1.626 m)          Physical Exam  Constitutional:       General: She is not in acute distress.     Appearance: She is well-developed. She is not diaphoretic.   HENT:      Head: Normocephalic.   Neck:      Vascular: No JVD.   Cardiovascular:      Rate and Rhythm: Normal rate and regular rhythm.      Heart sounds: No murmur heard.    No friction rub. No gallop.   Pulmonary:      Effort: Pulmonary effort is normal. No respiratory distress.      Breath sounds: Normal breath sounds.   Abdominal:      Palpations: Abdomen is soft.      Tenderness: There is no abdominal tenderness.   Musculoskeletal:         General: No " swelling.      Cervical back: Normal range of motion.   Skin:     General: Skin is warm.   Neurological:      Mental Status: She is alert.   Psychiatric:         Mood and Affect: Mood normal.           Lab Results    Lab Results   Component Value Date    WBC 8.26 06/20/2022    WBC 0 10/01/2011    HGB 10.7 (L) 06/20/2022    HCT 33.4 (L) 06/20/2022    MCV 95 06/20/2022       Lab Results   Component Value Date     06/20/2022    INR 1.0 11/01/2017       Lab Results   Component Value Date    K 4.1 06/24/2022    MG 1.9 06/20/2022    BUN 13 06/24/2022    BUN 20 07/18/2017    CREATININE 0.96 06/24/2022       Lab Results   Component Value Date     (H) 06/24/2022    HGBA1C 7.9 (H) 06/17/2022       Lab Results   Component Value Date    AST 33 06/20/2022    ALT 27 06/20/2022    ALBUMIN 3.2 (L) 06/20/2022    PROT 8.0 06/20/2022       Lab Results   Component Value Date    CHOL 137 07/26/2022    HDL 31 (A) 07/26/2022    LDLCALC 66 07/26/2022    TRIG 250 (A) 07/26/2022       Lab Results   Component Value Date    CRP 28.10 (H) 10/01/2011    BNP 47 06/20/2022       Cardiac Studies  Significant Imaging: Echocardiogram:   Transthoracic echo (TTE) complete (Cupid Only):   Results for orders placed or performed during the hospital encounter of 06/16/22   Echo Saline Bubble? Yes   Result Value Ref Range    BSA 2.02 m2    AV mean gradient 7 mmHg    Ao peak john 1.65 m/s    Ao VTI 32.88 cm    IVRT 137.01 msec    IVS 1.65 (A) 0.6 - 1.1 cm    LA size 3.40 cm    Left Atrium Major Axis 4.23 cm    Left Atrium Minor Axis 4.98 cm    LVIDd 3.52 3.5 - 6.0 cm    LVIDs 2.43 2.1 - 4.0 cm    LVOT diameter 1.97 cm    LVOT peak VTI 26.91 cm    Posterior Wall 1.36 (A) 0.6 - 1.1 cm    MV Peak A John 1.14 m/s    E wave deceleration time 240.08 msec    MV Peak E John 0.79 m/s    PV Peak D John 0.24 m/s    PV Peak S John 0.55 m/s    RA Major Axis 4.43 cm    RA Width 2.96 cm    RVDD 1.91 cm    LA WIDTH 3.81 cm    Ao root annulus 2.88 cm    MV stenosis  "pressure 1/2 time 72.94 ms    LV Diastolic Volume 51.68 mL    LV Systolic Volume 20.85 mL    LVOT peak florina 1.10 m/s    TDI LATERAL 0.07 m/s    TDI SEPTAL 0.17 m/s    MV "A" wave duration 11.04 msec    MV mean gradient 1 mmHg    MV peak gradient 5 mmHg    MV VTI 28.38 cm    LV LATERAL E/E' RATIO 11.29 m/s    LV SEPTAL E/E' RATIO 4.65 m/s    FS 31 %    LA volume 50.37 cm3    LV mass 195.98 g    Left Ventricle Relative Wall Thickness 0.77 cm    AV valve area 2.49 cm2    AV Velocity Ratio 0.67     AV index (prosthetic) 0.82     MV valve area p 1/2 method 3.02 cm2    MV valve area by continuity eq 2.89 cm2    E/A ratio 0.69     Mean e' 0.12 m/s    Pulm vein S/D ratio 2.29     LVOT area 3.0 cm2    LVOT stroke volume 81.98 cm3    AV peak gradient 11 mmHg    E/E' ratio 6.58 m/s    LV Systolic Volume Index 10.6 mL/m2    LV Diastolic Volume Index 26.37 mL/m2    LA Volume Index 25.7 mL/m2    LV Mass Index 100 g/m2    EF 75 %    Narrative    · Technically significantly limited echocardiogram.  · Concentric hypertrophy and hyperdynamic systolic function.  · The estimated ejection fraction is 75%.  · Left ventricular mid-cavity obstruction is present.  · Normal left ventricular diastolic function.  · Normal right ventricular size with normal right ventricular systolic   function.  · Trivial pericardial effusion.  · Unable to interpret bubble study due to the resolution of the images   obtained.          Assessment:     No diagnosis found.    Plan:     HTN  Recommended to monitor BP twice daily after BP med administration at last visit, however no BP log presented today. States BP 140s at home prior to BP medications. Recent visit with PCP SBP 120s. BP 140s today, however patient reports just taking BP medications prior to visit and she did not take Lasix at all. Encouraged caregiver and patient to monitor BP at home after BP medications and if readings are consistently >130s, to please notify clinic. Patient/caregiever voiced " understanding.  Also discussed weight loss, salt restriction, medication compliance    PAD  S/p PTA to right PTA for Balta IIb lifestyle limiting claudication   Stable; continue ASA + statin, Plavix      Obesity  Encouraged weight loss    Hyperlipidemia  Continue statin therapy    TIA  Stable; on DAPT, statin   BP stable  Has neuro appointment soon     Continue with current medical plan and lifestyle changes as patient is stable from heart standpoint. Asymptomatic.        No orders of the defined types were placed in this encounter.      Follow up in 6 months or sooner if home BP log > greater than 130s  Return sooner for concerns or questions. If symptoms persist go to the ED    She expressed verbal understanding and agreed with the plan    Thank you for the opportunity to care for this patient. Will be available for questions if needed.     Total duration of face to face visit time 30 minutes.  Total time spent counseling greater than fifty percent of total visit time.  Counseling included discussion regarding imaging findings, diagnosis, possibilities, treatment options, risks and benefits.    January Mckeon, CHARBEL-C  Cardiology Clinic  [facility]

## 2022-08-03 ENCOUNTER — TELEPHONE (OUTPATIENT)
Dept: FAMILY MEDICINE | Facility: CLINIC | Age: 82
End: 2022-08-03
Payer: MEDICARE

## 2022-08-08 ENCOUNTER — DOCUMENT SCAN (OUTPATIENT)
Dept: HOME HEALTH SERVICES | Facility: HOSPITAL | Age: 82
End: 2022-08-08
Payer: MEDICARE

## 2022-08-09 ENCOUNTER — DOCUMENT SCAN (OUTPATIENT)
Dept: HOME HEALTH SERVICES | Facility: HOSPITAL | Age: 82
End: 2022-08-09
Payer: MEDICARE

## 2022-08-11 ENCOUNTER — DOCUMENT SCAN (OUTPATIENT)
Dept: HOME HEALTH SERVICES | Facility: HOSPITAL | Age: 82
End: 2022-08-11
Payer: MEDICARE

## 2022-08-15 ENCOUNTER — TELEPHONE (OUTPATIENT)
Dept: FAMILY MEDICINE | Facility: CLINIC | Age: 82
End: 2022-08-15
Payer: MEDICARE

## 2022-08-15 ENCOUNTER — PATIENT OUTREACH (OUTPATIENT)
Dept: ADMINISTRATIVE | Facility: HOSPITAL | Age: 82
End: 2022-08-15
Payer: MEDICARE

## 2022-08-15 NOTE — TELEPHONE ENCOUNTER
----- Message from Deepti Herrmann sent at 8/15/2022  9:12 AM CDT -----  Type: Requesting to speak with nurse         Who Called: PT's  nurse Jenni   Regarding: needing an okay to re certify her for home health please advise   Would the patient rather a call back or a response via [facility]? Call back  Best Call Back Number: [phone]  Additional Information: n/a

## 2022-08-15 NOTE — PROGRESS NOTES
08/15/2022 Care Everywhere updates requested and reviewed.  Immunizations reconciled. Media reports reviewed.  Duplicate HM overrides and  orders removed.  Overdue HM topic chart audit and/or requested.  Overdue lab testing linked to upcoming lab appointments if applies.      Health Maintenance Due   Topic Date Due    COVID-19 Vaccine (3 - Booster) 2021

## 2022-08-16 ENCOUNTER — PES CALL (OUTPATIENT)
Dept: ADMINISTRATIVE | Facility: OTHER | Age: 82
End: 2022-08-16
Payer: MEDICARE

## 2022-08-19 PROCEDURE — G0179 PR HOME HEALTH MD RECERTIFICATION: ICD-10-PCS | Mod: ,,, | Performed by: FAMILY MEDICINE

## 2022-08-19 PROCEDURE — G0179 MD RECERTIFICATION HHA PT: HCPCS | Mod: ,,, | Performed by: FAMILY MEDICINE

## 2022-08-29 ENCOUNTER — CLINICAL SUPPORT (OUTPATIENT)
Dept: DIABETES | Facility: CLINIC | Age: 82
End: 2022-08-29
Payer: MEDICARE

## 2022-08-29 ENCOUNTER — OFFICE VISIT (OUTPATIENT)
Dept: FAMILY MEDICINE | Facility: CLINIC | Age: 82
End: 2022-08-29
Payer: MEDICARE

## 2022-08-29 VITALS
WEIGHT: 213.19 LBS | HEIGHT: 64 IN | DIASTOLIC BLOOD PRESSURE: 90 MMHG | HEART RATE: 74 BPM | TEMPERATURE: 98 F | BODY MASS INDEX: 36.4 KG/M2 | SYSTOLIC BLOOD PRESSURE: 154 MMHG | OXYGEN SATURATION: 99 %

## 2022-08-29 DIAGNOSIS — R06.02 SOB (SHORTNESS OF BREATH): ICD-10-CM

## 2022-08-29 DIAGNOSIS — E11.9 TYPE 2 DIABETES MELLITUS WITHOUT COMPLICATION, WITHOUT LONG-TERM CURRENT USE OF INSULIN: ICD-10-CM

## 2022-08-29 DIAGNOSIS — E11.9 TYPE 2 DIABETES MELLITUS WITHOUT COMPLICATION, WITHOUT LONG-TERM CURRENT USE OF INSULIN: Primary | ICD-10-CM

## 2022-08-29 DIAGNOSIS — R60.1 GENERALIZED EDEMA: ICD-10-CM

## 2022-08-29 PROCEDURE — G0108 DIAB MANAGE TRN  PER INDIV: HCPCS | Mod: PBBFAC,PO | Performed by: DIETITIAN, REGISTERED

## 2022-08-29 PROCEDURE — 99999 PR PBB SHADOW E&M-EST. PATIENT-LVL I: CPT | Mod: PBBFAC,,, | Performed by: DIETITIAN, REGISTERED

## 2022-08-29 PROCEDURE — 99214 PR OFFICE/OUTPT VISIT, EST, LEVL IV, 30-39 MIN: ICD-10-PCS | Mod: S$GLB,,, | Performed by: FAMILY MEDICINE

## 2022-08-29 PROCEDURE — 99999 PR PBB SHADOW E&M-EST. PATIENT-LVL I: ICD-10-PCS | Mod: PBBFAC,,, | Performed by: DIETITIAN, REGISTERED

## 2022-08-29 PROCEDURE — 99214 OFFICE O/P EST MOD 30 MIN: CPT | Mod: S$GLB,,, | Performed by: FAMILY MEDICINE

## 2022-08-29 PROCEDURE — 99211 OFF/OP EST MAY X REQ PHY/QHP: CPT | Mod: PBBFAC,PO | Performed by: DIETITIAN, REGISTERED

## 2022-08-29 RX ORDER — FUROSEMIDE 40 MG/1
80 TABLET ORAL 2 TIMES DAILY
Qty: 120 TABLET | Refills: 3 | Status: SHIPPED | OUTPATIENT
Start: 2022-08-29 | End: 2022-12-28

## 2022-08-29 NOTE — PROGRESS NOTES
Diabetes Care Specialist Progress Note  Author: Pinky Grajeda RD  Date: 8/29/2022    Program Intake  Reason for Diabetes Program Visit:: Initial Diabetes Assessment  Current diabetes risk level:: moderate  In the last 12 months, have you:: none  Permission to speak with others about care:: yes    Lab Results   Component Value Date    LABA1C 5.6 06/06/2017    HGBA1C 7.9 (H) 06/17/2022       Clinical  Problem Review  Reviewed Problem List with Patient: yes  Active comorbidities affecting diabetes self-care.: yes  Comorbidities: Cardiovascular Disease  Reviewed health maintenance: yes    Clinical Assessment  Current Diabetes Treatment: Oral Medication  Have you ever experienced hypoglycemia (low blood sugar)?: yes  In the last month, how often have you experienced low blood sugar?: once a week  Are you able to tell when your blood sugar is low?: Yes  What symptoms do you experience?: Dizzy/Light-headed  Have you ever been hospitalized because your blood sugar was too low?: no  How do you treat hypoglycemia (low blood sugar)?: 1/2 can soda/fruit juice  Have you ever experienced hyperglycemia (high blood sugar)?: no    Medication Information  How do you obtain your medications?: Family picks up  How many days a week do you miss your medications?: Never  Medication adherence impacting ability to self-manage diabetes?: No    Labs  Do you have regular lab work to monitor your medications?: Yes  Type of Regular Lab Work: A1c  Lab Compliance Barriers: No    Nutritional Status  Diet: Regular  Meal Plan 24 Hour Recall: Breakfast, Lunch, Snack, Dinner  Meal Plan 24 Hour Recall - Breakfast: 7 a.m. 2 toast, banana, SF pudding, egg, water, sometimes chicken  Meal Plan 24 Hour Recall - Lunch: 1:30 pm boil potatoes, string beans, corn, sliced turkey  Meal Plan 24 Hour Recall - Dinner: boil potatoes, string beans, corn, sliced turkey or chicken, hard boilded egg  Meal Plan 24 Hour Recall - Snack: Pt stated she does not snack  Change  in appetite?: No  Recent Changes in Weight: No Recent Weight Change  Current nutritional status an area of need that is impacting patient's ability to self-manage diabetes?: Yes    Additional Social History    Support  Does anyone support you with your diabetes care?: yes  Who supports you?: family member  Who takes you to your medical appointments?: family member  Does the current support meet the patient's needs?: Yes  Is Support an area impacting ability to self-manage diabetes?: Yes    Access to Mass Media & Technology  Does the patient have access to any of the following devices or technologies?: Smart phone  Media or technology needs impacting ability to self-manage diabetes?: No    Cognitive/Behavioral Health  Alert and Oriented: Yes  Difficulty Thinking: No  Requires Prompting: No  Requires assistance for routine expression?: No  Cognitive or behavioral barriers impacting ability to self-manage diabetes?: No         Communication  Language preference: English  Hearing Problems: Yes  Hearing Assistance:  (None)  Vision Problems: No  Communication needs impacting ability to self-manage diabetes?: Yes    Health Literacy  Preferred Learning Method: Face to Face, Reading Materials  How often do you need to have someone help you read instructions, pamphlets, or written material from your doctor or pharmacy?: Sometimes  Health literacy needs impacting ability to self-manage diabetes?: No      Diabetes Self-Management Skills Assessment    Diabetes Disease Process/Treatment Options  Patient/caregiver able to state what happens when someone has diabetes.: no  Patient/caregiver knows what type of diabetes they have.: yes  Diabetes Type : Type II  Patient/caregiver able to identify at least three signs and symptoms of diabetes.: no  Patient able to identify at least three risk factors for diabetes.: no  Diabetes Disease Process/Treatment Options: Skills Assessment Completed: Yes  Assessment indicates:: Instruction  Needed  Area of need?: No    Nutrition/Healthy Eating  Challenges to healthy eating:: portion control  Method of carbohydrate measurement:: no method  Patient can identify foods that impact blood sugar.: yes  Patient-identified foods:: starches (bread, pasta, rice, cereal)  Nutrition/Healthy Eating Skills Assessment Completed:: Yes  Assessment indicates:: Instruction Needed  Area of need?: Yes    Physical Activity/Exercise  Patient's daily activity level:: sedentary  Patient formally exercises outside of work.: no  Reasons for not exercising:: unsafe to exercise at home  Patient can identify forms of physical activity.: yes  Stated forms of physical activity::  (walking)  Patient can identify reasons why exercise/physical activity is important in diabetes management.: no  Physical Activity/Exercise Skills Assessment Completed: : Yes  Assessment indicates:: Instruction Needed  Area of need?: No    Medications  Patient is able to describe current diabetes management routine.: yes  Diabetes management routine:: oral medications, diet  Patient is able to identify current diabetes medications, dosages, and appropriate timing of medications.: no  Patient understands the purpose of the medications taken for diabetes.: no  Patient reports problems or concerns with current medication regimen.: no  Medication Skills Assessment Completed:: Yes  Assessment indicates:: Instruction Needed  Area of need?: Yes    Home Blood Glucose Monitoring  Patient states that blood sugar is checked at home daily.: no  Reasons for not monitoring:: finger pain  Home Blood Glucose Monitoring Skills Assessment Completed: : Yes  Assessment indicates:: Adequate understanding  Area of need?: No    Acute Complications  Acute Complications Skills Assessment Completed: : No  Deffered due to:: Time    Chronic Complications  Chronic Complications Skills Assessment Completed: : No  Deferred due to:: Time    Psychosocial/Coping  Psychosocial/Coping Skills  Assessment Completed: : No  Deffered due to:: Time    Assessment Summary and Plan    Based on today's diabetes care assessment, the following areas of need were identified:      Social 8/29/2022   Support No   Access to Mass Media/Tech No   Cognitive/Behavioral Health No   Communication Yes, pt has hearing loss and does not wear hearing device   Health Literacy No        Clinical 8/29/2022   Medication Adherence No   Lab Compliance No   Nutritional Status Yes, see care plan.        Diabetes Self-Management Skills 8/29/2022   Diabetes Disease Process/Treatment Options No   Nutrition/Healthy Eating Yes, see car plan   Physical Activity/Exercise No   Medication Yes, see car plan   Home Blood Glucose Monitoring No          Today's interventions were provided through individual discussion, instruction, and written materials were provided.      Patient verbalized understanding of instruction and written materials.  Pt was able to return back demonstration of instructions today. Patient understood key points, needs reinforcement and further instruction.     Diabetes Self-Management Care Plan:    Today's Diabetes Self-Management Care Plan was developed with Katy's input. Katy has agreed to work toward the following goal(s) to improve his/her overall diabetes control.      Care Plan: Diabetes Management   Updates made since 7/30/2022 12:00 AM     Problem: Disease Process         Goal: Patient agrees to take steps toward understanding the diabetes disease process and treatment options by re-reading the booklet given to her at this appt.    Start Date: 8/29/2022   Expected End Date: 11/29/2022   Priority: Low        Task: Provided a basic introduction of the diabetes disease process, diagnosis, progression, and how diabetes can be successfully managed. Completed 8/29/2022        Task: Reviewed the following risk factors associated with diabetes: Being overweight, family history, reduced activity, ethnicity, age over 40,  "past history of Gestational DM Completed 8/29/2022       Problem: Healthy Eating         Goal: Eat 3 meals daily with 30-45g servings of or 2-3 Carbohydrate per meal.    Start Date: 8/29/2022   Expected End Date: 11/29/2022   Priority: High          Task: Provided visual examples using dry measuring cups, food models, and other familiar objects such as computer mouse, deck or cards, tennis ball etc. to help with visualization of portions. Completed 8/29/2022          Task: Review the importance of balancing carbohydrates with each meal using portion control techniques to count servings of carbohydrate and label reading to identify serving size and amount of total carbs per serving. Completed 8/29/2022        Task: Provided Sample plate method and reviewed the use of the plate to estimate amounts of carbohydrate per meal. Completed 8/29/2022        Problem: Blood Glucose Self-Monitoring         Goal: Patient agrees to check and record blood sugars 3 times per week and when she feels "funny".    Start Date: 8/29/2022   Expected End Date: 11/29/2022   Priority: High   Note:    Pt does not know when she is hypoglycemic vs. hyperglycemic. She has never checked her BG when she feels "funny" (her word for having a hypoglycemic episode). Her symptoms include headache. She was instructed to check her BG when she feels this way to determine the best course of treatment. She currently treats these as lows by drinking sugary drinks.              Follow Up Plan     Follow up in about 3 months (around 11/29/2022). Pt will call to schedule f/u. Review carb counting/meal planning, monitoring BG and treating hypoglycemia.    Today's care plan and follow up schedule was discussed with patient.  Katy verbalized understanding of the care plan, goals, and agrees to follow up plan.        The patient was encouraged to communicate with his/her health care provider/physician and care team regarding his/her condition(s) and treatment.  I " provided the patient with my contact information today and encouraged to contact me via phone or [facility] Portal as needed.     Length of Visit   Total Time: 60 Minutes

## 2022-08-29 NOTE — PROGRESS NOTES
"Subjective:      Patient ID: Katy Soto is a 82 y.o. female.    Chief Complaint: Follow-up (1 mon)      Vitals:    08/29/22 1136   BP: (!) 154/90   Pulse: 74   Temp: 97.9 °F (36.6 °C)   TempSrc: Oral   SpO2: 99%   Weight: 96.7 kg (213 lb 3 oz)   Height: 5' 4" (1.626 m)        HPI   Still swollen, [place], feetnumb feet, left arm nuymb      Problem List  Patient Active Problem List   Diagnosis    Hypertensive emergency    Hyperlipidemia    Arthritis    PAD (peripheral artery disease)    Bilateral leg edema    Anxiety    Obesity (BMI 30.0-34.9)    Osteopenia    Atherosclerosis of aorta    Elevated sed rate    Vitamin D insufficiency    LVH (left ventricular hypertrophy)    Type 2 diabetes mellitus without complication, without long-term current use of insulin    Severe obesity (BMI 35.0-39.9) with comorbidity    Fracture of orbital floor, left side, initial encounter for closed fracture    Reduced visual acuity    TIA (transient ischemic attack)    right Occipital stroke    Cerebral infarction, left hemisphere    Generalized edema        ALLERGIES:   Review of patient's allergies indicates:   Allergen Reactions    Cilostazol      dooss4z and stomach cramps    Clonidine     Coreg [carvedilol]      Cramps stomach and nausea    Diltiazem      nauseana d stoamch cramps    Lexapro [escitalopram oxalate]      Dizzy, nausea and weak    Shellfish containing products Hives       MEDS:   Current Outpatient Medications:     aspirin (ECOTRIN) 81 MG EC tablet, Take 81 mg by mouth once daily., Disp: , Rfl:     atorvastatin (LIPITOR) 40 MG tablet, Take 1 tablet (40 mg total) by mouth once daily. For cholesterol, Disp: 90 tablet, Rfl: 3    blood sugar diagnostic Strp, by Misc.(Non-Drug; Combo Route) route., Disp: , Rfl:     blood-glucose meter kit, Use daily, Disp: 1 each, Rfl: 0    cholecalciferol, vitamin D3, (VITAMIN D3) 125 mcg (5,000 unit) Tab, Take 1 tablet (5,000 Units total) by mouth once daily. (Patient taking " differently: Take 1,000 Units by mouth once daily.), Disp: 90 tablet, Rfl: 3    clopidogreL (PLAVIX) 75 mg tablet, Take 1 tablet (75 mg total) by mouth once daily. for 21 days, Disp: 90 tablet, Rfl: 3    folic acid/multivit-min/lutein (CENTRUM SILVER ORAL), Take by mouth., Disp: , Rfl:     irbesartan (AVAPRO) 300 MG tablet, TAKE 1 TABLET(300 MG) BY MOUTH EVERY DAY, Disp: 90 tablet, Rfl: 3    lancets 31 gauge Misc, by Misc.(Non-Drug; Combo Route) route., Disp: , Rfl:     memantine (NAMENDA) 5 MG Tab, Take 1 tablet (5 mg total) by mouth 2 (two) times daily. For memory, Disp: 180 tablet, Rfl: 3    potassium chloride SA (K-DUR,KLOR-CON) 20 MEQ tablet, Take 1 tablet (20 mEq total) by mouth once daily., Disp: 90 tablet, Rfl: 3    SAXagliptin (ONGLYZA) 5 mg Tab tablet, Take 1 tablet (5 mg total) by mouth once daily., Disp: 90 tablet, Rfl: 3    sucralfate (CARAFATE) 1 gram tablet, TAKE 1 TABLET(1 GRAM) BY MOUTH THREE TIMES DAILY, Disp: 270 tablet, Rfl: 3    terazosin (HYTRIN) 10 MG capsule, Take 1 capsule (10 mg total) by mouth every evening., Disp: 30 capsule, Rfl: 11    furosemide (LASIX) 40 MG tablet, Take 2 tablets (80 mg total) by mouth 2 (two) times a day. For one week, then 1 bid, Disp: 120 tablet, Rfl: 3      History:  Current Providers as of 2022  PCP: Rudy Cruz MD  Care Team Provider: New Mehta MD  Care Team Provider: David Fried MD  Care Team Provider: Easton Johnson  Encounter Provider: Rudy Cruz MD, starting on Mon Aug 29, 2022 12:00 AM  Referring Provider: not found, starting on Mon Aug 29, 2022 12:00 AM  Consulting Physician: Rudy Cruz MD, starting on Mon Aug 29, 2022 11:33 AM (Active)   Past Medical History:   Diagnosis Date    Hyperlipidemia     Hypertension     Type 2 diabetes mellitus without complication, without long-term current use of insulin 3/17/2022     Past Surgical History:   Procedure Laterality Date     SECTION      x1    HERNIA REPAIR      umbilical     LEG SURGERY Right 11/01/2017    stents placed     Social History     Tobacco Use    Smoking status: Never    Smokeless tobacco: Never   Substance Use Topics    Alcohol use: No    Drug use: No         Review of Systems   Constitutional: Negative.    HENT: Negative.     Respiratory:  Positive for shortness of breath.    Cardiovascular:  Positive for leg swelling.   Gastrointestinal: Negative.    Endocrine: Negative.    Genitourinary: Negative.    Musculoskeletal: Negative.    Psychiatric/Behavioral: Negative.     All other systems reviewed and are negative.  Objective:     Physical Exam  Vitals and nursing note reviewed.   Constitutional:       Appearance: She is well-developed.   HENT:      Head: Normocephalic.   Eyes:      Conjunctiva/sclera: Conjunctivae normal.      Pupils: Pupils are equal, round, and reactive to light.   Cardiovascular:      Rate and Rhythm: Normal rate and regular rhythm.      Heart sounds: Normal heart sounds.   Pulmonary:      Effort: Pulmonary effort is normal.      Breath sounds: Normal breath sounds.   Musculoskeletal:         General: Normal range of motion.      Cervical back: Normal range of motion and neck supple.   Skin:     General: Skin is warm and dry.   Neurological:      Mental Status: She is alert and oriented to person, place, and time.      Deep Tendon Reflexes: Reflexes are normal and symmetric.   Psychiatric:         Behavior: Behavior normal.         Thought Content: Thought content normal.         Judgment: Judgment normal.           Assessment:     1. Type 2 diabetes mellitus without complication, without long-term current use of insulin    2. Generalized edema    3. SOB (shortness of breath)      Plan:        Medication List            Accurate as of August 29, 2022 12:26 PM. If you have any questions, ask your nurse or doctor.                CHANGE how you take these medications      cholecalciferol (vitamin D3) 125 mcg (5,000 unit) Tab  Commonly known as: VITAMIN  D3  Take 1 tablet (5,000 Units total) by mouth once daily.  What changed: how much to take     furosemide 40 MG tablet  Commonly known as: LASIX  Take 2 tablets (80 mg total) by mouth 2 (two) times a day. For one week, then 1 bid  What changed: See the new instructions.  Changed by: Rudy Cruz MD            CONTINUE taking these medications      aspirin 81 MG EC tablet  Commonly known as: ECOTRIN     atorvastatin 40 MG tablet  Commonly known as: LIPITOR  Take 1 tablet (40 mg total) by mouth once daily. For cholesterol     blood sugar diagnostic Strp     blood-glucose meter kit  Use daily     CENTRUM SILVER ORAL     clopidogreL 75 mg tablet  Commonly known as: PLAVIX  Take 1 tablet (75 mg total) by mouth once daily. for 21 days     irbesartan 300 MG tablet  Commonly known as: AVAPRO  TAKE 1 TABLET(300 MG) BY MOUTH EVERY DAY     lancets 31 gauge Misc     memantine 5 MG Tab  Commonly known as: NAMENDA  Take 1 tablet (5 mg total) by mouth 2 (two) times daily. For memory     potassium chloride SA 20 MEQ tablet  Commonly known as: K-DUR,KLOR-CON  Take 1 tablet (20 mEq total) by mouth once daily.     SAXagliptin 5 mg Tab tablet  Commonly known as: ONGLYZA  Take 1 tablet (5 mg total) by mouth once daily.     sucralfate 1 gram tablet  Commonly known as: CARAFATE  TAKE 1 TABLET(1 GRAM) BY MOUTH THREE TIMES DAILY     terazosin 10 MG capsule  Commonly known as: HYTRIN  Take 1 capsule (10 mg total) by mouth every evening.               Where to Get Your Medications        These medications were sent to [id number] - [facility] [address] [facility] [address] [facility] [address] [facility]  [address] [facility] [facility] [address]      Phone: [phone]   furosemide 40 MG tablet       Type 2 diabetes mellitus without complication, without long-term current use of insulin    Generalized edema    SOB (shortness of breath)    Other orders  -     furosemide (LASIX) 40 MG tablet; Take 2 tablets (80 mg total) by mouth  2 (two) times a day. For one week, then 1 bid  Dispense: 120 tablet; Refill: 3        Bmp 2 weeks with HHN

## 2022-08-30 ENCOUNTER — PATIENT OUTREACH (OUTPATIENT)
Dept: ADMINISTRATIVE | Facility: HOSPITAL | Age: 82
End: 2022-08-30
Payer: MEDICARE

## 2022-08-30 NOTE — PROGRESS NOTES
2022 Care Everywhere updates requested and reviewed.  Immunizations reconciled. Media reports reviewed.  Duplicate HM overrides and  orders removed.  Overdue HM topic chart audit and/or requested.  Overdue lab testing linked to upcoming lab appointments if applies.    Health Maintenance Due   Topic Date Due    COVID-19 Vaccine (3 - Booster) 2021

## 2022-08-31 ENCOUNTER — TELEPHONE (OUTPATIENT)
Dept: FAMILY MEDICINE | Facility: CLINIC | Age: 82
End: 2022-08-31
Payer: MEDICARE

## 2022-08-31 NOTE — TELEPHONE ENCOUNTER
----- Message from Maricel Cast sent at 8/31/2022  9:29 AM CDT -----  Type:  Needs Medical Advice    Who Called: Jenni  Symptoms flow up on lab orders   Would the patient rather a call back or a response via [facility]?call  Best Call Back Number:[phone]   Additional Information: n/a        
Jenni voicemail to call back   
Spoke to Jenni with [facility] - Wrong # was listed below.  Orders for labs discussed.    She will drawn them prior to pt's F/U on 09/13/22.  
pt Sent from [facility] w/ c/o back pain x5 days and pelvic pain since this a.m, has burning urination.

## 2022-09-13 ENCOUNTER — TELEPHONE (OUTPATIENT)
Dept: FAMILY MEDICINE | Facility: CLINIC | Age: 82
End: 2022-09-13

## 2022-09-13 ENCOUNTER — OFFICE VISIT (OUTPATIENT)
Dept: FAMILY MEDICINE | Facility: CLINIC | Age: 82
End: 2022-09-13
Payer: MEDICARE

## 2022-09-13 VITALS
HEART RATE: 98 BPM | WEIGHT: 207.69 LBS | DIASTOLIC BLOOD PRESSURE: 66 MMHG | TEMPERATURE: 98 F | OXYGEN SATURATION: 98 % | BODY MASS INDEX: 35.46 KG/M2 | HEIGHT: 64 IN | SYSTOLIC BLOOD PRESSURE: 128 MMHG

## 2022-09-13 DIAGNOSIS — I10 PRIMARY HYPERTENSION: ICD-10-CM

## 2022-09-13 DIAGNOSIS — E11.9 TYPE 2 DIABETES MELLITUS WITHOUT COMPLICATION, WITHOUT LONG-TERM CURRENT USE OF INSULIN: Primary | ICD-10-CM

## 2022-09-13 DIAGNOSIS — R06.02 SOB (SHORTNESS OF BREATH): ICD-10-CM

## 2022-09-13 DIAGNOSIS — R04.0 EPISTAXIS: ICD-10-CM

## 2022-09-13 DIAGNOSIS — R60.1 GENERALIZED EDEMA: ICD-10-CM

## 2022-09-13 DIAGNOSIS — E66.01 SEVERE OBESITY (BMI 35.0-39.9) WITH COMORBIDITY: ICD-10-CM

## 2022-09-13 DIAGNOSIS — E78.5 HYPERLIPIDEMIA, UNSPECIFIED HYPERLIPIDEMIA TYPE: ICD-10-CM

## 2022-09-13 PROCEDURE — 99214 PR OFFICE/OUTPT VISIT, EST, LEVL IV, 30-39 MIN: ICD-10-PCS | Mod: S$GLB,,, | Performed by: FAMILY MEDICINE

## 2022-09-13 PROCEDURE — 99214 OFFICE O/P EST MOD 30 MIN: CPT | Mod: S$GLB,,, | Performed by: FAMILY MEDICINE

## 2022-09-13 NOTE — TELEPHONE ENCOUNTER
----- Message from Evie Bradford sent at 9/13/2022 10:43 AM CDT -----  Needs advice from nurse:      Who Called:Rico  Regarding:needs to discuss diabetic supplies  Would the patient rather a call back or VIA [email or web address]?  Best Call Back number:[phone]  Additional Info:

## 2022-09-13 NOTE — PROGRESS NOTES
"Subjective:      Patient ID: Katy Soto is a 82 y.o. female.    Chief Complaint: Follow-up      Vitals:    09/13/22 0812   BP: 128/66   Pulse: 98   Temp: 98 °F (36.7 °C)   TempSrc: Oral   SpO2: 98%   Weight: 94.2 kg (207 lb 10.8 oz)   Height: 5' 4" (1.626 m)        HPI   Follow up edema and sob; I doubled her lasix to 80 bid 2 weeks ago; here with niece  Had labs done by [facility]  Saw dietician  Pt cancelled meals on wheels  Lost almost 6 pounds in 2 weeks  Await[facility] done [facility]  C/o numbness of hands and feet  Going to audiology tomorrow  Has DM, HTN, obese, [place]    Problem List  Patient Active Problem List   Diagnosis    Hypertensive emergency    Hyperlipidemia    Arthritis    PAD (peripheral artery disease)    Bilateral leg edema    Anxiety    Obesity (BMI 30.0-34.9)    Osteopenia    Atherosclerosis of aorta    Elevated sed rate    Vitamin D insufficiency    LVH (left ventricular hypertrophy)    Type 2 diabetes mellitus without complication, without long-term current use of insulin    Severe obesity (BMI 35.0-39.9) with comorbidity    Fracture of orbital floor, left side, initial encounter for closed fracture    Reduced visual acuity    TIA (transient ischemic attack)    right Occipital stroke    Cerebral infarction, left hemisphere    Generalized edema        ALLERGIES:   Review of patient's allergies indicates:   Allergen Reactions    Cilostazol      [id number] and stomach cramps    Clonidine     Coreg [carvedilol]      Cramps stomach and nausea    Diltiazem      nauseana d stoamch cramps    Lexapro [escitalopram oxalate]      Dizzy, nausea and weak    Shellfish containing products Hives       MEDS:   Current Outpatient Medications:     aspirin (ECOTRIN) 81 MG EC tablet, Take 81 mg by mouth once daily., Disp: , Rfl:     atorvastatin (LIPITOR) 40 MG tablet, Take 1 tablet (40 mg total) by mouth once daily. For cholesterol, Disp: 90 tablet, Rfl: 3    blood sugar diagnostic Strp, by Misc.(Non-Drug; " Combo Route) route., Disp: , Rfl:     blood-glucose meter kit, Use daily, Disp: 1 each, Rfl: 0    cholecalciferol, vitamin D3, (VITAMIN D3) 125 mcg (5,000 unit) Tab, Take 1 tablet (5,000 Units total) by mouth once daily. (Patient taking differently: Take 1,000 Units by mouth once daily.), Disp: 90 tablet, Rfl: 3    clopidogreL (PLAVIX) 75 mg tablet, Take 1 tablet (75 mg total) by mouth once daily. for 21 days, Disp: 90 tablet, Rfl: 3    folic acid/multivit-min/lutein (CENTRUM SILVER ORAL), Take by mouth., Disp: , Rfl:     furosemide (LASIX) 40 MG tablet, Take 2 tablets (80 mg total) by mouth 2 (two) times a day. For one week, then 1 bid, Disp: 120 tablet, Rfl: 3    irbesartan (AVAPRO) 300 MG tablet, TAKE 1 TABLET(300 MG) BY MOUTH EVERY DAY, Disp: 90 tablet, Rfl: 3    lancets 31 gauge Misc, by Misc.(Non-Drug; Combo Route) route., Disp: , Rfl:     memantine (NAMENDA) 5 MG Tab, Take 1 tablet (5 mg total) by mouth 2 (two) times daily. For memory, Disp: 180 tablet, Rfl: 3    potassium chloride SA (K-DUR,KLOR-CON) 20 MEQ tablet, Take 1 tablet (20 mEq total) by mouth once daily., Disp: 90 tablet, Rfl: 3    SAXagliptin (ONGLYZA) 5 mg Tab tablet, Take 1 tablet (5 mg total) by mouth once daily., Disp: 90 tablet, Rfl: 3    sucralfate (CARAFATE) 1 gram tablet, TAKE 1 TABLET(1 GRAM) BY MOUTH THREE TIMES DAILY, Disp: 270 tablet, Rfl: 3    terazosin (HYTRIN) 10 MG capsule, Take 1 capsule (10 mg total) by mouth every evening., Disp: 30 capsule, Rfl: 11      History:  Current Providers as of 9/13/2022  PCP: Rudy Cruz MD  Care Team Provider: New Mehta MD  Care Team Provider: David Fried MD  Care Team Provider: Easton Johnson  Care Team Provider: Pinky Grajeda RD  Encounter Provider: Rudy Cruz MD, starting on Tue Sep 13, 2022 12:00 AM  Referring Provider: not found, starting on Tue Sep 13, 2022 12:00 AM  Consulting Physician: Rudy Cruz MD, starting on Tue Sep 13, 2022  8:09 AM (Active)   Past Medical  History:   Diagnosis Date    Hyperlipidemia     Hypertension     Type 2 diabetes mellitus without complication, without long-term current use of insulin 3/17/2022     Past Surgical History:   Procedure Laterality Date     SECTION      x1    HERNIA REPAIR      umbilical    LEG SURGERY Right 2017    stents placed     Social History     Tobacco Use    Smoking status: Never    Smokeless tobacco: Never   Substance Use Topics    Alcohol use: No    Drug use: No         Review of Systems   Constitutional: Negative.    HENT:  Positive for hearing loss and nosebleeds.    Respiratory: Negative.     Cardiovascular: Negative.    Gastrointestinal: Negative.    Endocrine: Negative.    Genitourinary: Negative.    Musculoskeletal: Negative.    Neurological:  Positive for numbness.   Psychiatric/Behavioral: Negative.     All other systems reviewed and are negative.  Objective:     Physical Exam  Vitals and nursing note reviewed.   Constitutional:       Appearance: She is well-developed. She is obese.   HENT:      Head: Normocephalic.      Ears:      Comments: Deaf near     Nose:      Comments: Dry nose mucous membranes  Eyes:      Conjunctiva/sclera: Conjunctivae normal.      Pupils: Pupils are equal, round, and reactive to light.   Cardiovascular:      Rate and Rhythm: Normal rate and regular rhythm.      Heart sounds: Normal heart sounds.   Pulmonary:      Effort: Pulmonary effort is normal.      Breath sounds: Normal breath sounds.   Musculoskeletal:         General: Normal range of motion.      Cervical back: Normal range of motion and neck supple.   Skin:     General: Skin is warm and dry.   Neurological:      Mental Status: She is alert and oriented to person, place, and time.      Deep Tendon Reflexes: Reflexes are normal and symmetric.   Psychiatric:         Behavior: Behavior normal.         Thought Content: Thought content normal.         Judgment: Judgment normal.           Assessment:     1. Type 2 diabetes  mellitus without complication, without long-term current use of insulin    2. Generalized edema    3. SOB (shortness of breath)    4. Severe obesity (BMI 35.0-39.9) with comorbidity    5. Primary hypertension    6. Hyperlipidemia, unspecified hyperlipidemia type      Plan:        Medication List            Accurate as of September 13, 2022  8:51 AM. If you have any questions, ask your nurse or doctor.                CHANGE how you take these medications      cholecalciferol (vitamin D3) 125 mcg (5,000 unit) Tab  Commonly known as: VITAMIN D3  Take 1 tablet (5,000 Units total) by mouth once daily.  What changed: how much to take            CONTINUE taking these medications      aspirin 81 MG EC tablet  Commonly known as: ECOTRIN     atorvastatin 40 MG tablet  Commonly known as: LIPITOR  Take 1 tablet (40 mg total) by mouth once daily. For cholesterol     blood sugar diagnostic Strp     blood-glucose meter kit  Use daily     CENTRUM SILVER ORAL     clopidogreL 75 mg tablet  Commonly known as: PLAVIX  Take 1 tablet (75 mg total) by mouth once daily. for 21 days     furosemide 40 MG tablet  Commonly known as: LASIX  Take 2 tablets (80 mg total) by mouth 2 (two) times a day. For one week, then 1 bid     irbesartan 300 MG tablet  Commonly known as: AVAPRO  TAKE 1 TABLET(300 MG) BY MOUTH EVERY DAY     lancets 31 gauge Misc     memantine 5 MG Tab  Commonly known as: NAMENDA  Take 1 tablet (5 mg total) by mouth 2 (two) times daily. For memory     potassium chloride SA 20 MEQ tablet  Commonly known as: K-DUR,KLOR-CON  Take 1 tablet (20 mEq total) by mouth once daily.     SAXagliptin 5 mg Tab tablet  Commonly known as: ONGLYZA  Take 1 tablet (5 mg total) by mouth once daily.     sucralfate 1 gram tablet  Commonly known as: CARAFATE  TAKE 1 TABLET(1 GRAM) BY MOUTH THREE TIMES DAILY     terazosin 10 MG capsule  Commonly known as: HYTRIN  Take 1 capsule (10 mg total) by mouth every evening.            Type 2 diabetes mellitus  without complication, without long-term current use of insulin  -     BNP; Future; Expected date: 09/13/2022  -     CBC Auto Differential; Future; Expected date: 09/13/2022  -     Comprehensive Metabolic Panel; Future; Expected date: 09/13/2022  -     Hemoglobin A1C; Future  -     Lipid Panel; Future  -     Microalbumin/Creatinine Ratio, Urine; Future  -     TSH; Future  -     Urinalysis; Future    Generalized edema  -     BNP; Future; Expected date: 09/13/2022  -     CBC Auto Differential; Future; Expected date: 09/13/2022  -     Comprehensive Metabolic Panel; Future; Expected date: 09/13/2022  -     Hemoglobin A1C; Future  -     Lipid Panel; Future  -     Microalbumin/Creatinine Ratio, Urine; Future  -     TSH; Future  -     Urinalysis; Future    SOB (shortness of breath)  -     BNP; Future; Expected date: 09/13/2022  -     CBC Auto Differential; Future; Expected date: 09/13/2022  -     Comprehensive Metabolic Panel; Future; Expected date: 09/13/2022  -     Hemoglobin A1C; Future  -     Lipid Panel; Future  -     Microalbumin/Creatinine Ratio, Urine; Future  -     TSH; Future  -     Urinalysis; Future    Severe obesity (BMI 35.0-39.9) with comorbidity  -     BNP; Future; Expected date: 09/13/2022  -     CBC Auto Differential; Future; Expected date: 09/13/2022  -     Comprehensive Metabolic Panel; Future; Expected date: 09/13/2022  -     Hemoglobin A1C; Future  -     Lipid Panel; Future  -     Microalbumin/Creatinine Ratio, Urine; Future  -     TSH; Future  -     Urinalysis; Future    Primary hypertension  -     BNP; Future; Expected date: 09/13/2022  -     CBC Auto Differential; Future; Expected date: 09/13/2022  -     Comprehensive Metabolic Panel; Future; Expected date: 09/13/2022  -     Hemoglobin A1C; Future  -     Lipid Panel; Future  -     Microalbumin/Creatinine Ratio, Urine; Future  -     TSH; Future  -     Urinalysis; Future    Hyperlipidemia, unspecified hyperlipidemia type  -     BNP; Future; Expected  date: 09/13/2022  -     CBC Auto Differential; Future; Expected date: 09/13/2022  -     Comprehensive Metabolic Panel; Future; Expected date: 09/13/2022  -     Hemoglobin A1C; Future  -     Lipid Panel; Future  -     Microalbumin/Creatinine Ratio, Urine; Future  -     TSH; Future  -     Urinalysis; Future      Saline every yhour forever  Ointment bid for one week  Track down last weeks BMP done at [facility] by [facility]  Complete labs ion December  See me 6 weeks

## 2022-09-14 ENCOUNTER — OFFICE VISIT (OUTPATIENT)
Dept: OTOLARYNGOLOGY | Facility: CLINIC | Age: 82
End: 2022-09-14
Payer: MEDICARE

## 2022-09-14 ENCOUNTER — CLINICAL SUPPORT (OUTPATIENT)
Dept: OTOLARYNGOLOGY | Facility: CLINIC | Age: 82
End: 2022-09-14
Payer: MEDICARE

## 2022-09-14 VITALS
DIASTOLIC BLOOD PRESSURE: 71 MMHG | HEIGHT: 64 IN | BODY MASS INDEX: 35.76 KG/M2 | WEIGHT: 209.44 LBS | HEART RATE: 69 BPM | SYSTOLIC BLOOD PRESSURE: 183 MMHG

## 2022-09-14 DIAGNOSIS — H93.13 TINNITUS AURIUM, BILATERAL: Chronic | ICD-10-CM

## 2022-09-14 DIAGNOSIS — H93.293 IMPAIRMENT OF AUDITORY DISCRIMINATION OF BOTH EARS: ICD-10-CM

## 2022-09-14 DIAGNOSIS — H90.3 SENSORINEURAL HEARING LOSS, BILATERAL: Primary | ICD-10-CM

## 2022-09-14 DIAGNOSIS — H91.90 HEARING LOSS, UNSPECIFIED HEARING LOSS TYPE, UNSPECIFIED LATERALITY: ICD-10-CM

## 2022-09-14 DIAGNOSIS — H90.3 SENSORINEURAL HEARING LOSS (SNHL), BILATERAL: Primary | Chronic | ICD-10-CM

## 2022-09-14 PROCEDURE — 92567 TYMPANOMETRY: CPT | Mod: PBBFAC,PN | Performed by: PHYSICIAN ASSISTANT

## 2022-09-14 PROCEDURE — 99214 OFFICE O/P EST MOD 30 MIN: CPT | Mod: PBBFAC,PN | Performed by: OTOLARYNGOLOGY

## 2022-09-14 PROCEDURE — 99999 PR PBB SHADOW E&M-EST. PATIENT-LVL IV: CPT | Mod: PBBFAC,,, | Performed by: OTOLARYNGOLOGY

## 2022-09-14 PROCEDURE — 99203 OFFICE O/P NEW LOW 30 MIN: CPT | Mod: S$PBB,,, | Performed by: OTOLARYNGOLOGY

## 2022-09-14 PROCEDURE — 99999 PR PBB SHADOW E&M-EST. PATIENT-LVL IV: ICD-10-PCS | Mod: PBBFAC,,, | Performed by: OTOLARYNGOLOGY

## 2022-09-14 PROCEDURE — 99203 PR OFFICE/OUTPT VISIT, NEW, LEVL III, 30-44 MIN: ICD-10-PCS | Mod: S$PBB,,, | Performed by: OTOLARYNGOLOGY

## 2022-09-14 PROCEDURE — 92557 COMPREHENSIVE HEARING TEST: CPT | Mod: PBBFAC,PN | Performed by: PHYSICIAN ASSISTANT

## 2022-09-14 NOTE — PROGRESS NOTES
Chief Complaint   Patient presents with    Hearing Loss     bilateral     .     HPI:  Katy Soto is a very pleasant 82 y.o. female here to see me today for the first time for evaluation of hearing loss.  She reports hearing loss that has been gradually progressing over the last several years. She has had hearing aids in the past but has not worn them in over a year.  She has not noted any difference in hearing between the ears, with both ears being the worse hearing ear.  She has noted intermittent tinnitus in both ears.  She has not had any recent issues with ear pain or ear drainage.  She has not had any previous otologic surgery.  She denies any history of significant loud noise exposure.      Past Medical History:   Diagnosis Date    Hyperlipidemia     Hypertension     Type 2 diabetes mellitus without complication, without long-term current use of insulin 3/17/2022     Social History     Socioeconomic History    Marital status: Single   Tobacco Use    Smoking status: Never    Smokeless tobacco: Never   Substance and Sexual Activity    Alcohol use: No    Drug use: No    Sexual activity: Not Currently     Partners: Male     Birth control/protection: Post-menopausal     Social Determinants of Health     Financial Resource Strain: Medium Risk    Difficulty of Paying Living Expenses: Somewhat hard   Food Insecurity: Food Insecurity Present    Worried About Running Out of Food in the Last Year: Sometimes true    Ran Out of Food in the Last Year: Never true   Transportation Needs: No Transportation Needs    Lack of Transportation (Medical): No    Lack of Transportation (Non-Medical): No   Physical Activity: Inactive    Days of Exercise per Week: 0 days    Minutes of Exercise per Session: 0 min   Stress: No Stress Concern Present    Feeling of Stress : Only a little   Social Connections: Moderately Integrated    Frequency of Communication with Friends and Family: Three times a week    Frequency of Social  Gatherings with Friends and Family: Three times a week    Attends Rastafarian Services: More than 4 times per year    Active Member of Clubs or Organizations: Yes    Attends Club or Organization Meetings: More than 4 times per year    Marital Status: Never    Housing Stability: Low Risk     Unable to Pay for Housing in the Last Year: No    Number of Places Lived in the Last Year: 1    Unstable Housing in the Last Year: No     Past Surgical History:   Procedure Laterality Date     SECTION      x1    HERNIA REPAIR      umbilical    LEG SURGERY Right 2017    stents placed     Family History   Problem Relation Age of Onset    Diabetes Mother     Hypertension Mother     Stroke Father     Heart disease Father     Heart attack Father     Cancer Sister         Breast cancer    Breast cancer Sister     No Known Problems Son     Kidney failure Sister         Kidney transplant x18 years    Lupus Sister     Hypertension Sister     Diabetes Sister     Arthritis Sister     Hypertension Sister     Cancer Brother          Review of Systems  General: negative for chills, fever or weight loss  Psychological: negative for mood changes or depression  Ophthalmic: negative for blurry vision, photophobia or eye pain  ENT: see HPI  Respiratory: no cough, shortness of breath, or wheezing  Cardiovascular: no chest pain or dyspnea on exertion  Gastrointestinal: no abdominal pain, change in bowel habits, or black/ bloody stools  Musculoskeletal: negative for gait disturbance or muscular weakness  Neurological: no syncope or seizures; no ataxia  Dermatological: negative for puritis,  rash and jaundice  Hematologic/lymphatic: no easy bruising, no new lumps or bumps      Physical Exam:    Vitals:    22 0907   BP: (!) 183/71   Pulse: 69       Constitutional: Well appearing / communicating without difficutly.  NAD.  Eyes: EOM I Bilaterally  Head/Face: Normocephalic.  Negative paranasal sinus pressure/tenderness.  Salivary  glands WNL.  House Brackmann I Bilaterally.    Right Ear: Auricle normal appearance. External Auditory Canal within normal limits no lesions or masses,TM w/o masses/lesions/perforations. TM mobility noted.   Left Ear: Auricle normal appearance. External Auditory Canal within normal limits no lesions or masses,TM w/o masses/lesions/perforations. TM mobility noted.  Rinne Air conduction >bone conduction bilaterally, Don midline.   Nose: No gross nasal septal deviation. Inferior Turbinates 3+ bilaterally. No septal perforation. No masses/lesions. External nasal skin appears normal without masses/lesions.  Oral Cavity: Gingiva/lips within normal limits.  Dentition/gingiva healthy appearing. Mucus membranes moist. Floor of mouth soft, no masses palpated. Oral Tongue mobile. Hard Palate appears normal.    Oropharynx: Base of tongue appears normal. No masses/lesions noted. Tonsillar fossa/pharyngeal wall without lesions. Posterior oropharynx WNL.  Soft palate without masses. Midline uvula.   Neck/Lymphatic: No LAD I-VI bilaterally.  No thyromegaly.  No masses noted on exam.      Diagnostic studies:  Audiogram interpreted personally by me and discussed in detail with the patient today. Bilateral moderate to severe SNHL. Type A tympanograms bilaterally.         Assessment:    ICD-10-CM ICD-9-CM    1. Sensorineural hearing loss (SNHL), bilateral  H90.3 389.18       2. Tinnitus aurium, bilateral  H93.13 388.31       3. Hearing loss, unspecified hearing loss type, unspecified laterality  H91.90 389.9 Ambulatory referral/consult to ENT        The primary encounter diagnosis was Sensorineural hearing loss (SNHL), bilateral. Diagnoses of Tinnitus aurium, bilateral and Hearing loss, unspecified hearing loss type, unspecified laterality were also pertinent to this visit.      Plan:  No orders of the defined types were placed in this encounter.      We reviewed the patient's recent audiogram and hearing loss in detail.  We also  discussed that she is a good candidate for hearing aids, if and when she the patient is motivated.    We also discussed the use hearing protection when exposed to loud noise, including lawn equipment. Recommend audiogram in 1 year.      We also discussed that tinnitus is most often caused by a hearing loss, and that as the hair cells are damaged, either genetic or as a result of loud noise exposure, they then cause tinnitus.  Some patients find that restricting the salt or caffeine in their diet helps.  Tinnitus tends to be louder in times of stress and fatigue, and may decrease with time.  Sound machines may also be an effective masking technique if needed at night.    Thank you kindly for allowing me to participate in the patient's care.       Emily Owusu MD

## 2022-09-14 NOTE — PATIENT INSTRUCTIONS
WHAT IS TINNITUS?  The perception of sound heard in one or both sides of the head. Some refer to it as a ringing, noise, buzzing, roaring, clicking, wooshing, crickets, heartbeat, music, or other noises. There are varying levels of severity. The tinnitus may be constant or periodic. Common complaints include difficulty sleeping, struggling to understand other's speech, depression, and problems focusing.  Tinnitus is a symptom, not a disease. It affects 10-15% of adults in the [place].    WHAT CAN YOU DO?  You should seek medical care if you suspect you have tinnitus and tell your physician if your symptoms are affecting your daily life. Tinnitus may cause anxiety, depression, insomnia, negative emotions, and withdrawal. It's okay to seek help as your symptoms may be managed, and common.    HOW IS TINNITUS DIAGNOSED?  A doctor can diagnose tinnitus after a physical examination and interview. The examination may rule out conditions causing the tinnitus such as wax impaction or fluid behind the eardrum. Tinnitus may also be related to hearing loss, especially hearing loss from frequent noise exposure. A hearing test may be performed if you are experiencing tinnitus.    TREATMENT FOR TINNITUS?  Treatment may improve on its own but if it doesn't there are several ways to manage your symptoms although there is no cure. Possible treatment options include amplification (hearing aids), sound therapy (maskers,white noise,etc.), TMJ treatment, cognitive behavioral therapy, relaxation exercises, and managing your medications.  There is not enough evidence to suggest that over the counter products help patients with tinnitus. Acupuncture can neither be recommended or discouraged due to lack of evidence as well.    Source: American Academy of Otolaryngology-Head And Neck Surgery    ORGANIZATIONS AND LINKS FOR TINNITUS AND HEARING LOSS    American Academy of Audiology      www.audiology.org  American Tinnitus  Associate        www.dom.org  American Academy of Otolaryngology, Head & Neck Surgery www.entnet.org  American Auditory Society     www.amauditorysoc.org  Better Hearing [place]      www.betterhearing.org  EarHelp        www.earhelp.co.uk/  Hearing Education and Awareness for [place] (HEAR)  www.hearnet.[employer]  Hearing Loss Association of [place]    www.hearingloss.[employer]   Hear-It        www.hear-it.org  Healthy Hearing       www.healthy[employer]ovy [employer]   Sight and Hearing Association     www.sightandhearing.org

## 2022-09-14 NOTE — PROGRESS NOTES
Katy Soto, a 82 y.o. female, was seen today in the clinic for an audiologic evaluation.  Patients main complaint was hearing loss.      Audiogram results revealed a moderate to severe sensorineural hearing loss bilaterally.  Speech reception thresholds were noted at 55 dB in the right ear and 55 dB in the left ear.  Speech discrimination scores were 24% in the right ear and 28% in the left ear.  Tympanometry revealed Type A in the right ear and Type A in the left ear. Ms. Soto had hearing aids in the past but she has not worn them in at least one year.  She understands the importance of wearing hearing aids and she does report struggling to understand conversation on a daily basis.    Recommendations:  Otologic evaluation  Annual audiogram  Hearing protection when in noise  Hearing aid consultation

## 2022-09-15 ENCOUNTER — EXTERNAL HOME HEALTH (OUTPATIENT)
Dept: HOME HEALTH SERVICES | Facility: HOSPITAL | Age: 82
End: 2022-09-15
Payer: MEDICARE

## 2022-09-16 NOTE — TELEPHONE ENCOUNTER
Spoke to [facility].      They need to fax us a form.  Once we get it we need to complete and send it back.  It is for Medicare.      I notified Holger.

## 2022-09-19 ENCOUNTER — CLINICAL SUPPORT (OUTPATIENT)
Dept: OTOLARYNGOLOGY | Facility: CLINIC | Age: 82
End: 2022-09-19
Payer: MEDICARE

## 2022-09-19 DIAGNOSIS — H90.3 SENSORINEURAL HEARING LOSS, BILATERAL: Primary | ICD-10-CM

## 2022-09-19 PROCEDURE — 99499 UNLISTED E&M SERVICE: CPT | Mod: S$PBB,,, | Performed by: AUDIOLOGIST

## 2022-09-19 PROCEDURE — 99499 NO LOS: ICD-10-PCS | Mod: S$PBB,,, | Performed by: AUDIOLOGIST

## 2022-09-19 NOTE — PROGRESS NOTES
Katy Soto was seen today in the clinic for a hearing aid consult.  She was able to demo a pair of the Phonak Audeo P90-R trial hearing aids during the appointment.  I discussed in detail with her the hearing aid technology, pricing, policies and procedures and made a recommendation for a pair of Performance level hearing aids.  She is not able to order the hearing aids today due to financial reasons.  She will contact me in the future if she would like to order hearing aids.

## 2022-09-28 ENCOUNTER — PATIENT OUTREACH (OUTPATIENT)
Dept: ADMINISTRATIVE | Facility: OTHER | Age: 82
End: 2022-09-28
Payer: MEDICARE

## 2022-10-21 ENCOUNTER — PES CALL (OUTPATIENT)
Dept: ADMINISTRATIVE | Facility: CLINIC | Age: 82
End: 2022-10-21
Payer: MEDICARE

## 2022-10-24 ENCOUNTER — EXTERNAL HOME HEALTH (OUTPATIENT)
Dept: HOME HEALTH SERVICES | Facility: HOSPITAL | Age: 82
End: 2022-10-24
Payer: MEDICAID

## 2022-10-24 ENCOUNTER — PATIENT OUTREACH (OUTPATIENT)
Dept: HOME HEALTH SERVICES | Facility: HOSPITAL | Age: 82
End: 2022-10-24
Payer: MEDICARE

## 2022-10-26 ENCOUNTER — OFFICE VISIT (OUTPATIENT)
Dept: FAMILY MEDICINE | Facility: CLINIC | Age: 82
End: 2022-10-26
Payer: MEDICARE

## 2022-10-26 VITALS
BODY MASS INDEX: 35.96 KG/M2 | DIASTOLIC BLOOD PRESSURE: 78 MMHG | HEIGHT: 64 IN | SYSTOLIC BLOOD PRESSURE: 136 MMHG | TEMPERATURE: 98 F | WEIGHT: 210.63 LBS | HEART RATE: 89 BPM | OXYGEN SATURATION: 99 %

## 2022-10-26 DIAGNOSIS — R60.1 GENERALIZED EDEMA: Primary | ICD-10-CM

## 2022-10-26 DIAGNOSIS — M19.90 ARTHRITIS: ICD-10-CM

## 2022-10-26 DIAGNOSIS — I63.9 OCCIPITAL STROKE: ICD-10-CM

## 2022-10-26 DIAGNOSIS — E11.9 TYPE 2 DIABETES MELLITUS WITHOUT COMPLICATION, WITHOUT LONG-TERM CURRENT USE OF INSULIN: ICD-10-CM

## 2022-10-26 DIAGNOSIS — R60.0 BILATERAL LEG EDEMA: ICD-10-CM

## 2022-10-26 DIAGNOSIS — E66.01 SEVERE OBESITY (BMI 35.0-39.9) WITH COMORBIDITY: ICD-10-CM

## 2022-10-26 DIAGNOSIS — E78.5 HYPERLIPIDEMIA, UNSPECIFIED HYPERLIPIDEMIA TYPE: ICD-10-CM

## 2022-10-26 DIAGNOSIS — R04.0 EPISTAXIS: ICD-10-CM

## 2022-10-26 DIAGNOSIS — Z23 FLU VACCINE NEED: ICD-10-CM

## 2022-10-26 PROCEDURE — 99499 RISK ADDL DX/OHS AUDIT: ICD-10-PCS | Mod: S$GLB,,, | Performed by: FAMILY MEDICINE

## 2022-10-26 PROCEDURE — 90694 VACC AIIV4 NO PRSRV 0.5ML IM: CPT | Mod: S$GLB,,, | Performed by: FAMILY MEDICINE

## 2022-10-26 PROCEDURE — 99214 PR OFFICE/OUTPT VISIT, EST, LEVL IV, 30-39 MIN: ICD-10-PCS | Mod: S$GLB,,, | Performed by: FAMILY MEDICINE

## 2022-10-26 PROCEDURE — 99499 UNLISTED E&M SERVICE: CPT | Mod: S$GLB,,, | Performed by: FAMILY MEDICINE

## 2022-10-26 PROCEDURE — G0008 ADMIN INFLUENZA VIRUS VAC: HCPCS | Mod: S$GLB,,, | Performed by: FAMILY MEDICINE

## 2022-10-26 PROCEDURE — 99214 OFFICE O/P EST MOD 30 MIN: CPT | Mod: S$GLB,,, | Performed by: FAMILY MEDICINE

## 2022-10-26 PROCEDURE — G0008 FLU VACCINE - QUADRIVALENT - ADJUVANTED: ICD-10-PCS | Mod: S$GLB,,, | Performed by: FAMILY MEDICINE

## 2022-10-26 PROCEDURE — 90694 FLU VACCINE - QUADRIVALENT - ADJUVANTED: ICD-10-PCS | Mod: S$GLB,,, | Performed by: FAMILY MEDICINE

## 2022-10-26 NOTE — PROGRESS NOTES
"Subjective:      Patient ID: Katy Soto is a 82 y.o. female.    Chief Complaint: Follow-up      Vitals:    10/26/22 0847   BP: (!) 148/78   Pulse: 89   Temp: 97.8 °F (36.6 °C)   TempSrc: Oral   SpO2: 99%   Weight: 95.5 kg (210 lb 10.4 oz)   Height: 5' 4" (1.626 m)        HPI   Check up with [facility]; right patellar pain, able to walk, had fallen, nose still bleeds, knee cracks, gets sob niece b oulble her diuretic  Had labs by N  On lasix 40 2 in AM and 1 in PM and occ 2 in PM      Problem List  Patient Active Problem List   Diagnosis    Hypertensive emergency    Hyperlipidemia    Arthritis    PAD (peripheral artery disease)    Bilateral leg edema    Anxiety    Obesity (BMI 30.0-34.9)    Osteopenia    Atherosclerosis of aorta    Elevated sed rate    Vitamin D insufficiency    LVH (left ventricular hypertrophy)    Type 2 diabetes mellitus without complication, without long-term current use of insulin    Severe obesity (BMI 35.0-39.9) with comorbidity    Fracture of orbital floor, left side, initial encounter for closed fracture    Reduced visual acuity    TIA (transient ischemic attack)    right Occipital stroke    Cerebral infarction, left hemisphere    Generalized edema        ALLERGIES:   Review of patient's allergies indicates:   Allergen Reactions    Cilostazol      [id number] and stomach cramps    Clonidine     Coreg [carvedilol]      Cramps stomach and nausea    Diltiazem      nauseana d stoamch cramps    Lexapro [escitalopram oxalate]      Dizzy, nausea and weak    Shellfish containing products Hives       MEDS:   Current Outpatient Medications:     aspirin (ECOTRIN) 81 MG EC tablet, Take 81 mg by mouth once daily., Disp: , Rfl:     atorvastatin (LIPITOR) 40 MG tablet, Take 1 tablet (40 mg total) by mouth once daily. For cholesterol, Disp: 90 tablet, Rfl: 3    blood sugar diagnostic Strp, by Misc.(Non-Drug; Combo Route) route., Disp: , Rfl:     blood-glucose meter kit, Use daily, Disp: 1 each, Rfl: 0    " cholecalciferol, vitamin D3, (VITAMIN D3) 125 mcg (5,000 unit) Tab, Take 1 tablet (5,000 Units total) by mouth once daily. (Patient taking differently: Take 1,000 Units by mouth once daily.), Disp: 90 tablet, Rfl: 3    clopidogreL (PLAVIX) 75 mg tablet, Take 1 tablet (75 mg total) by mouth once daily. for 21 days, Disp: 90 tablet, Rfl: 3    folic acid/multivit-min/lutein (CENTRUM SILVER ORAL), Take by mouth., Disp: , Rfl:     furosemide (LASIX) 40 MG tablet, Take 2 tablets (80 mg total) by mouth 2 (two) times a day. For one week, then 1 bid, Disp: 120 tablet, Rfl: 3    irbesartan (AVAPRO) 300 MG tablet, TAKE 1 TABLET(300 MG) BY MOUTH EVERY DAY, Disp: 90 tablet, Rfl: 3    lancets 31 gauge Misc, by Misc.(Non-Drug; Combo Route) route., Disp: , Rfl:     memantine (NAMENDA) 5 MG Tab, Take 1 tablet (5 mg total) by mouth 2 (two) times daily. For memory, Disp: 180 tablet, Rfl: 3    potassium chloride SA (K-DUR,KLOR-CON) 20 MEQ tablet, Take 1 tablet (20 mEq total) by mouth once daily., Disp: 90 tablet, Rfl: 3    SAXagliptin (ONGLYZA) 5 mg Tab tablet, Take 1 tablet (5 mg total) by mouth once daily., Disp: 90 tablet, Rfl: 3    sucralfate (CARAFATE) 1 gram tablet, TAKE 1 TABLET(1 GRAM) BY MOUTH THREE TIMES DAILY, Disp: 270 tablet, Rfl: 3    terazosin (HYTRIN) 10 MG capsule, Take 1 capsule (10 mg total) by mouth every evening., Disp: 30 capsule, Rfl: 11      History:  Current Providers as of 10/26/2022  PCP: Rudy Cruz MD  Care Team Provider: New Mehta MD  Care Team Provider: David Fried MD  Care Team Provider: Pinky Grajeda RD  Encounter Provider: Rudy Cruz MD, starting on Wed Oct 26, 2022 12:00 AM  Referring Provider: not found, starting on Wed Oct 26, 2022 12:00 AM  Consulting Physician: Rudy Cruz MD, starting on Wed Oct 26, 2022  8:43 AM (Active)   Past Medical History:   Diagnosis Date    Hyperlipidemia     Hypertension     Type 2 diabetes mellitus without complication, without long-term  current use of insulin 3/17/2022     Past Surgical History:   Procedure Laterality Date     SECTION      x1    HERNIA REPAIR      umbilical    LEG SURGERY Right 2017    stents placed     Social History     Tobacco Use    Smoking status: Never    Smokeless tobacco: Never   Substance Use Topics    Alcohol use: No    Drug use: No         Review of Systems   HENT:  Positive for hearing loss.    Respiratory:  Positive for shortness of breath.    Musculoskeletal:  Positive for arthralgias.   All other systems reviewed and are negative.  Objective:     Physical Exam  Vitals and nursing note reviewed.   Constitutional:       General: She is not in acute distress.     Appearance: She is well-developed. She is obese. She is not diaphoretic.   HENT:      Head: Normocephalic and atraumatic.   Eyes:      General: No scleral icterus.        Right eye: No discharge.         Left eye: No discharge.      Conjunctiva/sclera: Conjunctivae normal.      Pupils: Pupils are equal, round, and reactive to light.   Neck:      Thyroid: No thyromegaly.      Vascular: No JVD.   Cardiovascular:      Rate and Rhythm: Normal rate and regular rhythm.      Heart sounds: Normal heart sounds. No murmur heard.  Pulmonary:      Effort: Pulmonary effort is normal. No respiratory distress.      Breath sounds: Normal breath sounds. No stridor. No wheezing or rales.   Chest:      Chest wall: No tenderness.   Abdominal:      General: There is no distension.      Palpations: Abdomen is soft. There is no mass.      Tenderness: There is no abdominal tenderness. There is no guarding or rebound.   Musculoskeletal:         General: No tenderness. Normal range of motion.      Cervical back: Normal range of motion and neck supple.   Lymphadenopathy:      Cervical: No cervical adenopathy.   Skin:     General: Skin is warm and dry.      Coloration: Skin is not pale.      Findings: No erythema or rash.   Neurological:      Mental Status: She is alert and  oriented to person, place, and time.   Psychiatric:         Behavior: Behavior normal.         Thought Content: Thought content normal.         Judgment: Judgment normal.      Niece takes care of her wonderfully  Get labs in December  Goes to neurologgy in December for hx of stroke       Assessment:     1. Generalized edema    2. Flu vaccine need    3. Bilateral leg edema    4. Type 2 diabetes mellitus without complication, without long-term current use of insulin    5. Severe obesity (BMI 35.0-39.9) with comorbidity    6. Hyperlipidemia, unspecified hyperlipidemia type    7. right Occipital stroke    8. Epistaxis    9. Arthritis      Plan:        Medication List            Accurate as of October 26, 2022  9:28 AM. If you have any questions, ask your nurse or doctor.                CHANGE how you take these medications      cholecalciferol (vitamin D3) 125 mcg (5,000 unit) Tab  Commonly known as: VITAMIN D3  Take 1 tablet (5,000 Units total) by mouth once daily.  What changed: how much to take            CONTINUE taking these medications      aspirin 81 MG EC tablet  Commonly known as: ECOTRIN     atorvastatin 40 MG tablet  Commonly known as: LIPITOR  Take 1 tablet (40 mg total) by mouth once daily. For cholesterol     blood sugar diagnostic Strp     blood-glucose meter kit  Use daily     CENTRUM SILVER ORAL     clopidogreL 75 mg tablet  Commonly known as: PLAVIX  Take 1 tablet (75 mg total) by mouth once daily. for 21 days     furosemide 40 MG tablet  Commonly known as: LASIX  Take 2 tablets (80 mg total) by mouth 2 (two) times a day. For one week, then 1 bid     irbesartan 300 MG tablet  Commonly known as: AVAPRO  TAKE 1 TABLET(300 MG) BY MOUTH EVERY DAY     lancets 31 gauge Misc     memantine 5 MG Tab  Commonly known as: NAMENDA  Take 1 tablet (5 mg total) by mouth 2 (two) times daily. For memory     potassium chloride SA 20 MEQ tablet  Commonly known as: K-DUR,KLOR-CON  Take 1 tablet (20 mEq total) by mouth once  daily.     SAXagliptin 5 mg Tab tablet  Commonly known as: ONGLYZA  Take 1 tablet (5 mg total) by mouth once daily.     sucralfate 1 gram tablet  Commonly known as: CARAFATE  TAKE 1 TABLET(1 GRAM) BY MOUTH THREE TIMES DAILY     terazosin 10 MG capsule  Commonly known as: HYTRIN  Take 1 capsule (10 mg total) by mouth every evening.            Generalized edema    Flu vaccine need  -     Influenza (FLUAD) - Quadrivalent (Adjuvanted) *Preferred* (65+) (PF)    Bilateral leg edema    Type 2 diabetes mellitus without complication, without long-term current use of insulin    Severe obesity (BMI 35.0-39.9) with comorbidity    Hyperlipidemia, unspecified hyperlipidemia type    right Occipital stroke    Epistaxis    Arthritis

## 2022-12-08 ENCOUNTER — TELEPHONE (OUTPATIENT)
Dept: NEUROLOGY | Facility: CLINIC | Age: 82
End: 2022-12-08
Payer: MEDICARE

## 2022-12-12 ENCOUNTER — LAB VISIT (OUTPATIENT)
Dept: LAB | Facility: HOSPITAL | Age: 82
End: 2022-12-12
Attending: FAMILY MEDICINE
Payer: MEDICARE

## 2022-12-12 DIAGNOSIS — E11.9 TYPE 2 DIABETES MELLITUS WITHOUT COMPLICATION, WITHOUT LONG-TERM CURRENT USE OF INSULIN: ICD-10-CM

## 2022-12-12 DIAGNOSIS — E66.01 SEVERE OBESITY (BMI 35.0-39.9) WITH COMORBIDITY: ICD-10-CM

## 2022-12-12 DIAGNOSIS — R60.1 GENERALIZED EDEMA: ICD-10-CM

## 2022-12-12 DIAGNOSIS — I10 PRIMARY HYPERTENSION: ICD-10-CM

## 2022-12-12 DIAGNOSIS — E78.5 HYPERLIPIDEMIA, UNSPECIFIED HYPERLIPIDEMIA TYPE: ICD-10-CM

## 2022-12-12 DIAGNOSIS — R06.02 SOB (SHORTNESS OF BREATH): ICD-10-CM

## 2022-12-12 LAB
ALBUMIN/CREAT UR: NORMAL UG/MG (ref 0–30)
BILIRUB UR QL STRIP: NEGATIVE
CLARITY UR REFRACT.AUTO: CLEAR
COLOR UR AUTO: YELLOW
CREAT UR-MCNC: 153 MG/DL (ref 15–325)
GLUCOSE UR QL STRIP: NEGATIVE
HGB UR QL STRIP: NEGATIVE
KETONES UR QL STRIP: NEGATIVE
LEUKOCYTE ESTERASE UR QL STRIP: NEGATIVE
MICROALBUMIN UR DL<=1MG/L-MCNC: <5 UG/ML
NITRITE UR QL STRIP: NEGATIVE
PH UR STRIP: 6 [PH] (ref 5–8)
PROT UR QL STRIP: NEGATIVE
SP GR UR STRIP: 1.01 (ref 1–1.03)
URN SPEC COLLECT METH UR: NORMAL
UROBILINOGEN UR STRIP-ACNC: NEGATIVE EU/DL

## 2022-12-12 PROCEDURE — 81003 URINALYSIS AUTO W/O SCOPE: CPT | Mod: PO | Performed by: FAMILY MEDICINE

## 2022-12-12 PROCEDURE — 82570 ASSAY OF URINE CREATININE: CPT | Mod: PO | Performed by: FAMILY MEDICINE

## 2022-12-12 PROCEDURE — 82043 UR ALBUMIN QUANTITATIVE: CPT | Mod: PO | Performed by: FAMILY MEDICINE

## 2022-12-13 ENCOUNTER — OFFICE VISIT (OUTPATIENT)
Dept: NEUROLOGY | Facility: CLINIC | Age: 82
End: 2022-12-13
Payer: MEDICARE

## 2022-12-13 VITALS
HEART RATE: 69 BPM | SYSTOLIC BLOOD PRESSURE: 177 MMHG | BODY MASS INDEX: 35.85 KG/M2 | WEIGHT: 210 LBS | HEIGHT: 64 IN | DIASTOLIC BLOOD PRESSURE: 81 MMHG

## 2022-12-13 DIAGNOSIS — E78.5 HYPERLIPIDEMIA, UNSPECIFIED HYPERLIPIDEMIA TYPE: Primary | ICD-10-CM

## 2022-12-13 DIAGNOSIS — I10 HYPERTENSION, UNSPECIFIED TYPE: ICD-10-CM

## 2022-12-13 DIAGNOSIS — E66.9 OBESITY (BMI 30.0-34.9): ICD-10-CM

## 2022-12-13 DIAGNOSIS — H54.7 REDUCED VISUAL ACUITY: ICD-10-CM

## 2022-12-13 DIAGNOSIS — E11.9 TYPE 2 DIABETES MELLITUS WITHOUT COMPLICATION, WITHOUT LONG-TERM CURRENT USE OF INSULIN: ICD-10-CM

## 2022-12-13 DIAGNOSIS — Z86.73 HISTORY OF STROKE: ICD-10-CM

## 2022-12-13 PROCEDURE — 1100F PTFALLS ASSESS-DOCD GE2>/YR: CPT | Mod: CPTII,S$GLB,, | Performed by: PSYCHIATRY & NEUROLOGY

## 2022-12-13 PROCEDURE — 99205 OFFICE O/P NEW HI 60 MIN: CPT | Mod: S$GLB,,, | Performed by: PSYCHIATRY & NEUROLOGY

## 2022-12-13 PROCEDURE — 1100F PR PT FALLS ASSESS DOC 2+ FALLS/FALL W/INJURY/YR: ICD-10-PCS | Mod: CPTII,S$GLB,, | Performed by: PSYCHIATRY & NEUROLOGY

## 2022-12-13 PROCEDURE — 99499 RISK ADDL DX/OHS AUDIT: ICD-10-PCS | Mod: S$GLB,,, | Performed by: PSYCHIATRY & NEUROLOGY

## 2022-12-13 PROCEDURE — 3288F FALL RISK ASSESSMENT DOCD: CPT | Mod: CPTII,S$GLB,, | Performed by: PSYCHIATRY & NEUROLOGY

## 2022-12-13 PROCEDURE — 3077F SYST BP >= 140 MM HG: CPT | Mod: CPTII,S$GLB,, | Performed by: PSYCHIATRY & NEUROLOGY

## 2022-12-13 PROCEDURE — 3079F DIAST BP 80-89 MM HG: CPT | Mod: CPTII,S$GLB,, | Performed by: PSYCHIATRY & NEUROLOGY

## 2022-12-13 PROCEDURE — 99999 PR PBB SHADOW E&M-EST. PATIENT-LVL V: CPT | Mod: PBBFAC,,, | Performed by: PSYCHIATRY & NEUROLOGY

## 2022-12-13 PROCEDURE — 3079F PR MOST RECENT DIASTOLIC BLOOD PRESSURE 80-89 MM HG: ICD-10-PCS | Mod: CPTII,S$GLB,, | Performed by: PSYCHIATRY & NEUROLOGY

## 2022-12-13 PROCEDURE — 3077F PR MOST RECENT SYSTOLIC BLOOD PRESSURE >= 140 MM HG: ICD-10-PCS | Mod: CPTII,S$GLB,, | Performed by: PSYCHIATRY & NEUROLOGY

## 2022-12-13 PROCEDURE — 1159F PR MEDICATION LIST DOCUMENTED IN MEDICAL RECORD: ICD-10-PCS | Mod: CPTII,S$GLB,, | Performed by: PSYCHIATRY & NEUROLOGY

## 2022-12-13 PROCEDURE — 1160F RVW MEDS BY RX/DR IN RCRD: CPT | Mod: CPTII,S$GLB,, | Performed by: PSYCHIATRY & NEUROLOGY

## 2022-12-13 PROCEDURE — 1157F PR ADVANCE CARE PLAN OR EQUIV PRESENT IN MEDICAL RECORD: ICD-10-PCS | Mod: CPTII,S$GLB,, | Performed by: PSYCHIATRY & NEUROLOGY

## 2022-12-13 PROCEDURE — 99999 PR PBB SHADOW E&M-EST. PATIENT-LVL V: ICD-10-PCS | Mod: PBBFAC,,, | Performed by: PSYCHIATRY & NEUROLOGY

## 2022-12-13 PROCEDURE — 99205 PR OFFICE/OUTPT VISIT, NEW, LEVL V, 60-74 MIN: ICD-10-PCS | Mod: S$GLB,,, | Performed by: PSYCHIATRY & NEUROLOGY

## 2022-12-13 PROCEDURE — 1126F PR PAIN SEVERITY QUANTIFIED, NO PAIN PRESENT: ICD-10-PCS | Mod: CPTII,S$GLB,, | Performed by: PSYCHIATRY & NEUROLOGY

## 2022-12-13 PROCEDURE — 3288F PR FALLS RISK ASSESSMENT DOCUMENTED: ICD-10-PCS | Mod: CPTII,S$GLB,, | Performed by: PSYCHIATRY & NEUROLOGY

## 2022-12-13 PROCEDURE — 1157F ADVNC CARE PLAN IN RCRD: CPT | Mod: CPTII,S$GLB,, | Performed by: PSYCHIATRY & NEUROLOGY

## 2022-12-13 PROCEDURE — 1160F PR REVIEW ALL MEDS BY PRESCRIBER/CLIN PHARMACIST DOCUMENTED: ICD-10-PCS | Mod: CPTII,S$GLB,, | Performed by: PSYCHIATRY & NEUROLOGY

## 2022-12-13 PROCEDURE — 99499 UNLISTED E&M SERVICE: CPT | Mod: S$GLB,,, | Performed by: PSYCHIATRY & NEUROLOGY

## 2022-12-13 PROCEDURE — 1159F MED LIST DOCD IN RCRD: CPT | Mod: CPTII,S$GLB,, | Performed by: PSYCHIATRY & NEUROLOGY

## 2022-12-13 PROCEDURE — 1126F AMNT PAIN NOTED NONE PRSNT: CPT | Mod: CPTII,S$GLB,, | Performed by: PSYCHIATRY & NEUROLOGY

## 2022-12-13 NOTE — PROGRESS NOTES
[facility] NEUROLOGY  [facility], [place]    Date: 12/13/22  Patient Name: Katy Soto   MRN: [id number]   PCP: Rudy Cruz  Referring Provider: Unique Okeefe *    Chief Complaint: hx of stroke  Subjective:   Patient seen in consultation at the request of Unique Okeefe * for the evaluation of stroke hx. A copy of this note will be sent to the referring physician.      HPI:   Ms. Katy Soto is a 82 y.o. RH female presenting for evaluation after a stroke in June 2022.  Patient's stroke risk factors include grossly uncontrolled hypertension at the time, poorly controlled diabetes at the time, and high cholesterol.    Patient is joined by her daughter at the time of the encounter.  They share that the patient was in her car driving when she had sudden onset severe pain in the right eye.  Difficulty with significant decreased visual acuity persisted for a couple of days.  The patient then sought attention at an eye care professional who sent her to the hospital.  Upon evaluation in the emergency department, the patient was found to be severely hypertensive with systolic blood pressure greater than 240.  CT head did not reveal significant issues, but subsequent MRI found evidence of a left cerebral hemisphere and right occipital multifocal punctate acute strokes.  CTA found evidence of widespread atherosclerosis most notable at the left subclavian, cavernous and supraclinoid ICA, prox M2 Mca, and p1-p2 junction of the pca on the right.     Patient underwent routine workup for stroke including echo with bubble study; no significant findings.  No cardiac arrhythmias during hospitalization.  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  Discharged with aspirin 81, Lipitor 40, Plavix 75 daily.  Patient endorses that she continues to be compliant with these medications.    In  further questioning, patient's daughter clarifies that the patient had completely stopped taking her medications for several months leading up to this event including hypertensive meds.  She also as that there was a significant stressor around the same time where a family friend.    Baseline hearing loss with use of aids. Vision is overall improved but continues to struggle with significant blurriness in the right eye; following with a retina specialist.     Denies focal or lateralizing weakness/sensory complaints at this time.     Hemoglobin A1C   Date Value Ref Range Status   12/12/2022 5.7 (H) 4.0 - 5.6 % Final     Comment:     ADA Screening Guidelines:  5.7-6.4%  Consistent with prediabetes  >or=6.5%  Consistent with diabetes    High levels of fetal hemoglobin interfere with the HbA1C  assay. Heterozygous hemoglobin variants (HbS, HgC, etc)do  not significantly interfere with this assay.   However, presence of multiple variants may affect accuracy.     06/17/2022 7.9 (H) 4.0 - 5.6 % Final     Comment:     ADA Screening Guidelines:  5.7-6.4%  Consistent with prediabetes  >or=6.5%  Consistent with diabetes    High levels of fetal hemoglobin interfere with the HbA1C  assay. Heterozygous hemoglobin variants (HbS, HgC, etc)do  not significantly interfere with this assay.   However, presence of multiple variants may affect accuracy.     03/17/2022 7.0 (H) 4.0 - 5.6 % Final     Comment:     ADA Screening Guidelines:  5.7-6.4%  Consistent with prediabetes  >or=6.5%  Consistent with diabetes    High levels of fetal hemoglobin interfere with the HbA1C  assay. Heterozygous hemoglobin variants (HbS, HgC, etc)do  not significantly interfere with this assay.   However, presence of multiple variants may affect accuracy.         PAST MEDICAL HISTORY:  Past Medical History:   Diagnosis Date    Hyperlipidemia     Hypertension     Type 2 diabetes mellitus without complication, without long-term current use of insulin 3/17/2022        PAST SURGICAL HISTORY:  Past Surgical History:   Procedure Laterality Date     SECTION      x1    HERNIA REPAIR      umbilical    LEG SURGERY Right 2017    stents placed       CURRENT MEDS:  Current Outpatient Medications   Medication Sig Dispense Refill    aspirin (ECOTRIN) 81 MG EC tablet Take 81 mg by mouth once daily.      atorvastatin (LIPITOR) 40 MG tablet Take 1 tablet (40 mg total) by mouth once daily. For cholesterol 90 tablet 3    blood sugar diagnostic Strp by Misc.(Non-Drug; Combo Route) route.      blood-glucose meter kit Use daily 1 each 0    cholecalciferol, vitamin D3, (VITAMIN D3) 125 mcg (5,000 unit) Tab Take 1 tablet (5,000 Units total) by mouth once daily. (Patient taking differently: Take 1,000 Units by mouth once daily.) 90 tablet 3    clopidogreL (PLAVIX) 75 mg tablet Take 1 tablet (75 mg total) by mouth once daily. for 21 days 90 tablet 3    folic acid/multivit-min/lutein (CENTRUM SILVER ORAL) Take by mouth.      furosemide (LASIX) 40 MG tablet Take 2 tablets (80 mg total) by mouth 2 (two) times a day. For one week, then 1 bid 120 tablet 3    irbesartan (AVAPRO) 300 MG tablet TAKE 1 TABLET(300 MG) BY MOUTH EVERY DAY 90 tablet 3    lancets 31 gauge Misc by Misc.(Non-Drug; Combo Route) route.      memantine (NAMENDA) 5 MG Tab Take 1 tablet (5 mg total) by mouth 2 (two) times daily. For memory 180 tablet 3    potassium chloride SA (K-DUR,KLOR-CON) 20 MEQ tablet Take 1 tablet (20 mEq total) by mouth once daily. 90 tablet 3    SAXagliptin (ONGLYZA) 5 mg Tab tablet Take 1 tablet (5 mg total) by mouth once daily. 90 tablet 3    sucralfate (CARAFATE) 1 gram tablet TAKE 1 TABLET(1 GRAM) BY MOUTH THREE TIMES DAILY 270 tablet 3    terazosin (HYTRIN) 10 MG capsule Take 1 capsule (10 mg total) by mouth every evening. 30 capsule 11     No current facility-administered medications for this visit.       ALLERGIES:  Review of patient's allergies indicates:   Allergen Reactions     "Cilostazol      cunjd4g and stomach cramps    Clonidine     Coreg [carvedilol]      Cramps stomach and nausea    Diltiazem      nauseana d stoamch cramps    Lexapro [escitalopram oxalate]      Dizzy, nausea and weak    Shellfish containing products Hives       FAMILY HISTORY:  Family History   Problem Relation Age of Onset    Diabetes Mother     Hypertension Mother     Stroke Father     Heart disease Father     Heart attack Father     Cancer Sister         Breast cancer    Breast cancer Sister     No Known Problems Son     Kidney failure Sister         Kidney transplant x18 years    Lupus Sister     Hypertension Sister     Diabetes Sister     Arthritis Sister     Hypertension Sister     Cancer Brother        SOCIAL HISTORY:  Social History     Tobacco Use    Smoking status: Never    Smokeless tobacco: Never   Substance Use Topics    Alcohol use: No    Drug use: No       Review of Systems:  Gen: no fever, no chills, no generalized feeling of weakness   HEENT: no double vision, (+) blurred vision, no eye pain, no eye exudates.    Heart: no chest pain, no SOB    Lungs: no SOB, no cough    MSK: no weakness of legs, intact ROM    ABD: no abd pain, no N/V/D/C, no difficulty with defecation.    Extremities: No leg pain, no edema.       Objective:     Vitals:    12/13/22 1308   BP: (!) 177/81   Pulse: 69   Weight: 95.3 kg (210 lb)   Height: 5' 4" (1.626 m)     General: female in NAD, alert and awake, Aox3, well groomed. ?    ? ?    HEENT: Head is NC/AT EOMI, pupil size: 4 mm B/L, no nystagmus noted; Mucous membrane moist, uvula midline, no pharyngeal erythema, exudates or discharges.      Neck: Supple. no nuchal rigidity.      Cardiovascular: well perfused, no cyanosis        Respiratory: Symmetric chest rise noted       Musculoskeletal: Muscle tone noted to be adequate for patient age, muscle mass is WNL. No spontaneous movements or fasciculations noted during this examination.       Extremities: No pedal edema or calf " tenderness. No cogwheel rigidity noted on B/L UE extremities.       Neurological Examination.    Mental status: AA&O x3; Affect/mood is euthymic/congruent; no aphasia noted during examination. Patient answers simple questions appropriately & follows simple commands; no dysarthria or expressive aphasia; no sasha-neglect or extinction. Vocabulary/word finding: excellent.       Cranial Nerves:  Decreased visual acuity in all fields of the right eye.  Bilateral decrease in hearing requiring elevated voice from the examiner.  Otherwise II-XII intact bilaterally     Muscle Function: Tone WNL and Muscle bulk WNL. Left elbow flexors/extensors (5/5), Left shoulder (5/5); left Finger flexor/extensors (5/5). Right elbow flexors/extensors (5/5). Right shoulder abduction/flexion (5/5), Right finger flexors/extensors (5/5). Left LE hip flexion/extension (5/5), Left Knee flexion/extension (5/5) and Left ankle flexion/extension/Eversion/inversion (5/5). Right LE hip flexion/extension (5/5), Right Knee flexion/extension (5/5) and ankle flexion/extension/Eversion/inversion (5/5).       Sensory: ?Pain/sharp, proprioception, and light touch are grossly intact in bilateral upper and lower extremities, face, and trunk.       Reflexes:  Left biceps, left patella 3/4.  Otherwise 2/4 throughout     Coordination: no dysmetria (finger to nose negative)     Gait: adequate casual gait with stride length and arm swing WNL.  Appears quite stable without the use of cane or walker.  No shuffling.  Efficient turns.    NIHSS: 0     OTHER:  06/18/2022 MRI brain without contrast:  FINDINGS:  Intracranial compartment:  Generalized cerebral volume loss.  Ventricles are midline and stable in size and configuration without distortion by mass effect or acute hydrocephalus.  No extra-axial blood or fluid collections.  Multifocal punctate foci of restricted diffusion involving the cortex and also subcortical white matter of the left centrum semiovale and  "corona radiata, posterior left temporal lobe, left occipital lobe and right occipital lobe.  Mild patchy nonspecific T2/FLAIR hyperintense signal of the subcortical and periventricular white matter consistent with chronic microvascular ischemic change.  Multiple punctate foci of susceptibility throughout the bilateral cerebral hemispheres predominantly posteriorly and on the left likely remote microhemorrhages.  No mass lesion, acute hemorrhage, significant edema, acute large vascular territory infarct or midline shift.  Normal vascular flow voids are preserved.  Skull/extracranial contents (limited evaluation): Bone marrow signal intensity is normal.  Craniocervical junction is within normal limits.  Paranasal sinuses and mastoid air cells are clear.  Impression:  Left cerebral hemisphere and right occipital lobe cortical and subcortical white matter multifocal punctate acute infarcts.  No acute intracranial hemorrhage or acute large vascular territory infarct.  Involutional change, sequela of chronic microvascular ischemic change, and posterior and left cerebral hemisphere predominant suspected multifocal remote microhemorrhages."    06/17/2022 CTA head and neck:   Impression:  CTA head: Atherosclerotic disease cavernous and supraclinoid ICAs with at least moderate stenosis right distal cavernous ICA.  Moderate narrowing left proximal M2 MCA.  High-grade stenosis right P1 P2 junction of the PCA.  CTA neck: No significant carotid stenosis throughout the neck.  There is moderate to high-grade stenosis in the left proximal subclavian artery.  CT head: No evidence for acute intracranial hemorrhage or definite abnormal parenchymal enhancement.  Generalized cerebral volume loss with patchy and confluent decreased attenuation supratentorial white matter which may be sequela of chronic ischemic change."    06/16/2022 CT head without contrast:  FINDINGS:  Intracranial compartment:  The brain parenchyma demonstrates areas " "of decreased attenuation with moderate periventricular white matter consistent with chronic microvascular ischemic changes..  No parenchymal mass, hemorrhage, edema or major vascular distribution infarct.  Vascular calcifications are noted.  Mild prominence of the sulci and ventricles are consistent with age-related involutional changes.  No extra-axial blood or fluid collections.  Skull/extracranial contents (limited evaluation): No fracture. Mastoid air cells and paranasal sinuses are essentially clear.  Impression:  Chronic microvascular ischemic changes.  All CT scans at this facility use dose modulation, iterative reconstruction, and/or weight based dosing when appropriate to reduce radiation dose to as low as reasonable achievable."      Assessment:   Katy Soto is a 82 y.o. female presenting for follow-up after hospitalization due to multifocal stroke in the setting of grossly uncontrolled hypertension and extensive atherosclerotic changes.    I endorse continued use of aspirin, Plavix (due to the extent of atherosclerosis), and statin daily; no changes to current regimen.    We had an extensive conversation regarding the importance of controlling her hypertension including the fact that her blood pressure was quite elevated at the time of today's encounter.  In discussing options for pursuing a remedy to today's reading, her daughter expressed that they have home options that they will pursue.  They were specifically counseled to seek care at an urgent care or to contact her PCP's office immediately if blood pressure readings continue to be elevated at home.  They verbally expressed understanding of this recommendation.    Plan:     Problem List Items Addressed This Visit          Ophtho    Reduced visual acuity       Cardiac/Vascular    Hyperlipidemia - Primary       Endocrine    Obesity (BMI 30.0-34.9)    Overview     Current BMI 34.28.          Type 2 diabetes mellitus without complication, without " long-term current use of insulin     Other Visit Diagnoses       Hypertension, unspecified type        History of stroke              - tight control blood pressure, glucose, cholesterol (goal LDL <70) at all times moving forward  - follow-up with PCP regarding adjustments in hypertensive medications   - counseling on healthy diet, regular physical activity, stress management, and good sleep habits discussed   - no indication for additional physical therapy at this time  - patient was repeatedly counseled on the immense importance of medication compliance in her situation.  - continue close follow-up with retina specialist  - follow-up with our clinic routinely in 1 year or sooner as needed    I spent a total of 60 minutes on the day of the visit. This includes face to face time and non-face to face time preparing to see the patient (eg, review of tests), obtaining and/or reviewing separately obtained history, documenting clinical information in the electronic or other health record, independently interpreting results and communicating results to the patient/family/caregiver, or care coordinator.    A dictation device was used to produce this document. Use of such devices sometimes results in grammatical errors or replacement of words that sound similarly.    Stevo Byers, DO

## 2022-12-28 ENCOUNTER — OFFICE VISIT (OUTPATIENT)
Dept: FAMILY MEDICINE | Facility: CLINIC | Age: 82
End: 2022-12-28
Payer: MEDICARE

## 2022-12-28 VITALS
HEIGHT: 64 IN | BODY MASS INDEX: 36.08 KG/M2 | HEART RATE: 107 BPM | OXYGEN SATURATION: 97 % | DIASTOLIC BLOOD PRESSURE: 72 MMHG | WEIGHT: 211.31 LBS | SYSTOLIC BLOOD PRESSURE: 138 MMHG

## 2022-12-28 DIAGNOSIS — I63.9 OCCIPITAL STROKE: ICD-10-CM

## 2022-12-28 DIAGNOSIS — H91.90 HEARING LOSS, UNSPECIFIED HEARING LOSS TYPE, UNSPECIFIED LATERALITY: ICD-10-CM

## 2022-12-28 DIAGNOSIS — E11.9 TYPE 2 DIABETES MELLITUS WITHOUT COMPLICATION, WITHOUT LONG-TERM CURRENT USE OF INSULIN: ICD-10-CM

## 2022-12-28 DIAGNOSIS — E78.5 HYPERLIPIDEMIA, UNSPECIFIED HYPERLIPIDEMIA TYPE: ICD-10-CM

## 2022-12-28 DIAGNOSIS — R60.1 GENERALIZED EDEMA: ICD-10-CM

## 2022-12-28 DIAGNOSIS — E66.9 OBESITY (BMI 30.0-34.9): ICD-10-CM

## 2022-12-28 DIAGNOSIS — I63.9 CEREBRAL INFARCTION, LEFT HEMISPHERE: ICD-10-CM

## 2022-12-28 DIAGNOSIS — H54.7 REDUCED VISUAL ACUITY: ICD-10-CM

## 2022-12-28 DIAGNOSIS — E03.4 HYPOTHYROIDISM DUE TO ACQUIRED ATROPHY OF THYROID: Primary | ICD-10-CM

## 2022-12-28 DIAGNOSIS — R60.0 BILATERAL LEG EDEMA: ICD-10-CM

## 2022-12-28 DIAGNOSIS — F41.9 ANXIETY: ICD-10-CM

## 2022-12-28 DIAGNOSIS — E66.01 SEVERE OBESITY (BMI 35.0-39.9) WITH COMORBIDITY: ICD-10-CM

## 2022-12-28 PROBLEM — I16.1 HYPERTENSIVE EMERGENCY: Status: RESOLVED | Noted: 2017-03-01 | Resolved: 2022-12-28

## 2022-12-28 PROBLEM — I51.7 LVH (LEFT VENTRICULAR HYPERTROPHY): Status: RESOLVED | Noted: 2020-12-03 | Resolved: 2022-12-28

## 2022-12-28 PROCEDURE — 99214 PR OFFICE/OUTPT VISIT, EST, LEVL IV, 30-39 MIN: ICD-10-PCS | Mod: S$GLB,,, | Performed by: FAMILY MEDICINE

## 2022-12-28 PROCEDURE — 1101F PT FALLS ASSESS-DOCD LE1/YR: CPT | Mod: CPTII,S$GLB,, | Performed by: FAMILY MEDICINE

## 2022-12-28 PROCEDURE — 3288F PR FALLS RISK ASSESSMENT DOCUMENTED: ICD-10-PCS | Mod: CPTII,S$GLB,, | Performed by: FAMILY MEDICINE

## 2022-12-28 PROCEDURE — 1101F PR PT FALLS ASSESS DOC 0-1 FALLS W/OUT INJ PAST YR: ICD-10-PCS | Mod: CPTII,S$GLB,, | Performed by: FAMILY MEDICINE

## 2022-12-28 PROCEDURE — 1157F PR ADVANCE CARE PLAN OR EQUIV PRESENT IN MEDICAL RECORD: ICD-10-PCS | Mod: CPTII,S$GLB,, | Performed by: FAMILY MEDICINE

## 2022-12-28 PROCEDURE — 3078F DIAST BP <80 MM HG: CPT | Mod: CPTII,S$GLB,, | Performed by: FAMILY MEDICINE

## 2022-12-28 PROCEDURE — 99214 OFFICE O/P EST MOD 30 MIN: CPT | Mod: S$GLB,,, | Performed by: FAMILY MEDICINE

## 2022-12-28 PROCEDURE — 3078F PR MOST RECENT DIASTOLIC BLOOD PRESSURE < 80 MM HG: ICD-10-PCS | Mod: CPTII,S$GLB,, | Performed by: FAMILY MEDICINE

## 2022-12-28 PROCEDURE — 3075F SYST BP GE 130 - 139MM HG: CPT | Mod: CPTII,S$GLB,, | Performed by: FAMILY MEDICINE

## 2022-12-28 PROCEDURE — 1126F AMNT PAIN NOTED NONE PRSNT: CPT | Mod: CPTII,S$GLB,, | Performed by: FAMILY MEDICINE

## 2022-12-28 PROCEDURE — 1157F ADVNC CARE PLAN IN RCRD: CPT | Mod: CPTII,S$GLB,, | Performed by: FAMILY MEDICINE

## 2022-12-28 PROCEDURE — 3288F FALL RISK ASSESSMENT DOCD: CPT | Mod: CPTII,S$GLB,, | Performed by: FAMILY MEDICINE

## 2022-12-28 PROCEDURE — 1159F PR MEDICATION LIST DOCUMENTED IN MEDICAL RECORD: ICD-10-PCS | Mod: CPTII,S$GLB,, | Performed by: FAMILY MEDICINE

## 2022-12-28 PROCEDURE — 3075F PR MOST RECENT SYSTOLIC BLOOD PRESS GE 130-139MM HG: ICD-10-PCS | Mod: CPTII,S$GLB,, | Performed by: FAMILY MEDICINE

## 2022-12-28 PROCEDURE — 1159F MED LIST DOCD IN RCRD: CPT | Mod: CPTII,S$GLB,, | Performed by: FAMILY MEDICINE

## 2022-12-28 PROCEDURE — 1126F PR PAIN SEVERITY QUANTIFIED, NO PAIN PRESENT: ICD-10-PCS | Mod: CPTII,S$GLB,, | Performed by: FAMILY MEDICINE

## 2022-12-28 RX ORDER — FUROSEMIDE 40 MG/1
80 TABLET ORAL 2 TIMES DAILY
Qty: 360 TABLET | Refills: 3 | Status: SHIPPED | OUTPATIENT
Start: 2022-12-28 | End: 2023-08-03 | Stop reason: SDUPTHER

## 2022-12-28 RX ORDER — LEVOTHYROXINE SODIUM 25 UG/1
25 TABLET ORAL
Qty: 90 TABLET | Refills: 3 | Status: SHIPPED | OUTPATIENT
Start: 2022-12-28 | End: 2023-03-29 | Stop reason: SDUPTHER

## 2022-12-28 NOTE — PROGRESS NOTES
"Subjective:      Patient ID: Katy Soto is a 82 y.o. female.    Chief Complaint: Follow-up      Vitals:    12/28/22 0809   BP: 138/72   Pulse: 107   SpO2: 97%   Weight: 95.8 kg (211 lb 5 oz)   Height: 5' 4" (1.626 m)        HPI   Had labs, saw neurology, bp was up there, had labs  Pt says "ok"  Niece says SOB and she upped her lasix for one week; she is drinking cokes again  TSH up a little  Starting 25 mcg daily  Dr Byers note read and NP cardiology note read  Time for pt to go back to card  Pt's bP is fixed by niece giving extra lasix  Routine 40 bid and prn 3/day  So, she'll start taking 3 lasix/day pm MWF  Problem List  Patient Active Problem List   Diagnosis    Hyperlipidemia    Arthritis    PAD (peripheral artery disease)    Bilateral leg edema    Anxiety    Obesity (BMI 30.0-34.9)    Osteopenia    Atherosclerosis of aorta    Elevated sed rate    Vitamin D insufficiency    Type 2 diabetes mellitus without complication, without long-term current use of insulin    Severe obesity (BMI 35.0-39.9) with comorbidity    Fracture of orbital floor, left side, initial encounter for closed fracture    Reduced visual acuity    TIA (transient ischemic attack)    right Occipital stroke    Cerebral infarction, left hemisphere    Generalized edema        ALLERGIES:   Review of patient's allergies indicates:   Allergen Reactions    Cilostazol      [id number] and stomach cramps    Clonidine     Coreg [carvedilol]      Cramps stomach and nausea    Diltiazem      nauseana d stoamch cramps    Lexapro [escitalopram oxalate]      Dizzy, nausea and weak    Shellfish containing products Hives       MEDS:   Current Outpatient Medications:     aspirin (ECOTRIN) 81 MG EC tablet, Take 81 mg by mouth once daily., Disp: , Rfl:     atorvastatin (LIPITOR) 40 MG tablet, Take 1 tablet (40 mg total) by mouth once daily. For cholesterol, Disp: 90 tablet, Rfl: 3    blood sugar diagnostic Strp, by Misc.(Non-Drug; Combo Route) route., Disp: , " Rfl:     blood-glucose meter kit, Use daily, Disp: 1 each, Rfl: 0    cholecalciferol, vitamin D3, (VITAMIN D3) 125 mcg (5,000 unit) Tab, Take 1 tablet (5,000 Units total) by mouth once daily. (Patient taking differently: Take 1,000 Units by mouth once daily.), Disp: 90 tablet, Rfl: 3    clopidogreL (PLAVIX) 75 mg tablet, Take 1 tablet (75 mg total) by mouth once daily. for 21 days, Disp: 90 tablet, Rfl: 3    folic acid/multivit-min/lutein (CENTRUM SILVER ORAL), Take by mouth., Disp: , Rfl:     irbesartan (AVAPRO) 300 MG tablet, TAKE 1 TABLET(300 MG) BY MOUTH EVERY DAY, Disp: 90 tablet, Rfl: 3    lancets 31 gauge Misc, by Misc.(Non-Drug; Combo Route) route., Disp: , Rfl:     memantine (NAMENDA) 5 MG Tab, Take 1 tablet (5 mg total) by mouth 2 (two) times daily. For memory, Disp: 180 tablet, Rfl: 3    potassium chloride SA (K-DUR,KLOR-CON) 20 MEQ tablet, Take 1 tablet (20 mEq total) by mouth once daily., Disp: 90 tablet, Rfl: 3    SAXagliptin (ONGLYZA) 5 mg Tab tablet, Take 1 tablet (5 mg total) by mouth once daily., Disp: 90 tablet, Rfl: 3    sucralfate (CARAFATE) 1 gram tablet, TAKE 1 TABLET(1 GRAM) BY MOUTH THREE TIMES DAILY, Disp: 270 tablet, Rfl: 3    terazosin (HYTRIN) 10 MG capsule, Take 1 capsule (10 mg total) by mouth every evening., Disp: 30 capsule, Rfl: 11    furosemide (LASIX) 40 MG tablet, Take 2 tablets (80 mg total) by mouth 2 (two) times a day. For one week, then 1 bid, Disp: 360 tablet, Rfl: 3    levothyroxine (SYNTHROID) 25 MCG tablet, Take 1 tablet (25 mcg total) by mouth before breakfast., Disp: 90 tablet, Rfl: 3      History:  Current Providers as of 12/28/2022  PCP: Rudy Cruz MD  Care Team Provider: New Mehta MD  Care Team Provider: David Fried MD  Care Team Provider: Pinky Grajeda RD  Care Team Provider: Stevo Byers, DO  Encounter Provider: Rudy Cruz MD, starting on Wed Dec 28, 2022 12:00 AM  Referring Provider: not found, starting on Wed Dec 28, 2022 12:00  AM  Consulting Physician: Rudy Cruz MD, starting on Wed Dec 28, 2022  8:07 AM (Active)   Past Medical History:   Diagnosis Date    Hyperlipidemia     Hypertension     Type 2 diabetes mellitus without complication, without long-term current use of insulin 3/17/2022     Past Surgical History:   Procedure Laterality Date     SECTION      x1    HERNIA REPAIR      umbilical    LEG SURGERY Right 2017    stents placed     Social History     Tobacco Use    Smoking status: Never    Smokeless tobacco: Never   Substance Use Topics    Alcohol use: No    Drug use: No         Review of Systems   Eyes:  Positive for visual disturbance.   Respiratory:  Positive for shortness of breath.    Cardiovascular:  Positive for leg swelling.   All other systems reviewed and are negative.  Objective:     Physical Exam  Vitals and nursing note reviewed.   Constitutional:       Appearance: She is well-developed. She is obese.   HENT:      Head: Normocephalic.   Eyes:      Conjunctiva/sclera: Conjunctivae normal.      Pupils: Pupils are equal, round, and reactive to light.   Cardiovascular:      Rate and Rhythm: Normal rate and regular rhythm.      Heart sounds: Normal heart sounds.   Pulmonary:      Effort: Pulmonary effort is normal.      Breath sounds: Wheezing and rales present.   Musculoskeletal:         General: Normal range of motion.      Cervical back: Normal range of motion and neck supple.   Skin:     General: Skin is warm and dry.   Neurological:      Mental Status: She is alert and oriented to person, place, and time.      Deep Tendon Reflexes: Reflexes are normal and symmetric.   Psychiatric:         Behavior: Behavior normal.         Thought Content: Thought content normal.         Judgment: Judgment normal.           Assessment:     1. Hypothyroidism due to acquired atrophy of thyroid    2. Cerebral infarction, left hemisphere    3. right Occipital stroke    4. Anxiety    5. Reduced visual acuity    6. Hearing  loss, unspecified hearing loss type, unspecified laterality    7. Hyperlipidemia, unspecified hyperlipidemia type    8. Obesity (BMI 30.0-34.9)    9. Severe obesity (BMI 35.0-39.9) with comorbidity    10. Type 2 diabetes mellitus without complication, without long-term current use of insulin    11. Bilateral leg edema    12. Generalized edema      Plan:        Medication List            Accurate as of December 28, 2022  8:44 AM. If you have any questions, ask your nurse or doctor.                START taking these medications      levothyroxine 25 MCG tablet  Commonly known as: SYNTHROID  Take 1 tablet (25 mcg total) by mouth before breakfast.  Started by: Rudy Cruz MD            CHANGE how you take these medications      cholecalciferol (vitamin D3) 125 mcg (5,000 unit) Tab  Commonly known as: VITAMIN D3  Take 1 tablet (5,000 Units total) by mouth once daily.  What changed: how much to take            CONTINUE taking these medications      aspirin 81 MG EC tablet  Commonly known as: ECOTRIN     atorvastatin 40 MG tablet  Commonly known as: LIPITOR  Take 1 tablet (40 mg total) by mouth once daily. For cholesterol     blood sugar diagnostic Strp     blood-glucose meter kit  Use daily     CENTRUM SILVER ORAL     clopidogreL 75 mg tablet  Commonly known as: PLAVIX  Take 1 tablet (75 mg total) by mouth once daily. for 21 days     furosemide 40 MG tablet  Commonly known as: LASIX  Take 2 tablets (80 mg total) by mouth 2 (two) times a day. For one week, then 1 bid     irbesartan 300 MG tablet  Commonly known as: AVAPRO  TAKE 1 TABLET(300 MG) BY MOUTH EVERY DAY     lancets 31 gauge Misc     memantine 5 MG Tab  Commonly known as: NAMENDA  Take 1 tablet (5 mg total) by mouth 2 (two) times daily. For memory     potassium chloride SA 20 MEQ tablet  Commonly known as: K-DUR,KLOR-CON  Take 1 tablet (20 mEq total) by mouth once daily.     SAXagliptin 5 mg Tab tablet  Commonly known as: ONGLYZA  Take 1 tablet (5 mg total) by  mouth once daily.     sucralfate 1 gram tablet  Commonly known as: CARAFATE  TAKE 1 TABLET(1 GRAM) BY MOUTH THREE TIMES DAILY     terazosin 10 MG capsule  Commonly known as: HYTRIN  Take 1 capsule (10 mg total) by mouth every evening.               Where to Get Your Medications        These medications were sent to [id number] - [place], [place] - [address] [facility] AT [facility] [address]  [address] [facility], [facility] [address]      Phone: [phone]   furosemide 40 MG tablet  levothyroxine 25 MCG tablet       Hypothyroidism due to acquired atrophy of thyroid  -     TSH; Future; Expected date: 03/28/2023  -     CBC Auto Differential; Future; Expected date: 03/27/2023  -     Comprehensive Metabolic Panel; Future; Expected date: 03/27/2023  -     Hemoglobin A1C; Future; Expected date: 03/27/2023  -     Lipid Panel; Future; Expected date: 03/27/2023  -     TSH; Future; Expected date: 03/27/2023    Cerebral infarction, left hemisphere  -     CBC Auto Differential; Future; Expected date: 03/27/2023  -     Comprehensive Metabolic Panel; Future; Expected date: 03/27/2023  -     Hemoglobin A1C; Future; Expected date: 03/27/2023  -     Lipid Panel; Future; Expected date: 03/27/2023  -     TSH; Future; Expected date: 03/27/2023    right Occipital stroke  -     CBC Auto Differential; Future; Expected date: 03/27/2023  -     Comprehensive Metabolic Panel; Future; Expected date: 03/27/2023  -     Hemoglobin A1C; Future; Expected date: 03/27/2023  -     Lipid Panel; Future; Expected date: 03/27/2023  -     TSH; Future; Expected date: 03/27/2023    Anxiety  -     CBC Auto Differential; Future; Expected date: 03/27/2023  -     Comprehensive Metabolic Panel; Future; Expected date: 03/27/2023  -     Hemoglobin A1C; Future; Expected date: 03/27/2023  -     Lipid Panel; Future; Expected date: 03/27/2023  -     TSH; Future; Expected date: 03/27/2023    Reduced visual acuity  -     CBC Auto Differential;  Future; Expected date: 03/27/2023  -     Comprehensive Metabolic Panel; Future; Expected date: 03/27/2023  -     Hemoglobin A1C; Future; Expected date: 03/27/2023  -     Lipid Panel; Future; Expected date: 03/27/2023  -     TSH; Future; Expected date: 03/27/2023    Hearing loss, unspecified hearing loss type, unspecified laterality  -     CBC Auto Differential; Future; Expected date: 03/27/2023  -     Comprehensive Metabolic Panel; Future; Expected date: 03/27/2023  -     Hemoglobin A1C; Future; Expected date: 03/27/2023  -     Lipid Panel; Future; Expected date: 03/27/2023  -     TSH; Future; Expected date: 03/27/2023    Hyperlipidemia, unspecified hyperlipidemia type  -     CBC Auto Differential; Future; Expected date: 03/27/2023  -     Comprehensive Metabolic Panel; Future; Expected date: 03/27/2023  -     Hemoglobin A1C; Future; Expected date: 03/27/2023  -     Lipid Panel; Future; Expected date: 03/27/2023  -     TSH; Future; Expected date: 03/27/2023    Obesity (BMI 30.0-34.9)  -     CBC Auto Differential; Future; Expected date: 03/27/2023  -     Comprehensive Metabolic Panel; Future; Expected date: 03/27/2023  -     Hemoglobin A1C; Future; Expected date: 03/27/2023  -     Lipid Panel; Future; Expected date: 03/27/2023  -     TSH; Future; Expected date: 03/27/2023    Severe obesity (BMI 35.0-39.9) with comorbidity  -     CBC Auto Differential; Future; Expected date: 03/27/2023  -     Comprehensive Metabolic Panel; Future; Expected date: 03/27/2023  -     Hemoglobin A1C; Future; Expected date: 03/27/2023  -     Lipid Panel; Future; Expected date: 03/27/2023  -     TSH; Future; Expected date: 03/27/2023    Type 2 diabetes mellitus without complication, without long-term current use of insulin  -     CBC Auto Differential; Future; Expected date: 03/27/2023  -     Comprehensive Metabolic Panel; Future; Expected date: 03/27/2023  -     Hemoglobin A1C; Future; Expected date: 03/27/2023  -     Lipid Panel; Future;  Expected date: 03/27/2023  -     TSH; Future; Expected date: 03/27/2023    Bilateral leg edema  -     CBC Auto Differential; Future; Expected date: 03/27/2023  -     Comprehensive Metabolic Panel; Future; Expected date: 03/27/2023  -     Hemoglobin A1C; Future; Expected date: 03/27/2023  -     Lipid Panel; Future; Expected date: 03/27/2023  -     TSH; Future; Expected date: 03/27/2023    Generalized edema  -     CBC Auto Differential; Future; Expected date: 03/27/2023  -     Comprehensive Metabolic Panel; Future; Expected date: 03/27/2023  -     Hemoglobin A1C; Future; Expected date: 03/27/2023  -     Lipid Panel; Future; Expected date: 03/27/2023  -     TSH; Future; Expected date: 03/27/2023    Other orders  -     levothyroxine (SYNTHROID) 25 MCG tablet; Take 1 tablet (25 mcg total) by mouth before breakfast.  Dispense: 90 tablet; Refill: 3  -     furosemide (LASIX) 40 MG tablet; Take 2 tablets (80 mg total) by mouth 2 (two) times a day. For one week, then 1 bid  Dispense: 360 tablet; Refill: 3

## 2023-02-19 RX ORDER — POTASSIUM CHLORIDE 20 MEQ/1
TABLET, EXTENDED RELEASE ORAL
Qty: 90 TABLET | Refills: 3 | Status: SHIPPED | OUTPATIENT
Start: 2023-02-19 | End: 2023-07-28 | Stop reason: SDUPTHER

## 2023-02-19 NOTE — TELEPHONE ENCOUNTER
Refill Decision Note   Katy Brittany  is requesting a refill authorization.  Brief Assessment and Rationale for Refill:  Approve     Medication Therapy Plan:       Medication Reconciliation Completed: No   Comments:     No Care Gaps recommended.     Note composed:4:51 PM 02/19/2023

## 2023-02-19 NOTE — TELEPHONE ENCOUNTER
No new care gaps identified.  [facility] Embedded Care Gaps. Reference number: [id number]. 2/18/2023   7:37:13 PM CST

## 2023-02-22 ENCOUNTER — TELEPHONE (OUTPATIENT)
Dept: FAMILY MEDICINE | Facility: CLINIC | Age: 83
End: 2023-02-22
Payer: MEDICARE

## 2023-02-22 NOTE — TELEPHONE ENCOUNTER
----- Message from Dilcia Ellis sent at 2/22/2023  1:17 PM CST -----  Regarding: [facility]  Contact: [facility]/[phone]  Type:  Pharmacy Calling to Clarify an RX    Name of Caller:   Pharmacy Name: [facility]  Prescription Name: SAXagliptin (ONGLYZA) 5 mg Tab tablet  What do they need to clarify?: needs a Prior Auth  Best Call Back Number: [facility]/[phone]  Additional Information:

## 2023-03-08 ENCOUNTER — TELEPHONE (OUTPATIENT)
Dept: FAMILY MEDICINE | Facility: CLINIC | Age: 83
End: 2023-03-08
Payer: MEDICARE

## 2023-03-10 ENCOUNTER — TELEPHONE (OUTPATIENT)
Dept: FAMILY MEDICINE | Facility: CLINIC | Age: 83
End: 2023-03-10
Payer: MEDICARE

## 2023-03-10 NOTE — TELEPHONE ENCOUNTER
----- Message from Jcarlos Childers sent at 3/10/2023  2:21 PM CST -----  .Type:  RX Refill Request    Who Called: Pt's Niece  Refill or New Rx:REfill  RX Name and Strength:SAXagliptin (ONGLYZA) 5 mg Tab tablet  Preferred Pharmacy with phone number:  [id number] - [address] [facility] [address] [facility] [address] [facility]   Phone: [phone]  Fax:  [phone]  Would the patient rather a call back or a response via [facility]? Call  Best Call Back Number:[phone]  Please give a call back before its refilled. Pt needs a prior authorization

## 2023-03-10 NOTE — TELEPHONE ENCOUNTER
Spoke to Aurelio with [facility] ([phone]). Got the PA submitted over the phone.    Spoke to pt's niece, Kelin. She verbally understood the message above.

## 2023-03-20 ENCOUNTER — TELEPHONE (OUTPATIENT)
Dept: FAMILY MEDICINE | Facility: CLINIC | Age: 83
End: 2023-03-20
Payer: MEDICARE

## 2023-03-23 ENCOUNTER — LAB VISIT (OUTPATIENT)
Dept: LAB | Facility: HOSPITAL | Age: 83
End: 2023-03-23
Attending: FAMILY MEDICINE
Payer: MEDICARE

## 2023-03-23 ENCOUNTER — PATIENT OUTREACH (OUTPATIENT)
Dept: ADMINISTRATIVE | Facility: HOSPITAL | Age: 83
End: 2023-03-23
Payer: MEDICARE

## 2023-03-23 DIAGNOSIS — E03.4 HYPOTHYROIDISM DUE TO ACQUIRED ATROPHY OF THYROID: ICD-10-CM

## 2023-03-23 DIAGNOSIS — R60.1 GENERALIZED EDEMA: ICD-10-CM

## 2023-03-23 DIAGNOSIS — E78.5 HYPERLIPIDEMIA, UNSPECIFIED HYPERLIPIDEMIA TYPE: ICD-10-CM

## 2023-03-23 DIAGNOSIS — I63.9 OCCIPITAL STROKE: ICD-10-CM

## 2023-03-23 DIAGNOSIS — E66.9 OBESITY (BMI 30.0-34.9): ICD-10-CM

## 2023-03-23 DIAGNOSIS — R60.0 BILATERAL LEG EDEMA: ICD-10-CM

## 2023-03-23 DIAGNOSIS — H91.90 HEARING LOSS, UNSPECIFIED HEARING LOSS TYPE, UNSPECIFIED LATERALITY: ICD-10-CM

## 2023-03-23 DIAGNOSIS — H54.7 REDUCED VISUAL ACUITY: ICD-10-CM

## 2023-03-23 DIAGNOSIS — I63.9 CEREBRAL INFARCTION, LEFT HEMISPHERE: ICD-10-CM

## 2023-03-23 DIAGNOSIS — E66.01 SEVERE OBESITY (BMI 35.0-39.9) WITH COMORBIDITY: ICD-10-CM

## 2023-03-23 DIAGNOSIS — F41.9 ANXIETY: ICD-10-CM

## 2023-03-23 DIAGNOSIS — E11.9 TYPE 2 DIABETES MELLITUS WITHOUT COMPLICATION, WITHOUT LONG-TERM CURRENT USE OF INSULIN: ICD-10-CM

## 2023-03-23 LAB
ALBUMIN SERPL BCP-MCNC: 4 G/DL (ref 3.5–5.2)
ALP SERPL-CCNC: 147 U/L (ref 38–126)
ALT SERPL W/O P-5'-P-CCNC: 20 U/L (ref 10–44)
ANION GAP SERPL CALC-SCNC: 7 MMOL/L (ref 8–16)
AST SERPL-CCNC: 25 U/L (ref 15–46)
BASOPHILS # BLD AUTO: 0.04 K/UL (ref 0–0.2)
BASOPHILS NFR BLD: 0.6 % (ref 0–1.9)
BILIRUB SERPL-MCNC: 0.9 MG/DL (ref 0.1–1)
CALCIUM SERPL-MCNC: 9.2 MG/DL (ref 8.7–10.5)
CHLORIDE SERPL-SCNC: 105 MMOL/L (ref 95–110)
CHOLEST SERPL-MCNC: 142 MG/DL (ref 120–199)
CHOLEST/HDLC SERPL: 4.3 {RATIO} (ref 2–5)
CO2 SERPL-SCNC: 27 MMOL/L (ref 23–29)
CREAT SERPL-MCNC: 1.14 MG/DL (ref 0.5–1.4)
DIFFERENTIAL METHOD: ABNORMAL
EOSINOPHIL # BLD AUTO: 0.2 K/UL (ref 0–0.5)
EOSINOPHIL NFR BLD: 3.2 % (ref 0–8)
ERYTHROCYTE [DISTWIDTH] IN BLOOD BY AUTOMATED COUNT: 13.3 % (ref 11.5–14.5)
EST. GFR  (NO RACE VARIABLE): 47.8 ML/MIN/1.73 M^2
ESTIMATED AVG GLUCOSE: 128 MG/DL (ref 68–131)
GLUCOSE SERPL-MCNC: 135 MG/DL (ref 70–110)
HBA1C MFR BLD: 6.1 % (ref 4–5.6)
HCT VFR BLD AUTO: 34.6 % (ref 37–48.5)
HDLC SERPL-MCNC: 33 MG/DL (ref 40–75)
HDLC SERPL: 23.2 % (ref 20–50)
HGB BLD-MCNC: 11 G/DL (ref 12–16)
IMM GRANULOCYTES # BLD AUTO: 0.02 K/UL (ref 0–0.04)
IMM GRANULOCYTES NFR BLD AUTO: 0.3 % (ref 0–0.5)
LDLC SERPL CALC-MCNC: 81.4 MG/DL (ref 63–159)
LYMPHOCYTES # BLD AUTO: 2.3 K/UL (ref 1–4.8)
LYMPHOCYTES NFR BLD: 37.2 % (ref 18–48)
MCH RBC QN AUTO: 29.5 PG (ref 27–31)
MCHC RBC AUTO-ENTMCNC: 31.8 G/DL (ref 32–36)
MCV RBC AUTO: 93 FL (ref 82–98)
MONOCYTES # BLD AUTO: 0.6 K/UL (ref 0.3–1)
MONOCYTES NFR BLD: 8.7 % (ref 4–15)
NEUTROPHILS # BLD AUTO: 3.1 K/UL (ref 1.8–7.7)
NEUTROPHILS NFR BLD: 50 % (ref 38–73)
NONHDLC SERPL-MCNC: 109 MG/DL
NRBC BLD-RTO: 0 /100 WBC
PLATELET # BLD AUTO: 236 K/UL (ref 150–450)
PMV BLD AUTO: 10 FL (ref 9.2–12.9)
POTASSIUM SERPL-SCNC: 3.6 MMOL/L (ref 3.5–5.1)
PROT SERPL-MCNC: 8.1 G/DL (ref 6–8.4)
RBC # BLD AUTO: 3.73 M/UL (ref 4–5.4)
SODIUM SERPL-SCNC: 139 MMOL/L (ref 136–145)
T4 FREE SERPL-MCNC: 0.82 NG/DL (ref 0.71–1.51)
TRIGL SERPL-MCNC: 138 MG/DL (ref 30–150)
TSH SERPL DL<=0.005 MIU/L-ACNC: 4.18 UIU/ML (ref 0.4–4)
UUN UR-MCNC: 19 MG/DL (ref 7–17)
WBC # BLD AUTO: 6.29 K/UL (ref 3.9–12.7)

## 2023-03-23 PROCEDURE — 80053 COMPREHEN METABOLIC PANEL: CPT | Mod: PO | Performed by: FAMILY MEDICINE

## 2023-03-23 PROCEDURE — 84443 ASSAY THYROID STIM HORMONE: CPT | Mod: PO | Performed by: FAMILY MEDICINE

## 2023-03-23 PROCEDURE — 80061 LIPID PANEL: CPT | Performed by: FAMILY MEDICINE

## 2023-03-23 PROCEDURE — 85025 COMPLETE CBC W/AUTO DIFF WBC: CPT | Mod: PO | Performed by: FAMILY MEDICINE

## 2023-03-23 PROCEDURE — 83036 HEMOGLOBIN GLYCOSYLATED A1C: CPT | Performed by: FAMILY MEDICINE

## 2023-03-23 PROCEDURE — 36415 COLL VENOUS BLD VENIPUNCTURE: CPT | Mod: PO | Performed by: FAMILY MEDICINE

## 2023-03-23 PROCEDURE — 84439 ASSAY OF FREE THYROXINE: CPT | Performed by: FAMILY MEDICINE

## 2023-03-26 DIAGNOSIS — E11.9 TYPE 2 DIABETES MELLITUS WITHOUT COMPLICATION, WITHOUT LONG-TERM CURRENT USE OF INSULIN: ICD-10-CM

## 2023-03-26 DIAGNOSIS — E78.5 HYPERLIPIDEMIA, UNSPECIFIED HYPERLIPIDEMIA TYPE: ICD-10-CM

## 2023-03-26 DIAGNOSIS — E03.4 HYPOTHYROIDISM DUE TO ACQUIRED ATROPHY OF THYROID: Primary | ICD-10-CM

## 2023-03-29 ENCOUNTER — OFFICE VISIT (OUTPATIENT)
Dept: FAMILY MEDICINE | Facility: CLINIC | Age: 83
End: 2023-03-29
Payer: MEDICARE

## 2023-03-29 VITALS
TEMPERATURE: 98 F | DIASTOLIC BLOOD PRESSURE: 80 MMHG | HEIGHT: 64 IN | SYSTOLIC BLOOD PRESSURE: 130 MMHG | OXYGEN SATURATION: 96 % | BODY MASS INDEX: 35.83 KG/M2 | WEIGHT: 209.88 LBS | HEART RATE: 80 BPM

## 2023-03-29 DIAGNOSIS — F41.9 ANXIETY: ICD-10-CM

## 2023-03-29 DIAGNOSIS — E66.9 OBESITY (BMI 30.0-34.9): ICD-10-CM

## 2023-03-29 DIAGNOSIS — M19.90 ARTHRITIS: ICD-10-CM

## 2023-03-29 DIAGNOSIS — R60.1 GENERALIZED EDEMA: ICD-10-CM

## 2023-03-29 DIAGNOSIS — E78.5 HYPERLIPIDEMIA, UNSPECIFIED HYPERLIPIDEMIA TYPE: ICD-10-CM

## 2023-03-29 DIAGNOSIS — E11.9 TYPE 2 DIABETES MELLITUS WITHOUT COMPLICATION, WITHOUT LONG-TERM CURRENT USE OF INSULIN: ICD-10-CM

## 2023-03-29 DIAGNOSIS — I70.0 ATHEROSCLEROSIS OF AORTA: ICD-10-CM

## 2023-03-29 DIAGNOSIS — M85.80 OSTEOPENIA, UNSPECIFIED LOCATION: ICD-10-CM

## 2023-03-29 DIAGNOSIS — R60.0 BILATERAL LEG EDEMA: ICD-10-CM

## 2023-03-29 DIAGNOSIS — H54.7 REDUCED VISUAL ACUITY: ICD-10-CM

## 2023-03-29 DIAGNOSIS — I63.9 OCCIPITAL STROKE: Primary | ICD-10-CM

## 2023-03-29 DIAGNOSIS — E03.4 HYPOTHYROIDISM DUE TO ACQUIRED ATROPHY OF THYROID: ICD-10-CM

## 2023-03-29 PROCEDURE — 3288F PR FALLS RISK ASSESSMENT DOCUMENTED: ICD-10-PCS | Mod: CPTII,S$GLB,, | Performed by: FAMILY MEDICINE

## 2023-03-29 PROCEDURE — 99214 OFFICE O/P EST MOD 30 MIN: CPT | Mod: S$GLB,,, | Performed by: FAMILY MEDICINE

## 2023-03-29 PROCEDURE — 1126F PR PAIN SEVERITY QUANTIFIED, NO PAIN PRESENT: ICD-10-PCS | Mod: CPTII,S$GLB,, | Performed by: FAMILY MEDICINE

## 2023-03-29 PROCEDURE — 3079F PR MOST RECENT DIASTOLIC BLOOD PRESSURE 80-89 MM HG: ICD-10-PCS | Mod: CPTII,S$GLB,, | Performed by: FAMILY MEDICINE

## 2023-03-29 PROCEDURE — 1157F ADVNC CARE PLAN IN RCRD: CPT | Mod: CPTII,S$GLB,, | Performed by: FAMILY MEDICINE

## 2023-03-29 PROCEDURE — 1126F AMNT PAIN NOTED NONE PRSNT: CPT | Mod: CPTII,S$GLB,, | Performed by: FAMILY MEDICINE

## 2023-03-29 PROCEDURE — 99214 PR OFFICE/OUTPT VISIT, EST, LEVL IV, 30-39 MIN: ICD-10-PCS | Mod: S$GLB,,, | Performed by: FAMILY MEDICINE

## 2023-03-29 PROCEDURE — 1159F MED LIST DOCD IN RCRD: CPT | Mod: CPTII,S$GLB,, | Performed by: FAMILY MEDICINE

## 2023-03-29 PROCEDURE — 1160F RVW MEDS BY RX/DR IN RCRD: CPT | Mod: CPTII,S$GLB,, | Performed by: FAMILY MEDICINE

## 2023-03-29 PROCEDURE — 1101F PT FALLS ASSESS-DOCD LE1/YR: CPT | Mod: CPTII,S$GLB,, | Performed by: FAMILY MEDICINE

## 2023-03-29 PROCEDURE — 3075F SYST BP GE 130 - 139MM HG: CPT | Mod: CPTII,S$GLB,, | Performed by: FAMILY MEDICINE

## 2023-03-29 PROCEDURE — 3079F DIAST BP 80-89 MM HG: CPT | Mod: CPTII,S$GLB,, | Performed by: FAMILY MEDICINE

## 2023-03-29 PROCEDURE — 1160F PR REVIEW ALL MEDS BY PRESCRIBER/CLIN PHARMACIST DOCUMENTED: ICD-10-PCS | Mod: CPTII,S$GLB,, | Performed by: FAMILY MEDICINE

## 2023-03-29 PROCEDURE — 3075F PR MOST RECENT SYSTOLIC BLOOD PRESS GE 130-139MM HG: ICD-10-PCS | Mod: CPTII,S$GLB,, | Performed by: FAMILY MEDICINE

## 2023-03-29 PROCEDURE — 1159F PR MEDICATION LIST DOCUMENTED IN MEDICAL RECORD: ICD-10-PCS | Mod: CPTII,S$GLB,, | Performed by: FAMILY MEDICINE

## 2023-03-29 PROCEDURE — 3288F FALL RISK ASSESSMENT DOCD: CPT | Mod: CPTII,S$GLB,, | Performed by: FAMILY MEDICINE

## 2023-03-29 PROCEDURE — 1101F PR PT FALLS ASSESS DOC 0-1 FALLS W/OUT INJ PAST YR: ICD-10-PCS | Mod: CPTII,S$GLB,, | Performed by: FAMILY MEDICINE

## 2023-03-29 PROCEDURE — 1157F PR ADVANCE CARE PLAN OR EQUIV PRESENT IN MEDICAL RECORD: ICD-10-PCS | Mod: CPTII,S$GLB,, | Performed by: FAMILY MEDICINE

## 2023-03-29 RX ORDER — KETOROLAC TROMETHAMINE 5 MG/ML
1 SOLUTION OPHTHALMIC 4 TIMES DAILY
COMMUNITY
Start: 2023-03-21 | End: 2023-07-28

## 2023-03-29 RX ORDER — LEVOTHYROXINE SODIUM 25 UG/1
25 TABLET ORAL
Qty: 90 TABLET | Refills: 3 | Status: SHIPPED | OUTPATIENT
Start: 2023-03-29 | End: 2023-08-03 | Stop reason: SDUPTHER

## 2023-03-29 RX ORDER — PREDNISOLONE ACETATE 10 MG/ML
SUSPENSION/ DROPS OPHTHALMIC
COMMUNITY
Start: 2023-03-21 | End: 2023-07-28

## 2023-03-29 RX ORDER — OFLOXACIN 3 MG/ML
1 SOLUTION/ DROPS OPHTHALMIC 4 TIMES DAILY
COMMUNITY
Start: 2023-03-21 | End: 2023-07-28

## 2023-03-29 RX ORDER — ISOSORBIDE MONONITRATE 60 MG/1
TABLET, EXTENDED RELEASE ORAL
COMMUNITY
Start: 2023-03-21 | End: 2023-08-23

## 2023-03-29 NOTE — PROGRESS NOTES
"Subjective:      Patient ID: Katy Soto is a 82 y.o. female.    Chief Complaint: Follow-up and Dizziness      Vitals:    03/29/23 0810   BP: 130/80   Pulse: 80   Temp: 97.7 °F (36.5 °C)   TempSrc: Oral   SpO2: 96%   Weight: 95.2 kg (209 lb 14.1 oz)   Height: 5' 4" (1.626 m)        HPI   Dm AND dizzy; hgad labs; pt is deaf, with hearing aid; here with niece; cna't afford onglyza; has januvia but not taking it; has card appt coming up  Eats salt; takes lasix 3 times a day 3 times a week, rest of days bid  TSH a little better, keep up same 25 mcg and repeat 3 mnths  A1C great at 6.1 on meds for DM, off onglyza for one month at time of stick  Labs all good  Card appt in May  ? If HS doxazosin causing post hypotension  Bp good now  Unable to get a detailed hx from her  Niece says she has some dementia going on; she repeats her self  Last years echo n ormal  Ekg jarret;  Small lacunar infarcts  Has vasc dis on CTA last year  On plavix and statin    Problem List  Patient Active Problem List   Diagnosis    Hyperlipidemia    Arthritis    PAD (peripheral artery disease)    Bilateral leg edema    Anxiety    Obesity (BMI 30.0-34.9)    Osteopenia    Atherosclerosis of aorta    Elevated sed rate    Vitamin D insufficiency    Type 2 diabetes mellitus without complication, without long-term current use of insulin    Severe obesity (BMI 35.0-39.9) with comorbidity    Fracture of orbital floor, left side, initial encounter for closed fracture    Reduced visual acuity    TIA (transient ischemic attack)    right Occipital stroke    Cerebral infarction, left hemisphere    Generalized edema        ALLERGIES:   Review of patient's allergies indicates:   Allergen Reactions    Cilostazol      ooifw3c and stomach cramps    Clonidine     Coreg [carvedilol]      Cramps stomach and nausea    Diltiazem      nauseana d stoamch cramps    Lexapro [escitalopram oxalate]      Dizzy, nausea and weak    Shellfish containing products Hives "       MEDS:   Current Outpatient Medications:     aspirin (ECOTRIN) 81 MG EC tablet, Take 81 mg by mouth once daily., Disp: , Rfl:     atorvastatin (LIPITOR) 40 MG tablet, TAKE 1 TABLET(40 MG) BY MOUTH EVERY DAY, Disp: 90 tablet, Rfl: 3    blood sugar diagnostic Strp, by Misc.(Non-Drug; Combo Route) route., Disp: , Rfl:     blood-glucose meter kit, Use daily, Disp: 1 each, Rfl: 0    cholecalciferol, vitamin D3, (VITAMIN D3) 125 mcg (5,000 unit) Tab, Take 1 tablet (5,000 Units total) by mouth once daily., Disp: 90 tablet, Rfl: 3    clopidogreL (PLAVIX) 75 mg tablet, Take 1 tablet (75 mg total) by mouth once daily. for 21 days, Disp: 90 tablet, Rfl: 3    folic acid/multivit-min/lutein (CENTRUM SILVER ORAL), Take by mouth., Disp: , Rfl:     furosemide (LASIX) 40 MG tablet, Take 2 tablets (80 mg total) by mouth 2 (two) times a day. For one week, then 1 bid, Disp: 360 tablet, Rfl: 3    irbesartan (AVAPRO) 300 MG tablet, TAKE 1 TABLET(300 MG) BY MOUTH EVERY DAY, Disp: 90 tablet, Rfl: 3    isosorbide mononitrate (IMDUR) 60 MG 24 hr tablet, tablet, Disp: , Rfl:     ketorolac 0.5% (ACULAR) 0.5 % Drop, Place 1 drop into the left eye 4 (four) times daily., Disp: , Rfl:     lancets 31 gauge Misc, by Misc.(Non-Drug; Combo Route) route., Disp: , Rfl:     levothyroxine (SYNTHROID) 25 MCG tablet, Take 1 tablet (25 mcg total) by mouth before breakfast., Disp: 90 tablet, Rfl: 3    memantine (NAMENDA) 5 MG Tab, Take 1 tablet (5 mg total) by mouth 2 (two) times daily. For memory, Disp: 180 tablet, Rfl: 3    ofloxacin (OCUFLOX) 0.3 % ophthalmic solution, Place 1 drop into the left eye 4 (four) times daily., Disp: , Rfl:     potassium chloride SA (K-DUR,KLOR-CON) 20 MEQ tablet, TAKE 1 TABLET(20 MEQ) BY MOUTH EVERY DAY, Disp: 90 tablet, Rfl: 3    prednisoLONE acetate (PRED FORTE) 1 % DrpS, SHAKE LIQUID AND INSTILL 1 DROP IN LEFT EYE FOUR TIMES DAILY, Disp: , Rfl:     sucralfate (CARAFATE) 1 gram tablet, TAKE 1 TABLET(1 GRAM) BY MOUTH  THREE TIMES DAILY, Disp: 270 tablet, Rfl: 3    terazosin (HYTRIN) 10 MG capsule, Take 1 capsule (10 mg total) by mouth every evening., Disp: 30 capsule, Rfl: 11    SAXagliptin (ONGLYZA) 5 mg Tab tablet, Take 1 tablet (5 mg total) by mouth once daily. (Patient not taking: Reported on 3/29/2023), Disp: 90 tablet, Rfl: 3      History:  Current Providers as of 3/29/2023  PCP: Rudy Cruz MD  Care Team Provider: New Mehta MD  Care Team Provider: David Fried MD  Care Team Provider: Pinky Grajeda RD  Care Team Provider: Stevo Byers,   Encounter Provider: Rudy Cruz MD, starting on Wed Mar 29, 2023 12:00 AM  Referring Provider: not found, starting on Wed Mar 29, 2023 12:00 AM  Consulting Physician: Rudy Cruz MD, starting on Wed Mar 29, 2023  8:10 AM (Active)   Past Medical History:   Diagnosis Date    Hyperlipidemia     Hypertension     Type 2 diabetes mellitus without complication, without long-term current use of insulin 3/17/2022     Past Surgical History:   Procedure Laterality Date     SECTION      x1    HERNIA REPAIR      umbilical    LEG SURGERY Right 2017    stents placed     Social History     Tobacco Use    Smoking status: Never     Passive exposure: Never    Smokeless tobacco: Never   Substance Use Topics    Alcohol use: No    Drug use: No         Review of Systems   Constitutional: Negative.    HENT:  Positive for hearing loss.    Respiratory:  Positive for shortness of breath.    Cardiovascular: Negative.    Gastrointestinal: Negative.    Endocrine: Negative.    Genitourinary: Negative.    Musculoskeletal:  Positive for gait problem.   Neurological:  Positive for dizziness. Negative for syncope.   Psychiatric/Behavioral: Negative.     All other systems reviewed and are negative.  Objective:     Physical Exam  Vitals and nursing note reviewed.   Constitutional:       Appearance: She is well-developed. She is obese.   HENT:      Head: Normocephalic.      Right Ear:  Tympanic membrane, ear canal and external ear normal. There is no impacted cerumen.      Left Ear: Tympanic membrane, ear canal and external ear normal. There is no impacted cerumen.   Eyes:      Conjunctiva/sclera: Conjunctivae normal.      Pupils: Pupils are equal, round, and reactive to light.   Cardiovascular:      Rate and Rhythm: Normal rate and regular rhythm.      Heart sounds: Normal heart sounds.   Pulmonary:      Effort: Pulmonary effort is normal.      Breath sounds: Normal breath sounds.   Musculoskeletal:         General: Normal range of motion.      Cervical back: Normal range of motion and neck supple.   Skin:     General: Skin is warm and dry.   Neurological:      Mental Status: She is alert and oriented to person, place, and time.      Deep Tendon Reflexes: Reflexes are normal and symmetric.   Psychiatric:         Behavior: Behavior normal.         Thought Content: Thought content normal.         Judgment: Judgment normal.           Assessment:     No diagnosis found.  Plan:        Medication List            Accurate as of March 29, 2023  8:28 AM. If you have any questions, ask your nurse or doctor.                CONTINUE taking these medications      aspirin 81 MG EC tablet  Commonly known as: ECOTRIN     atorvastatin 40 MG tablet  Commonly known as: LIPITOR  TAKE 1 TABLET(40 MG) BY MOUTH EVERY DAY     blood sugar diagnostic Strp     blood-glucose meter kit  Use daily     CENTRUM SILVER ORAL     cholecalciferol (vitamin D3) 125 mcg (5,000 unit) Tab  Commonly known as: VITAMIN D3  Take 1 tablet (5,000 Units total) by mouth once daily.     clopidogreL 75 mg tablet  Commonly known as: PLAVIX  Take 1 tablet (75 mg total) by mouth once daily. for 21 days     furosemide 40 MG tablet  Commonly known as: LASIX  Take 2 tablets (80 mg total) by mouth 2 (two) times a day. For one week, then 1 bid     irbesartan 300 MG tablet  Commonly known as: AVAPRO  TAKE 1 TABLET(300 MG) BY MOUTH EVERY DAY     isosorbide  mononitrate 60 MG 24 hr tablet  Commonly known as: IMDUR     ketorolac 0.5% 0.5 % Drop  Commonly known as: ACULAR     lancets 31 gauge Misc     levothyroxine 25 MCG tablet  Commonly known as: SYNTHROID  Take 1 tablet (25 mcg total) by mouth before breakfast.     memantine 5 MG Tab  Commonly known as: NAMENDA  Take 1 tablet (5 mg total) by mouth 2 (two) times daily. For memory     ofloxacin 0.3 % ophthalmic solution  Commonly known as: OCUFLOX     potassium chloride SA 20 MEQ tablet  Commonly known as: K-DUR,KLOR-CON  TAKE 1 TABLET(20 MEQ) BY MOUTH EVERY DAY     prednisoLONE acetate 1 % Drps  Commonly known as: PRED FORTE     SAXagliptin 5 mg Tab tablet  Commonly known as: ONGLYZA  Take 1 tablet (5 mg total) by mouth once daily.     sucralfate 1 gram tablet  Commonly known as: CARAFATE  TAKE 1 TABLET(1 GRAM) BY MOUTH THREE TIMES DAILY     terazosin 10 MG capsule  Commonly known as: HYTRIN  Take 1 capsule (10 mg total) by mouth every evening.            There are no diagnoses linked to this encounter.

## 2023-04-05 DIAGNOSIS — E78.5 HYPERLIPIDEMIA, UNSPECIFIED HYPERLIPIDEMIA TYPE: ICD-10-CM

## 2023-04-05 DIAGNOSIS — R07.9 CHEST PAIN, UNSPECIFIED TYPE: ICD-10-CM

## 2023-04-05 DIAGNOSIS — G45.9 TIA (TRANSIENT ISCHEMIC ATTACK): Primary | ICD-10-CM

## 2023-04-20 PROBLEM — E11.22 TYPE 2 DIABETES MELLITUS WITH STAGE 3A CHRONIC KIDNEY DISEASE AND HYPERTENSION: Status: ACTIVE | Noted: 2023-04-20

## 2023-04-20 PROBLEM — I12.9 TYPE 2 DIABETES MELLITUS WITH STAGE 3A CHRONIC KIDNEY DISEASE AND HYPERTENSION: Status: ACTIVE | Noted: 2023-04-20

## 2023-04-20 PROBLEM — E11.59 OBESITY, DIABETES, AND HYPERTENSION SYNDROME: Status: ACTIVE | Noted: 2018-12-14

## 2023-04-20 PROBLEM — I63.9 OCCIPITAL STROKE: Status: RESOLVED | Noted: 2022-06-19 | Resolved: 2023-04-20

## 2023-04-20 PROBLEM — E78.5 TYPE 2 DIABETES MELLITUS WITH HYPERLIPIDEMIA: Status: ACTIVE | Noted: 2023-04-20

## 2023-04-20 PROBLEM — G45.9 TIA (TRANSIENT ISCHEMIC ATTACK): Status: RESOLVED | Noted: 2022-06-18 | Resolved: 2023-04-20

## 2023-04-20 PROBLEM — I15.2 OBESITY, DIABETES, AND HYPERTENSION SYNDROME: Status: ACTIVE | Noted: 2018-12-14

## 2023-04-20 PROBLEM — E11.9 TYPE 2 DIABETES MELLITUS WITHOUT COMPLICATION, WITHOUT LONG-TERM CURRENT USE OF INSULIN: Status: RESOLVED | Noted: 2022-03-17 | Resolved: 2023-04-20

## 2023-04-20 PROBLEM — Z86.73 HISTORY OF CEREBRAL INFARCTION: Status: ACTIVE | Noted: 2022-06-19

## 2023-04-20 PROBLEM — E11.69 TYPE 2 DIABETES MELLITUS WITH HYPERLIPIDEMIA: Status: ACTIVE | Noted: 2023-04-20

## 2023-04-20 PROBLEM — E11.69 OBESITY, DIABETES, AND HYPERTENSION SYNDROME: Status: ACTIVE | Noted: 2018-12-14

## 2023-04-20 PROBLEM — N18.31 TYPE 2 DIABETES MELLITUS WITH STAGE 3A CHRONIC KIDNEY DISEASE AND HYPERTENSION: Status: ACTIVE | Noted: 2023-04-20

## 2023-04-21 ENCOUNTER — TELEPHONE (OUTPATIENT)
Dept: FAMILY MEDICINE | Facility: CLINIC | Age: 83
End: 2023-04-21
Payer: MEDICARE

## 2023-05-09 ENCOUNTER — HOSPITAL ENCOUNTER (OUTPATIENT)
Dept: CARDIOLOGY | Facility: HOSPITAL | Age: 83
Discharge: HOME | End: 2023-05-09
Attending: INTERNAL MEDICINE
Payer: MEDICARE

## 2023-05-09 VITALS — WEIGHT: 209 LBS | BODY MASS INDEX: 35.68 KG/M2 | HEIGHT: 64 IN

## 2023-05-09 DIAGNOSIS — E03.4 HYPOTHYROIDISM DUE TO ACQUIRED ATROPHY OF THYROID: ICD-10-CM

## 2023-05-09 DIAGNOSIS — G45.9 TIA (TRANSIENT ISCHEMIC ATTACK): ICD-10-CM

## 2023-05-09 DIAGNOSIS — I10 ESSENTIAL HYPERTENSION: ICD-10-CM

## 2023-05-09 DIAGNOSIS — E11.9 TYPE 2 DIABETES MELLITUS WITHOUT COMPLICATION, WITHOUT LONG-TERM CURRENT USE OF INSULIN: ICD-10-CM

## 2023-05-09 DIAGNOSIS — H91.90 HEARING LOSS, UNSPECIFIED HEARING LOSS TYPE, UNSPECIFIED LATERALITY: ICD-10-CM

## 2023-05-09 DIAGNOSIS — R07.9 CHEST PAIN, UNSPECIFIED TYPE: ICD-10-CM

## 2023-05-09 DIAGNOSIS — Z78.0 ASYMPTOMATIC POSTMENOPAUSAL STATUS: ICD-10-CM

## 2023-05-09 DIAGNOSIS — E78.5 HYPERLIPIDEMIA, UNSPECIFIED HYPERLIPIDEMIA TYPE: ICD-10-CM

## 2023-05-09 LAB
AV INDEX (PROSTH): 0.62
AV MEAN GRADIENT: 10 MMHG
AV PEAK GRADIENT: 16 MMHG
AV VALVE AREA: 2.3 CM2
AV VELOCITY RATIO: 0.59
BSA FOR ECHO PROCEDURE: 2.07 M2
CV ECHO LV RWT: 0.42 CM
DOP CALC AO PEAK VEL: 1.99 M/S
DOP CALC AO VTI: 36.1 CM
DOP CALC LVOT AREA: 3.7 CM2
DOP CALC LVOT DIAMETER: 2.18 CM
DOP CALC LVOT PEAK VEL: 1.17 M/S
DOP CALC LVOT STROKE VOLUME: 83.19 CM3
DOP CALC MV VTI: 28.3 CM
DOP CALCLVOT PEAK VEL VTI: 22.3 CM
E WAVE DECELERATION TIME: 312.14 MSEC
E/A RATIO: 0.55
E/E' RATIO: 15.78 M/S
ECHO LV POSTERIOR WALL: 0.77 CM (ref 0.6–1.1)
EJECTION FRACTION: 75 %
FRACTIONAL SHORTENING: 42 % (ref 28–44)
INTERVENTRICULAR SEPTUM: 1.24 CM (ref 0.6–1.1)
IVC DIAMETER: 1.5 CM
IVRT: 87.66 MSEC
LA MAJOR: 4.4 CM
LA MINOR: 4.92 CM
LA WIDTH: 4.4 CM
LEFT ATRIUM SIZE: 3.74 CM
LEFT ATRIUM VOLUME INDEX: 32.7 ML/M2
LEFT ATRIUM VOLUME: 64.98 CM3
LEFT INTERNAL DIMENSION IN SYSTOLE: 2.11 CM (ref 2.1–4)
LEFT VENTRICLE DIASTOLIC VOLUME INDEX: 27.88 ML/M2
LEFT VENTRICLE DIASTOLIC VOLUME: 55.49 ML
LEFT VENTRICLE MASS INDEX: 55 G/M2
LEFT VENTRICLE SYSTOLIC VOLUME INDEX: 7.3 ML/M2
LEFT VENTRICLE SYSTOLIC VOLUME: 14.6 ML
LEFT VENTRICULAR INTERNAL DIMENSION IN DIASTOLE: 3.63 CM (ref 3.5–6)
LEFT VENTRICULAR MASS: 110.07 G
LV LATERAL E/E' RATIO: 14.2 M/S
LV SEPTAL E/E' RATIO: 17.75 M/S
LVOT MG: 3.8 MMHG
LVOT MV: 0.95 CM/S
MV MEAN GRADIENT: 3 MMHG
MV PEAK A VEL: 1.3 M/S
MV PEAK E VEL: 0.71 M/S
MV PEAK GRADIENT: 8 MMHG
MV STENOSIS PRESSURE HALF TIME: 90.52 MS
MV VALVE AREA BY CONTINUITY EQUATION: 2.94 CM2
MV VALVE AREA P 1/2 METHOD: 2.43 CM2
OHS LV EJECTION FRACTION SIMPSONS BIPLANE MOD: 7 %
PISA TR MAX VEL: 2.02 M/S
PULM VEIN S/D RATIO: 2.71
PV PEAK D VEL: 0.24 M/S
PV PEAK S VEL: 0.65 M/S
PV PEAK VELOCITY: 1.03 CM/S
RA MAJOR: 3.71 CM
RA PRESSURE: 3 MMHG
RA WIDTH: 3.1 CM
TDI LATERAL: 0.05 M/S
TDI SEPTAL: 0.04 M/S
TDI: 0.05 M/S
TR MAX PG: 16 MMHG
TV REST PULMONARY ARTERY PRESSURE: 19 MMHG

## 2023-05-09 PROCEDURE — 93306 TTE W/DOPPLER COMPLETE: CPT | Mod: 26,,, | Performed by: INTERNAL MEDICINE

## 2023-05-09 PROCEDURE — 93306 ECHO (CUPID ONLY): ICD-10-PCS | Mod: 26,,, | Performed by: INTERNAL MEDICINE

## 2023-05-09 PROCEDURE — 93306 TTE W/DOPPLER COMPLETE: CPT | Mod: PO

## 2023-05-09 RX ORDER — IRBESARTAN 300 MG/1
TABLET ORAL
Qty: 90 TABLET | Refills: 3 | Status: SHIPPED | OUTPATIENT
Start: 2023-05-09

## 2023-05-09 NOTE — TELEPHONE ENCOUNTER
Refill Decision Note   Katy Brittany  is requesting a refill authorization.  Brief Assessment and Rationale for Refill:  Approve     Medication Therapy Plan:       Medication Reconciliation Completed: No   Comments:     No Care Gaps recommended.     Note composed:6:23 PM 05/09/2023

## 2023-05-09 NOTE — TELEPHONE ENCOUNTER
No care due was identified.  [facility] Embedded Care Due Messages. Reference number: [id number].   5/09/2023 11:15:36 AM CDT

## 2023-05-24 RX ORDER — TERAZOSIN 10 MG/1
CAPSULE ORAL
Qty: 30 CAPSULE | Refills: 11 | Status: SHIPPED | OUTPATIENT
Start: 2023-05-24 | End: 2023-07-13

## 2023-05-24 RX ORDER — MEMANTINE HYDROCHLORIDE 5 MG/1
TABLET ORAL
Qty: 180 TABLET | Refills: 3 | Status: SHIPPED | OUTPATIENT
Start: 2023-05-24

## 2023-06-19 ENCOUNTER — PATIENT OUTREACH (OUTPATIENT)
Dept: ADMINISTRATIVE | Facility: HOSPITAL | Age: 83
End: 2023-06-19
Payer: MEDICARE

## 2023-06-29 ENCOUNTER — LAB VISIT (OUTPATIENT)
Dept: LAB | Facility: HOSPITAL | Age: 83
End: 2023-06-29
Attending: FAMILY MEDICINE
Payer: MEDICARE

## 2023-06-29 ENCOUNTER — OFFICE VISIT (OUTPATIENT)
Dept: FAMILY MEDICINE | Facility: CLINIC | Age: 83
End: 2023-06-29
Payer: MEDICARE

## 2023-06-29 VITALS
HEART RATE: 86 BPM | OXYGEN SATURATION: 97 % | TEMPERATURE: 98 F | BODY MASS INDEX: 35.29 KG/M2 | SYSTOLIC BLOOD PRESSURE: 152 MMHG | DIASTOLIC BLOOD PRESSURE: 90 MMHG | WEIGHT: 206.69 LBS | HEIGHT: 64 IN

## 2023-06-29 DIAGNOSIS — E78.5 TYPE 2 DIABETES MELLITUS WITH HYPERLIPIDEMIA: ICD-10-CM

## 2023-06-29 DIAGNOSIS — R60.1 GENERALIZED EDEMA: ICD-10-CM

## 2023-06-29 DIAGNOSIS — N18.31 TYPE 2 DIABETES MELLITUS WITH STAGE 3A CHRONIC KIDNEY DISEASE AND HYPERTENSION: ICD-10-CM

## 2023-06-29 DIAGNOSIS — E66.9 OBESITY, DIABETES, AND HYPERTENSION SYNDROME: Primary | ICD-10-CM

## 2023-06-29 DIAGNOSIS — H54.7 REDUCED VISUAL ACUITY: ICD-10-CM

## 2023-06-29 DIAGNOSIS — E78.5 HYPERLIPIDEMIA, UNSPECIFIED HYPERLIPIDEMIA TYPE: ICD-10-CM

## 2023-06-29 DIAGNOSIS — E11.59 OBESITY, DIABETES, AND HYPERTENSION SYNDROME: Primary | ICD-10-CM

## 2023-06-29 DIAGNOSIS — E11.42 DIABETIC POLYNEUROPATHY ASSOCIATED WITH TYPE 2 DIABETES MELLITUS: ICD-10-CM

## 2023-06-29 DIAGNOSIS — I12.9 TYPE 2 DIABETES MELLITUS WITH STAGE 3A CHRONIC KIDNEY DISEASE AND HYPERTENSION: ICD-10-CM

## 2023-06-29 DIAGNOSIS — R60.0 BILATERAL LEG EDEMA: ICD-10-CM

## 2023-06-29 DIAGNOSIS — Z86.73 HISTORY OF CEREBRAL INFARCTION: ICD-10-CM

## 2023-06-29 DIAGNOSIS — G60.3 IDIOPATHIC PROGRESSIVE NEUROPATHY: ICD-10-CM

## 2023-06-29 DIAGNOSIS — E11.69 OBESITY, DIABETES, AND HYPERTENSION SYNDROME: Primary | ICD-10-CM

## 2023-06-29 DIAGNOSIS — E11.22 TYPE 2 DIABETES MELLITUS WITH STAGE 3A CHRONIC KIDNEY DISEASE AND HYPERTENSION: ICD-10-CM

## 2023-06-29 DIAGNOSIS — I73.9 PAD (PERIPHERAL ARTERY DISEASE): ICD-10-CM

## 2023-06-29 DIAGNOSIS — E11.69 TYPE 2 DIABETES MELLITUS WITH HYPERLIPIDEMIA: ICD-10-CM

## 2023-06-29 DIAGNOSIS — I15.2 OBESITY, DIABETES, AND HYPERTENSION SYNDROME: Primary | ICD-10-CM

## 2023-06-29 DIAGNOSIS — E66.01 SEVERE OBESITY (BMI 35.0-39.9) WITH COMORBIDITY: ICD-10-CM

## 2023-06-29 PROBLEM — S02.32XA FRACTURE OF ORBITAL FLOOR, LEFT SIDE, INITIAL ENCOUNTER FOR CLOSED FRACTURE: Status: RESOLVED | Noted: 2022-03-17 | Resolved: 2023-06-29

## 2023-06-29 LAB
ESTIMATED AVG GLUCOSE: 143 MG/DL (ref 68–131)
FERRITIN SERPL-MCNC: 92 NG/ML (ref 20–300)
HBA1C MFR BLD: 6.6 % (ref 4–5.6)
IRON SERPL-MCNC: 59 UG/DL (ref 30–160)
RETICS/RBC NFR AUTO: 2.9 % (ref 0.5–2.5)
VIT B12 SERPL-MCNC: 588 PG/ML (ref 210–950)

## 2023-06-29 PROCEDURE — 85045 AUTOMATED RETICULOCYTE COUNT: CPT | Mod: PO | Performed by: FAMILY MEDICINE

## 2023-06-29 PROCEDURE — 1159F PR MEDICATION LIST DOCUMENTED IN MEDICAL RECORD: ICD-10-PCS | Mod: CPTII,S$GLB,, | Performed by: FAMILY MEDICINE

## 2023-06-29 PROCEDURE — 83540 ASSAY OF IRON: CPT | Mod: PO | Performed by: FAMILY MEDICINE

## 2023-06-29 PROCEDURE — 83036 HEMOGLOBIN GLYCOSYLATED A1C: CPT | Performed by: FAMILY MEDICINE

## 2023-06-29 PROCEDURE — 36415 COLL VENOUS BLD VENIPUNCTURE: CPT | Mod: PO | Performed by: FAMILY MEDICINE

## 2023-06-29 PROCEDURE — 1101F PT FALLS ASSESS-DOCD LE1/YR: CPT | Mod: CPTII,S$GLB,, | Performed by: FAMILY MEDICINE

## 2023-06-29 PROCEDURE — 3288F PR FALLS RISK ASSESSMENT DOCUMENTED: ICD-10-PCS | Mod: CPTII,S$GLB,, | Performed by: FAMILY MEDICINE

## 2023-06-29 PROCEDURE — 1159F MED LIST DOCD IN RCRD: CPT | Mod: CPTII,S$GLB,, | Performed by: FAMILY MEDICINE

## 2023-06-29 PROCEDURE — 1160F RVW MEDS BY RX/DR IN RCRD: CPT | Mod: CPTII,S$GLB,, | Performed by: FAMILY MEDICINE

## 2023-06-29 PROCEDURE — 1157F ADVNC CARE PLAN IN RCRD: CPT | Mod: CPTII,S$GLB,, | Performed by: FAMILY MEDICINE

## 2023-06-29 PROCEDURE — 3080F PR MOST RECENT DIASTOLIC BLOOD PRESSURE >= 90 MM HG: ICD-10-PCS | Mod: CPTII,S$GLB,, | Performed by: FAMILY MEDICINE

## 2023-06-29 PROCEDURE — 3288F FALL RISK ASSESSMENT DOCD: CPT | Mod: CPTII,S$GLB,, | Performed by: FAMILY MEDICINE

## 2023-06-29 PROCEDURE — 99214 PR OFFICE/OUTPT VISIT, EST, LEVL IV, 30-39 MIN: ICD-10-PCS | Mod: S$GLB,,, | Performed by: FAMILY MEDICINE

## 2023-06-29 PROCEDURE — 3077F SYST BP >= 140 MM HG: CPT | Mod: CPTII,S$GLB,, | Performed by: FAMILY MEDICINE

## 2023-06-29 PROCEDURE — 1160F PR REVIEW ALL MEDS BY PRESCRIBER/CLIN PHARMACIST DOCUMENTED: ICD-10-PCS | Mod: CPTII,S$GLB,, | Performed by: FAMILY MEDICINE

## 2023-06-29 PROCEDURE — 1126F AMNT PAIN NOTED NONE PRSNT: CPT | Mod: CPTII,S$GLB,, | Performed by: FAMILY MEDICINE

## 2023-06-29 PROCEDURE — 1126F PR PAIN SEVERITY QUANTIFIED, NO PAIN PRESENT: ICD-10-PCS | Mod: CPTII,S$GLB,, | Performed by: FAMILY MEDICINE

## 2023-06-29 PROCEDURE — 1101F PR PT FALLS ASSESS DOC 0-1 FALLS W/OUT INJ PAST YR: ICD-10-PCS | Mod: CPTII,S$GLB,, | Performed by: FAMILY MEDICINE

## 2023-06-29 PROCEDURE — 99214 OFFICE O/P EST MOD 30 MIN: CPT | Mod: S$GLB,,, | Performed by: FAMILY MEDICINE

## 2023-06-29 PROCEDURE — 82607 VITAMIN B-12: CPT | Mod: PO | Performed by: FAMILY MEDICINE

## 2023-06-29 PROCEDURE — 3077F PR MOST RECENT SYSTOLIC BLOOD PRESSURE >= 140 MM HG: ICD-10-PCS | Mod: CPTII,S$GLB,, | Performed by: FAMILY MEDICINE

## 2023-06-29 PROCEDURE — 3080F DIAST BP >= 90 MM HG: CPT | Mod: CPTII,S$GLB,, | Performed by: FAMILY MEDICINE

## 2023-06-29 PROCEDURE — 1157F PR ADVANCE CARE PLAN OR EQUIV PRESENT IN MEDICAL RECORD: ICD-10-PCS | Mod: CPTII,S$GLB,, | Performed by: FAMILY MEDICINE

## 2023-06-29 PROCEDURE — 82728 ASSAY OF FERRITIN: CPT | Performed by: FAMILY MEDICINE

## 2023-06-29 RX ORDER — SPIRONOLACTONE 50 MG/1
50 TABLET, FILM COATED ORAL DAILY
Qty: 90 TABLET | Refills: 3 | Status: SHIPPED | OUTPATIENT
Start: 2023-06-29 | End: 2023-07-28

## 2023-06-29 RX ORDER — CHOLECALCIFEROL (VITAMIN D3) 25 MCG
1 TABLET,CHEWABLE ORAL DAILY
Qty: 100 LOZENGE | Refills: 3 | Status: SHIPPED | OUTPATIENT
Start: 2023-06-29

## 2023-06-29 NOTE — PROGRESS NOTES
"Subjective:      Patient ID: Katy Soto is a 83 y.o. female.    Chief Complaint: Follow-up (3 mon)      Vitals:    06/29/23 0813   BP: (!) 154/90   Pulse: 86   Temp: 98 °F (36.7 °C)   TempSrc: Oral   SpO2: 97%   Weight: 93.8 kg (206 lb 10.9 oz)   Height: 5' 4" (1.626 m)        HPI   Follow up; here with niece; she can hear with hearing HA  C/o water blisters on her lower legs that drained, she picks  Not losing weight  C/o painfula numbness of hands and feet  Dizzy in AM  BP too high according to geri  Some edema of legs  Goes to [facility] for shots for diabetic retin; pt doesn't drive      Problem List  Patient Active Problem List   Diagnosis    Hyperlipidemia    Arthritis    PAD (peripheral artery disease)    Bilateral leg edema    Anxiety    Obesity, diabetes, and hypertension syndrome    Osteopenia    Atherosclerosis of aorta    Elevated sed rate    Vitamin D insufficiency    Severe obesity (BMI 35.0-39.9) with comorbidity    Fracture of orbital floor, left side, initial encounter for closed fracture    Reduced visual acuity    History of cerebral infarction    Generalized edema    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    Type 2 diabetes mellitus with hyperlipidemia        ALLERGIES:   Review of patient's allergies indicates:   Allergen Reactions    Cilostazol      [id number] and stomach cramps    Clonidine     Coreg [carvedilol]      Cramps stomach and nausea    Diltiazem      nauseana d stoamch cramps    Lexapro [escitalopram oxalate]      Dizzy, nausea and weak    Shellfish containing products Hives       MEDS:   Current Outpatient Medications:     aspirin (ECOTRIN) 81 MG EC tablet, Take 81 mg by mouth once daily., Disp: , Rfl:     atorvastatin (LIPITOR) 40 MG tablet, TAKE 1 TABLET(40 MG) BY MOUTH EVERY DAY, Disp: 90 tablet, Rfl: 3    blood sugar diagnostic Strp, by Misc.(Non-Drug; Combo Route) route., Disp: , Rfl:     blood-glucose meter kit, Use daily, Disp: 1 each, " Rfl: 0    cholecalciferol, vitamin D3, (VITAMIN D3) 125 mcg (5,000 unit) Tab, Take 1 tablet (5,000 Units total) by mouth once daily., Disp: 90 tablet, Rfl: 3    clopidogreL (PLAVIX) 75 mg tablet, Take 1 tablet (75 mg total) by mouth once daily. for 21 days, Disp: 90 tablet, Rfl: 3    donepeziL (ARICEPT) 5 MG tablet, tablet, Disp: , Rfl:     folic acid/multivit-min/lutein (CENTRUM SILVER ORAL), Take by mouth., Disp: , Rfl:     furosemide (LASIX) 40 MG tablet, Take 2 tablets (80 mg total) by mouth 2 (two) times a day. For one week, then 1 bid, Disp: 360 tablet, Rfl: 3    irbesartan (AVAPRO) 300 MG tablet, TAKE 1 TABLET(300 MG) BY MOUTH EVERY DAY, Disp: 90 tablet, Rfl: 3    isosorbide mononitrate (IMDUR) 60 MG 24 hr tablet, tablet, Disp: , Rfl:     lancets 31 gauge Misc, by Misc.(Non-Drug; Combo Route) route., Disp: , Rfl:     levothyroxine (SYNTHROID) 25 MCG tablet, Take 1 tablet (25 mcg total) by mouth before breakfast., Disp: 90 tablet, Rfl: 3    memantine (NAMENDA) 5 MG Tab, TAKE 1 TABLET(5 MG) BY MOUTH TWICE DAILY FOR MEMORY, Disp: 180 tablet, Rfl: 3    potassium chloride SA (K-DUR,KLOR-CON) 20 MEQ tablet, TAKE 1 TABLET(20 MEQ) BY MOUTH EVERY DAY, Disp: 90 tablet, Rfl: 3    sucralfate (CARAFATE) 1 gram tablet, TAKE 1 TABLET(1 GRAM) BY MOUTH THREE TIMES DAILY, Disp: 270 tablet, Rfl: 3    terazosin (HYTRIN) 10 MG capsule, TAKE 1 CAPSULE(10 MG) BY MOUTH EVERY EVENING, Disp: 30 capsule, Rfl: 11    ketorolac 0.5% (ACULAR) 0.5 % Drop, Place 1 drop into the left eye 4 (four) times daily., Disp: , Rfl:     ofloxacin (OCUFLOX) 0.3 % ophthalmic solution, Place 1 drop into the left eye 4 (four) times daily., Disp: , Rfl:     prednisoLONE acetate (PRED FORTE) 1 % CHANDA BeltranKE LIQUID AND INSTILL 1 DROP IN LEFT EYE FOUR TIMES DAILY, Disp: , Rfl:       History:  Current Providers as of 6/29/2023  PCP: Rudy Cruz MD  Care Team Provider: New Mehta MD  Care Team Provider: David Fried MD  Care Team Provider: Pinky  TERRI Grajeda  Care Team Provider: Stevo Byers,   Encounter Provider: Rudy Cruz MD, starting on  12:00 AM  Referring Provider: not found, starting on  12:00 AM  Consulting Physician: Rudy Cruz MD, starting on   8:10 AM (Active)   Past Medical History:   Diagnosis Date    Hyperlipidemia     Hypertension     right Occipital stroke 2022    TIA (transient ischemic attack) 2022    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    HENT:  Positive for hearing loss.    Eyes:  Positive for visual disturbance.   Respiratory: Negative.     Cardiovascular:  Positive for leg swelling.   Gastrointestinal: Negative.    Endocrine: Negative.    Genitourinary: Negative.    Musculoskeletal: Negative.    Skin:         blisters   Neurological:  Positive for dizziness and numbness.        Painful tingling   Psychiatric/Behavioral: Negative.     All other systems reviewed and are negative.  Objective:     Physical Exam  Vitals and nursing note reviewed.   Constitutional:       Appearance: She is well-developed. She is obese.   HENT:      Head: Normocephalic.   Eyes:      Conjunctiva/sclera: Conjunctivae normal.      Pupils: Pupils are equal, round, and reactive to light.   Cardiovascular:      Rate and Rhythm: Normal rate and regular rhythm.      Heart sounds: Normal heart sounds.   Pulmonary:      Effort: Pulmonary effort is normal.       Breath sounds: Normal breath sounds. No wheezing or rales.   Musculoskeletal:         General: Normal range of motion.      Cervical back: Normal range of motion and neck supple.      Right lower leg: Edema present.      Left lower leg: Edema present.   Skin:     General: Skin is warm and dry.      Comments: Lower leg dried up blisters, small non infected   Neurological:      Mental Status: She is alert and oriented to person, place, and time.      Deep Tendon Reflexes: Reflexes are normal and symmetric.   Psychiatric:         Behavior: Behavior normal.         Thought Content: Thought content normal.         Judgment: Judgment normal.           Assessment:     1. Obesity, diabetes, and hypertension syndrome    2. Severe obesity (BMI 35.0-39.9) with comorbidity    3. 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 History of cerebral infarction    5. Reduced visual acuity    6. Hyperlipidemia, unspecified hyperlipidemia type    7. PAD (peripheral artery disease)    8. Type 2 diabetes mellitus with hyperlipidemia    9. Bilateral leg edema      Plan:        Medication List            Accurate as of June 29, 2023  8:43 AM. If you have any questions, ask your nurse or doctor.                CONTINUE taking these medications      aspirin 81 MG EC tablet  Commonly known as: ECOTRIN     atorvastatin 40 MG tablet  Commonly known as: LIPITOR  TAKE 1 TABLET(40 MG) BY MOUTH EVERY DAY     blood sugar diagnostic Strp     blood-glucose meter kit  Use daily     CENTRUM SILVER ORAL     cholecalciferol (vitamin D3) 125 mcg (5,000 unit) Tab  Commonly known as: VITAMIN D3  Take 1 tablet (5,000 Units total) by mouth once daily.     clopidogreL 75 mg tablet  Commonly known as: PLAVIX  Take 1 tablet (75 mg total) by mouth once daily. for 21 days     donepeziL 5 MG tablet  Commonly known as: ARICEPT     furosemide 40 MG tablet  Commonly known as: LASIX  Take 2 tablets (80 mg total) by mouth 2 (two)  times a day. For one week, then 1 bid     irbesartan 300 MG tablet  Commonly known as: AVAPRO  TAKE 1 TABLET(300 MG) BY MOUTH EVERY DAY     isosorbide mononitrate 60 MG 24 hr tablet  Commonly known as: IMDUR     ketorolac 0.5% 0.5 % Drop  Commonly known as: ACULAR     lancets 31 gauge Misc     levothyroxine 25 MCG tablet  Commonly known as: SYNTHROID  Take 1 tablet (25 mcg total) by mouth before breakfast.     memantine 5 MG Tab  Commonly known as: NAMENDA  TAKE 1 TABLET(5 MG) BY MOUTH TWICE DAILY FOR MEMORY     ofloxacin 0.3 % ophthalmic solution  Commonly known as: OCUFLOX     potassium chloride SA 20 MEQ tablet  Commonly known as: K-DUR,KLOR-CON  TAKE 1 TABLET(20 MEQ) BY MOUTH EVERY DAY     prednisoLONE acetate 1 % Drps  Commonly known as: PRED FORTE     sucralfate 1 gram tablet  Commonly known as: CARAFATE  TAKE 1 TABLET(1 GRAM) BY MOUTH THREE TIMES DAILY     terazosin 10 MG capsule  Commonly known as: HYTRIN  TAKE 1 CAPSULE(10 MG) BY MOUTH EVERY EVENING            STOP taking these medications      JANUVIA 50 MG Tab  Generic drug: SITagliptin phosphate  Stopped by: Rudy Cruz MD     metFORMIN 500 MG tablet  Commonly known as: GLUCOPHAGE  Stopped by: Rudy Cruz MD     ONGLYZA 5 mg Tab tablet  Generic drug: SAXagliptin  Stopped by: Rudy Cruz MD            Obesity, diabetes, and hypertension syndrome    Severe obesity (BMI 35.0-39.9) with comorbidity    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

## 2023-07-08 DIAGNOSIS — I63.9 OCCIPITAL STROKE: ICD-10-CM

## 2023-07-08 NOTE — TELEPHONE ENCOUNTER
No care due was identified.  [facility] Embedded Care Due Messages. Reference number: [id number].   7/08/2023 5:55:42 PM CDT

## 2023-07-09 DIAGNOSIS — I12.9 TYPE 2 DIABETES MELLITUS WITH STAGE 3A CHRONIC KIDNEY DISEASE AND HYPERTENSION: Primary | ICD-10-CM

## 2023-07-09 DIAGNOSIS — N18.31 TYPE 2 DIABETES MELLITUS WITH STAGE 3A CHRONIC KIDNEY DISEASE AND HYPERTENSION: Primary | ICD-10-CM

## 2023-07-09 DIAGNOSIS — E11.22 TYPE 2 DIABETES MELLITUS WITH STAGE 3A CHRONIC KIDNEY DISEASE AND HYPERTENSION: Primary | ICD-10-CM

## 2023-07-09 RX ORDER — CLOPIDOGREL BISULFATE 75 MG/1
TABLET ORAL
Qty: 90 TABLET | Refills: 3 | Status: SHIPPED | OUTPATIENT
Start: 2023-07-09

## 2023-07-09 NOTE — TELEPHONE ENCOUNTER
Refill Routing Note   Refill Routing Note   Medication(s) are not appropriate for processing by [facility] for the following reason(s):      Required labs abnormal    [facility] action(s):  Defer None identified        Medication reconciliation completed: No     Appointments  past 12m or future 3m with PCP    Date Provider   Last Visit   6/29/2023 Rudy Cruz MD   Next Visit   7/28/2023 Rudy Cruz MD   ED visits in past 90 days: 0        Note composed:1:29 PM 07/09/2023

## 2023-07-13 ENCOUNTER — CLINICAL SUPPORT (OUTPATIENT)
Dept: FAMILY MEDICINE | Facility: CLINIC | Age: 83
End: 2023-07-13
Payer: MEDICARE

## 2023-07-13 ENCOUNTER — OFFICE VISIT (OUTPATIENT)
Dept: CARDIOLOGY | Facility: CLINIC | Age: 83
End: 2023-07-13
Payer: MEDICARE

## 2023-07-13 ENCOUNTER — TELEPHONE (OUTPATIENT)
Dept: FAMILY MEDICINE | Facility: CLINIC | Age: 83
End: 2023-07-13

## 2023-07-13 VITALS
OXYGEN SATURATION: 98 % | HEART RATE: 86 BPM | OXYGEN SATURATION: 97 % | BODY MASS INDEX: 34.81 KG/M2 | HEART RATE: 91 BPM | SYSTOLIC BLOOD PRESSURE: 124 MMHG | WEIGHT: 203.88 LBS | DIASTOLIC BLOOD PRESSURE: 58 MMHG | SYSTOLIC BLOOD PRESSURE: 102 MMHG | HEIGHT: 64 IN | DIASTOLIC BLOOD PRESSURE: 64 MMHG

## 2023-07-13 DIAGNOSIS — I10 PRIMARY HYPERTENSION: Primary | ICD-10-CM

## 2023-07-13 DIAGNOSIS — E66.9 OBESITY, DIABETES, AND HYPERTENSION SYNDROME: ICD-10-CM

## 2023-07-13 DIAGNOSIS — E11.69 OBESITY, DIABETES, AND HYPERTENSION SYNDROME: ICD-10-CM

## 2023-07-13 DIAGNOSIS — I70.0 ATHEROSCLEROSIS OF AORTA: ICD-10-CM

## 2023-07-13 DIAGNOSIS — E11.59 OBESITY, DIABETES, AND HYPERTENSION SYNDROME: ICD-10-CM

## 2023-07-13 DIAGNOSIS — I15.2 OBESITY, DIABETES, AND HYPERTENSION SYNDROME: ICD-10-CM

## 2023-07-13 DIAGNOSIS — I73.9 PAD (PERIPHERAL ARTERY DISEASE): ICD-10-CM

## 2023-07-13 DIAGNOSIS — R60.0 BILATERAL LEG EDEMA: Primary | ICD-10-CM

## 2023-07-13 DIAGNOSIS — E78.5 HYPERLIPIDEMIA, UNSPECIFIED HYPERLIPIDEMIA TYPE: ICD-10-CM

## 2023-07-13 DIAGNOSIS — R60.1 GENERALIZED EDEMA: ICD-10-CM

## 2023-07-13 DIAGNOSIS — N18.31 TYPE 2 DIABETES MELLITUS WITH STAGE 3A CHRONIC KIDNEY DISEASE AND HYPERTENSION: ICD-10-CM

## 2023-07-13 DIAGNOSIS — E11.22 TYPE 2 DIABETES MELLITUS WITH STAGE 3A CHRONIC KIDNEY DISEASE AND HYPERTENSION: ICD-10-CM

## 2023-07-13 DIAGNOSIS — E66.01 SEVERE OBESITY (BMI 35.0-39.9) WITH COMORBIDITY: ICD-10-CM

## 2023-07-13 DIAGNOSIS — I12.9 TYPE 2 DIABETES MELLITUS WITH STAGE 3A CHRONIC KIDNEY DISEASE AND HYPERTENSION: ICD-10-CM

## 2023-07-13 PROCEDURE — 1126F PR PAIN SEVERITY QUANTIFIED, NO PAIN PRESENT: ICD-10-PCS | Mod: CPTII,S$GLB,, | Performed by: INTERNAL MEDICINE

## 2023-07-13 PROCEDURE — 3074F PR MOST RECENT SYSTOLIC BLOOD PRESSURE < 130 MM HG: ICD-10-PCS | Mod: CPTII,S$GLB,, | Performed by: INTERNAL MEDICINE

## 2023-07-13 PROCEDURE — 3074F SYST BP LT 130 MM HG: CPT | Mod: CPTII,S$GLB,, | Performed by: INTERNAL MEDICINE

## 2023-07-13 PROCEDURE — 3078F PR MOST RECENT DIASTOLIC BLOOD PRESSURE < 80 MM HG: ICD-10-PCS | Mod: CPTII,S$GLB,, | Performed by: INTERNAL MEDICINE

## 2023-07-13 PROCEDURE — 1159F PR MEDICATION LIST DOCUMENTED IN MEDICAL RECORD: ICD-10-PCS | Mod: CPTII,S$GLB,, | Performed by: INTERNAL MEDICINE

## 2023-07-13 PROCEDURE — 99999 PR PBB SHADOW E&M-EST. PATIENT-LVL V: ICD-10-PCS | Mod: PBBFAC,,, | Performed by: INTERNAL MEDICINE

## 2023-07-13 PROCEDURE — 99214 PR OFFICE/OUTPT VISIT, EST, LEVL IV, 30-39 MIN: ICD-10-PCS | Mod: S$GLB,,, | Performed by: INTERNAL MEDICINE

## 2023-07-13 PROCEDURE — 99214 OFFICE O/P EST MOD 30 MIN: CPT | Mod: S$GLB,,, | Performed by: INTERNAL MEDICINE

## 2023-07-13 PROCEDURE — 1157F PR ADVANCE CARE PLAN OR EQUIV PRESENT IN MEDICAL RECORD: ICD-10-PCS | Mod: CPTII,S$GLB,, | Performed by: INTERNAL MEDICINE

## 2023-07-13 PROCEDURE — 1157F ADVNC CARE PLAN IN RCRD: CPT | Mod: CPTII,S$GLB,, | Performed by: INTERNAL MEDICINE

## 2023-07-13 PROCEDURE — 3078F DIAST BP <80 MM HG: CPT | Mod: CPTII,S$GLB,, | Performed by: INTERNAL MEDICINE

## 2023-07-13 PROCEDURE — 1159F MED LIST DOCD IN RCRD: CPT | Mod: CPTII,S$GLB,, | Performed by: INTERNAL MEDICINE

## 2023-07-13 PROCEDURE — 99999 PR PBB SHADOW E&M-EST. PATIENT-LVL V: CPT | Mod: PBBFAC,,, | Performed by: INTERNAL MEDICINE

## 2023-07-13 PROCEDURE — 1126F AMNT PAIN NOTED NONE PRSNT: CPT | Mod: CPTII,S$GLB,, | Performed by: INTERNAL MEDICINE

## 2023-07-13 RX ORDER — TERAZOSIN 5 MG/1
5 CAPSULE ORAL NIGHTLY
Qty: 90 CAPSULE | Refills: 3 | Status: SHIPPED | OUTPATIENT
Start: 2023-07-13

## 2023-07-13 NOTE — PROGRESS NOTES
"Subjective:    Patient ID:  Katy Soto is a 83 y.o. female who presents for follow-up of Leg Swelling      HPI    84 y/o female with hx of PAD s/p PTA to RPTA with resolution of severe Balta IIb lifestyle limiting claudication, HTN, HLD who presents for f/u. Initially seen for claudication, intervention performed to RLE with resolution of claudication. No claudication of LLE. Back then she was confused as to her meds and claims to have had a nosebleed from Diltiazem and had not been taking Dilt or Pletal (I suspect it was Pletal that caused the nosebleed). Had Covid infection 4/2020 with full recovery. Uncontrolled BP and confused as to her meds and how/when she takes them. Family stated that she had been noncompliant and after discussion, admitted to med noncompliance and dietary noncompliance. Record reviewed and she has been on multiple regimens.   BP being managed by Dr Cruz and she states it has been 130-150's at home. Did not take lasix today. Does not take her meds at times bc she "feels good." Denies CP, SOB/SPIVEY, orthopnea, PND, syncope, palps, claudication. 2DE with normal EF and LVH.    7/13/2023:  Since last visit, was following with NP. Has had recent medication changes. Initially had volume overload from dietary indiscretion. Dr Cruz regulating BP meds. Lasix increased and spironolactone added and volume removed and spironolactone discontinued 1 week ago. Currently on lasix 40 mg (decreased), irbesartan 300 mg, isosorbide 60 mg. Since taking her meds this morning she feels dizzy, sluggish, lethargic, and weak. SBP  in clinic.       Review of Systems   Constitutional: Positive for malaise/fatigue.   HENT:  Negative for congestion.    Eyes:  Negative for blurred vision.   Cardiovascular:  Positive for dyspnea on exertion. Negative for chest pain, claudication, cyanosis, irregular heartbeat, leg swelling, near-syncope, orthopnea, palpitations, paroxysmal nocturnal dyspnea and syncope. "   Respiratory:  Negative for shortness of breath.    Endocrine: Negative for polyuria.   Hematologic/Lymphatic: Negative for bleeding problem.   Skin:  Negative for itching and rash.   Musculoskeletal:  Negative for joint swelling, muscle cramps and muscle weakness.   Gastrointestinal:  Negative for abdominal pain, hematemesis, hematochezia, melena, nausea and vomiting.   Genitourinary:  Negative for dysuria and hematuria.   Neurological:  Positive for dizziness and weakness. Negative for focal weakness, headaches, light-headedness and loss of balance.   Psychiatric/Behavioral:  Negative for depression. The patient is not nervous/anxious.       Objective:    Physical Exam  Constitutional:       Appearance: She is well-developed.   HENT:      Head: Normocephalic and atraumatic.   Neck:      Vascular: No JVD.   Cardiovascular:      Rate and Rhythm: Normal rate and regular rhythm.      Pulses:           Carotid pulses are 2+ on the right side and 2+ on the left side.       Radial pulses are 2+ on the right side and 2+ on the left side.        Femoral pulses are 2+ on the right side and 2+ on the left side.       Posterior tibial pulses are 1+ on the right side and 1+ on the left side.      Heart sounds: Normal heart sounds.      Comments: Monophasic doppler RDP  Biphasic doppler RPT  Biphasic doppler LDP and LPT      Pulmonary:      Effort: Pulmonary effort is normal.      Breath sounds: Normal breath sounds.   Abdominal:      General: Bowel sounds are normal.      Palpations: Abdomen is soft.   Musculoskeletal:      Cervical back: Neck supple.   Skin:     General: Skin is warm and dry.   Neurological:      Mental Status: She is alert and oriented to person, place, and time.   Psychiatric:         Behavior: Behavior normal.         Thought Content: Thought content normal.         Assessment:       1. Bilateral leg edema    2. PAD (peripheral artery disease)    3. Hyperlipidemia, unspecified hyperlipidemia type    4.  Atherosclerosis of aorta    5. 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 Severe obesity (BMI 35.0-39.9) with comorbidity    7. Obesity, diabetes, and hypertension syndrome    8. Generalized edema      82 y/o pt with hx and presentation as above. Doing well from a cardiac perspective and compensated from a HF perspective. PAD stable and no symptoms - cont ASA/statin. BP marginal likely from meds. Drink water when she gets home and decrease Imdur dose. Discussed the etiology, evaluation, and management of HTN, PAD, HLD, ATH, obesity, leg edema. Discussed the importance of med compliance, heart healthy diet, and regular exercise.      Plan:       -Decrease Imdur to 30 mg daily, and if symptoms persist and BP low, discontinue all together  -f/u in 3 months

## 2023-07-13 NOTE — PROGRESS NOTES
Katy Soto 83 y.o. female is here today for Blood Pressure check.   History of HTN yes.    Review of patient's allergies indicates:   Allergen Reactions    Cilostazol      rfsel1c and stomach cramps    Clonidine     Coreg [carvedilol]      Cramps stomach and nausea    Diltiazem      nauseana d stoamch cramps    Lexapro [escitalopram oxalate]      Dizzy, nausea and weak    Shellfish containing products Hives     Creatinine   Date Value Ref Range Status   05/09/2023 1.11 0.50 - 1.40 mg/dL Final     Sodium   Date Value Ref Range Status   05/09/2023 142 136 - 145 mmol/L Final     Potassium   Date Value Ref Range Status   05/09/2023 3.7 3.5 - 5.1 mmol/L Final   ]  Patient verifies taking blood pressure medications on a regular basis at the same time of the day.     Current Outpatient Medications:     aspirin (ECOTRIN) 81 MG EC tablet, Take 81 mg by mouth once daily., Disp: , Rfl:     atorvastatin (LIPITOR) 40 MG tablet, TAKE 1 TABLET(40 MG) BY MOUTH EVERY DAY, Disp: 90 tablet, Rfl: 3    blood sugar diagnostic Strp, by Misc.(Non-Drug; Combo Route) route., Disp: , Rfl:     blood-glucose meter kit, Use daily, Disp: 1 each, Rfl: 0    cholecalciferol, vitamin D3, (VITAMIN D3) 125 mcg (5,000 unit) Tab, Take 1 tablet (5,000 Units total) by mouth once daily., Disp: 90 tablet, Rfl: 3    clopidogreL (PLAVIX) 75 mg tablet, TAKE 1 TABLET(75 MG) BY MOUTH EVERY DAY FOR 21 DAYS, Disp: 90 tablet, Rfl: 3    cyanocobalamin, vitamin B-12, 1,000 mcg Lozg, Place 1 lozenge under the tongue Daily. For B12 and nerves, Disp: 100 lozenge, Rfl: 3    donepeziL (ARICEPT) 5 MG tablet, tablet, Disp: , Rfl:     folic acid/multivit-min/lutein (CENTRUM SILVER ORAL), Take by mouth., Disp: , Rfl:     furosemide (LASIX) 40 MG tablet, Take 2 tablets (80 mg total) by mouth 2 (two) times a day. For one week, then 1 bid, Disp: 360 tablet, Rfl: 3    irbesartan (AVAPRO) 300 MG tablet, TAKE 1 TABLET(300 MG) BY MOUTH EVERY DAY, Disp: 90 tablet, Rfl: 3     isosorbide mononitrate (IMDUR) 60 MG 24 hr tablet, tablet, Disp: , Rfl:     ketorolac 0.5% (ACULAR) 0.5 % Drop, Place 1 drop into the left eye 4 (four) times daily., Disp: , Rfl:     lancets 31 gauge Misc, by Misc.(Non-Drug; Combo Route) route., Disp: , Rfl:     levothyroxine (SYNTHROID) 25 MCG tablet, Take 1 tablet (25 mcg total) by mouth before breakfast., Disp: 90 tablet, Rfl: 3    memantine (NAMENDA) 5 MG Tab, TAKE 1 TABLET(5 MG) BY MOUTH TWICE DAILY FOR MEMORY, Disp: 180 tablet, Rfl: 3    ofloxacin (OCUFLOX) 0.3 % ophthalmic solution, Place 1 drop into the left eye 4 (four) times daily., Disp: , Rfl:     potassium chloride SA (K-DUR,KLOR-CON) 20 MEQ tablet, TAKE 1 TABLET(20 MEQ) BY MOUTH EVERY DAY, Disp: 90 tablet, Rfl: 3    prednisoLONE acetate (PRED FORTE) 1 % DrpS, SHAKE LIQUID AND INSTILL 1 DROP IN LEFT EYE FOUR TIMES DAILY, Disp: , Rfl:     spironolactone (ALDACTONE) 50 MG tablet, Take 1 tablet (50 mg total) by mouth once daily. For blood pressure, Disp: 90 tablet, Rfl: 3    sucralfate (CARAFATE) 1 gram tablet, TAKE 1 TABLET(1 GRAM) BY MOUTH THREE TIMES DAILY, Disp: 270 tablet, Rfl: 3    terazosin (HYTRIN) 5 MG capsule, Take 1 capsule (5 mg total) by mouth every evening., Disp: 90 capsule, Rfl: 3  Does patient have record of home blood pressure readings yes. Home today 120/61  Last dose of blood pressure medication was taken yesterday.  Patient is symptomatic.   Complains of dizziness.    BP: (!) 124/58 , Pulse: 91 .      Dr. Cruz notified & discussed with patient

## 2023-07-28 ENCOUNTER — OFFICE VISIT (OUTPATIENT)
Dept: FAMILY MEDICINE | Facility: CLINIC | Age: 83
End: 2023-07-28
Payer: MEDICARE

## 2023-07-28 ENCOUNTER — PATIENT MESSAGE (OUTPATIENT)
Dept: CARDIOLOGY | Facility: CLINIC | Age: 83
End: 2023-07-28
Payer: MEDICARE

## 2023-07-28 VITALS
DIASTOLIC BLOOD PRESSURE: 62 MMHG | HEIGHT: 64 IN | BODY MASS INDEX: 35.55 KG/M2 | SYSTOLIC BLOOD PRESSURE: 138 MMHG | HEART RATE: 88 BPM | WEIGHT: 208.25 LBS | OXYGEN SATURATION: 98 %

## 2023-07-28 DIAGNOSIS — I15.2 OBESITY, DIABETES, AND HYPERTENSION SYNDROME: ICD-10-CM

## 2023-07-28 DIAGNOSIS — E66.9 OBESITY, DIABETES, AND HYPERTENSION SYNDROME: ICD-10-CM

## 2023-07-28 DIAGNOSIS — E11.69 OBESITY, DIABETES, AND HYPERTENSION SYNDROME: ICD-10-CM

## 2023-07-28 DIAGNOSIS — N18.31 TYPE 2 DIABETES MELLITUS WITH STAGE 3A CHRONIC KIDNEY DISEASE AND HYPERTENSION: ICD-10-CM

## 2023-07-28 DIAGNOSIS — F03.90 DEMENTIA, UNSPECIFIED DEMENTIA SEVERITY, UNSPECIFIED DEMENTIA TYPE, UNSPECIFIED WHETHER BEHAVIORAL, PSYCHOTIC, OR MOOD DISTURBANCE OR ANXIETY: ICD-10-CM

## 2023-07-28 DIAGNOSIS — I12.9 TYPE 2 DIABETES MELLITUS WITH STAGE 3A CHRONIC KIDNEY DISEASE AND HYPERTENSION: ICD-10-CM

## 2023-07-28 DIAGNOSIS — E11.22 TYPE 2 DIABETES MELLITUS WITH STAGE 3A CHRONIC KIDNEY DISEASE AND HYPERTENSION: ICD-10-CM

## 2023-07-28 DIAGNOSIS — E11.59 OBESITY, DIABETES, AND HYPERTENSION SYNDROME: ICD-10-CM

## 2023-07-28 DIAGNOSIS — I10 PRIMARY HYPERTENSION: ICD-10-CM

## 2023-07-28 DIAGNOSIS — R60.0 BILATERAL LEG EDEMA: Primary | ICD-10-CM

## 2023-07-28 PROCEDURE — 1126F AMNT PAIN NOTED NONE PRSNT: CPT | Mod: CPTII,S$GLB,, | Performed by: FAMILY MEDICINE

## 2023-07-28 PROCEDURE — 1126F PR PAIN SEVERITY QUANTIFIED, NO PAIN PRESENT: ICD-10-PCS | Mod: CPTII,S$GLB,, | Performed by: FAMILY MEDICINE

## 2023-07-28 PROCEDURE — 99214 OFFICE O/P EST MOD 30 MIN: CPT | Mod: S$GLB,,, | Performed by: FAMILY MEDICINE

## 2023-07-28 PROCEDURE — 1160F RVW MEDS BY RX/DR IN RCRD: CPT | Mod: CPTII,S$GLB,, | Performed by: FAMILY MEDICINE

## 2023-07-28 PROCEDURE — 3075F PR MOST RECENT SYSTOLIC BLOOD PRESS GE 130-139MM HG: ICD-10-PCS | Mod: CPTII,S$GLB,, | Performed by: FAMILY MEDICINE

## 2023-07-28 PROCEDURE — 1159F MED LIST DOCD IN RCRD: CPT | Mod: CPTII,S$GLB,, | Performed by: FAMILY MEDICINE

## 2023-07-28 PROCEDURE — 1157F ADVNC CARE PLAN IN RCRD: CPT | Mod: CPTII,S$GLB,, | Performed by: FAMILY MEDICINE

## 2023-07-28 PROCEDURE — 1160F PR REVIEW ALL MEDS BY PRESCRIBER/CLIN PHARMACIST DOCUMENTED: ICD-10-PCS | Mod: CPTII,S$GLB,, | Performed by: FAMILY MEDICINE

## 2023-07-28 PROCEDURE — 1159F PR MEDICATION LIST DOCUMENTED IN MEDICAL RECORD: ICD-10-PCS | Mod: CPTII,S$GLB,, | Performed by: FAMILY MEDICINE

## 2023-07-28 PROCEDURE — 3078F PR MOST RECENT DIASTOLIC BLOOD PRESSURE < 80 MM HG: ICD-10-PCS | Mod: CPTII,S$GLB,, | Performed by: FAMILY MEDICINE

## 2023-07-28 PROCEDURE — 3075F SYST BP GE 130 - 139MM HG: CPT | Mod: CPTII,S$GLB,, | Performed by: FAMILY MEDICINE

## 2023-07-28 PROCEDURE — 3078F DIAST BP <80 MM HG: CPT | Mod: CPTII,S$GLB,, | Performed by: FAMILY MEDICINE

## 2023-07-28 PROCEDURE — 99214 PR OFFICE/OUTPT VISIT, EST, LEVL IV, 30-39 MIN: ICD-10-PCS | Mod: S$GLB,,, | Performed by: FAMILY MEDICINE

## 2023-07-28 PROCEDURE — 1157F PR ADVANCE CARE PLAN OR EQUIV PRESENT IN MEDICAL RECORD: ICD-10-PCS | Mod: CPTII,S$GLB,, | Performed by: FAMILY MEDICINE

## 2023-07-28 RX ORDER — POTASSIUM CHLORIDE 20 MEQ/1
20 TABLET, EXTENDED RELEASE ORAL DAILY
Qty: 90 TABLET | Refills: 3 | Status: SHIPPED | OUTPATIENT
Start: 2023-07-28 | End: 2023-08-03 | Stop reason: SDUPTHER

## 2023-07-28 NOTE — PROGRESS NOTES
"Subjective:      Patient ID: Katy Soto is a 83 y.o. female.    Chief Complaint: Follow-up      Vitals:    23 1012   BP: 138/62   Pulse: 88   SpO2: 98%   Weight: 94.4 kg (208 lb 3.6 oz)   Height: 5' 4" (1.626 m)        HPI   Sister , here with nerachele here for checkup  Wetnt to cardi after my last visit  On imdur hlaf of 60  Lasix 40 daily lately  Has gained 5 pounds, but bp good and so, 2 in AM only, total of 80 mg in AM        Problem List  Patient Active Problem List   Diagnosis    Hyperlipidemia    Arthritis    PAD (peripheral artery disease)    Bilateral leg edema    Anxiety    Obesity, diabetes, and hypertension syndrome    Osteopenia    Atherosclerosis of aorta    Elevated sed rate    Vitamin D insufficiency    Severe obesity (BMI 35.0-39.9) with comorbidity    Reduced visual acuity    History of cerebral infarction    Generalized edema    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    Type 2 diabetes mellitus with hyperlipidemia    Diabetic polyneuropathy associated with type 2 diabetes mellitus    Dementia, unspecified dementia severity, unspecified dementia type, unspecified whether behavioral, psychotic, or mood disturbance or anxiety        ALLERGIES:   Review of patient's allergies indicates:   Allergen Reactions    Cilostazol      icvqx3c and stomach cramps    Clonidine     Coreg [carvedilol]      Cramps stomach and nausea    Diltiazem      nauseana d stoamch cramps    Lexapro [escitalopram oxalate]      Dizzy, nausea and weak    Shellfish containing products Hives       MEDS:   Current Outpatient Medications:     aspirin (ECOTRIN) 81 MG EC tablet, Take 81 mg by mouth once daily., Disp: , Rfl:     atorvastatin (LIPITOR) 40 MG tablet, TAKE 1 TABLET(40 MG) BY MOUTH EVERY DAY, Disp: 90 tablet, Rfl: 3    blood sugar diagnostic Strp, by Misc.(Non-Drug; Combo Route) route., Disp: , Rfl:     blood-glucose meter kit, Use daily, Disp: 1 each, Rfl: 0    " cholecalciferol, vitamin D3, (VITAMIN D3) 125 mcg (5,000 unit) Tab, Take 1 tablet (5,000 Units total) by mouth once daily., Disp: 90 tablet, Rfl: 3    clopidogreL (PLAVIX) 75 mg tablet, TAKE 1 TABLET(75 MG) BY MOUTH EVERY DAY FOR 21 DAYS, Disp: 90 tablet, Rfl: 3    cyanocobalamin, vitamin B-12, 1,000 mcg Lozg, Place 1 lozenge under the tongue Daily. For B12 and nerves, Disp: 100 lozenge, Rfl: 3    donepeziL (ARICEPT) 5 MG tablet, tablet, Disp: , Rfl:     folic acid/multivit-min/lutein (CENTRUM SILVER ORAL), Take by mouth., Disp: , Rfl:     furosemide (LASIX) 40 MG tablet, Take 2 tablets (80 mg total) by mouth 2 (two) times a day. For one week, then 1 bid, Disp: 360 tablet, Rfl: 3    irbesartan (AVAPRO) 300 MG tablet, TAKE 1 TABLET(300 MG) BY MOUTH EVERY DAY, Disp: 90 tablet, Rfl: 3    isosorbide mononitrate (IMDUR) 60 MG 24 hr tablet, tablet, Disp: , Rfl:     lancets 31 gauge Misc, by Misc.(Non-Drug; Combo Route) route., Disp: , Rfl:     levothyroxine (SYNTHROID) 25 MCG tablet, Take 1 tablet (25 mcg total) by mouth before breakfast., Disp: 90 tablet, Rfl: 3    memantine (NAMENDA) 5 MG Tab, TAKE 1 TABLET(5 MG) BY MOUTH TWICE DAILY FOR MEMORY, Disp: 180 tablet, Rfl: 3    sucralfate (CARAFATE) 1 gram tablet, TAKE 1 TABLET(1 GRAM) BY MOUTH THREE TIMES DAILY, Disp: 270 tablet, Rfl: 3    terazosin (HYTRIN) 5 MG capsule, Take 1 capsule (5 mg total) by mouth every evening., Disp: 90 capsule, Rfl: 3    potassium chloride SA (K-DUR,KLOR-CON) 20 MEQ tablet, Take 1 tablet (20 mEq total) by mouth once daily., Disp: 90 tablet, Rfl: 3      History:  Current Providers as of 7/28/2023  PCP: Rudy Cruz MD  Care Team Provider: New Mehta MD  Care Team Provider: David Fried MD  Care Team Provider: Pinky Grajeda RD  Care Team Provider: Stevo Byers,   Encounter Provider: Rudy Cruz MD, starting on Fri Jul 28, 2023 12:00 AM  Referring Provider: not found, starting on Fri Jul 28, 2023 12:00 AM  Consulting  Physician: Rudy Cruz MD, starting on  10:09 AM (Active)   Past Medical History:   Diagnosis Date    Hyperlipidemia     Hypertension     right Occipital stroke 2022    TIA (transient ischemic attack) 2022    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    HENT: Negative.     Respiratory: Negative.     Cardiovascular: Negative.    Gastrointestinal: Negative.    Endocrine: Negative.    Genitourinary: Negative.    Musculoskeletal: Negative.    Psychiatric/Behavioral: Negative.          Grieving   All other systems reviewed and are negative.  Objective:     Physical Exam        Assessment:     1. Bilateral leg edema    2. Dementia, unspecified dementia severity, unspecified dementia type, unspecified whether behavioral, psychotic, or mood disturbance or anxiety    3. 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 Primary hypertension    5. Obesity, diabetes, and hypertension syndrome      Plan:        Medication List            Accurate as of 2023 11:20 AM. If you have any questions, ask your nurse or doctor.                CHANGE how you take these medications      potassium chloride SA 20 MEQ tablet  Commonly known as: K-DUR,KLOR-CON  Take 1 tablet (20 mEq total) by mouth once daily.  What changed: when to take this  Changed by: Rudy Cruz MD            CONTINUE taking these medications      aspirin 81 MG EC  tablet  Commonly known as: ECOTRIN     atorvastatin 40 MG tablet  Commonly known as: LIPITOR  TAKE 1 TABLET(40 MG) BY MOUTH EVERY DAY     blood sugar diagnostic Strp     blood-glucose meter kit  Use daily     CENTRUM SILVER ORAL     cholecalciferol (vitamin D3) 125 mcg (5,000 unit) Tab  Commonly known as: VITAMIN D3  Take 1 tablet (5,000 Units total) by mouth once daily.     clopidogreL 75 mg tablet  Commonly known as: PLAVIX  TAKE 1 TABLET(75 MG) BY MOUTH EVERY DAY FOR 21 DAYS     cyanocobalamin (vitamin B-12) 1,000 mcg Lozg  Place 1 lozenge under the tongue Daily. For B12 and nerves     donepeziL 5 MG tablet  Commonly known as: ARICEPT     furosemide 40 MG tablet  Commonly known as: LASIX  Take 2 tablets (80 mg total) by mouth 2 (two) times a day. For one week, then 1 bid     irbesartan 300 MG tablet  Commonly known as: AVAPRO  TAKE 1 TABLET(300 MG) BY MOUTH EVERY DAY     isosorbide mononitrate 60 MG 24 hr tablet  Commonly known as: IMDUR     lancets 31 gauge Misc     levothyroxine 25 MCG tablet  Commonly known as: SYNTHROID  Take 1 tablet (25 mcg total) by mouth before breakfast.     memantine 5 MG Tab  Commonly known as: NAMENDA  TAKE 1 TABLET(5 MG) BY MOUTH TWICE DAILY FOR MEMORY     sucralfate 1 gram tablet  Commonly known as: CARAFATE  TAKE 1 TABLET(1 GRAM) BY MOUTH THREE TIMES DAILY     terazosin 5 MG capsule  Commonly known as: HYTRIN  Take 1 capsule (5 mg total) by mouth every evening.            STOP taking these medications      spironolactone 50 MG tablet  Commonly known as: ALDACTONE  Stopped by: Rudy Cruz MD               Where to Get Your Medications        These medications were sent to [id number] - [facility] [address] [facility] [address] [facility]  [address] [facility], [facility] [address]      Phone: [phone]   potassium chloride SA 20 MEQ tablet       Bilateral leg edema    Dementia, unspecified dementia severity, unspecified dementia type, unspecified  whether behavioral, psychotic, or mood disturbance or anxiety    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  Dispense: 90 tablet; Refill: 3      Increase lasix, normal renal, edema basck, bp back up, take 80 mg in AM  Get labs soon and a1c end of year

## 2023-08-03 NOTE — TELEPHONE ENCOUNTER
No care due was identified.  [facility] Embedded Care Due Messages. Reference number: [id number].   8/03/2023 4:47:24 PM CDT

## 2023-08-03 NOTE — TELEPHONE ENCOUNTER
No care due was identified.  [facility] Embedded Care Due Messages. Reference number: [id number].   8/03/2023 4:49:15 PM CDT

## 2023-08-03 NOTE — TELEPHONE ENCOUNTER
No care due was identified.  [facility] Embedded Care Due Messages. Reference number: [id number].   8/03/2023 4:49:46 PM CDT

## 2023-08-04 RX ORDER — LEVOTHYROXINE SODIUM 25 UG/1
25 TABLET ORAL
Qty: 90 TABLET | Refills: 3 | Status: SHIPPED | OUTPATIENT
Start: 2023-08-04 | End: 2024-08-03

## 2023-08-04 RX ORDER — POTASSIUM CHLORIDE 20 MEQ/1
20 TABLET, EXTENDED RELEASE ORAL DAILY
Qty: 90 TABLET | Refills: 3 | Status: SHIPPED | OUTPATIENT
Start: 2023-08-04

## 2023-08-04 RX ORDER — FUROSEMIDE 40 MG/1
80 TABLET ORAL 2 TIMES DAILY
Qty: 360 TABLET | Refills: 3 | Status: SHIPPED | OUTPATIENT
Start: 2023-08-04 | End: 2024-01-20

## 2023-08-22 ENCOUNTER — LAB VISIT (OUTPATIENT)
Dept: LAB | Facility: HOSPITAL | Age: 83
End: 2023-08-22
Attending: FAMILY MEDICINE
Payer: MEDICARE

## 2023-08-22 DIAGNOSIS — I12.9 TYPE 2 DIABETES MELLITUS WITH STAGE 3A CHRONIC KIDNEY DISEASE AND HYPERTENSION: ICD-10-CM

## 2023-08-22 DIAGNOSIS — N18.31 TYPE 2 DIABETES MELLITUS WITH STAGE 3A CHRONIC KIDNEY DISEASE AND HYPERTENSION: ICD-10-CM

## 2023-08-22 DIAGNOSIS — E11.22 TYPE 2 DIABETES MELLITUS WITH STAGE 3A CHRONIC KIDNEY DISEASE AND HYPERTENSION: ICD-10-CM

## 2023-08-22 LAB
ALBUMIN SERPL BCP-MCNC: 3.8 G/DL (ref 3.5–5.2)
ALP SERPL-CCNC: 133 U/L (ref 38–126)
ALT SERPL W/O P-5'-P-CCNC: 19 U/L (ref 10–44)
ANION GAP SERPL CALC-SCNC: 12 MMOL/L (ref 8–16)
AST SERPL-CCNC: 23 U/L (ref 15–46)
BASOPHILS # BLD AUTO: 0.04 K/UL (ref 0–0.2)
BASOPHILS NFR BLD: 0.6 % (ref 0–1.9)
BILIRUB SERPL-MCNC: 0.7 MG/DL (ref 0.1–1)
CALCIUM SERPL-MCNC: 9 MG/DL (ref 8.7–10.5)
CHLORIDE SERPL-SCNC: 106 MMOL/L (ref 95–110)
CO2 SERPL-SCNC: 21 MMOL/L (ref 23–29)
CREAT SERPL-MCNC: 1.15 MG/DL (ref 0.5–1.4)
DIFFERENTIAL METHOD: ABNORMAL
EOSINOPHIL # BLD AUTO: 0.2 K/UL (ref 0–0.5)
EOSINOPHIL NFR BLD: 2.5 % (ref 0–8)
ERYTHROCYTE [DISTWIDTH] IN BLOOD BY AUTOMATED COUNT: 12.9 % (ref 11.5–14.5)
EST. GFR  (NO RACE VARIABLE): 47 ML/MIN/1.73 M^2
GLUCOSE SERPL-MCNC: 206 MG/DL (ref 70–110)
HCT VFR BLD AUTO: 32.3 % (ref 37–48.5)
HGB BLD-MCNC: 10.4 G/DL (ref 12–16)
IMM GRANULOCYTES # BLD AUTO: 0.02 K/UL (ref 0–0.04)
IMM GRANULOCYTES NFR BLD AUTO: 0.3 % (ref 0–0.5)
LYMPHOCYTES # BLD AUTO: 2.7 K/UL (ref 1–4.8)
LYMPHOCYTES NFR BLD: 38 % (ref 18–48)
MCH RBC QN AUTO: 30.4 PG (ref 27–31)
MCHC RBC AUTO-ENTMCNC: 32.2 G/DL (ref 32–36)
MCV RBC AUTO: 94 FL (ref 82–98)
MONOCYTES # BLD AUTO: 0.5 K/UL (ref 0.3–1)
MONOCYTES NFR BLD: 7.3 % (ref 4–15)
NEUTROPHILS # BLD AUTO: 3.7 K/UL (ref 1.8–7.7)
NEUTROPHILS NFR BLD: 51.3 % (ref 38–73)
NRBC BLD-RTO: 0 /100 WBC
PLATELET # BLD AUTO: 232 K/UL (ref 150–450)
PMV BLD AUTO: 10.1 FL (ref 9.2–12.9)
POTASSIUM SERPL-SCNC: 3.6 MMOL/L (ref 3.5–5.1)
PROT SERPL-MCNC: 7.8 G/DL (ref 6–8.4)
RBC # BLD AUTO: 3.42 M/UL (ref 4–5.4)
SODIUM SERPL-SCNC: 139 MMOL/L (ref 136–145)
TSH SERPL DL<=0.005 MIU/L-ACNC: 3.49 UIU/ML (ref 0.4–4)
UUN UR-MCNC: 15 MG/DL (ref 7–17)
WBC # BLD AUTO: 7.15 K/UL (ref 3.9–12.7)

## 2023-08-22 PROCEDURE — 84443 ASSAY THYROID STIM HORMONE: CPT | Mod: PO | Performed by: FAMILY MEDICINE

## 2023-08-22 PROCEDURE — 85025 COMPLETE CBC W/AUTO DIFF WBC: CPT | Mod: PO | Performed by: FAMILY MEDICINE

## 2023-08-22 PROCEDURE — 80053 COMPREHEN METABOLIC PANEL: CPT | Mod: PO | Performed by: FAMILY MEDICINE

## 2023-08-22 PROCEDURE — 36415 COLL VENOUS BLD VENIPUNCTURE: CPT | Mod: PO | Performed by: FAMILY MEDICINE

## 2023-08-22 NOTE — TELEPHONE ENCOUNTER
No care due was identified.  [facility] Embedded Care Due Messages. Reference number: [id number].   8/22/2023 5:52:40 AM CDT

## 2023-08-22 NOTE — TELEPHONE ENCOUNTER
Refill Routing Note   Medication(s) are not appropriate for processing by [facility] for the following reason(s):      Medication outside of protocol  No active prescription written by provider    [facility] action(s):  Route Care Due:  None identified            Appointments  past 12m or future 3m with PCP    Date Provider   Last Visit   7/28/2023 Rudy Cruz MD   Next Visit   8/28/2023 Rudy Cruz MD   ED visits in past 90 days: 0        Note composed:10:05 AM 08/22/2023

## 2023-08-23 RX ORDER — DONEPEZIL HYDROCHLORIDE 5 MG/1
TABLET, FILM COATED ORAL
Qty: 90 TABLET | Refills: 3 | Status: SHIPPED | OUTPATIENT
Start: 2023-08-23

## 2023-08-23 RX ORDER — ISOSORBIDE MONONITRATE 60 MG/1
60 TABLET, EXTENDED RELEASE ORAL
Qty: 90 TABLET | Refills: 3 | Status: SHIPPED | OUTPATIENT
Start: 2023-08-23 | End: 2023-10-12

## 2023-08-28 ENCOUNTER — OFFICE VISIT (OUTPATIENT)
Dept: FAMILY MEDICINE | Facility: CLINIC | Age: 83
End: 2023-08-28
Payer: MEDICARE

## 2023-08-28 VITALS
OXYGEN SATURATION: 98 % | TEMPERATURE: 98 F | BODY MASS INDEX: 35.21 KG/M2 | DIASTOLIC BLOOD PRESSURE: 82 MMHG | SYSTOLIC BLOOD PRESSURE: 136 MMHG | WEIGHT: 206.25 LBS | HEIGHT: 64 IN | HEART RATE: 93 BPM

## 2023-08-28 DIAGNOSIS — Z78.0 ASYMPTOMATIC MENOPAUSAL STATE: ICD-10-CM

## 2023-08-28 DIAGNOSIS — Z86.73 HISTORY OF CEREBRAL INFARCTION: ICD-10-CM

## 2023-08-28 DIAGNOSIS — I70.0 ATHEROSCLEROSIS OF AORTA: ICD-10-CM

## 2023-08-28 DIAGNOSIS — E11.59 OBESITY, DIABETES, AND HYPERTENSION SYNDROME: ICD-10-CM

## 2023-08-28 DIAGNOSIS — I15.2 OBESITY, DIABETES, AND HYPERTENSION SYNDROME: ICD-10-CM

## 2023-08-28 DIAGNOSIS — I10 PRIMARY HYPERTENSION: ICD-10-CM

## 2023-08-28 DIAGNOSIS — E11.22 TYPE 2 DIABETES MELLITUS WITH STAGE 3A CHRONIC KIDNEY DISEASE AND HYPERTENSION: ICD-10-CM

## 2023-08-28 DIAGNOSIS — E11.69 TYPE 2 DIABETES MELLITUS WITH HYPERLIPIDEMIA: Primary | ICD-10-CM

## 2023-08-28 DIAGNOSIS — E66.9 OBESITY, DIABETES, AND HYPERTENSION SYNDROME: ICD-10-CM

## 2023-08-28 DIAGNOSIS — I73.9 PAD (PERIPHERAL ARTERY DISEASE): ICD-10-CM

## 2023-08-28 DIAGNOSIS — E11.69 OBESITY, DIABETES, AND HYPERTENSION SYNDROME: ICD-10-CM

## 2023-08-28 DIAGNOSIS — E78.5 TYPE 2 DIABETES MELLITUS WITH HYPERLIPIDEMIA: Primary | ICD-10-CM

## 2023-08-28 DIAGNOSIS — I12.9 TYPE 2 DIABETES MELLITUS WITH STAGE 3A CHRONIC KIDNEY DISEASE AND HYPERTENSION: ICD-10-CM

## 2023-08-28 DIAGNOSIS — N18.31 TYPE 2 DIABETES MELLITUS WITH STAGE 3A CHRONIC KIDNEY DISEASE AND HYPERTENSION: ICD-10-CM

## 2023-08-28 DIAGNOSIS — F41.9 ANXIETY: ICD-10-CM

## 2023-08-28 PROCEDURE — 1101F PT FALLS ASSESS-DOCD LE1/YR: CPT | Mod: CPTII,S$GLB,, | Performed by: FAMILY MEDICINE

## 2023-08-28 PROCEDURE — 1159F MED LIST DOCD IN RCRD: CPT | Mod: CPTII,S$GLB,, | Performed by: FAMILY MEDICINE

## 2023-08-28 PROCEDURE — 1160F RVW MEDS BY RX/DR IN RCRD: CPT | Mod: CPTII,S$GLB,, | Performed by: FAMILY MEDICINE

## 2023-08-28 PROCEDURE — 3079F DIAST BP 80-89 MM HG: CPT | Mod: CPTII,S$GLB,, | Performed by: FAMILY MEDICINE

## 2023-08-28 PROCEDURE — 1160F PR REVIEW ALL MEDS BY PRESCRIBER/CLIN PHARMACIST DOCUMENTED: ICD-10-PCS | Mod: CPTII,S$GLB,, | Performed by: FAMILY MEDICINE

## 2023-08-28 PROCEDURE — 1126F PR PAIN SEVERITY QUANTIFIED, NO PAIN PRESENT: ICD-10-PCS | Mod: CPTII,S$GLB,, | Performed by: FAMILY MEDICINE

## 2023-08-28 PROCEDURE — 1159F PR MEDICATION LIST DOCUMENTED IN MEDICAL RECORD: ICD-10-PCS | Mod: CPTII,S$GLB,, | Performed by: FAMILY MEDICINE

## 2023-08-28 PROCEDURE — 3288F PR FALLS RISK ASSESSMENT DOCUMENTED: ICD-10-PCS | Mod: CPTII,S$GLB,, | Performed by: FAMILY MEDICINE

## 2023-08-28 PROCEDURE — 1126F AMNT PAIN NOTED NONE PRSNT: CPT | Mod: CPTII,S$GLB,, | Performed by: FAMILY MEDICINE

## 2023-08-28 PROCEDURE — 3075F SYST BP GE 130 - 139MM HG: CPT | Mod: CPTII,S$GLB,, | Performed by: FAMILY MEDICINE

## 2023-08-28 PROCEDURE — 99214 OFFICE O/P EST MOD 30 MIN: CPT | Mod: S$GLB,,, | Performed by: FAMILY MEDICINE

## 2023-08-28 PROCEDURE — 1157F ADVNC CARE PLAN IN RCRD: CPT | Mod: CPTII,S$GLB,, | Performed by: FAMILY MEDICINE

## 2023-08-28 PROCEDURE — 1101F PR PT FALLS ASSESS DOC 0-1 FALLS W/OUT INJ PAST YR: ICD-10-PCS | Mod: CPTII,S$GLB,, | Performed by: FAMILY MEDICINE

## 2023-08-28 PROCEDURE — 3079F PR MOST RECENT DIASTOLIC BLOOD PRESSURE 80-89 MM HG: ICD-10-PCS | Mod: CPTII,S$GLB,, | Performed by: FAMILY MEDICINE

## 2023-08-28 PROCEDURE — 3075F PR MOST RECENT SYSTOLIC BLOOD PRESS GE 130-139MM HG: ICD-10-PCS | Mod: CPTII,S$GLB,, | Performed by: FAMILY MEDICINE

## 2023-08-28 PROCEDURE — 3288F FALL RISK ASSESSMENT DOCD: CPT | Mod: CPTII,S$GLB,, | Performed by: FAMILY MEDICINE

## 2023-08-28 PROCEDURE — 99214 PR OFFICE/OUTPT VISIT, EST, LEVL IV, 30-39 MIN: ICD-10-PCS | Mod: S$GLB,,, | Performed by: FAMILY MEDICINE

## 2023-08-28 PROCEDURE — 1157F PR ADVANCE CARE PLAN OR EQUIV PRESENT IN MEDICAL RECORD: ICD-10-PCS | Mod: CPTII,S$GLB,, | Performed by: FAMILY MEDICINE

## 2023-08-28 NOTE — PROGRESS NOTES
"Subjective:      Patient ID: Katy Soto is a 83 y.o. female.    Chief Complaint: Follow-up (1 mon )      Vitals:    08/28/23 1524   BP: 136/82   Pulse: 93   Temp: 98 °F (36.7 °C)   TempSrc: Oral   SpO2: 98%   Weight: 93.6 kg (206 lb 3.9 oz)   Height: 5' 4" (1.626 m)        HPI   Here with niece, follow up below Dx; doing better; wearing hearing aids    Problem List  Patient Active Problem List   Diagnosis    Hyperlipidemia    Arthritis    PAD (peripheral artery disease)    Bilateral leg edema    Anxiety    Obesity, diabetes, and hypertension syndrome    Osteopenia    Atherosclerosis of aorta    Elevated sed rate    Vitamin D insufficiency    Severe obesity (BMI 35.0-39.9) with comorbidity    Reduced visual acuity    History of cerebral infarction    Generalized edema    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    Type 2 diabetes mellitus with hyperlipidemia    Diabetic polyneuropathy associated with type 2 diabetes mellitus    Dementia, unspecified dementia severity, unspecified dementia type, unspecified whether behavioral, psychotic, or mood disturbance or anxiety        ALLERGIES:   Review of patient's allergies indicates:   Allergen Reactions    Cilostazol      gbilo3v and stomach cramps    Clonidine     Coreg [carvedilol]      Cramps stomach and nausea    Diltiazem      nauseana d stoamch cramps    Lexapro [escitalopram oxalate]      Dizzy, nausea and weak    Shellfish containing products Hives       MEDS:   Current Outpatient Medications:     aspirin (ECOTRIN) 81 MG EC tablet, Take 81 mg by mouth once daily., Disp: , Rfl:     atorvastatin (LIPITOR) 40 MG tablet, TAKE 1 TABLET(40 MG) BY MOUTH EVERY DAY, Disp: 90 tablet, Rfl: 3    blood sugar diagnostic Strp, by Misc.(Non-Drug; Combo Route) route., Disp: , Rfl:     blood-glucose meter kit, Use daily, Disp: 1 each, Rfl: 0    cholecalciferol, vitamin D3, (VITAMIN D3) 125 mcg (5,000 unit) Tab, Take 1 tablet (5,000 Units " total) by mouth once daily., Disp: 90 tablet, Rfl: 3    clopidogreL (PLAVIX) 75 mg tablet, TAKE 1 TABLET(75 MG) BY MOUTH EVERY DAY FOR 21 DAYS, Disp: 90 tablet, Rfl: 3    cyanocobalamin, vitamin B-12, 1,000 mcg Lozg, Place 1 lozenge under the tongue Daily. For B12 and nerves, Disp: 100 lozenge, Rfl: 3    donepeziL (ARICEPT) 5 MG tablet, TAKE 1 TABLET(5 MG) BY MOUTH EVERY EVENING FOR BLOOD PRESSURE, Disp: 90 tablet, Rfl: 3    folic acid/multivit-min/lutein (CENTRUM SILVER ORAL), Take by mouth., Disp: , Rfl:     furosemide (LASIX) 40 MG tablet, Take 2 tablets (80 mg total) by mouth 2 (two) times a day. For one week, then 1 bid, Disp: 360 tablet, Rfl: 3    irbesartan (AVAPRO) 300 MG tablet, TAKE 1 TABLET(300 MG) BY MOUTH EVERY DAY, Disp: 90 tablet, Rfl: 3    isosorbide mononitrate (IMDUR) 60 MG 24 hr tablet, TAKE 1 TABLET(60 MG) BY MOUTH EVERY DAY, Disp: 90 tablet, Rfl: 3    lancets 31 gauge Misc, by Misc.(Non-Drug; Combo Route) route., Disp: , Rfl:     levothyroxine (SYNTHROID) 25 MCG tablet, Take 1 tablet (25 mcg total) by mouth before breakfast., Disp: 90 tablet, Rfl: 3    memantine (NAMENDA) 5 MG Tab, TAKE 1 TABLET(5 MG) BY MOUTH TWICE DAILY FOR MEMORY, Disp: 180 tablet, Rfl: 3    potassium chloride SA (K-DUR,KLOR-CON) 20 MEQ tablet, Take 1 tablet (20 mEq total) by mouth once daily., Disp: 90 tablet, Rfl: 3    sucralfate (CARAFATE) 1 gram tablet, TAKE 1 TABLET(1 GRAM) BY MOUTH THREE TIMES DAILY, Disp: 270 tablet, Rfl: 3    terazosin (HYTRIN) 5 MG capsule, Take 1 capsule (5 mg total) by mouth every evening., Disp: 90 capsule, Rfl: 3      History:  Current Providers as of 8/28/2023  PCP: Rudy Cruz MD  Care Team Provider: New Mehta MD  Care Team Provider: David Fried MD  Care Team Provider: Pinky Grajeda RD  Care Team Provider: Stevo Byers, DO  Encounter Provider: Rudy Cruz MD, starting on Mon Aug 28, 2023 12:00 AM  Referring Provider: not found, starting on Mon Aug 28, 2023 12:00  AM  Consulting Physician: Rudy Cruz MD, starting on Mon Aug 28, 2023 10:20 AM (Active)   Past Medical History:   Diagnosis Date    Hyperlipidemia     Hypertension     right Occipital stroke 2022    TIA (transient ischemic attack) 2022    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    HENT:  Positive for hearing loss.    Respiratory: Negative.     Cardiovascular: Negative.    Gastrointestinal: Negative.    Endocrine: Negative.    Genitourinary: Negative.    Musculoskeletal: Negative.    Psychiatric/Behavioral: Negative.     All other systems reviewed and are negative.    Objective:     Physical Exam  Vitals and nursing note reviewed.   Constitutional:       General: She is not in acute distress.     Appearance: She is well-developed. She is obese. She is not ill-appearing, toxic-appearing or diaphoretic.   HENT:      Head: Normocephalic.   Eyes:      Conjunctiva/sclera: Conjunctivae normal.      Pupils: Pupils are equal, round, and reactive to light.   Cardiovascular:      Rate and Rhythm: Normal rate and regular rhythm.      Heart sounds: Normal heart sounds.   Pulmonary:      Effort: Pulmonary effort is normal.      Breath sounds: Normal breath sounds.   Musculoskeletal:         General: Normal range of motion.      Cervical back: Normal range of motion and neck supple.   Skin:     General: Skin is warm and dry.   Neurological:      Mental Status: She is alert and  oriented to person, place, and time.      Deep Tendon Reflexes: Reflexes are normal and symmetric.   Psychiatric:         Mood and Affect: Mood normal.         Behavior: Behavior normal.         Thought Content: Thought content normal.         Judgment: Judgment normal.             Assessment:     1. Type 2 diabetes mellitus with hyperlipidemia    2. Anxiety    3. Atherosclerosis of aorta    4. PAD (peripheral artery disease)    5. Obesity, diabetes, and hypertension syndrome    6. History of cerebral infarction    7. 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 Primary hypertension    9. Asymptomatic menopausal state      Plan:        Medication List            Accurate as of August 28, 2023 11:59 PM. If you have any questions, ask your nurse or doctor.                CONTINUE taking these medications      aspirin 81 MG EC tablet  Commonly known as: ECOTRIN     atorvastatin 40 MG tablet  Commonly known as: LIPITOR  TAKE 1 TABLET(40 MG) BY MOUTH EVERY DAY     blood sugar diagnostic Strp     blood-glucose meter kit  Use daily     CENTRUM SILVER ORAL     cholecalciferol (vitamin D3) 125 mcg (5,000 unit) Tab  Commonly known as: VITAMIN D3  Take 1 tablet (5,000 Units total) by mouth once daily.     clopidogreL 75 mg tablet  Commonly known as: PLAVIX  TAKE 1 TABLET(75 MG) BY MOUTH EVERY DAY FOR 21 DAYS     cyanocobalamin (vitamin B-12) 1,000 mcg Lozg  Place 1 lozenge under the tongue Daily. For B12 and nerves     donepeziL 5 MG tablet  Commonly known as: ARICEPT  TAKE 1 TABLET(5 MG) BY MOUTH EVERY EVENING FOR BLOOD PRESSURE     furosemide 40 MG tablet  Commonly known as: LASIX  Take 2 tablets (80 mg total) by mouth 2 (two) times a day. For one week, then 1 bid     irbesartan 300 MG tablet  Commonly known as: AVAPRO  TAKE 1 TABLET(300 MG) BY MOUTH EVERY DAY     isosorbide mononitrate 60 MG 24 hr tablet  Commonly known as: IMDUR  TAKE 1 TABLET(60 MG) BY MOUTH EVERY DAY     lancets 31  gauge Misc     levothyroxine 25 MCG tablet  Commonly known as: SYNTHROID  Take 1 tablet (25 mcg total) by mouth before breakfast.     memantine 5 MG Tab  Commonly known as: NAMENDA  TAKE 1 TABLET(5 MG) BY MOUTH TWICE DAILY FOR MEMORY     potassium chloride SA 20 MEQ tablet  Commonly known as: K-DUR,KLOR-CON  Take 1 tablet (20 mEq total) by mouth once daily.     sucralfate 1 gram tablet  Commonly known as: CARAFATE  TAKE 1 TABLET(1 GRAM) BY MOUTH THREE TIMES DAILY     terazosin 5 MG capsule  Commonly known as: HYTRIN  Take 1 capsule (5 mg total) by mouth every evening.            Type 2 diabetes mellitus with hyperlipidemia  -     Basic Metabolic Panel; Future; Expected date: 08/28/2023  -     Comprehensive Metabolic Panel; Future; Expected date: 08/28/2023  -     Hemoglobin A1C; Future  -     Lipid Panel; Future  -     Microalbumin/Creatinine Ratio, Urine; Future  -     TSH; Future  -     Urinalysis; Future  -     DXA Bone Density Axial Skeleton 1 or more sites; Future; Expected date: 08/28/2023    Anxiety  -     Basic Metabolic Panel; Future; Expected date: 08/28/2023  -     Comprehensive Metabolic Panel; Future; Expected date: 08/28/2023  -     Hemoglobin A1C; Future  -     Lipid Panel; Future  -     Microalbumin/Creatinine Ratio, Urine; Future  -     TSH; Future  -     Urinalysis; Future  -     DXA Bone Density Axial Skeleton 1 or more sites; Future; Expected date: 08/28/2023    Atherosclerosis of aorta  -     Basic Metabolic Panel; Future; Expected date: 08/28/2023  -     Comprehensive Metabolic Panel; Future; Expected date: 08/28/2023  -     Hemoglobin A1C; Future  -     Lipid Panel; Future  -     Microalbumin/Creatinine Ratio, Urine; Future  -     TSH; Future  -     Urinalysis; Future  -     DXA Bone Density Axial Skeleton 1 or more sites; Future; Expected date: 08/28/2023    PAD (peripheral artery disease)  -     Basic Metabolic Panel; Future; Expected date: 08/28/2023  -     Comprehensive Metabolic Panel;  Future; Expected date: 08/28/2023  -     Hemoglobin A1C; Future  -     Lipid Panel; Future  -     Microalbumin/Creatinine Ratio, Urine; Future  -     TSH; Future  -     Urinalysis; Future  -     DXA Bone Density Axial Skeleton 1 or more sites; Future; Expected date: 08/28/2023    Obesity, diabetes, and hypertension syndrome  -     Basic Metabolic Panel; Future; Expected date: 08/28/2023  -     Comprehensive Metabolic Panel; Future; Expected date: 08/28/2023  -     Hemoglobin A1C; Future  -     Lipid Panel; Future  -     Microalbumin/Creatinine Ratio, Urine; Future  -     TSH; Future  -     Urinalysis; Future  -     DXA Bone Density Axial Skeleton 1 or more sites; Future; Expected date: 08/28/2023    History of cerebral infarction  -     Basic Metabolic Panel; Future; Expected date: 08/28/2023  -     Comprehensive Metabolic Panel; Future; Expected date: 08/28/2023  -     Hemoglobin A1C; Future  -     Lipid Panel; Future  -     Microalbumin/Creatinine Ratio, Urine; Future  -     TSH; Future  -     Urinalysis; Future  -     DXA Bone Density Axial Skeleton 1 or more sites; Future; Expected date: 08/28/2023    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  -     Basic Metabolic Panel; Future; Expected date: 08/28/2023  -     Comprehensive Metabolic Panel; Future; Expected date: 08/28/2023  -     Hemoglobin A1C; Future  -     Lipid Panel; Future  -     Microalbumin/Creatinine Ratio, Urine; Future  -     TSH; Future  -     Urinalysis; Future  -     DXA Bone Density Axial Skeleton 1 or more sites; Future; Expected date: 08/28/2023    Primary hypertension  -     Basic Metabolic Panel; Future; Expected date: 08/28/2023  -     Comprehensive Metabolic Panel; Future; Expected date: 08/28/2023  -     Hemoglobin A1C; Future  -     Lipid Panel; Future  -     Microalbumin/Creatinine Ratio, Urine; Future  -     TSH; Future  -     Urinalysis; Future  -     DXA Bone Density Axial Skeleton 1 or more  sites; Future; Expected date: 08/28/2023    Asymptomatic menopausal state  -     DXA Bone Density Axial Skeleton 1 or more sites; Future; Expected date: 08/28/2023

## 2023-09-06 ENCOUNTER — HOSPITAL ENCOUNTER (OUTPATIENT)
Dept: RADIOLOGY | Facility: HOSPITAL | Age: 83
Discharge: HOME | End: 2023-09-06
Attending: FAMILY MEDICINE
Payer: MEDICARE

## 2023-09-06 DIAGNOSIS — I15.2 OBESITY, DIABETES, AND HYPERTENSION SYNDROME: ICD-10-CM

## 2023-09-06 DIAGNOSIS — E78.5 TYPE 2 DIABETES MELLITUS WITH HYPERLIPIDEMIA: ICD-10-CM

## 2023-09-06 DIAGNOSIS — Z78.0 ASYMPTOMATIC MENOPAUSAL STATE: ICD-10-CM

## 2023-09-06 DIAGNOSIS — E11.69 OBESITY, DIABETES, AND HYPERTENSION SYNDROME: ICD-10-CM

## 2023-09-06 DIAGNOSIS — E11.59 OBESITY, DIABETES, AND HYPERTENSION SYNDROME: ICD-10-CM

## 2023-09-06 DIAGNOSIS — I70.0 ATHEROSCLEROSIS OF AORTA: ICD-10-CM

## 2023-09-06 DIAGNOSIS — E11.69 TYPE 2 DIABETES MELLITUS WITH HYPERLIPIDEMIA: ICD-10-CM

## 2023-09-06 DIAGNOSIS — E11.22 TYPE 2 DIABETES MELLITUS WITH STAGE 3A CHRONIC KIDNEY DISEASE AND HYPERTENSION: ICD-10-CM

## 2023-09-06 DIAGNOSIS — I12.9 TYPE 2 DIABETES MELLITUS WITH STAGE 3A CHRONIC KIDNEY DISEASE AND HYPERTENSION: ICD-10-CM

## 2023-09-06 DIAGNOSIS — E66.9 OBESITY, DIABETES, AND HYPERTENSION SYNDROME: ICD-10-CM

## 2023-09-06 DIAGNOSIS — F41.9 ANXIETY: ICD-10-CM

## 2023-09-06 DIAGNOSIS — I73.9 PAD (PERIPHERAL ARTERY DISEASE): ICD-10-CM

## 2023-09-06 DIAGNOSIS — Z86.73 HISTORY OF CEREBRAL INFARCTION: ICD-10-CM

## 2023-09-06 DIAGNOSIS — I10 PRIMARY HYPERTENSION: ICD-10-CM

## 2023-09-06 DIAGNOSIS — N18.31 TYPE 2 DIABETES MELLITUS WITH STAGE 3A CHRONIC KIDNEY DISEASE AND HYPERTENSION: ICD-10-CM

## 2023-09-06 PROCEDURE — 77080 DXA BONE DENSITY AXIAL: CPT | Mod: 26,,, | Performed by: RADIOLOGY

## 2023-09-06 PROCEDURE — 77080 DXA BONE DENSITY AXIAL SKELETON 1 OR MORE SITES: ICD-10-PCS | Mod: 26,,, | Performed by: RADIOLOGY

## 2023-09-06 PROCEDURE — 77080 DXA BONE DENSITY AXIAL: CPT | Mod: TC,PO

## 2023-09-14 ENCOUNTER — TELEPHONE (OUTPATIENT)
Dept: FAMILY MEDICINE | Facility: CLINIC | Age: 83
End: 2023-09-14
Payer: MEDICARE

## 2023-09-14 DIAGNOSIS — E03.4 HYPOTHYROIDISM DUE TO ACQUIRED ATROPHY OF THYROID: Primary | ICD-10-CM

## 2023-09-14 NOTE — TELEPHONE ENCOUNTER
----- Message from Rudy Cruz MD sent at 9/14/2023  7:59 AM CDT -----  TSH went up a little; rest of labs good; no change in thryoid medicine; repeat TSH 3 months

## 2023-09-14 NOTE — TELEPHONE ENCOUNTER
----- Message from Rudy Cruz MD sent at 9/14/2023  8:01 AM CDT -----  Mild osteopenia; no change in treatment

## 2023-09-15 NOTE — TELEPHONE ENCOUNTER
Spoke with Niece and inform her of lab results and dxa results and dr courtney,Repeat labs in 3 mths already schedule and Yariel will inform pt.

## 2023-09-21 RX ORDER — SUCRALFATE 1 G/1
TABLET ORAL
Qty: 270 TABLET | Refills: 3 | Status: SHIPPED | OUTPATIENT
Start: 2023-09-21

## 2023-10-12 ENCOUNTER — PATIENT MESSAGE (OUTPATIENT)
Dept: RESPIRATORY THERAPY | Facility: HOSPITAL | Age: 83
End: 2023-10-12
Payer: MEDICARE

## 2023-10-12 ENCOUNTER — OFFICE VISIT (OUTPATIENT)
Dept: CARDIOLOGY | Facility: CLINIC | Age: 83
End: 2023-10-12
Payer: MEDICARE

## 2023-10-12 VITALS
DIASTOLIC BLOOD PRESSURE: 76 MMHG | WEIGHT: 203.06 LBS | SYSTOLIC BLOOD PRESSURE: 119 MMHG | HEART RATE: 84 BPM | BODY MASS INDEX: 34.67 KG/M2 | OXYGEN SATURATION: 96 % | HEIGHT: 64 IN

## 2023-10-12 DIAGNOSIS — R60.0 BILATERAL LEG EDEMA: ICD-10-CM

## 2023-10-12 DIAGNOSIS — E11.22 TYPE 2 DIABETES MELLITUS WITH STAGE 3A CHRONIC KIDNEY DISEASE AND HYPERTENSION: ICD-10-CM

## 2023-10-12 DIAGNOSIS — E66.9 OBESITY, DIABETES, AND HYPERTENSION SYNDROME: ICD-10-CM

## 2023-10-12 DIAGNOSIS — N18.31 TYPE 2 DIABETES MELLITUS WITH STAGE 3A CHRONIC KIDNEY DISEASE AND HYPERTENSION: ICD-10-CM

## 2023-10-12 DIAGNOSIS — E66.01 SEVERE OBESITY (BMI 35.0-39.9) WITH COMORBIDITY: ICD-10-CM

## 2023-10-12 DIAGNOSIS — I70.0 ATHEROSCLEROSIS OF AORTA: ICD-10-CM

## 2023-10-12 DIAGNOSIS — E78.5 HYPERLIPIDEMIA, UNSPECIFIED HYPERLIPIDEMIA TYPE: ICD-10-CM

## 2023-10-12 DIAGNOSIS — I12.9 TYPE 2 DIABETES MELLITUS WITH STAGE 3A CHRONIC KIDNEY DISEASE AND HYPERTENSION: ICD-10-CM

## 2023-10-12 DIAGNOSIS — E11.69 OBESITY, DIABETES, AND HYPERTENSION SYNDROME: ICD-10-CM

## 2023-10-12 DIAGNOSIS — I73.9 PAD (PERIPHERAL ARTERY DISEASE): Primary | ICD-10-CM

## 2023-10-12 DIAGNOSIS — I10 PRIMARY HYPERTENSION: ICD-10-CM

## 2023-10-12 DIAGNOSIS — Z86.73 HISTORY OF CEREBRAL INFARCTION: ICD-10-CM

## 2023-10-12 DIAGNOSIS — E11.59 OBESITY, DIABETES, AND HYPERTENSION SYNDROME: ICD-10-CM

## 2023-10-12 DIAGNOSIS — I15.2 OBESITY, DIABETES, AND HYPERTENSION SYNDROME: ICD-10-CM

## 2023-10-12 PROCEDURE — 99999 PR PBB SHADOW E&M-EST. PATIENT-LVL IV: CPT | Mod: PBBFAC,,, | Performed by: INTERNAL MEDICINE

## 2023-10-12 PROCEDURE — 1101F PR PT FALLS ASSESS DOC 0-1 FALLS W/OUT INJ PAST YR: ICD-10-PCS | Mod: CPTII,S$GLB,, | Performed by: INTERNAL MEDICINE

## 2023-10-12 PROCEDURE — 3288F FALL RISK ASSESSMENT DOCD: CPT | Mod: CPTII,S$GLB,, | Performed by: INTERNAL MEDICINE

## 2023-10-12 PROCEDURE — 3078F PR MOST RECENT DIASTOLIC BLOOD PRESSURE < 80 MM HG: ICD-10-PCS | Mod: CPTII,S$GLB,, | Performed by: INTERNAL MEDICINE

## 2023-10-12 PROCEDURE — 99999 PR PBB SHADOW E&M-EST. PATIENT-LVL IV: ICD-10-PCS | Mod: PBBFAC,,, | Performed by: INTERNAL MEDICINE

## 2023-10-12 PROCEDURE — 1157F ADVNC CARE PLAN IN RCRD: CPT | Mod: CPTII,S$GLB,, | Performed by: INTERNAL MEDICINE

## 2023-10-12 PROCEDURE — 1159F PR MEDICATION LIST DOCUMENTED IN MEDICAL RECORD: ICD-10-PCS | Mod: CPTII,S$GLB,, | Performed by: INTERNAL MEDICINE

## 2023-10-12 PROCEDURE — 3078F DIAST BP <80 MM HG: CPT | Mod: CPTII,S$GLB,, | Performed by: INTERNAL MEDICINE

## 2023-10-12 PROCEDURE — 3074F PR MOST RECENT SYSTOLIC BLOOD PRESSURE < 130 MM HG: ICD-10-PCS | Mod: CPTII,S$GLB,, | Performed by: INTERNAL MEDICINE

## 2023-10-12 PROCEDURE — 1160F PR REVIEW ALL MEDS BY PRESCRIBER/CLIN PHARMACIST DOCUMENTED: ICD-10-PCS | Mod: CPTII,S$GLB,, | Performed by: INTERNAL MEDICINE

## 2023-10-12 PROCEDURE — 1101F PT FALLS ASSESS-DOCD LE1/YR: CPT | Mod: CPTII,S$GLB,, | Performed by: INTERNAL MEDICINE

## 2023-10-12 PROCEDURE — 1160F RVW MEDS BY RX/DR IN RCRD: CPT | Mod: CPTII,S$GLB,, | Performed by: INTERNAL MEDICINE

## 2023-10-12 PROCEDURE — 1126F AMNT PAIN NOTED NONE PRSNT: CPT | Mod: CPTII,S$GLB,, | Performed by: INTERNAL MEDICINE

## 2023-10-12 PROCEDURE — 1126F PR PAIN SEVERITY QUANTIFIED, NO PAIN PRESENT: ICD-10-PCS | Mod: CPTII,S$GLB,, | Performed by: INTERNAL MEDICINE

## 2023-10-12 PROCEDURE — 1159F MED LIST DOCD IN RCRD: CPT | Mod: CPTII,S$GLB,, | Performed by: INTERNAL MEDICINE

## 2023-10-12 PROCEDURE — 3288F PR FALLS RISK ASSESSMENT DOCUMENTED: ICD-10-PCS | Mod: CPTII,S$GLB,, | Performed by: INTERNAL MEDICINE

## 2023-10-12 PROCEDURE — 99214 OFFICE O/P EST MOD 30 MIN: CPT | Mod: S$GLB,,, | Performed by: INTERNAL MEDICINE

## 2023-10-12 PROCEDURE — 1157F PR ADVANCE CARE PLAN OR EQUIV PRESENT IN MEDICAL RECORD: ICD-10-PCS | Mod: CPTII,S$GLB,, | Performed by: INTERNAL MEDICINE

## 2023-10-12 PROCEDURE — 99214 PR OFFICE/OUTPT VISIT, EST, LEVL IV, 30-39 MIN: ICD-10-PCS | Mod: S$GLB,,, | Performed by: INTERNAL MEDICINE

## 2023-10-12 PROCEDURE — 3074F SYST BP LT 130 MM HG: CPT | Mod: CPTII,S$GLB,, | Performed by: INTERNAL MEDICINE

## 2023-10-12 RX ORDER — ISOSORBIDE MONONITRATE 30 MG/1
30 TABLET, EXTENDED RELEASE ORAL DAILY
Qty: 90 TABLET | Refills: 3 | Status: SHIPPED | OUTPATIENT
Start: 2023-10-12

## 2023-10-12 NOTE — PROGRESS NOTES
"Subjective:    Patient ID:  Katy Soto is a 83 y.o. female who presents for follow-up of Peripheral Arterial Disease      HPI    82 y/o female with hx of PAD s/p PTA to RPTA with resolution of severe Balta IIb lifestyle limiting claudication, HTN, HLD who presents for f/u. Initially seen for claudication, intervention performed to RLE with resolution of claudication. No claudication of LLE. Back then she was confused as to her meds and claims to have had a nosebleed from Diltiazem and had not been taking Dilt or Pletal (I suspect it was Pletal that caused the nosebleed). Had Covid infection 4/2020 with full recovery. Uncontrolled BP and confused as to her meds and how/when she takes them. Family stated that she had been noncompliant and after discussion, admitted to med noncompliance and dietary noncompliance. Record reviewed and she has been on multiple regimens.   BP being managed by Dr Cruz and she states it has been 130-150's at home. Did not take lasix today. Does not take her meds at times bc she "feels good." Denies CP, SOB/SPIVEY, orthopnea, PND, syncope, palps, claudication. 2DE with normal EF and LVH.    7/13/2023:  Since last visit, was following with NP. Has had recent medication changes. Initially had volume overload from dietary indiscretion. Dr Cruz regulating BP meds. Lasix increased and spironolactone added and volume removed and spironolactone discontinued 1 week ago. Currently on lasix 40 mg (decreased), irbesartan 300 mg, isosorbide 60 mg. Since taking her meds this morning she feels dizzy, sluggish, lethargic, and weak. SBP  in clinic.     10/12/2023:  Hx of stroke 2022, on Plavix since. Since last visit off spironolactone and taking 1/2 60 mg tab of Imdur and BP controlled. No new cardiac complaints and no claudication. Compliant with meds. Intermittent bilateral LE edema after high sodium meal. Attempting to stay active.      Review of Systems   Constitutional: Positive for " malaise/fatigue.   HENT:  Negative for congestion.    Eyes:  Negative for blurred vision.   Cardiovascular:  Positive for dyspnea on exertion. Negative for chest pain, claudication, cyanosis, irregular heartbeat, leg swelling, near-syncope, orthopnea, palpitations, paroxysmal nocturnal dyspnea and syncope.   Respiratory:  Negative for shortness of breath.    Endocrine: Negative for polyuria.   Hematologic/Lymphatic: Negative for bleeding problem.   Skin:  Negative for itching and rash.   Musculoskeletal:  Negative for joint swelling, muscle cramps and muscle weakness.   Gastrointestinal:  Negative for abdominal pain, hematemesis, hematochezia, melena, nausea and vomiting.   Genitourinary:  Negative for dysuria and hematuria.   Neurological:  Positive for dizziness and weakness. Negative for focal weakness, headaches, light-headedness and loss of balance.   Psychiatric/Behavioral:  Negative for depression. The patient is not nervous/anxious.         Objective:    Physical Exam  Constitutional:       Appearance: She is well-developed.   HENT:      Head: Normocephalic and atraumatic.   Neck:      Vascular: No JVD.   Cardiovascular:      Rate and Rhythm: Normal rate and regular rhythm.      Pulses:           Carotid pulses are 2+ on the right side and 2+ on the left side.       Radial pulses are 2+ on the right side and 2+ on the left side.        Femoral pulses are 2+ on the right side and 2+ on the left side.       Posterior tibial pulses are 1+ on the right side and 1+ on the left side.      Heart sounds: Normal heart sounds.      Comments: Monophasic doppler RDP  Biphasic doppler RPT  Biphasic doppler LDP and LPT      Pulmonary:      Effort: Pulmonary effort is normal.      Breath sounds: Normal breath sounds.   Abdominal:      General: Bowel sounds are normal.      Palpations: Abdomen is soft.   Musculoskeletal:      Cervical back: Neck supple.   Skin:     General: Skin is warm and dry.   Neurological:      Mental  Status: She is alert and oriented to person, place, and time.   Psychiatric:         Behavior: Behavior normal.         Thought Content: Thought content normal.           Assessment:       1. PAD (peripheral artery disease)    2. Primary hypertension    3. Hyperlipidemia, unspecified hyperlipidemia type    4. Atherosclerosis of aorta    5. Obesity, diabetes, and hypertension syndrome    6. Severe obesity (BMI 35.0-39.9) with comorbidity    7. 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 Bilateral leg edema    9. History of cerebral infarction      84 y/o pt with hx and presentation as above. Doing well from a cardiac perspective and compensated from a HF perspective. PAD stable and no symptoms - cont ASA/statin. BP controlled. Needs to stay active. Discussed the etiology, evaluation, and management of HTN, PAD, HLD, ATH, obesity, leg edema. Discussed the importance of med compliance, heart healthy diet, and regular exercise.      Plan:       -Imdur 30 mg tabs  -Walking program  -f/u in 1 year

## 2023-10-24 ENCOUNTER — LAB VISIT (OUTPATIENT)
Dept: LAB | Facility: HOSPITAL | Age: 83
End: 2023-10-24
Attending: PODIATRIST
Payer: MEDICARE

## 2023-10-24 DIAGNOSIS — B35.1 DERMATOPHYTOSIS OF NAIL: Primary | ICD-10-CM

## 2023-10-24 LAB
ALT SERPL W/O P-5'-P-CCNC: 17 U/L (ref 10–44)
AST SERPL-CCNC: 25 U/L (ref 15–46)

## 2023-10-24 PROCEDURE — 36415 COLL VENOUS BLD VENIPUNCTURE: CPT | Mod: PN | Performed by: PODIATRIST

## 2023-10-24 PROCEDURE — 84450 TRANSFERASE (AST) (SGOT): CPT | Mod: PN | Performed by: PODIATRIST

## 2023-10-24 PROCEDURE — 84460 ALANINE AMINO (ALT) (SGPT): CPT | Mod: PN | Performed by: PODIATRIST

## 2023-11-29 ENCOUNTER — OFFICE VISIT (OUTPATIENT)
Dept: FAMILY MEDICINE | Facility: CLINIC | Age: 83
End: 2023-11-29
Payer: MEDICARE

## 2023-11-29 VITALS
HEART RATE: 90 BPM | BODY MASS INDEX: 34.97 KG/M2 | OXYGEN SATURATION: 98 % | DIASTOLIC BLOOD PRESSURE: 58 MMHG | TEMPERATURE: 98 F | SYSTOLIC BLOOD PRESSURE: 122 MMHG | WEIGHT: 204.81 LBS | HEIGHT: 64 IN

## 2023-11-29 DIAGNOSIS — E78.5 TYPE 2 DIABETES MELLITUS WITH HYPERLIPIDEMIA: Primary | ICD-10-CM

## 2023-11-29 DIAGNOSIS — I15.2 OBESITY, DIABETES, AND HYPERTENSION SYNDROME: ICD-10-CM

## 2023-11-29 DIAGNOSIS — F03.90 DEMENTIA, UNSPECIFIED DEMENTIA SEVERITY, UNSPECIFIED DEMENTIA TYPE, UNSPECIFIED WHETHER BEHAVIORAL, PSYCHOTIC, OR MOOD DISTURBANCE OR ANXIETY: ICD-10-CM

## 2023-11-29 DIAGNOSIS — E11.69 TYPE 2 DIABETES MELLITUS WITH HYPERLIPIDEMIA: Primary | ICD-10-CM

## 2023-11-29 DIAGNOSIS — I70.0 ATHEROSCLEROSIS OF AORTA: ICD-10-CM

## 2023-11-29 DIAGNOSIS — I73.9 PAD (PERIPHERAL ARTERY DISEASE): ICD-10-CM

## 2023-11-29 DIAGNOSIS — E66.9 OBESITY, DIABETES, AND HYPERTENSION SYNDROME: ICD-10-CM

## 2023-11-29 DIAGNOSIS — E11.59 OBESITY, DIABETES, AND HYPERTENSION SYNDROME: ICD-10-CM

## 2023-11-29 DIAGNOSIS — E03.4 HYPOTHYROIDISM DUE TO ACQUIRED ATROPHY OF THYROID: ICD-10-CM

## 2023-11-29 DIAGNOSIS — Z86.73 HISTORY OF CEREBRAL INFARCTION: ICD-10-CM

## 2023-11-29 DIAGNOSIS — E11.69 OBESITY, DIABETES, AND HYPERTENSION SYNDROME: ICD-10-CM

## 2023-11-29 PROCEDURE — 99214 OFFICE O/P EST MOD 30 MIN: CPT | Mod: S$GLB,,, | Performed by: FAMILY MEDICINE

## 2023-11-29 PROCEDURE — G0008 ADMIN INFLUENZA VIRUS VAC: HCPCS | Mod: S$GLB,,, | Performed by: FAMILY MEDICINE

## 2023-11-29 PROCEDURE — 1157F ADVNC CARE PLAN IN RCRD: CPT | Mod: CPTII,S$GLB,, | Performed by: FAMILY MEDICINE

## 2023-11-29 PROCEDURE — 90694 FLU VACCINE - QUADRIVALENT - ADJUVANTED: ICD-10-PCS | Mod: S$GLB,,, | Performed by: FAMILY MEDICINE

## 2023-11-29 PROCEDURE — 3074F PR MOST RECENT SYSTOLIC BLOOD PRESSURE < 130 MM HG: ICD-10-PCS | Mod: CPTII,S$GLB,, | Performed by: FAMILY MEDICINE

## 2023-11-29 PROCEDURE — 1125F AMNT PAIN NOTED PAIN PRSNT: CPT | Mod: CPTII,S$GLB,, | Performed by: FAMILY MEDICINE

## 2023-11-29 PROCEDURE — 99214 PR OFFICE/OUTPT VISIT, EST, LEVL IV, 30-39 MIN: ICD-10-PCS | Mod: S$GLB,,, | Performed by: FAMILY MEDICINE

## 2023-11-29 PROCEDURE — 1159F MED LIST DOCD IN RCRD: CPT | Mod: CPTII,S$GLB,, | Performed by: FAMILY MEDICINE

## 2023-11-29 PROCEDURE — 1125F PR PAIN SEVERITY QUANTIFIED, PAIN PRESENT: ICD-10-PCS | Mod: CPTII,S$GLB,, | Performed by: FAMILY MEDICINE

## 2023-11-29 PROCEDURE — 90694 VACC AIIV4 NO PRSRV 0.5ML IM: CPT | Mod: S$GLB,,, | Performed by: FAMILY MEDICINE

## 2023-11-29 PROCEDURE — G0008 FLU VACCINE - QUADRIVALENT - ADJUVANTED: ICD-10-PCS | Mod: S$GLB,,, | Performed by: FAMILY MEDICINE

## 2023-11-29 PROCEDURE — 3074F SYST BP LT 130 MM HG: CPT | Mod: CPTII,S$GLB,, | Performed by: FAMILY MEDICINE

## 2023-11-29 PROCEDURE — 1157F PR ADVANCE CARE PLAN OR EQUIV PRESENT IN MEDICAL RECORD: ICD-10-PCS | Mod: CPTII,S$GLB,, | Performed by: FAMILY MEDICINE

## 2023-11-29 PROCEDURE — 1160F PR REVIEW ALL MEDS BY PRESCRIBER/CLIN PHARMACIST DOCUMENTED: ICD-10-PCS | Mod: CPTII,S$GLB,, | Performed by: FAMILY MEDICINE

## 2023-11-29 PROCEDURE — 1159F PR MEDICATION LIST DOCUMENTED IN MEDICAL RECORD: ICD-10-PCS | Mod: CPTII,S$GLB,, | Performed by: FAMILY MEDICINE

## 2023-11-29 PROCEDURE — 3078F PR MOST RECENT DIASTOLIC BLOOD PRESSURE < 80 MM HG: ICD-10-PCS | Mod: CPTII,S$GLB,, | Performed by: FAMILY MEDICINE

## 2023-11-29 PROCEDURE — 1160F RVW MEDS BY RX/DR IN RCRD: CPT | Mod: CPTII,S$GLB,, | Performed by: FAMILY MEDICINE

## 2023-11-29 PROCEDURE — 3078F DIAST BP <80 MM HG: CPT | Mod: CPTII,S$GLB,, | Performed by: FAMILY MEDICINE

## 2023-12-15 ENCOUNTER — LAB VISIT (OUTPATIENT)
Dept: LAB | Facility: HOSPITAL | Age: 83
End: 2023-12-15
Attending: FAMILY MEDICINE
Payer: MEDICARE

## 2023-12-15 DIAGNOSIS — E78.5 HYPERLIPIDEMIA, UNSPECIFIED HYPERLIPIDEMIA TYPE: ICD-10-CM

## 2023-12-15 DIAGNOSIS — E03.4 HYPOTHYROIDISM DUE TO ACQUIRED ATROPHY OF THYROID: ICD-10-CM

## 2023-12-15 DIAGNOSIS — E11.9 TYPE 2 DIABETES MELLITUS WITHOUT COMPLICATION, WITHOUT LONG-TERM CURRENT USE OF INSULIN: ICD-10-CM

## 2023-12-15 LAB
ESTIMATED AVG GLUCOSE: 134 MG/DL (ref 68–131)
HBA1C MFR BLD: 6.3 % (ref 4–5.6)
T4 FREE SERPL-MCNC: 0.86 NG/DL (ref 0.71–1.51)
TSH SERPL DL<=0.005 MIU/L-ACNC: 4.11 UIU/ML (ref 0.4–4)

## 2023-12-15 PROCEDURE — 36415 COLL VENOUS BLD VENIPUNCTURE: CPT | Mod: PN | Performed by: FAMILY MEDICINE

## 2023-12-15 PROCEDURE — 84439 ASSAY OF FREE THYROXINE: CPT | Performed by: FAMILY MEDICINE

## 2023-12-15 PROCEDURE — 83036 HEMOGLOBIN GLYCOSYLATED A1C: CPT | Performed by: FAMILY MEDICINE

## 2023-12-15 PROCEDURE — 84443 ASSAY THYROID STIM HORMONE: CPT | Mod: PN | Performed by: FAMILY MEDICINE

## 2024-01-19 NOTE — TELEPHONE ENCOUNTER
Refill Routing Note   Medication(s) are not appropriate for processing by [facility] for the following reason(s):        Clarification of medication (Rx) details    [facility] action(s):  Defer        Medication Therapy Plan: Adjusted sig/qty to reflect current dosage pending provider review      Appointments  past 12m or future 3m with PCP    Date Provider   Last Visit   11/29/2023 Rudy Cruz MD   Next Visit   6/3/2024 Rudy Cruz MD   ED visits in past 90 days: 0        Note composed:12:13 PM 01/19/2024

## 2024-01-19 NOTE — TELEPHONE ENCOUNTER
No care due was identified.  [facility] Embedded Care Due Messages. Reference number: [id number].   1/19/2024 3:08:22 AM CST

## 2024-01-20 RX ORDER — FUROSEMIDE 40 MG/1
40 TABLET ORAL 2 TIMES DAILY
Qty: 180 TABLET | Refills: 3 | Status: SHIPPED | OUTPATIENT
Start: 2024-01-20

## 2024-02-18 RX ORDER — ATORVASTATIN CALCIUM 40 MG/1
TABLET, FILM COATED ORAL
Qty: 90 TABLET | Refills: 1 | Status: SHIPPED | OUTPATIENT
Start: 2024-02-18

## 2024-02-18 NOTE — TELEPHONE ENCOUNTER
Care Due:                  Date            Visit Type   Department     Provider  --------------------------------------------------------------------------------                                EP -                              PRIMARY      [facility] FAMILY  Last Visit: 11-      CARE ([facility])   YOHANA Cruz                               -                              PRIMARY      [facility]  Next Visit: 06-      CARE ([facility])   YOHANA Cruz                                                            Last  Test          Frequency    Reason                     Performed    Due Date  --------------------------------------------------------------------------------    Vitamin D...  12 months..  cholecalciferol,.........  03- 03-    [facility] Embedded Care Due Messages. Reference number: [id number].   2/18/2024 6:14:58 AM CST

## 2024-02-18 NOTE — TELEPHONE ENCOUNTER
Refill Decision Note   Katy Soto  is requesting a refill authorization.  Brief Assessment and Rationale for Refill:  Approve     Medication Therapy Plan:        Comments:     Note composed:9:42 AM 02/18/2024

## 2024-05-18 DIAGNOSIS — E78.5 HYPERLIPIDEMIA, UNSPECIFIED HYPERLIPIDEMIA TYPE: ICD-10-CM

## 2024-05-18 DIAGNOSIS — I10 ESSENTIAL HYPERTENSION: ICD-10-CM

## 2024-05-18 DIAGNOSIS — Z78.0 ASYMPTOMATIC POSTMENOPAUSAL STATUS: ICD-10-CM

## 2024-05-18 DIAGNOSIS — E11.9 TYPE 2 DIABETES MELLITUS WITHOUT COMPLICATION, WITHOUT LONG-TERM CURRENT USE OF INSULIN: ICD-10-CM

## 2024-05-18 DIAGNOSIS — H91.90 HEARING LOSS, UNSPECIFIED HEARING LOSS TYPE, UNSPECIFIED LATERALITY: ICD-10-CM

## 2024-05-18 DIAGNOSIS — I63.9 OCCIPITAL STROKE: ICD-10-CM

## 2024-05-18 DIAGNOSIS — E03.4 HYPOTHYROIDISM DUE TO ACQUIRED ATROPHY OF THYROID: ICD-10-CM

## 2024-05-19 RX ORDER — IRBESARTAN 300 MG/1
TABLET ORAL
Qty: 90 TABLET | Refills: 3 | Status: SHIPPED | OUTPATIENT
Start: 2024-05-19

## 2024-05-19 RX ORDER — CLOPIDOGREL BISULFATE 75 MG/1
TABLET ORAL
Qty: 90 TABLET | Refills: 3 | Status: SHIPPED | OUTPATIENT
Start: 2024-05-19

## 2024-05-19 RX ORDER — MEMANTINE HYDROCHLORIDE 5 MG/1
TABLET ORAL
Qty: 180 TABLET | Refills: 3 | Status: SHIPPED | OUTPATIENT
Start: 2024-05-19

## 2024-05-19 NOTE — TELEPHONE ENCOUNTER
Refill Routing Note   Refill Routing Note   Medication(s) are not appropriate for processing by [facility] for the following reason(s):        Outside of protocol  Required labs abnormal    [facility] action(s):  Approve  Defer  Route      Medication Therapy Plan: Defer: Plavix (abnl labs); Route: Memantine; Approve: Irbesartan      Appointments  past 12m or future 3m with PCP    Date Provider   Last Visit   11/29/2023 Rudy Cruz MD   Next Visit   6/3/2024 Rudy Cruz MD   ED visits in past 90 days: 0        Note composed:4:15 PM 05/19/2024

## 2024-05-24 ENCOUNTER — LAB VISIT (OUTPATIENT)
Dept: LAB | Facility: HOSPITAL | Age: 84
End: 2024-05-24
Attending: FAMILY MEDICINE
Payer: MEDICARE

## 2024-05-24 DIAGNOSIS — E11.9 TYPE 2 DIABETES MELLITUS WITHOUT COMPLICATION, WITHOUT LONG-TERM CURRENT USE OF INSULIN: ICD-10-CM

## 2024-05-24 DIAGNOSIS — E03.4 HYPOTHYROIDISM DUE TO ACQUIRED ATROPHY OF THYROID: ICD-10-CM

## 2024-05-24 DIAGNOSIS — E78.5 TYPE 2 DIABETES MELLITUS WITH HYPERLIPIDEMIA: ICD-10-CM

## 2024-05-24 DIAGNOSIS — E11.69 TYPE 2 DIABETES MELLITUS WITH HYPERLIPIDEMIA: ICD-10-CM

## 2024-05-24 DIAGNOSIS — E78.5 HYPERLIPIDEMIA, UNSPECIFIED HYPERLIPIDEMIA TYPE: ICD-10-CM

## 2024-05-24 LAB
ALBUMIN SERPL BCP-MCNC: 4 G/DL (ref 3.5–5.2)
ALP SERPL-CCNC: 128 U/L (ref 38–126)
ALT SERPL W/O P-5'-P-CCNC: 15 U/L (ref 10–44)
ANION GAP SERPL CALC-SCNC: 13 MMOL/L (ref 8–16)
AST SERPL-CCNC: 21 U/L (ref 15–46)
BASOPHILS # BLD AUTO: 0.04 K/UL (ref 0–0.2)
BASOPHILS NFR BLD: 0.6 % (ref 0–1.9)
BILIRUB SERPL-MCNC: 1 MG/DL (ref 0.1–1)
CALCIUM SERPL-MCNC: 9.7 MG/DL (ref 8.7–10.5)
CHLORIDE SERPL-SCNC: 105 MMOL/L (ref 95–110)
CO2 SERPL-SCNC: 22 MMOL/L (ref 23–29)
CREAT SERPL-MCNC: 1.39 MG/DL (ref 0.5–1.4)
DIFFERENTIAL METHOD BLD: ABNORMAL
EOSINOPHIL # BLD AUTO: 0.2 K/UL (ref 0–0.5)
EOSINOPHIL NFR BLD: 2.4 % (ref 0–8)
ERYTHROCYTE [DISTWIDTH] IN BLOOD BY AUTOMATED COUNT: 13 % (ref 11.5–14.5)
EST. GFR  (NO RACE VARIABLE): 37.4 ML/MIN/1.73 M^2
ESTIMATED AVG GLUCOSE: 128 MG/DL (ref 68–131)
GLUCOSE SERPL-MCNC: 137 MG/DL (ref 70–110)
HBA1C MFR BLD: 6.1 % (ref 4–5.6)
HCT VFR BLD AUTO: 34.4 % (ref 37–48.5)
HGB BLD-MCNC: 11.2 G/DL (ref 12–16)
IMM GRANULOCYTES # BLD AUTO: 0.02 K/UL (ref 0–0.04)
IMM GRANULOCYTES NFR BLD AUTO: 0.3 % (ref 0–0.5)
LYMPHOCYTES # BLD AUTO: 2.9 K/UL (ref 1–4.8)
LYMPHOCYTES NFR BLD: 44.1 % (ref 18–48)
MCH RBC QN AUTO: 30.1 PG (ref 27–31)
MCHC RBC AUTO-ENTMCNC: 32.6 G/DL (ref 32–36)
MCV RBC AUTO: 93 FL (ref 82–98)
MONOCYTES # BLD AUTO: 0.5 K/UL (ref 0.3–1)
MONOCYTES NFR BLD: 7.9 % (ref 4–15)
NEUTROPHILS # BLD AUTO: 3 K/UL (ref 1.8–7.7)
NEUTROPHILS NFR BLD: 44.7 % (ref 38–73)
NRBC BLD-RTO: 0 /100 WBC
PLATELET # BLD AUTO: 220 K/UL (ref 150–450)
PMV BLD AUTO: 9.7 FL (ref 9.2–12.9)
POTASSIUM SERPL-SCNC: 4.1 MMOL/L (ref 3.5–5.1)
PROT SERPL-MCNC: 8 G/DL (ref 6–8.4)
RBC # BLD AUTO: 3.72 M/UL (ref 4–5.4)
SODIUM SERPL-SCNC: 140 MMOL/L (ref 136–145)
T4 FREE SERPL-MCNC: 0.87 NG/DL (ref 0.71–1.51)
TSH SERPL DL<=0.005 MIU/L-ACNC: 4.96 UIU/ML (ref 0.4–4)
UUN UR-MCNC: 20 MG/DL (ref 7–17)
WBC # BLD AUTO: 6.67 K/UL (ref 3.9–12.7)

## 2024-05-24 PROCEDURE — 85025 COMPLETE CBC W/AUTO DIFF WBC: CPT | Mod: PN | Performed by: FAMILY MEDICINE

## 2024-05-24 PROCEDURE — 36415 COLL VENOUS BLD VENIPUNCTURE: CPT | Mod: PN | Performed by: FAMILY MEDICINE

## 2024-05-24 PROCEDURE — 84443 ASSAY THYROID STIM HORMONE: CPT | Mod: PN | Performed by: FAMILY MEDICINE

## 2024-05-24 PROCEDURE — 80053 COMPREHEN METABOLIC PANEL: CPT | Mod: PN | Performed by: FAMILY MEDICINE

## 2024-05-24 PROCEDURE — 83036 HEMOGLOBIN GLYCOSYLATED A1C: CPT | Performed by: FAMILY MEDICINE

## 2024-05-24 PROCEDURE — 84439 ASSAY OF FREE THYROXINE: CPT | Performed by: FAMILY MEDICINE

## 2024-06-03 ENCOUNTER — OFFICE VISIT (OUTPATIENT)
Dept: FAMILY MEDICINE | Facility: CLINIC | Age: 84
End: 2024-06-03
Payer: MEDICARE

## 2024-06-03 VITALS
DIASTOLIC BLOOD PRESSURE: 60 MMHG | TEMPERATURE: 98 F | WEIGHT: 197.63 LBS | HEIGHT: 64 IN | HEART RATE: 106 BPM | SYSTOLIC BLOOD PRESSURE: 118 MMHG | BODY MASS INDEX: 33.74 KG/M2 | OXYGEN SATURATION: 98 %

## 2024-06-03 DIAGNOSIS — E11.59 OBESITY, DIABETES, AND HYPERTENSION SYNDROME: ICD-10-CM

## 2024-06-03 DIAGNOSIS — E66.01 SEVERE OBESITY (BMI 35.0-39.9) WITH COMORBIDITY: ICD-10-CM

## 2024-06-03 DIAGNOSIS — I15.2 OBESITY, DIABETES, AND HYPERTENSION SYNDROME: ICD-10-CM

## 2024-06-03 DIAGNOSIS — E11.69 OBESITY, DIABETES, AND HYPERTENSION SYNDROME: ICD-10-CM

## 2024-06-03 DIAGNOSIS — I12.9 TYPE 2 DIABETES MELLITUS WITH STAGE 3A CHRONIC KIDNEY DISEASE AND HYPERTENSION: ICD-10-CM

## 2024-06-03 DIAGNOSIS — N18.32 STAGE 3B CHRONIC KIDNEY DISEASE: Primary | ICD-10-CM

## 2024-06-03 DIAGNOSIS — N18.31 TYPE 2 DIABETES MELLITUS WITH STAGE 3A CHRONIC KIDNEY DISEASE AND HYPERTENSION: ICD-10-CM

## 2024-06-03 DIAGNOSIS — I70.0 ATHEROSCLEROSIS OF AORTA: ICD-10-CM

## 2024-06-03 DIAGNOSIS — J06.9 ACUTE URI: ICD-10-CM

## 2024-06-03 DIAGNOSIS — F03.90 DEMENTIA, UNSPECIFIED DEMENTIA SEVERITY, UNSPECIFIED DEMENTIA TYPE, UNSPECIFIED WHETHER BEHAVIORAL, PSYCHOTIC, OR MOOD DISTURBANCE OR ANXIETY: ICD-10-CM

## 2024-06-03 DIAGNOSIS — E03.4 HYPOTHYROIDISM DUE TO ACQUIRED ATROPHY OF THYROID: ICD-10-CM

## 2024-06-03 DIAGNOSIS — E66.9 OBESITY, DIABETES, AND HYPERTENSION SYNDROME: ICD-10-CM

## 2024-06-03 DIAGNOSIS — E11.22 TYPE 2 DIABETES MELLITUS WITH STAGE 3A CHRONIC KIDNEY DISEASE AND HYPERTENSION: ICD-10-CM

## 2024-06-03 DIAGNOSIS — D68.69 OTHER THROMBOPHILIA: ICD-10-CM

## 2024-06-03 DIAGNOSIS — M79.605 LEFT LEG PAIN: ICD-10-CM

## 2024-06-03 PROCEDURE — 1157F ADVNC CARE PLAN IN RCRD: CPT | Mod: CPTII,S$GLB,, | Performed by: FAMILY MEDICINE

## 2024-06-03 PROCEDURE — 1159F MED LIST DOCD IN RCRD: CPT | Mod: CPTII,S$GLB,, | Performed by: FAMILY MEDICINE

## 2024-06-03 PROCEDURE — 1101F PT FALLS ASSESS-DOCD LE1/YR: CPT | Mod: CPTII,S$GLB,, | Performed by: FAMILY MEDICINE

## 2024-06-03 PROCEDURE — 3078F DIAST BP <80 MM HG: CPT | Mod: CPTII,S$GLB,, | Performed by: FAMILY MEDICINE

## 2024-06-03 PROCEDURE — 1125F AMNT PAIN NOTED PAIN PRSNT: CPT | Mod: CPTII,S$GLB,, | Performed by: FAMILY MEDICINE

## 2024-06-03 PROCEDURE — 3074F SYST BP LT 130 MM HG: CPT | Mod: CPTII,S$GLB,, | Performed by: FAMILY MEDICINE

## 2024-06-03 PROCEDURE — 1160F RVW MEDS BY RX/DR IN RCRD: CPT | Mod: CPTII,S$GLB,, | Performed by: FAMILY MEDICINE

## 2024-06-03 PROCEDURE — 3288F FALL RISK ASSESSMENT DOCD: CPT | Mod: CPTII,S$GLB,, | Performed by: FAMILY MEDICINE

## 2024-06-03 PROCEDURE — 99214 OFFICE O/P EST MOD 30 MIN: CPT | Mod: S$GLB,,, | Performed by: FAMILY MEDICINE

## 2024-06-03 RX ORDER — BENZONATATE 200 MG/1
200 CAPSULE ORAL 3 TIMES DAILY PRN
Qty: 30 CAPSULE | Refills: 11 | Status: SHIPPED | OUTPATIENT
Start: 2024-06-03 | End: 2024-10-01

## 2024-06-03 RX ORDER — LEVOTHYROXINE SODIUM 50 UG/1
50 TABLET ORAL
Qty: 90 TABLET | Refills: 3 | Status: SHIPPED | OUTPATIENT
Start: 2024-06-03 | End: 2025-06-03

## 2024-06-03 NOTE — PROGRESS NOTES
"Subjective:      Patient ID: Katy Soto is a 83 y.o. female.    Chief Complaint: Follow-up (6 mon)      Vitals:    06/03/24 1403   BP: 118/60   Pulse: 106   Temp: 98.2 °F (36.8 °C)   TempSrc: Oral   SpO2: 98%   Weight: 89.7 kg (197 lb 10.3 oz)   Height: 5' 4" (1.626 m)        HPI   Here with niece; left leg pain and swelling; pain radiated to lower back up left leg; has a walker and can, not here ;   Had 3 plus edema, niece got it better by bumping up lasix for  a few days;   Pt doesn't like spironolactone; pt has been SOB and coughing, maddox  tSH a little high a t 4.9; compliant with meds, so incrasing to     Problem List  Patient Active Problem List   Diagnosis    Hyperlipidemia    Arthritis    PAD (peripheral artery disease)    Bilateral leg edema    Anxiety    Obesity, diabetes, and hypertension syndrome    Osteopenia    Atherosclerosis of aorta    Elevated sed rate    Vitamin D insufficiency    Severe obesity (BMI 35.0-39.9) with comorbidity    Reduced visual acuity    History of cerebral infarction    Generalized edema    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    Type 2 diabetes mellitus with hyperlipidemia    Diabetic polyneuropathy associated with type 2 diabetes mellitus    Dementia, unspecified dementia severity, unspecified dementia type, unspecified whether behavioral, psychotic, or mood disturbance or anxiety    Primary hypertension    Stage 3b chronic kidney disease    Other thrombophilia        ALLERGIES:   Review of patient's allergies indicates:   Allergen Reactions    Cilostazol      [id number] and stomach cramps    Clonidine     Coreg [carvedilol]      Cramps stomach and nausea    Diltiazem      nauseana d stoamch cramps    Lexapro [escitalopram oxalate]      Dizzy, nausea and weak    Shellfish containing products Hives       MEDS:   Current Outpatient Medications:     aspirin (ECOTRIN) 81 MG EC tablet, Take 81 mg by mouth once daily., Disp: , Rfl:     " atorvastatin (LIPITOR) 40 MG tablet, TAKE 1 TABLET(40 MG) BY MOUTH EVERY DAY, Disp: 90 tablet, Rfl: 1    blood sugar diagnostic Strp, by Misc.(Non-Drug; Combo Route) route., Disp: , Rfl:     blood-glucose meter kit, Use daily, Disp: 1 each, Rfl: 0    cholecalciferol, vitamin D3, (VITAMIN D3) 125 mcg (5,000 unit) Tab, Take 1 tablet (5,000 Units total) by mouth once daily., Disp: 90 tablet, Rfl: 3    clopidogreL (PLAVIX) 75 mg tablet, TAKE 1 TABLET(75 MG) BY MOUTH EVERY DAY FOR 21 DAYS, Disp: 90 tablet, Rfl: 3    cyanocobalamin, vitamin B-12, 1,000 mcg Lozg, Place 1 lozenge under the tongue Daily. For B12 and nerves, Disp: 100 lozenge, Rfl: 3    donepeziL (ARICEPT) 5 MG tablet, TAKE 1 TABLET(5 MG) BY MOUTH EVERY EVENING FOR BLOOD PRESSURE, Disp: 90 tablet, Rfl: 3    folic acid/multivit-min/lutein (CENTRUM SILVER ORAL), Take by mouth., Disp: , Rfl:     furosemide (LASIX) 40 MG tablet, Take 1 tablet (40 mg total) by mouth 2 (two) times daily., Disp: 180 tablet, Rfl: 3    irbesartan (AVAPRO) 300 MG tablet, TAKE 1 TABLET(300 MG) BY MOUTH EVERY DAY, Disp: 90 tablet, Rfl: 3    isosorbide mononitrate (IMDUR) 30 MG 24 hr tablet, Take 1 tablet (30 mg total) by mouth once daily., Disp: 90 tablet, Rfl: 3    lancets 31 gauge Misc, by Misc.(Non-Drug; Combo Route) route., Disp: , Rfl:     levothyroxine (SYNTHROID) 25 MCG tablet, Take 1 tablet (25 mcg total) by mouth before breakfast., Disp: 90 tablet, Rfl: 3    memantine (NAMENDA) 5 MG Tab, TAKE 1 TABLET(5 MG) BY MOUTH TWICE DAILY FOR MEMORY, Disp: 180 tablet, Rfl: 3    potassium chloride SA (K-DUR,KLOR-CON) 20 MEQ tablet, Take 1 tablet (20 mEq total) by mouth once daily., Disp: 90 tablet, Rfl: 3    sucralfate (CARAFATE) 1 gram tablet, TAKE 1 TABLET(1 GRAM) BY MOUTH THREE TIMES DAILY, Disp: 270 tablet, Rfl: 3    terazosin (HYTRIN) 5 MG capsule, Take 1 capsule (5 mg total) by mouth every evening., Disp: 90 capsule, Rfl: 3      History:  Current Providers as of 6/3/2024  PCP: Nancy,  Rudy MCCLELLAND MD  Care Team Provider: New Mehta MD  Care Team Provider: David Fried MD  Care Team Provider: Pinky Grajeda RD  Care Team Provider: Stevo Byers,   Encounter Provider: Rudy Cruz MD, starting on Mon Harley 3, 2024 12:00 AM  Referring Provider: not found, starting on Mon Harley 3, 2024 12:00 AM  Consulting Physician: Rudy Cruz MD, starting on  11:25 AM (Active)   Past Medical History:   Diagnosis Date    Hyperlipidemia     Hypertension     right Occipital stroke 2022    TIA (transient ischemic attack) 2022    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    HENT: Negative.     Respiratory:  Positive for cough and shortness of breath.    Cardiovascular:  Positive for leg swelling.   Gastrointestinal: Negative.    Endocrine: Negative.    Genitourinary: Negative.    Musculoskeletal:  Positive for back pain and gait problem.   Psychiatric/Behavioral: Negative.     All other systems reviewed and are negative.    Objective:     Physical Exam  Vitals and nursing note reviewed.   Constitutional:       Appearance: She is well-developed.   HENT:      Head: Normocephalic.   Eyes:      Conjunctiva/sclera: Conjunctivae normal.      Pupils: Pupils are equal, round, and reactive to light.   Cardiovascular:      Rate and Rhythm: Normal rate and regular rhythm.      Heart sounds: Normal heart sounds.   Pulmonary:      Effort: Pulmonary effort  is normal.      Breath sounds: Normal breath sounds.   Musculoskeletal:         General: Normal range of motion.      Cervical back: Normal range of motion and neck supple.   Skin:     General: Skin is warm and dry.   Neurological:      Mental Status: She is alert and oriented to person, place, and time.      Deep Tendon Reflexes: Reflexes are normal and symmetric.   Psychiatric:         Behavior: Behavior normal.         Thought Content: Thought content normal.         Judgment: Judgment normal.             Assessment:     1. Stage 3b chronic kidney disease    2. Other thrombophilia    3. Obesity, diabetes, and hypertension syndrome    4. Severe obesity (BMI 35.0-39.9) with comorbidity    5. Dementia, unspecified dementia severity, unspecified dementia type, unspecified whether behavioral, psychotic, or mood disturbance or anxiety    6. 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    7. Atherosclerosis of aorta    8. Left leg pain      Plan:        Medication List            Accurate as of Subha 3, 2024  2:37 PM. If you have any questions, ask your nurse or doctor.                CONTINUE taking these medications      aspirin 81 MG EC tablet  Commonly known as: ECOTRIN     atorvastatin 40 MG tablet  Commonly known as: LIPITOR  TAKE 1 TABLET(40 MG) BY MOUTH EVERY DAY     blood sugar diagnostic Strp     blood-glucose meter kit  Use daily     CENTRUM SILVER ORAL     cholecalciferol (vitamin D3) 125 mcg (5,000 unit) Tab  Commonly known as: VITAMIN D3  Take 1 tablet (5,000 Units total) by mouth once daily.     clopidogreL 75 mg tablet  Commonly known as: PLAVIX  TAKE 1 TABLET(75 MG) BY MOUTH EVERY DAY FOR 21 DAYS     cyanocobalamin (vitamin B-12) 1,000 mcg Lozg  Place 1 lozenge under the tongue Daily. For B12 and nerves     donepeziL 5 MG tablet  Commonly known as: ARICEPT  TAKE 1 TABLET(5 MG) BY MOUTH EVERY EVENING FOR BLOOD PRESSURE     furosemide 40 MG tablet  Commonly known as:  LASIX  Take 1 tablet (40 mg total) by mouth 2 (two) times daily.     irbesartan 300 MG tablet  Commonly known as: AVAPRO  TAKE 1 TABLET(300 MG) BY MOUTH EVERY DAY     isosorbide mononitrate 30 MG 24 hr tablet  Commonly known as: IMDUR  Take 1 tablet (30 mg total) by mouth once daily.     lancets 31 gauge Misc     levothyroxine 25 MCG tablet  Commonly known as: SYNTHROID  Take 1 tablet (25 mcg total) by mouth before breakfast.     memantine 5 MG Tab  Commonly known as: NAMENDA  TAKE 1 TABLET(5 MG) BY MOUTH TWICE DAILY FOR MEMORY     potassium chloride SA 20 MEQ tablet  Commonly known as: K-DUR,KLOR-CON  Take 1 tablet (20 mEq total) by mouth once daily.     sucralfate 1 gram tablet  Commonly known as: CARAFATE  TAKE 1 TABLET(1 GRAM) BY MOUTH THREE TIMES DAILY     terazosin 5 MG capsule  Commonly known as: HYTRIN  Take 1 capsule (5 mg total) by mouth every evening.            Stage 3b chronic kidney disease    Other thrombophilia    Obesity, diabetes, and hypertension syndrome    Severe obesity (BMI 35.0-39.9) with comorbidity    Dementia, unspecified dementia severity, unspecified dementia type, unspecified whether behavioral, psychotic, or mood disturbance or anxiety    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    Atherosclerosis of aorta    Left leg pain  -     US Lower Extremity Veins Left; Future; Expected date: 06/03/2024

## 2024-06-04 ENCOUNTER — HOSPITAL ENCOUNTER (OUTPATIENT)
Dept: RADIOLOGY | Facility: HOSPITAL | Age: 84
Discharge: HOME | End: 2024-06-04
Attending: FAMILY MEDICINE
Payer: MEDICARE

## 2024-06-04 DIAGNOSIS — M79.605 LEFT LEG PAIN: ICD-10-CM

## 2024-06-04 PROCEDURE — 93971 EXTREMITY STUDY: CPT | Mod: TC,PN,LT

## 2024-06-04 PROCEDURE — 93971 EXTREMITY STUDY: CPT | Mod: 26,LT,, | Performed by: STUDENT IN AN ORGANIZED HEALTH CARE EDUCATION/TRAINING PROGRAM

## 2024-06-06 ENCOUNTER — HOSPITAL ENCOUNTER (EMERGENCY)
Facility: HOSPITAL | Age: 84
Discharge: HOME | End: 2024-06-06
Attending: EMERGENCY MEDICINE
Payer: MEDICARE

## 2024-06-06 VITALS
TEMPERATURE: 98 F | WEIGHT: 205 LBS | RESPIRATION RATE: 16 BRPM | BODY MASS INDEX: 35 KG/M2 | HEIGHT: 64 IN | HEART RATE: 83 BPM | DIASTOLIC BLOOD PRESSURE: 68 MMHG | OXYGEN SATURATION: 99 % | SYSTOLIC BLOOD PRESSURE: 140 MMHG

## 2024-06-06 DIAGNOSIS — M25.569 KNEE PAIN: Primary | ICD-10-CM

## 2024-06-06 PROCEDURE — 99283 EMERGENCY DEPT VISIT LOW MDM: CPT | Mod: 25,ER

## 2024-06-06 RX ORDER — ORPHENADRINE CITRATE 100 MG/1
100 TABLET, EXTENDED RELEASE ORAL 2 TIMES DAILY
Qty: 10 TABLET | Refills: 0 | Status: SHIPPED | OUTPATIENT
Start: 2024-06-06 | End: 2024-06-06 | Stop reason: ALTCHOICE

## 2024-06-06 RX ORDER — ORPHENADRINE CITRATE 100 MG/1
100 TABLET, EXTENDED RELEASE ORAL 2 TIMES DAILY
Qty: 10 TABLET | Refills: 0 | Status: SHIPPED | OUTPATIENT
Start: 2024-06-06

## 2024-06-07 ENCOUNTER — PATIENT OUTREACH (OUTPATIENT)
Dept: EMERGENCY MEDICINE | Facility: HOSPITAL | Age: 84
End: 2024-06-07
Payer: MEDICARE

## 2024-06-07 NOTE — ED PROVIDER NOTES
Encounter Date: 2024       History     Chief Complaint   Patient presents with    Leg Pain     Pt with chronic left leg pain, reports has had xrays and ultrasound with all things returning back negative. Thought pt reports pain is not relieved. Pt currently has lidocaine patches on.      Patient presents with daughter.  She is complaining of left leg pain since .  She states that on  she had a fall while getting out of a vehicle however she fell back.  She was able to grab her fence before falling all the way back.  She states that she has had left leg pain since.  The pain is sharp and is exacerbated by palpation and movement.  She was seen by her primary care provider on the  who ordered a ultrasound of her left lower extremity which showed no evidence of DVT.    The history is provided by the patient and a relative.     Review of patient's allergies indicates:   Allergen Reactions    Cilostazol      afnsn2i and stomach cramps    Clonidine     Coreg [carvedilol]      Cramps stomach and nausea    Diltiazem      nauseana d stoamch cramps    Lexapro [escitalopram oxalate]      Dizzy, nausea and weak    Shellfish containing products Hives     Past Medical History:   Diagnosis Date    Hyperlipidemia     Hypertension     right Occipital stroke 2022    TIA (transient ischemic attack) 2022    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 2023    Type 2 diabetes mellitus without complication, without long-term current use of insulin 3/17/2022     Past Surgical History:   Procedure Laterality Date     SECTION      x1    HERNIA REPAIR      umbilical    LEG SURGERY Right 2017    stents placed     Family History   Problem Relation Name Age of Onset    Diabetes Mother karmen     Hypertension Mother karmen     Stroke Father ze     Heart disease Father ze     Heart attack Father ze     Cancer Sister Rob         Breast cancer     Breast cancer Sister Rob     No Known Problems Son [place]     Kidney failure Sister Zeina         Kidney transplant x18 years    Lupus Sister Zeina     Hypertension Sister Kenisha     Diabetes Sister Kenisha     Arthritis Sister Humphrey     Hypertension Sister Mera     Cancer Brother Jose Alberto      Social History     Tobacco Use    Smoking status: Never     Passive exposure: Never    Smokeless tobacco: Never   Substance Use Topics    Alcohol use: No    Drug use: No     Review of Systems   Musculoskeletal:         Left leg pain       Physical Exam     Initial Vitals [06/06/24 2003]   BP Pulse Resp Temp SpO2   (!) 140/68 83 16 97.8 °F (36.6 °C) 99 %      MAP       --         Physical Exam    Musculoskeletal:      Comments: Pulses are 2+ to the left lower extremity both DP and PT. when compared to the right leg there does not appear to be any significant edema.  The patient's pain is centered around her knee.  She has no ttp to the foot or ankle, no ttp to the tibia/fibula.  She does have pain to the knee area both lateral and medial.  There is no obvious deformity.  There is no erythema, induration, or significant edema when compared to the extremity on the other side.  There is no tenderness to palpation to the left hip or femur area.  There is no midline tenderness to palpation to the lumbar spine, left buttocks or piriformis area.  There is no sensation abnormalities that would indicate sciatica.           ED Course   Procedures  Labs Reviewed - No data to display       Imaging Results              X-Ray Knee 3 View Left (Final result)  Result time 06/06/24 20:40:23      Final result by Perico Gracia MD (06/06/24 20:40:23)                   Impression:      As above      Electronically signed by: Perico Gracia  Date:    06/06/2024  Time:    20:40               Narrative:    EXAMINATION:  XR KNEE 3 VIEW LEFT    CLINICAL HISTORY:  Pain in unspecified knee    TECHNIQUE:  AP, lateral, and Merchant views of the  left knee were performed.    COMPARISON:  None    FINDINGS:  No acute fracture or dislocation.  Super patellar fullness.  Quadriceps calcific tendinopathy..  Vascular calcification.  Intercondylar spurring.                                       Medications - No data to display  Medical Decision Making  Daughter states that there is a wheelchair at the house that the patient can use if she has not able to use her walker.  Outpatient referral placed to orthopedics to evaluate for the need for further workup and treatment.  Patient instructed to return immediately for any new or worsening symptoms in both her and her daughter verbalized understanding.    Amount and/or Complexity of Data Reviewed  Radiology: ordered. Decision-making details documented in ED Course.    Risk  Prescription drug management.  Risk Details: Differential diagnosis includes but is not limited to:  Sprain, strain, fracture, dislocation, arterial occlusion, DVT               ED Course as of 06/08/24 0328   Thu Jun 06, 2024 2028 EXAMINATION:  US LOWER EXTREMITY VEINS LEFT     CLINICAL HISTORY:  Pain in left leg     TECHNIQUE:  Duplex and color flow Doppler evaluation and graded compression of the left lower extremity veins was performed.     COMPARISON:  None     FINDINGS:  Left thigh veins: The common femoral, femoral, popliteal, upper greater saphenous, and deep femoral veins are patent and free of thrombus. The veins are normally compressible and have normal phasic flow and augmentation response.     Left calf veins: The visualized calf veins are patent.     Contralateral CFV: The contralateral (right) common femoral vein is patent and free of thrombus.     Miscellaneous: None     Impression:     No evidence of deep venous thrombosis in the left lower extremity.        Electronically signed by:Kartik Peralta  Date:                                            06/04/2024  Time:                                           13:25   [CD]   2046 X-Ray  Knee 3 View Left  No acute fracture or dislocation.  Super patellar fullness.  Quadriceps calcific tendinopathy..  Vascular calcification.  Intercondylar spurring. [CD]      ED Course User Index  [CD] Sami Adame DO                           Clinical Impression:  Final diagnoses:  [M25.569] Knee pain (Primary)          ED Disposition Condition    Discharge Stable          ED Prescriptions       Medication Sig Dispense Start Date End Date Auth. Provider    orphenadrine (NORFLEX) 100 mg tablet  (Status: Discontinued) Take 1 tablet (100 mg total) by mouth 2 (two) times daily. 10 tablet 6/6/2024 6/6/2024 Sami Adame DO    orphenadrine (NORFLEX) 100 mg tablet Take 1 tablet (100 mg total) by mouth 2 (two) times daily. 10 tablet 6/6/2024 -- Sami Adame DO          Follow-up Information       Follow up With Specialties Details Why Contact Info    Rudy Cruz MD Family Medicine Schedule an appointment as soon as possible for a visit   [address] [address] [phone]               Sami Adame DO  06/08/24 6981

## 2024-06-14 ENCOUNTER — TELEPHONE (OUTPATIENT)
Dept: SPORTS MEDICINE | Facility: CLINIC | Age: 84
End: 2024-06-14
Payer: MEDICARE

## 2024-06-14 DIAGNOSIS — M25.562 LEFT KNEE PAIN, UNSPECIFIED CHRONICITY: Primary | ICD-10-CM

## 2024-06-17 ENCOUNTER — OFFICE VISIT (OUTPATIENT)
Dept: SPORTS MEDICINE | Facility: CLINIC | Age: 84
End: 2024-06-17
Payer: MEDICARE

## 2024-06-17 VITALS — HEIGHT: 64 IN | BODY MASS INDEX: 33.41 KG/M2 | WEIGHT: 195.69 LBS

## 2024-06-17 DIAGNOSIS — M17.12 PRIMARY OSTEOARTHRITIS OF LEFT KNEE: Primary | ICD-10-CM

## 2024-06-17 DIAGNOSIS — M25.562 ACUTE PAIN OF LEFT KNEE: ICD-10-CM

## 2024-06-17 PROCEDURE — 1157F ADVNC CARE PLAN IN RCRD: CPT | Mod: CPTII,S$GLB,, | Performed by: ORTHOPAEDIC SURGERY

## 2024-06-17 PROCEDURE — 1101F PT FALLS ASSESS-DOCD LE1/YR: CPT | Mod: CPTII,S$GLB,, | Performed by: ORTHOPAEDIC SURGERY

## 2024-06-17 PROCEDURE — 1160F RVW MEDS BY RX/DR IN RCRD: CPT | Mod: CPTII,S$GLB,, | Performed by: ORTHOPAEDIC SURGERY

## 2024-06-17 PROCEDURE — 3288F FALL RISK ASSESSMENT DOCD: CPT | Mod: CPTII,S$GLB,, | Performed by: ORTHOPAEDIC SURGERY

## 2024-06-17 PROCEDURE — 1159F MED LIST DOCD IN RCRD: CPT | Mod: CPTII,S$GLB,, | Performed by: ORTHOPAEDIC SURGERY

## 2024-06-17 PROCEDURE — 99204 OFFICE O/P NEW MOD 45 MIN: CPT | Mod: S$GLB,,, | Performed by: ORTHOPAEDIC SURGERY

## 2024-06-17 PROCEDURE — 1125F AMNT PAIN NOTED PAIN PRSNT: CPT | Mod: CPTII,S$GLB,, | Performed by: ORTHOPAEDIC SURGERY

## 2024-06-17 PROCEDURE — 99999 PR PBB SHADOW E&M-EST. PATIENT-LVL IV: CPT | Mod: PBBFAC,,, | Performed by: ORTHOPAEDIC SURGERY

## 2024-06-17 NOTE — PROGRESS NOTES
Subjective:     Chief Complaint: Katy Soto is a 84 y.o. female who had concerns including Pain of the Left Knee.    HPI    Patient with history of CKD presents to clinic with acute left knee pain x 2 weeks. Patient states the pain began gradually and is localized circumferential around the patella and medial and lateral joint lines. She she states she was walking when she felt her left knee buckle which preceded the pain.  She visited ER and obtain radiographs which were unremarkable.  Pain is made worse with ambulation as well as feelings of prolonged sitting.  She rates the pain as 10/10. She has attempted multiple conservative measures that include activity modification, ice & elevation, and oral medications (Tylenol), with some relief.  Is affecting ADLs and limiting desired level of activity. Denies numbness, tingling, radiation, and inability to bear weight. She is here today to discuss treatment options.    No previous surgeries or trauma on bilateral knees      Review of Systems   Constitutional: Negative.   HENT: Negative.     Eyes: Negative.    Cardiovascular: Negative.    Respiratory: Negative.     Endocrine: Negative.    Hematologic/Lymphatic: Negative.    Skin: Negative.    Musculoskeletal:  Positive for joint pain and muscle weakness. Negative for arthritis, joint swelling and stiffness.   Neurological: Negative.    Psychiatric/Behavioral: Negative.     Allergic/Immunologic: Negative.                  Objective:     General: Katy is well-developed, well-nourished, appears stated age, in no acute distress, alert and oriented to time, place and person.     General    Nursing note and vitals reviewed.  Constitutional: She is oriented to person, place, and time. She appears well-developed and well-nourished. No distress.   HENT:   Head: Normocephalic and atraumatic.   Nose: Nose normal.   Eyes: EOM are normal.   Cardiovascular:  Intact distal pulses.            Pulmonary/Chest: Effort normal. No  respiratory distress.   Neurological: She is alert and oriented to person, place, and time.   Psychiatric: She has a normal mood and affect. Her behavior is normal. Judgment and thought content normal.           Right Knee Exam     Inspection   Erythema: absent  Scars: absent  Swelling: absent  Effusion: absent  Deformity: absent  Bruising: absent    Tenderness   The patient is experiencing no tenderness.     Range of Motion   Extension:  -5   Flexion:  120     Tests   Meniscus   Deo:  Medial - negative Lateral - negative  Ligament Examination   Lachman: normal (-1 to 2mm)   PCL-Posterior Drawer: normal (0 to 2mm)     MCL - Valgus: normal (0 to 2mm)  LCL - Varus: normal  Patella   Patellar apprehension: negative  Passive Patellar Tilt: neutral  Patellar Tracking: normal  Patellar Grind: negative    Other   Muscle Tightness: hamstring tightness  Sensation: normal    Left Knee Exam     Inspection   Erythema: absent  Scars: absent  Swelling: absent  Effusion: absent  Deformity: absent  Bruising: absent    Tenderness   The patient tender to palpation of the medial joint line and lateral joint line.    Range of Motion   Extension:  -5   Flexion:  110     Tests   Meniscus   Deo:  Medial - positive Lateral - positive  Stability   Lachman: normal (-1 to 2mm)   PCL-Posterior Drawer: normal (0 to 2mm)  MCL - Valgus: normal (0 to 2mm)  LCL - Varus: normal (0 to 2mm)  Patella   Patellar apprehension: negative  Passive Patellar Tilt: neutral  Patellar Tracking: normal  Patellar Grind: negative    Other   Muscle Tightness: hamstring tightness  Sensation: normal    Muscle Strength   Right Lower Extremity   Quadriceps:  5/5   Hamstrin/5   Left Lower Extremity   Quadriceps:  4/5   Hamstrin/5     Vascular Exam     Right Pulses  Dorsalis Pedis:      2+  Posterior Tibial:      2+        Left Pulses  Dorsalis Pedis:      2+  Posterior Tibial:      2+        Edema  Right Lower Leg: absent  Left Lower Leg:  absent    Radiographs bilateral knee     Joint space narrowing seen within the bilateral medial compartments with right being worse than left; tricompartmental osteophyte formation      Assessment:     Encounter Diagnoses   Name Primary?    Primary osteoarthritis of left knee Yes    Acute pain of left knee         Plan:       1. I made the decision to order new imaging of the extremity or extremities evaluated. I independently reviewed and interpreted the radiographs and/or MRIs today. These images were shown to the patient where I then discussed my findings in detail.    2. We have discussed a variety of treatment options including medications, injections, physical therapy and other alternative treatments. I also explained the indications, risks and benefits of surgery. Patient's pain is refractory HEP, conservative management, and NSAIDs. Had a lengthy discussion about osteoarthritis in the knees to include the primary causes to include excessive weight, trauma, genetics, and muscle weakness as well as the different forms of treatment used to help alleviate symptoms including CSI, physical therapy, and Visco supplementation injections.  Patient can not receive CSI due to ongoing CKD.  At this time, Pt would like to proceed with Visco supplementation.    3. Pre-authorization placed for  Gelsyn 3  injections.    4. Ice compress to the affected area 2-3x a day for 15-20 minutes as needed for pain management.  Tylenol prn pain.  Patient he is currently taking Plavix and has ongoing CKD, and therefore can not take anti-inflammatories.    5. Home health order for physical therapy for patellofemoral and quad strength and conditioning protocol.     6. RTC to see Kyler Malik PA-C in 2 weeks for  Gelsyn 3  injections.        All of the patient's questions were answered. Patient was advised to call the clinic or contact me through the patient portal for any questions or concerns.       Medical Dictation software was  used during the dictation of portions or the entirety of this medical record.  Phonetic or grammatic errors may exist due to the use of this software. For clarification, refer to the author of the document.      Patient questionnaires may have been collected.

## 2024-06-19 PROCEDURE — G0180 MD CERTIFICATION HHA PATIENT: HCPCS | Mod: ,,, | Performed by: ORTHOPAEDIC SURGERY

## 2024-07-01 ENCOUNTER — OFFICE VISIT (OUTPATIENT)
Dept: SPORTS MEDICINE | Facility: CLINIC | Age: 84
End: 2024-07-01
Payer: MEDICARE

## 2024-07-01 VITALS — HEIGHT: 64 IN | WEIGHT: 195.56 LBS | BODY MASS INDEX: 33.38 KG/M2

## 2024-07-01 DIAGNOSIS — M17.12 PRIMARY OSTEOARTHRITIS OF LEFT KNEE: Primary | ICD-10-CM

## 2024-07-01 PROCEDURE — 99999 PR PBB SHADOW E&M-EST. PATIENT-LVL III: CPT | Mod: PBBFAC,,, | Performed by: ORTHOPAEDIC SURGERY

## 2024-07-01 NOTE — PROCEDURES
Large Joint Aspiration/Injection: L knee    Date/Time: 7/1/2024 8:15 AM    Performed by: Sami Malik PA-C  Authorized by: Sami Malik PA-C    Consent Done?:  Yes (Verbal)  Indications:  Arthritis and pain  Site marked: the procedure site was marked    Timeout: prior to procedure the correct patient, procedure, and site was verified    Prep: patient was prepped and draped in usual sterile fashion      Local anesthesia used?: Yes    Local anesthetic:  Lidocaine spray    Details:  Needle Size:  22 G  Approach:  Anterolateral  Location:  Knee  Site:  L knee  Medications:  16.8 mg sodium hyaluronate 16.8 mg/2 mL  Patient tolerance:  Patient tolerated the procedure well with no immediate complications

## 2024-07-01 NOTE — PROGRESS NOTES
Patient is here for follow up of Left knee arthritis. Pt is requesting Gelsyn#1 for Left knee.  [facility] Has failed other conservative modalities including NSAIDS, activity modification, weight loss.    The prior shots were tolerated well.    PHYSICAL EXAMINATION:     General: The patient is alert and oriented x 3. Mood is pleasant.   Observation of ears, eyes and nose reveals no gross abnormalities. No   labored breathing observed.     No signs of infection or adverse reaction to knee.    Patient had no adverse reactions to the medication. Pain decreased. Patient was instructed to apply ice to the joint for 20 minutes and avoid strenuous activities for 24-36 hours following the injection. They were warned of possible blood sugar and/or blood pressure changes during that time. Following that time, they can resume regular activities.    Patient was reminded to call the clinic immediately for any adverse side effects as explained in clinic today.      RTC in 1 week with Sami Malik PA-C for Gelsyn#2.    All questions were answered, pt will contact us for questions or concerns in the interim.

## 2024-07-15 ENCOUNTER — OFFICE VISIT (OUTPATIENT)
Dept: SPORTS MEDICINE | Facility: CLINIC | Age: 84
End: 2024-07-15
Payer: MEDICARE

## 2024-07-15 VITALS — BODY MASS INDEX: 34.01 KG/M2 | HEIGHT: 64 IN | WEIGHT: 199.19 LBS

## 2024-07-15 DIAGNOSIS — M17.12 PRIMARY OSTEOARTHRITIS OF LEFT KNEE: Primary | ICD-10-CM

## 2024-07-15 PROCEDURE — 99499 UNLISTED E&M SERVICE: CPT | Mod: S$GLB,,, | Performed by: ORTHOPAEDIC SURGERY

## 2024-07-15 PROCEDURE — 99999 PR PBB SHADOW E&M-EST. PATIENT-LVL III: CPT | Mod: PBBFAC,,, | Performed by: ORTHOPAEDIC SURGERY

## 2024-07-15 PROCEDURE — 20610 DRAIN/INJ JOINT/BURSA W/O US: CPT | Mod: LT,S$GLB,, | Performed by: ORTHOPAEDIC SURGERY

## 2024-07-15 NOTE — PROCEDURES
Large Joint Aspiration/Injection: L knee    Date/Time: 7/15/2024 10:45 AM    Performed by: Sami Malik PA-C  Authorized by: Sami Malik PA-C    Consent Done?:  Yes (Verbal)  Indications:  Arthritis and pain  Site marked: the procedure site was marked    Timeout: prior to procedure the correct patient, procedure, and site was verified    Prep: patient was prepped and draped in usual sterile fashion      Local anesthesia used?: Yes    Local anesthetic:  Lidocaine spray    Details:  Needle Size:  22 G  Ultrasonic Guidance for needle placement?: No    Approach:  Anterolateral  Location:  Knee  Site:  L knee  Medications:  16.8 mg sodium hyaluronate 16.8 mg/2 mL  Patient tolerance:  Patient tolerated the procedure well with no immediate complications

## 2024-07-15 NOTE — PROGRESS NOTES
Patient is here for follow up of Left knee arthritis. Pt is requesting Gelsyn#2 for Left knee.  [facility] Has failed other conservative modalities including NSAIDS, activity modification, weight loss.    The prior shots were tolerated well.    PHYSICAL EXAMINATION:     General: The patient is alert and oriented x 3. Mood is pleasant.   Observation of ears, eyes and nose reveals no gross abnormalities. No   labored breathing observed.     No signs of infection or adverse reaction to knee.    Patient had no adverse reactions to the medication. Pain decreased. Patient was instructed to apply ice to the joint for 20 minutes and avoid strenuous activities for 24-36 hours following the injection. They were warned of possible blood sugar and/or blood pressure changes during that time. Following that time, they can resume regular activities.    Patient was reminded to call the clinic immediately for any adverse side effects as explained in clinic today.      RTC in 1 week with Sami Malik PA-C for Gelsyn#3.    All questions were answered, pt will contact us for questions or concerns in the interim.

## 2024-07-17 NOTE — TELEPHONE ENCOUNTER
Care Due:                  Date            Visit Type   Department     Provider  --------------------------------------------------------------------------------                                EP -                              PRIMARY      [facility]  Last Visit: 06-      CARE ([facility])   YOHANA Cruz                              EP -                              PRIMARY      [facility]  Next Visit: 12-      CARE ([facility])   YOHANA Cruz                                                            Last  Test          Frequency    Reason                     Performed    Due Date  --------------------------------------------------------------------------------    Lipid Panel.  12 months..  atorvastatin.............  09- 08-    Health [facility] Embedded Care Due Messages. Reference number: [id number].   7/17/2024 9:55:59 AM CDT

## 2024-07-18 RX ORDER — POTASSIUM CHLORIDE 20 MEQ/1
20 TABLET, EXTENDED RELEASE ORAL
Qty: 90 TABLET | Refills: 3 | Status: SHIPPED | OUTPATIENT
Start: 2024-07-18

## 2024-07-22 ENCOUNTER — EXTERNAL HOME HEALTH (OUTPATIENT)
Dept: HOME HEALTH SERVICES | Facility: HOSPITAL | Age: 84
End: 2024-07-22
Payer: MEDICARE

## 2024-07-22 ENCOUNTER — OFFICE VISIT (OUTPATIENT)
Dept: SPORTS MEDICINE | Facility: CLINIC | Age: 84
End: 2024-07-22
Payer: MEDICARE

## 2024-07-22 VITALS — HEIGHT: 64 IN | BODY MASS INDEX: 33.54 KG/M2 | WEIGHT: 196.44 LBS

## 2024-07-22 DIAGNOSIS — M17.12 PRIMARY OSTEOARTHRITIS OF LEFT KNEE: Primary | ICD-10-CM

## 2024-07-22 PROCEDURE — 99499 UNLISTED E&M SERVICE: CPT | Mod: 25,S$GLB,, | Performed by: ORTHOPAEDIC SURGERY

## 2024-07-22 PROCEDURE — 20610 DRAIN/INJ JOINT/BURSA W/O US: CPT | Mod: LT,S$GLB,, | Performed by: ORTHOPAEDIC SURGERY

## 2024-07-22 PROCEDURE — 99999 PR PBB SHADOW E&M-EST. PATIENT-LVL III: CPT | Mod: PBBFAC,,, | Performed by: ORTHOPAEDIC SURGERY

## 2024-07-22 NOTE — PROGRESS NOTES
Patient is here for follow up of Left knee arthritis. Pt is requesting Gelsyn#3 for Left knee.  [facility] reviewed per encounter record. Has failed other conservative modalities including NSAIDS, activity modification, weight loss.    The prior shots were tolerated well.    PHYSICAL EXAMINATION:     General: The patient is alert and oriented x 3. Mood is pleasant.   Observation of ears, eyes and nose reveals no gross abnormalities. No   labored breathing observed.     No signs of infection or adverse reaction to knee.    Patient had no adverse reactions to the medication. Pain decreased. Patient was instructed to apply ice to the joint for 20 minutes and avoid strenuous activities for 24-36 hours following the injection. They were warned of possible blood sugar and/or blood pressure changes during that time. Following that time, they can resume regular activities.    Patient was reminded to call the clinic immediately for any adverse side effects as explained in clinic today.      RTC in 12 weeks with Sami Malik PA-C for f/u.    All questions were answered, pt will contact us for questions or concerns in the interim.

## 2024-07-22 NOTE — PROCEDURES
Large Joint Aspiration/Injection: L knee    Date/Time: 7/22/2024 8:15 AM    Performed by: Sami Malik PA-C  Authorized by: Sami Malik PA-C    Consent Done?:  Yes (Verbal)  Indications:  Arthritis and pain  Site marked: the procedure site was marked    Timeout: prior to procedure the correct patient, procedure, and site was verified    Prep: patient was prepped and draped in usual sterile fashion      Local anesthesia used?: Yes    Local anesthetic:  Lidocaine spray    Details:  Needle Size:  22 G  Ultrasonic Guidance for needle placement?: No    Approach:  Anterolateral  Location:  Knee  Site:  L knee  Medications:  16.8 mg sodium hyaluronate 16.8 mg/2 mL  Patient tolerance:  Patient tolerated the procedure well with no immediate complications

## 2024-08-03 ENCOUNTER — PATIENT MESSAGE (OUTPATIENT)
Dept: ADMINISTRATIVE | Facility: HOSPITAL | Age: 84
End: 2024-08-03
Payer: MEDICARE

## 2024-08-19 RX ORDER — DONEPEZIL HYDROCHLORIDE 5 MG/1
TABLET, FILM COATED ORAL
Qty: 90 TABLET | Refills: 3 | Status: SHIPPED | OUTPATIENT
Start: 2024-08-19

## 2024-08-19 RX ORDER — ATORVASTATIN CALCIUM 40 MG/1
TABLET, FILM COATED ORAL
Qty: 90 TABLET | Refills: 1 | Status: SHIPPED | OUTPATIENT
Start: 2024-08-19

## 2024-08-19 NOTE — TELEPHONE ENCOUNTER
No care due was identified.  [facility] Embedded Care Due Messages. Reference number: [id number].   8/19/2024 8:47:40 AM CDT

## 2024-08-30 LAB
LEFT EYE DM RETINOPATHY: NEGATIVE
RIGHT EYE DM RETINOPATHY: NEGATIVE

## 2024-09-03 ENCOUNTER — LAB VISIT (OUTPATIENT)
Dept: LAB | Facility: HOSPITAL | Age: 84
End: 2024-09-03
Attending: FAMILY MEDICINE
Payer: MEDICARE

## 2024-09-03 DIAGNOSIS — E03.4 HYPOTHYROIDISM DUE TO ACQUIRED ATROPHY OF THYROID: ICD-10-CM

## 2024-09-03 LAB — TSH SERPL DL<=0.005 MIU/L-ACNC: 3.34 UIU/ML (ref 0.4–4)

## 2024-09-03 PROCEDURE — 84443 ASSAY THYROID STIM HORMONE: CPT | Mod: PN | Performed by: FAMILY MEDICINE

## 2024-09-03 PROCEDURE — 36415 COLL VENOUS BLD VENIPUNCTURE: CPT | Mod: PN | Performed by: FAMILY MEDICINE

## 2024-09-09 ENCOUNTER — PATIENT OUTREACH (OUTPATIENT)
Dept: ADMINISTRATIVE | Facility: HOSPITAL | Age: 84
End: 2024-09-09
Payer: MEDICARE

## 2024-09-09 NOTE — PROGRESS NOTES
Population Health Chart Review & Patient Outreach Details      Additional [facility] Health Notes:               Updates Requested / Reviewed:      Updated Care Coordination Note, Care Everywhere, and Immunizations Reconciliation Completed or Queried: [facility] Topics Overdue:      [facility] Score: 0     Patient is not due for any topics at this time.    Influenza Vaccine  RSV Vaccine                  Health Maintenance Topic(s) Outreach Outcomes & Actions Taken:    Eye Exam - Outreach Outcomes & Actions Taken  : Diabetic Eye External Records Uploaded, Care Team & History Updated if Applicable

## 2024-09-26 ENCOUNTER — PATIENT OUTREACH (OUTPATIENT)
Dept: ADMINISTRATIVE | Facility: HOSPITAL | Age: 84
End: 2024-09-26
Payer: MEDICARE

## 2024-09-26 NOTE — PROGRESS NOTES
Population Health Chart Review & Patient Outreach Details      Additional [facility] Health Notes:               Updates Requested / Reviewed:      Updated Care Coordination Note, Care Everywhere, and Immunizations Reconciliation Completed or Queried: [facility] Topics Overdue:      [facility] Score: 0     Patient is not due for any topics at this time.    Influenza Vaccine  RSV Vaccine                  Health Maintenance Topic(s) Outreach Outcomes & Actions Taken:    Eye Exam - Outreach Outcomes & Actions Taken  : Diabetic Eye External Records Uploaded, Care Team & History Updated if Applicable

## 2024-10-11 NOTE — TELEPHONE ENCOUNTER
Care Due:                  Date            Visit Type   Department     Provider  --------------------------------------------------------------------------------                                EP -                              PRIMARY      [facility]  Last Visit: 06-      CARE ([facility])   YOHANA Cruz                              EP -                              PRIMARY      [facility]  Next Visit: 12-      CARE ([facility])   YOHANA Cruz                                                            Last  Test          Frequency    Reason                     Performed    Due Date  --------------------------------------------------------------------------------    Lipid Panel.  12 months..  atorvastatin.............  09- 08-    Health [facility] Embedded Care Due Messages. Reference number: [id number].   10/11/2024 6:24:47 PM CDT

## 2024-10-14 RX ORDER — TERAZOSIN 5 MG/1
CAPSULE ORAL
Qty: 90 CAPSULE | Refills: 3 | Status: SHIPPED | OUTPATIENT
Start: 2024-10-14

## 2024-10-14 RX ORDER — POTASSIUM CHLORIDE 20 MEQ/1
20 TABLET, EXTENDED RELEASE ORAL
Qty: 90 TABLET | Refills: 3 | Status: SHIPPED | OUTPATIENT
Start: 2024-10-14

## 2024-10-21 ENCOUNTER — OFFICE VISIT (OUTPATIENT)
Dept: SPORTS MEDICINE | Facility: CLINIC | Age: 84
End: 2024-10-21
Payer: MEDICARE

## 2024-10-21 VITALS — WEIGHT: 198 LBS | BODY MASS INDEX: 33.8 KG/M2 | HEIGHT: 64 IN

## 2024-10-21 DIAGNOSIS — M17.12 PRIMARY OSTEOARTHRITIS OF LEFT KNEE: Primary | ICD-10-CM

## 2024-10-21 DIAGNOSIS — M25.562 ACUTE PAIN OF LEFT KNEE: ICD-10-CM

## 2024-10-21 PROCEDURE — 1159F MED LIST DOCD IN RCRD: CPT | Mod: CPTII,S$GLB,, | Performed by: ORTHOPAEDIC SURGERY

## 2024-10-21 PROCEDURE — 1160F RVW MEDS BY RX/DR IN RCRD: CPT | Mod: CPTII,S$GLB,, | Performed by: ORTHOPAEDIC SURGERY

## 2024-10-21 PROCEDURE — 1126F AMNT PAIN NOTED NONE PRSNT: CPT | Mod: CPTII,S$GLB,, | Performed by: ORTHOPAEDIC SURGERY

## 2024-10-21 PROCEDURE — 99213 OFFICE O/P EST LOW 20 MIN: CPT | Mod: S$GLB,,, | Performed by: ORTHOPAEDIC SURGERY

## 2024-10-21 PROCEDURE — 1157F ADVNC CARE PLAN IN RCRD: CPT | Mod: CPTII,S$GLB,, | Performed by: ORTHOPAEDIC SURGERY

## 2024-10-21 PROCEDURE — 3288F FALL RISK ASSESSMENT DOCD: CPT | Mod: CPTII,S$GLB,, | Performed by: ORTHOPAEDIC SURGERY

## 2024-10-21 PROCEDURE — 1101F PT FALLS ASSESS-DOCD LE1/YR: CPT | Mod: CPTII,S$GLB,, | Performed by: ORTHOPAEDIC SURGERY

## 2024-10-21 PROCEDURE — 99999 PR PBB SHADOW E&M-EST. PATIENT-LVL III: CPT | Mod: PBBFAC,,, | Performed by: ORTHOPAEDIC SURGERY

## 2024-10-21 NOTE — PROGRESS NOTES
Subjective:     Chief Complaint: Katy Soto is a 84 y.o. female who had concerns including Follow-up of the Left Knee.    HPI    Patient presents today for follow-up of acute left knee pain.  Patient received viscosupplementation injections about 3 months ago.  She states this significantly reduced her pain.  She is able to perform most functions without any issue.  She is still endorses some occasional swelling following increased activity.  She complete her home health physical therapy and is now performing a home exercise program on her own.  Her pain today is 0/10.    Interval history 06/17/2024:  Patient with history of CKD presents to clinic with acute left knee pain x 2 weeks. Patient states the pain began gradually and is localized circumferential around the patella and medial and lateral joint lines. She she states she was walking when she felt her left knee buckle which preceded the pain.  She visited ER and obtain radiographs which were unremarkable.  Pain is made worse with ambulation as well as feelings of prolonged sitting.  She rates the pain as 10/10. She has attempted multiple conservative measures that include activity modification, ice & elevation, and oral medications (Tylenol), with some relief.  Is affecting ADLs and limiting desired level of activity. Denies numbness, tingling, radiation, and inability to bear weight. She is here today to discuss treatment options.    No previous surgeries or trauma on bilateral knees      Review of Systems   Constitutional: Negative.   HENT: Negative.     Eyes: Negative.    Cardiovascular: Negative.    Respiratory: Negative.     Endocrine: Negative.    Hematologic/Lymphatic: Negative.    Skin: Negative.    Musculoskeletal:  Positive for joint pain and muscle weakness. Negative for arthritis, joint swelling and stiffness.   Neurological: Negative.    Psychiatric/Behavioral: Negative.     Allergic/Immunologic: Negative.                  Objective:      General: Katy is well-developed, well-nourished, appears stated age, in no acute distress, alert and oriented to time, place and person.     General    Nursing note and vitals reviewed.  Constitutional: She is oriented to person, place, and time. She appears well-developed and well-nourished. No distress.   HENT:   Head: Normocephalic and atraumatic.   Nose: Nose normal.   Eyes: EOM are normal.   Cardiovascular:  Intact distal pulses.            Pulmonary/Chest: Effort normal. No respiratory distress.   Neurological: She is alert and oriented to person, place, and time.   Psychiatric: She has a normal mood and affect. Her behavior is normal. Judgment and thought content normal.           Right Knee Exam     Inspection   Erythema: absent  Scars: absent  Swelling: absent  Effusion: absent  Deformity: absent  Bruising: absent    Tenderness   The patient is experiencing no tenderness.     Range of Motion   Extension:  -5   Flexion:  120     Tests   Meniscus   Deo:  Medial - negative Lateral - negative  Ligament Examination   Lachman: normal (-1 to 2mm)   PCL-Posterior Drawer: normal (0 to 2mm)     MCL - Valgus: normal (0 to 2mm)  LCL - Varus: normal  Patella   Patellar apprehension: negative  Passive Patellar Tilt: neutral  Patellar Tracking: normal  Patellar Grind: negative    Other   Muscle Tightness: hamstring tightness  Sensation: normal    Left Knee Exam     Inspection   Erythema: absent  Scars: absent  Swelling: absent  Effusion: absent  Deformity: absent  Bruising: absent    Tenderness   The patient tender to palpation of the medial joint line and lateral joint line.    Range of Motion   Extension:  -5   Flexion:  110     Tests   Meniscus   Deo:  Medial - positive Lateral - positive  Stability   Lachman: normal (-1 to 2mm)   PCL-Posterior Drawer: normal (0 to 2mm)  MCL - Valgus: normal (0 to 2mm)  LCL - Varus: normal (0 to 2mm)  Patella   Patellar apprehension: negative  Passive Patellar Tilt:  neutral  Patellar Tracking: normal  Patellar Grind: negative    Other   Muscle Tightness: hamstring tightness  Sensation: normal    Muscle Strength   Right Lower Extremity   Quadriceps:  5/5   Hamstrin/5   Left Lower Extremity   Quadriceps:  4/5   Hamstrin/5     Vascular Exam     Right Pulses  Dorsalis Pedis:      2+  Posterior Tibial:      2+        Left Pulses  Dorsalis Pedis:      2+  Posterior Tibial:      2+        Edema  Right Lower Leg: absent  Left Lower Leg: absent    Radiographs bilateral knee     Joint space narrowing seen within the bilateral medial compartments with right being worse than left; tricompartmental osteophyte formation      Assessment:     Encounter Diagnoses   Name Primary?    Primary osteoarthritis of left knee Yes    Acute pain of left knee           Plan:       1. We again discussed a variety of treatment options including medications, injections, physical therapy and other alternative treatments. I also explained the indications, risks and benefits of surgery. Patient's pain is refractory HEP, conservative management, and NSAIDs. Had a lengthy discussion about osteoarthritis in the knees to include the primary causes to include excessive weight, trauma, genetics, and muscle weakness as well as the different forms of treatment used to help alleviate symptoms including CSI, physical therapy, and Visco supplementation injections.  Patient can not receive CSI due to ongoing CKD.  At this time, Pt would like to proceed with repeat Visco supplementation in 3 months.  She will continue her home exercise program.    3. Pre-authorization placed for  Gelsyn 3  injections.    4. Ice compress to the affected area 2-3x a day for 15-20 minutes as needed for pain management.  Tylenol prn pain.  Patient he is currently taking Plavix and has ongoing CKD, and therefore can not take anti-inflammatories.    5. RTC to see Kyler aMlik PA-C in 2 weeks for  Gelsyn 3  injections.        All of  the patient's questions were answered. Patient was advised to call the clinic or contact me through the patient portal for any questions or concerns.       Medical Dictation software was used during the dictation of portions or the entirety of this medical record.  Phonetic or grammatic errors may exist due to the use of this software. For clarification, refer to the author of the document.      Patient questionnaires may have been collected.

## 2024-10-25 LAB
LEFT EYE DM RETINOPATHY: NEGATIVE
RIGHT EYE DM RETINOPATHY: NEGATIVE

## 2024-11-08 NOTE — TELEPHONE ENCOUNTER
No care due was identified.  Health [facility] Embedded Care Due Messages. Reference number: [id number].   11/08/2024 5:54:56 AM CST

## 2024-11-10 RX ORDER — FUROSEMIDE 40 MG/1
40 TABLET ORAL 2 TIMES DAILY
Qty: 180 TABLET | Refills: 3 | Status: SHIPPED | OUTPATIENT
Start: 2024-11-10

## 2024-11-12 RX ORDER — SUCRALFATE 1 G/1
TABLET ORAL
Qty: 270 TABLET | Refills: 3 | Status: SHIPPED | OUTPATIENT
Start: 2024-11-12

## 2024-12-02 ENCOUNTER — OFFICE VISIT (OUTPATIENT)
Dept: CARDIOLOGY | Facility: CLINIC | Age: 84
End: 2024-12-02
Payer: MEDICARE

## 2024-12-02 VITALS
BODY MASS INDEX: 34.28 KG/M2 | OXYGEN SATURATION: 95 % | WEIGHT: 200.81 LBS | HEIGHT: 64 IN | SYSTOLIC BLOOD PRESSURE: 178 MMHG | HEART RATE: 66 BPM | DIASTOLIC BLOOD PRESSURE: 76 MMHG

## 2024-12-02 DIAGNOSIS — I10 PRIMARY HYPERTENSION: ICD-10-CM

## 2024-12-02 DIAGNOSIS — E78.5 HYPERLIPIDEMIA, UNSPECIFIED HYPERLIPIDEMIA TYPE: ICD-10-CM

## 2024-12-02 DIAGNOSIS — I73.9 PAD (PERIPHERAL ARTERY DISEASE): ICD-10-CM

## 2024-12-02 DIAGNOSIS — I70.0 ATHEROSCLEROSIS OF AORTA: Primary | ICD-10-CM

## 2024-12-02 LAB
LEFT EYE DM RETINOPATHY: NEGATIVE
RIGHT EYE DM RETINOPATHY: NEGATIVE

## 2024-12-02 PROCEDURE — 1157F ADVNC CARE PLAN IN RCRD: CPT | Mod: CPTII,S$GLB,, | Performed by: INTERNAL MEDICINE

## 2024-12-02 PROCEDURE — 1159F MED LIST DOCD IN RCRD: CPT | Mod: CPTII,S$GLB,, | Performed by: INTERNAL MEDICINE

## 2024-12-02 PROCEDURE — 3077F SYST BP >= 140 MM HG: CPT | Mod: CPTII,S$GLB,, | Performed by: INTERNAL MEDICINE

## 2024-12-02 PROCEDURE — 3078F DIAST BP <80 MM HG: CPT | Mod: CPTII,S$GLB,, | Performed by: INTERNAL MEDICINE

## 2024-12-02 PROCEDURE — 99999 PR PBB SHADOW E&M-EST. PATIENT-LVL III: CPT | Mod: PBBFAC,,, | Performed by: INTERNAL MEDICINE

## 2024-12-02 PROCEDURE — 3288F FALL RISK ASSESSMENT DOCD: CPT | Mod: CPTII,S$GLB,, | Performed by: INTERNAL MEDICINE

## 2024-12-02 PROCEDURE — 99214 OFFICE O/P EST MOD 30 MIN: CPT | Mod: S$GLB,,, | Performed by: INTERNAL MEDICINE

## 2024-12-02 PROCEDURE — 1101F PT FALLS ASSESS-DOCD LE1/YR: CPT | Mod: CPTII,S$GLB,, | Performed by: INTERNAL MEDICINE

## 2024-12-02 PROCEDURE — 1160F RVW MEDS BY RX/DR IN RCRD: CPT | Mod: CPTII,S$GLB,, | Performed by: INTERNAL MEDICINE

## 2024-12-02 NOTE — PROGRESS NOTES
"Subjective:    Patient ID:  Katy Soto is a 84 y.o. female who presents for follow-up of No chief complaint on file.      HPI  Pt with hx of PAD s/p PTA to RPTA with resolution of severe Balta IIb lifestyle limiting claudication, HTN, HLD who presents for f/u. Initially seen for claudication, intervention performed to RLE with resolution of claudication. No claudication of LLE. Back then she was confused as to her meds and claims to have had a nosebleed from Diltiazem and had not been taking Dilt or Pletal (I suspect it was Pletal that caused the nosebleed). Had Covid infection 4/2020 with full recovery. Uncontrolled BP and confused as to her meds and how/when she takes them. Family stated that she had been noncompliant and after discussion, admitted to med noncompliance and dietary noncompliance. Record reviewed and she has been on multiple regimens.   BP being managed by Dr Cruz and she states it has been 130-150's at home. Did not take lasix today. Does not take her meds at times bc she "feels good." Denies CP, SOB/SPIVEY, orthopnea, PND, syncope, palps, claudication. 2DE with normal EF and LVH.    7/13/2023:  Since last visit, was following with NP. Has had recent medication changes. Initially had volume overload from dietary indiscretion. Dr Cruz regulating BP meds. Lasix increased and spironolactone added and volume removed and spironolactone discontinued 1 week ago. Currently on lasix 40 mg (decreased), irbesartan 300 mg, isosorbide 60 mg. Since taking her meds this morning she feels dizzy, sluggish, lethargic, and weak. SBP  in clinic.     10/12/2023:  Hx of stroke 2022, on Plavix since. Since last visit off spironolactone and taking 1/2 60 mg tab of Imdur and BP controlled. No new cardiac complaints and no claudication. Compliant with meds. Intermittent bilateral LE edema after high sodium meal. Attempting to stay active.    12/2024: Initial visit with me. Former pt of Dr. Fried.  " Accompanied by her niece.  She is doing well.  Denies any cardiac complaints.  BP up today but she did not take her medications.  BP managed by Dr. Cruz.  She is independent in ADLs.  Still performs her own laundry/cooking/cleaning without significant CP or SPIVEY.  When starts having significant swelling she switch Lasix to spironolactone as instructed by Dr. Cruz which helps afterward gets back on Lasix.     Review of Systems   Constitutional: Positive for malaise/fatigue. Negative for chills and fever.   HENT:  Negative for hearing loss and nosebleeds.    Eyes:  Negative for blurred vision.   Cardiovascular:         As in HPI   Respiratory:  Negative for hemoptysis and shortness of breath.    Hematologic/Lymphatic: Negative for bleeding problem.   Skin:  Negative for itching.   Musculoskeletal:  Negative for falls.   Gastrointestinal:  Negative for abdominal pain and hematochezia.   Genitourinary:  Negative for hematuria.   Neurological:  Negative for dizziness and loss of balance.   Psychiatric/Behavioral:  Negative for altered mental status and depression.         Objective:    Physical Exam  Constitutional:       Appearance: She is well-developed.   HENT:      Head: Normocephalic and atraumatic.   Eyes:      Conjunctiva/sclera: Conjunctivae normal.   Neck:      Vascular: No carotid bruit or JVD.   Cardiovascular:      Rate and Rhythm: Normal rate and regular rhythm.      Pulses: Decreased pulses.           Carotid pulses are 2+ on the right side and 2+ on the left side.       Radial pulses are 2+ on the right side and 2+ on the left side.      Heart sounds: Normal heart sounds. No murmur heard.     No friction rub. No gallop.   Pulmonary:      Effort: Pulmonary effort is normal. No respiratory distress.      Breath sounds: Normal breath sounds. No stridor. No wheezing.   Musculoskeletal:      Cervical back: Neck supple.      Right lower leg: Edema (trace) present.      Left lower leg: Edema (trace) present.    Skin:     General: Skin is warm and dry.   Neurological:      Mental Status: She is alert and oriented to person, place, and time.   Psychiatric:         Behavior: Behavior normal.           Assessment:       1. Atherosclerosis of aorta    2. Hyperlipidemia, unspecified hyperlipidemia type    3. PAD (peripheral artery disease)    4. Primary hypertension           Plan:       1. Atherosclerotic disease  She is on dual antiplatelet therapy.  I will stop aspirin and continue Plavix to decrease bleeding risks  Continue statin.  LDL at goal    2. Peripheral arterial disease  Stable without significant limiting claudications or tissue loss  Medical therapy for time being      3. Hypertension  Above goal today.  But did not take her medications  Monitor at home and keep log.  Managed by Dr. Cruz      4. Heart failure preserved ejection fraction  Well compensated  Continue Lasix  Blood pressure control  Low-salt diet      Six-months follow-up or sooner p.r.n.

## 2024-12-09 ENCOUNTER — PATIENT OUTREACH (OUTPATIENT)
Dept: ADMINISTRATIVE | Facility: HOSPITAL | Age: 84
End: 2024-12-09
Payer: MEDICARE

## 2024-12-09 NOTE — PROGRESS NOTES
Population Health Chart Review & Patient Outreach Details      Additional [facility] Health Notes:               Updates Requested / Reviewed:      Updated Care Coordination Note, Care Everywhere, , and Immunizations Reconciliation Completed or Queried: [facility] Topics Overdue:      [facility] Score: 1     Uncontrolled BP    RSV Vaccine                  Health Maintenance Topic(s) Outreach Outcomes & Actions Taken:    Eye Exam - Outreach Outcomes & Actions Taken  : Diabetic Eye External Records Uploaded, Care Team & History Updated if Applicable

## 2024-12-12 ENCOUNTER — OFFICE VISIT (OUTPATIENT)
Dept: FAMILY MEDICINE | Facility: CLINIC | Age: 84
End: 2024-12-12
Payer: MEDICARE

## 2024-12-12 VITALS
BODY MASS INDEX: 34.72 KG/M2 | HEART RATE: 85 BPM | WEIGHT: 203.38 LBS | SYSTOLIC BLOOD PRESSURE: 122 MMHG | HEIGHT: 64 IN | OXYGEN SATURATION: 97 % | TEMPERATURE: 98 F | DIASTOLIC BLOOD PRESSURE: 64 MMHG

## 2024-12-12 DIAGNOSIS — H91.90 HEARING LOSS, UNSPECIFIED HEARING LOSS TYPE, UNSPECIFIED LATERALITY: ICD-10-CM

## 2024-12-12 DIAGNOSIS — E11.59 OBESITY, DIABETES, AND HYPERTENSION SYNDROME: ICD-10-CM

## 2024-12-12 DIAGNOSIS — I10 ESSENTIAL HYPERTENSION: ICD-10-CM

## 2024-12-12 DIAGNOSIS — I15.2 OBESITY, DIABETES, AND HYPERTENSION SYNDROME: ICD-10-CM

## 2024-12-12 DIAGNOSIS — N18.32 STAGE 3B CHRONIC KIDNEY DISEASE: Primary | ICD-10-CM

## 2024-12-12 DIAGNOSIS — E66.01 SEVERE OBESITY (BMI 35.0-39.9) WITH COMORBIDITY: ICD-10-CM

## 2024-12-12 DIAGNOSIS — E03.4 HYPOTHYROIDISM DUE TO ACQUIRED ATROPHY OF THYROID: ICD-10-CM

## 2024-12-12 DIAGNOSIS — E66.9 OBESITY, DIABETES, AND HYPERTENSION SYNDROME: ICD-10-CM

## 2024-12-12 DIAGNOSIS — E11.69 OBESITY, DIABETES, AND HYPERTENSION SYNDROME: ICD-10-CM

## 2024-12-12 PROCEDURE — 1159F MED LIST DOCD IN RCRD: CPT | Mod: CPTII,S$GLB,, | Performed by: FAMILY MEDICINE

## 2024-12-12 PROCEDURE — 1160F RVW MEDS BY RX/DR IN RCRD: CPT | Mod: CPTII,S$GLB,, | Performed by: FAMILY MEDICINE

## 2024-12-12 PROCEDURE — 1101F PT FALLS ASSESS-DOCD LE1/YR: CPT | Mod: CPTII,S$GLB,, | Performed by: FAMILY MEDICINE

## 2024-12-12 PROCEDURE — 99214 OFFICE O/P EST MOD 30 MIN: CPT | Mod: S$GLB,,, | Performed by: FAMILY MEDICINE

## 2024-12-12 PROCEDURE — 3288F FALL RISK ASSESSMENT DOCD: CPT | Mod: CPTII,S$GLB,, | Performed by: FAMILY MEDICINE

## 2024-12-12 PROCEDURE — 3074F SYST BP LT 130 MM HG: CPT | Mod: CPTII,S$GLB,, | Performed by: FAMILY MEDICINE

## 2024-12-12 PROCEDURE — 1126F AMNT PAIN NOTED NONE PRSNT: CPT | Mod: CPTII,S$GLB,, | Performed by: FAMILY MEDICINE

## 2024-12-12 PROCEDURE — 3078F DIAST BP <80 MM HG: CPT | Mod: CPTII,S$GLB,, | Performed by: FAMILY MEDICINE

## 2024-12-12 PROCEDURE — 1157F ADVNC CARE PLAN IN RCRD: CPT | Mod: CPTII,S$GLB,, | Performed by: FAMILY MEDICINE

## 2024-12-12 RX ORDER — ISOSORBIDE MONONITRATE 30 MG/1
30 TABLET, EXTENDED RELEASE ORAL DAILY
Qty: 90 TABLET | Refills: 3 | Status: SHIPPED | OUTPATIENT
Start: 2024-12-12

## 2024-12-12 NOTE — PROGRESS NOTES
"Subjective:      Patient ID: Katy Soto is a 84 y.o. female.    Chief Complaint: Follow-up (6 mon)      Vitals:    12/12/24 1442   BP: 122/64   Pulse: 85   Temp: 97.9 °F (36.6 °C)   TempSrc: Oral   SpO2: 97%   Weight: 92.3 kg (203 lb 6 oz)   Height: 5' 4" (1.626 m)        HPI   Checkup; here with michael    Problem List  Patient Active Problem List   Diagnosis    Hyperlipidemia    Arthritis    PAD (peripheral artery disease)    Bilateral leg edema    Anxiety    Obesity, diabetes, and hypertension syndrome    Osteopenia    Atherosclerosis of aorta    Elevated sed rate    Vitamin D insufficiency    Severe obesity (BMI 35.0-39.9) with comorbidity    Reduced visual acuity    History of cerebral infarction    Generalized edema    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    Type 2 diabetes mellitus with hyperlipidemia    Diabetic polyneuropathy associated with type 2 diabetes mellitus    Dementia, unspecified dementia severity, unspecified dementia type, unspecified whether behavioral, psychotic, or mood disturbance or anxiety    Primary hypertension    Stage 3b chronic kidney disease    Other thrombophilia        ALLERGIES:   Review of patient's allergies indicates:   Allergen Reactions    Cilostazol      [id number] and stomach cramps    Clonidine     Coreg [carvedilol]      Cramps stomach and nausea    Diltiazem      nauseana d stoamch cramps    Lexapro [escitalopram oxalate]      Dizzy, nausea and weak    Shellfish containing products Hives       MEDS:   Current Outpatient Medications:     atorvastatin (LIPITOR) 40 MG tablet, TAKE 1 TABLET(40 MG) BY MOUTH EVERY DAY, Disp: 90 tablet, Rfl: 1    blood sugar diagnostic Strp, by Misc.(Non-Drug; Combo Route) route., Disp: , Rfl:     blood-glucose meter kit, Use daily, Disp: 1 each, Rfl: 0    cholecalciferol, vitamin D3, (VITAMIN D3) 125 mcg (5,000 unit) Tab, Take 1 tablet (5,000 Units total) by mouth once " daily., Disp: 90 tablet, Rfl: 3    clopidogreL (PLAVIX) 75 mg tablet, TAKE 1 TABLET(75 MG) BY MOUTH EVERY DAY FOR 21 DAYS, Disp: 90 tablet, Rfl: 3    cyanocobalamin, vitamin B-12, 1,000 mcg Lozg, Place 1 lozenge under the tongue Daily. For B12 and nerves, Disp: 100 lozenge, Rfl: 3    donepeziL (ARICEPT) 5 MG tablet, TAKE 1 TABLET(5 MG) BY MOUTH EVERY EVENING FOR BLOOD PRESSURE, Disp: 90 tablet, Rfl: 3    folic acid/multivit-min/lutein (CENTRUM SILVER ORAL), Take by mouth., Disp: , Rfl:     furosemide (LASIX) 40 MG tablet, TAKE 1 TABLET(40 MG) BY MOUTH TWICE DAILY, Disp: 180 tablet, Rfl: 3    irbesartan (AVAPRO) 300 MG tablet, TAKE 1 TABLET(300 MG) BY MOUTH EVERY DAY, Disp: 90 tablet, Rfl: 3    isosorbide mononitrate (IMDUR) 30 MG 24 hr tablet, Take 1 tablet (30 mg total) by mouth once daily., Disp: 90 tablet, Rfl: 3    lancets 31 gauge Misc, by Misc.(Non-Drug; Combo Route) route., Disp: , Rfl:     levothyroxine (SYNTHROID) 50 MCG tablet, Take 1 tablet (50 mcg total) by mouth before breakfast. For thyroid, Disp: 90 tablet, Rfl: 3    memantine (NAMENDA) 5 MG Tab, TAKE 1 TABLET(5 MG) BY MOUTH TWICE DAILY FOR MEMORY, Disp: 180 tablet, Rfl: 3    orphenadrine (NORFLEX) 100 mg tablet, Take 1 tablet (100 mg total) by mouth 2 (two) times daily., Disp: 10 tablet, Rfl: 0    potassium chloride SA (K-DUR,KLOR-CON) 20 MEQ tablet, TAKE 1 TABLET BY MOUTH EVERY DAY, Disp: 90 tablet, Rfl: 3    sucralfate (CARAFATE) 1 gram tablet, TAKE 1 TABLET(1 GRAM) BY MOUTH THREE TIMES DAILY, Disp: 270 tablet, Rfl: 3    terazosin (HYTRIN) 5 MG capsule, TAKE 1 CAPSULE(5 MG) BY MOUTH EVERY EVENING, Disp: 90 capsule, Rfl: 3      History:  Current Providers as of 12/12/2024  PCP: Rudy Cruz MD  Care Team Provider: New Mehta MD  Care Team Provider: David Fried MD  Care Team Provider: Pinky Grajeda RD  Care Team Provider: Stevo Byers,   Encounter Provider: Rudy Cruz MD, starting on Thu Dec 12, 2024  12:00 AM  Referring Provider: not found, starting on Thu Dec 12, 2024 12:00 AM  Consulting Physician: Rudy Cruz MD, starting on Mon Harley 3, 2024  3:01 PM (Active)   Past Medical History:   Diagnosis Date    Hyperlipidemia     Hypertension     right Occipital stroke 2022    TIA (transient ischemic attack) 2022    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 2023    Type 2 diabetes mellitus without complication, without long-term current use of insulin 3/17/2022     Past Surgical History:   Procedure Laterality Date     SECTION      x1    HERNIA REPAIR      umbilical    LEG SURGERY Right 2017    stents placed     Social History     Tobacco Use    Smoking status: Never     Passive exposure: Never    Smokeless tobacco: Never   Substance Use Topics    Alcohol use: No    Drug use: No         Review of Systems   Constitutional: Negative.    HENT:  Positive for hearing loss.    Respiratory: Negative.     Cardiovascular:  Positive for leg swelling.   Gastrointestinal: Negative.    Endocrine: Negative.    Genitourinary: Negative.    Musculoskeletal: Negative.    Psychiatric/Behavioral: Negative.     All other systems reviewed and are negative.    Objective:     Physical Exam  Vitals and nursing note reviewed.   Constitutional:       Appearance: She is well-developed. She is obese.   HENT:      Head: Normocephalic.   Eyes:      Conjunctiva/sclera: Conjunctivae normal.      Pupils: Pupils are equal, round, and reactive to light.   Cardiovascular:      Rate and Rhythm: Normal rate and regular rhythm.      Heart sounds: Normal heart sounds.   Pulmonary:      Effort: Pulmonary effort is normal.      Breath sounds: Normal breath sounds.   Musculoskeletal:         General: Normal range of motion.      Cervical back: Normal range of motion and neck supple.   Skin:     General: Skin is warm and dry.   Neurological:      Mental Status: She is alert and  oriented to person, place, and time.      Deep Tendon Reflexes: Reflexes are normal and symmetric.   Psychiatric:         Mood and Affect: Mood normal.         Behavior: Behavior normal.         Thought Content: Thought content normal.         Judgment: Judgment normal.           Assessment:     No diagnosis found.  Plan:        Medication List            Accurate as of December 12, 2024  3:03 PM. If you have any questions, ask your nurse or doctor.                CONTINUE taking these medications      atorvastatin 40 MG tablet  Commonly known as: LIPITOR  TAKE 1 TABLET(40 MG) BY MOUTH EVERY DAY     blood sugar diagnostic Strp     blood-glucose meter kit  Use daily     CENTRUM SILVER ORAL     cholecalciferol (vitamin D3) 125 mcg (5,000 unit) Tab  Commonly known as: VITAMIN D3  Take 1 tablet (5,000 Units total) by mouth once daily.     clopidogreL 75 mg tablet  Commonly known as: PLAVIX  TAKE 1 TABLET(75 MG) BY MOUTH EVERY DAY FOR 21 DAYS     cyanocobalamin (vitamin B-12) 1,000 mcg Lozg  Place 1 lozenge under the tongue Daily. For B12 and nerves     donepeziL 5 MG tablet  Commonly known as: ARICEPT  TAKE 1 TABLET(5 MG) BY MOUTH EVERY EVENING FOR BLOOD PRESSURE     furosemide 40 MG tablet  Commonly known as: LASIX  TAKE 1 TABLET(40 MG) BY MOUTH TWICE DAILY     irbesartan 300 MG tablet  Commonly known as: AVAPRO  TAKE 1 TABLET(300 MG) BY MOUTH EVERY DAY     isosorbide mononitrate 30 MG 24 hr tablet  Commonly known as: IMDUR  Take 1 tablet (30 mg total) by mouth once daily.     lancets 31 gauge Misc     levothyroxine 50 MCG tablet  Commonly known as: SYNTHROID  Take 1 tablet (50 mcg total) by mouth before breakfast. For thyroid     memantine 5 MG Tab  Commonly known as: NAMENDA  TAKE 1 TABLET(5 MG) BY MOUTH TWICE DAILY FOR MEMORY     orphenadrine 100 mg tablet  Commonly known as: NORFLEX  Take 1 tablet (100 mg total) by mouth 2 (two) times daily.     potassium chloride SA 20 MEQ tablet  Commonly known as:  K-GIOVANNA,KLOR-CON  TAKE 1 TABLET BY MOUTH EVERY DAY     sucralfate 1 gram tablet  Commonly known as: CARAFATE  TAKE 1 TABLET(1 GRAM) BY MOUTH THREE TIMES DAILY     terazosin 5 MG capsule  Commonly known as: HYTRIN  TAKE 1 CAPSULE(5 MG) BY MOUTH EVERY EVENING            There are no diagnoses linked to this encounter.

## 2024-12-17 ENCOUNTER — LAB VISIT (OUTPATIENT)
Dept: LAB | Facility: HOSPITAL | Age: 84
End: 2024-12-17
Attending: FAMILY MEDICINE
Payer: MEDICARE

## 2024-12-17 DIAGNOSIS — I15.2 OBESITY, DIABETES, AND HYPERTENSION SYNDROME: ICD-10-CM

## 2024-12-17 DIAGNOSIS — I10 ESSENTIAL HYPERTENSION: ICD-10-CM

## 2024-12-17 DIAGNOSIS — E03.4 HYPOTHYROIDISM DUE TO ACQUIRED ATROPHY OF THYROID: ICD-10-CM

## 2024-12-17 DIAGNOSIS — H91.90 HEARING LOSS, UNSPECIFIED HEARING LOSS TYPE, UNSPECIFIED LATERALITY: ICD-10-CM

## 2024-12-17 DIAGNOSIS — E66.01 SEVERE OBESITY (BMI 35.0-39.9) WITH COMORBIDITY: ICD-10-CM

## 2024-12-17 DIAGNOSIS — E11.59 OBESITY, DIABETES, AND HYPERTENSION SYNDROME: ICD-10-CM

## 2024-12-17 DIAGNOSIS — N18.32 STAGE 3B CHRONIC KIDNEY DISEASE: ICD-10-CM

## 2024-12-17 DIAGNOSIS — E66.9 OBESITY, DIABETES, AND HYPERTENSION SYNDROME: ICD-10-CM

## 2024-12-17 DIAGNOSIS — E11.69 OBESITY, DIABETES, AND HYPERTENSION SYNDROME: ICD-10-CM

## 2024-12-17 LAB
ALBUMIN SERPL BCP-MCNC: 3.8 G/DL (ref 3.5–5.2)
ALP SERPL-CCNC: 109 U/L (ref 38–126)
ALT SERPL W/O P-5'-P-CCNC: 16 U/L (ref 10–44)
ANION GAP SERPL CALC-SCNC: 9 MMOL/L (ref 8–16)
AST SERPL-CCNC: 23 U/L (ref 15–46)
BASOPHILS # BLD AUTO: 0.03 K/UL (ref 0–0.2)
BASOPHILS NFR BLD: 0.4 % (ref 0–1.9)
BILIRUB SERPL-MCNC: 0.5 MG/DL (ref 0.1–1)
BILIRUB UR QL STRIP: NEGATIVE
CALCIUM SERPL-MCNC: 9.2 MG/DL (ref 8.7–10.5)
CHLORIDE SERPL-SCNC: 105 MMOL/L (ref 95–110)
CHOLEST SERPL-MCNC: 154 MG/DL (ref 120–199)
CHOLEST/HDLC SERPL: 4.7 {RATIO} (ref 2–5)
CLARITY UR REFRACT.AUTO: CLEAR
CO2 SERPL-SCNC: 25 MMOL/L (ref 23–29)
COLOR UR AUTO: YELLOW
CREAT SERPL-MCNC: 1.35 MG/DL (ref 0.5–1.4)
DIFFERENTIAL METHOD BLD: ABNORMAL
EOSINOPHIL # BLD AUTO: 0.2 K/UL (ref 0–0.5)
EOSINOPHIL NFR BLD: 2.4 % (ref 0–8)
ERYTHROCYTE [DISTWIDTH] IN BLOOD BY AUTOMATED COUNT: 13 % (ref 11.5–14.5)
EST. GFR  (NO RACE VARIABLE): 38.5 ML/MIN/1.73 M^2
ESTIMATED AVG GLUCOSE: 126 MG/DL (ref 68–131)
GLUCOSE SERPL-MCNC: 113 MG/DL (ref 70–110)
GLUCOSE UR QL STRIP: NEGATIVE
HBA1C MFR BLD: 6 % (ref 4–5.6)
HCT VFR BLD AUTO: 34.3 % (ref 37–48.5)
HDLC SERPL-MCNC: 33 MG/DL (ref 40–75)
HDLC SERPL: 21.4 % (ref 20–50)
HGB BLD-MCNC: 11 G/DL (ref 12–16)
HGB UR QL STRIP: NEGATIVE
IMM GRANULOCYTES # BLD AUTO: 0.02 K/UL (ref 0–0.04)
IMM GRANULOCYTES NFR BLD AUTO: 0.3 % (ref 0–0.5)
KETONES UR QL STRIP: NEGATIVE
LDLC SERPL CALC-MCNC: 84.2 MG/DL (ref 63–159)
LEUKOCYTE ESTERASE UR QL STRIP: NEGATIVE
LYMPHOCYTES # BLD AUTO: 3.1 K/UL (ref 1–4.8)
LYMPHOCYTES NFR BLD: 45.4 % (ref 18–48)
MCH RBC QN AUTO: 29.9 PG (ref 27–31)
MCHC RBC AUTO-ENTMCNC: 32.1 G/DL (ref 32–36)
MCV RBC AUTO: 93 FL (ref 82–98)
MONOCYTES # BLD AUTO: 0.6 K/UL (ref 0.3–1)
MONOCYTES NFR BLD: 8.7 % (ref 4–15)
NEUTROPHILS # BLD AUTO: 2.9 K/UL (ref 1.8–7.7)
NEUTROPHILS NFR BLD: 42.8 % (ref 38–73)
NITRITE UR QL STRIP: NEGATIVE
NONHDLC SERPL-MCNC: 121 MG/DL
NRBC BLD-RTO: 0 /100 WBC
PH UR STRIP: 6 [PH] (ref 5–8)
PLATELET # BLD AUTO: 216 K/UL (ref 150–450)
PMV BLD AUTO: 10.3 FL (ref 9.2–12.9)
POTASSIUM SERPL-SCNC: 4.3 MMOL/L (ref 3.5–5.1)
PROT SERPL-MCNC: 7.9 G/DL (ref 6–8.4)
PROT UR QL STRIP: NEGATIVE
RBC # BLD AUTO: 3.68 M/UL (ref 4–5.4)
SODIUM SERPL-SCNC: 139 MMOL/L (ref 136–145)
SP GR UR STRIP: 1.01 (ref 1–1.03)
TRIGL SERPL-MCNC: 184 MG/DL (ref 30–150)
TSH SERPL DL<=0.005 MIU/L-ACNC: 3.27 UIU/ML (ref 0.4–4)
URN SPEC COLLECT METH UR: NORMAL
UROBILINOGEN UR STRIP-ACNC: NEGATIVE EU/DL
UUN UR-MCNC: 20 MG/DL (ref 7–17)
WBC # BLD AUTO: 6.79 K/UL (ref 3.9–12.7)

## 2024-12-17 PROCEDURE — 80061 LIPID PANEL: CPT | Performed by: FAMILY MEDICINE

## 2024-12-17 PROCEDURE — 85025 COMPLETE CBC W/AUTO DIFF WBC: CPT | Mod: PN | Performed by: FAMILY MEDICINE

## 2024-12-17 PROCEDURE — 81003 URINALYSIS AUTO W/O SCOPE: CPT | Mod: PN | Performed by: FAMILY MEDICINE

## 2024-12-17 PROCEDURE — 80053 COMPREHEN METABOLIC PANEL: CPT | Mod: PN | Performed by: FAMILY MEDICINE

## 2024-12-17 PROCEDURE — 84443 ASSAY THYROID STIM HORMONE: CPT | Mod: PN | Performed by: FAMILY MEDICINE

## 2024-12-17 PROCEDURE — 83036 HEMOGLOBIN GLYCOSYLATED A1C: CPT | Performed by: FAMILY MEDICINE

## 2025-01-13 DIAGNOSIS — M17.12 PRIMARY OSTEOARTHRITIS OF LEFT KNEE: Primary | ICD-10-CM

## 2025-01-27 ENCOUNTER — OFFICE VISIT (OUTPATIENT)
Dept: SPORTS MEDICINE | Facility: CLINIC | Age: 85
End: 2025-01-27
Payer: MEDICARE

## 2025-01-27 VITALS — WEIGHT: 206.44 LBS | BODY MASS INDEX: 35.24 KG/M2 | HEIGHT: 64 IN

## 2025-01-27 DIAGNOSIS — M17.12 PRIMARY OSTEOARTHRITIS OF LEFT KNEE: Primary | ICD-10-CM

## 2025-01-27 PROCEDURE — 20610 DRAIN/INJ JOINT/BURSA W/O US: CPT | Mod: LT,S$GLB,, | Performed by: ORTHOPAEDIC SURGERY

## 2025-01-27 PROCEDURE — 99499 UNLISTED E&M SERVICE: CPT | Mod: 25,S$GLB,, | Performed by: ORTHOPAEDIC SURGERY

## 2025-01-27 PROCEDURE — 99999 PR PBB SHADOW E&M-EST. PATIENT-LVL III: CPT | Mod: PBBFAC,,, | Performed by: ORTHOPAEDIC SURGERY

## 2025-01-27 NOTE — PROGRESS NOTES
Patient is here for follow up of Left knee arthritis. Pt is requesting Gelsyn#1 for Left knee.  [facility] Has failed other conservative modalities including NSAIDS, activity modification, weight loss.    The prior shots were tolerated well.    PHYSICAL EXAMINATION:     General: The patient is alert and oriented x 3. Mood is pleasant.   Observation of ears, eyes and nose reveals no gross abnormalities. No   labored breathing observed.     No signs of infection or adverse reaction to knee.    Patient had no adverse reactions to the medication. Pain decreased. Patient was instructed to apply ice to the joint for 20 minutes and avoid strenuous activities for 24-36 hours following the injection. They were warned of possible blood sugar and/or blood pressure changes during that time. Following that time, they can resume regular activities.    Patient was reminded to call the clinic immediately for any adverse side effects as explained in clinic today.      RTC in 1 week with Franklyn Koenig MD for Gelsyn#2.    All questions were answered, pt will contact us for questions or concerns in the interim.

## 2025-01-27 NOTE — PROCEDURES
Large Joint Aspiration/Injection: L knee    Date/Time: 1/27/2025 8:00 AM    Performed by: Sami Malik PA-C  Authorized by: Sami Malik PA-C    Consent Done?:  Yes (Verbal)  Indications:  Arthritis and pain  Site marked: the procedure site was marked    Timeout: prior to procedure the correct patient, procedure, and site was verified    Prep: patient was prepped and draped in usual sterile fashion      Local anesthesia used?: Yes    Local anesthetic:  Lidocaine spray    Details:  Needle Size:  22 G  Ultrasonic Guidance for needle placement?: No    Approach:  Anterolateral  Location:  Knee  Site:  L knee  Medications:  16.8 mg sodium hyaluronate 16.8 mg/2 mL  Patient tolerance:  Patient tolerated the procedure well with no immediate complications

## 2025-01-29 ENCOUNTER — TELEPHONE (OUTPATIENT)
Dept: SPORTS MEDICINE | Facility: CLINIC | Age: 85
End: 2025-01-29
Payer: MEDICARE

## 2025-01-31 LAB
LEFT EYE DM RETINOPATHY: NEGATIVE
LEFT EYE DM RETINOPATHY: NEGATIVE
RIGHT EYE DM RETINOPATHY: NEGATIVE
RIGHT EYE DM RETINOPATHY: NEGATIVE

## 2025-02-03 ENCOUNTER — OFFICE VISIT (OUTPATIENT)
Dept: SPORTS MEDICINE | Facility: CLINIC | Age: 85
End: 2025-02-03
Payer: MEDICARE

## 2025-02-03 VITALS — WEIGHT: 206.44 LBS | BODY MASS INDEX: 35.24 KG/M2 | HEIGHT: 64 IN

## 2025-02-03 DIAGNOSIS — M17.12 PRIMARY OSTEOARTHRITIS OF LEFT KNEE: Primary | ICD-10-CM

## 2025-02-03 PROCEDURE — 20610 DRAIN/INJ JOINT/BURSA W/O US: CPT | Mod: LT,S$GLB,, | Performed by: STUDENT IN AN ORGANIZED HEALTH CARE EDUCATION/TRAINING PROGRAM

## 2025-02-03 PROCEDURE — 99499 UNLISTED E&M SERVICE: CPT | Mod: 25,S$GLB,, | Performed by: STUDENT IN AN ORGANIZED HEALTH CARE EDUCATION/TRAINING PROGRAM

## 2025-02-03 PROCEDURE — 99999 PR PBB SHADOW E&M-EST. PATIENT-LVL III: CPT | Mod: PBBFAC,,, | Performed by: STUDENT IN AN ORGANIZED HEALTH CARE EDUCATION/TRAINING PROGRAM

## 2025-02-03 NOTE — PROGRESS NOTES
Katy Soto is a 84 y.o. female presents for 2/3 Gelsyn 3 injection to left knee.    See procedure note for details.    Pseudo-septic reaction discussed.

## 2025-02-03 NOTE — PROCEDURES
Large Joint Aspiration/Injection: L knee    Date/Time: 2/3/2025 9:45 AM    Performed by: Franklyn Koenig MD  Authorized by: Franklyn Koenig MD    Consent Done?:  Yes (Verbal)  Indications:  Pain, joint swelling and diagnostic evaluation  Site marked: the procedure site was marked    Timeout: prior to procedure the correct patient, procedure, and site was verified    Prep: patient was prepped and draped in usual sterile fashion      Local anesthesia used?: Yes    Local anesthetic:  Lidocaine spray    Details:  Needle Size:  18 G  Approach:  Anteromedial  Location:  Knee  Site:  L knee  Medications:  16.8 mg sodium hyaluronate 16.8 mg/2 mL  Patient tolerance:  Patient tolerated the procedure well with no immediate complications

## 2025-02-05 RX ORDER — ATORVASTATIN CALCIUM 40 MG/1
TABLET, FILM COATED ORAL
Qty: 90 TABLET | Refills: 3 | Status: SHIPPED | OUTPATIENT
Start: 2025-02-05

## 2025-02-05 NOTE — TELEPHONE ENCOUNTER
No care due was identified.  [facility] Embedded Care Due Messages. Reference number: [id number].   2/05/2025 5:56:41 AM CST

## 2025-02-06 NOTE — TELEPHONE ENCOUNTER
Refill Decision Note   Katy Soto  is requesting a refill authorization.  Brief Assessment and Rationale for Refill:  Approve     Medication Therapy Plan:         Comments:     Note composed:8:32 PM 02/05/2025             Appointments     Last Visit   12/12/2024 Rudy Cruz MD   Next Visit   6/12/2025 Rudy Cruz MD

## 2025-02-10 ENCOUNTER — OFFICE VISIT (OUTPATIENT)
Dept: SPORTS MEDICINE | Facility: CLINIC | Age: 85
End: 2025-02-10
Payer: MEDICARE

## 2025-02-10 VITALS — HEIGHT: 64 IN | WEIGHT: 206.38 LBS | BODY MASS INDEX: 35.23 KG/M2

## 2025-02-10 DIAGNOSIS — M17.12 PRIMARY OSTEOARTHRITIS OF LEFT KNEE: Primary | ICD-10-CM

## 2025-02-10 PROCEDURE — 99499 UNLISTED E&M SERVICE: CPT | Mod: 25,S$GLB,, | Performed by: STUDENT IN AN ORGANIZED HEALTH CARE EDUCATION/TRAINING PROGRAM

## 2025-02-10 PROCEDURE — 20610 DRAIN/INJ JOINT/BURSA W/O US: CPT | Mod: LT,S$GLB,, | Performed by: STUDENT IN AN ORGANIZED HEALTH CARE EDUCATION/TRAINING PROGRAM

## 2025-02-10 PROCEDURE — 99999 PR PBB SHADOW E&M-EST. PATIENT-LVL III: CPT | Mod: PBBFAC,,, | Performed by: STUDENT IN AN ORGANIZED HEALTH CARE EDUCATION/TRAINING PROGRAM

## 2025-02-10 NOTE — PROCEDURES
Large Joint Aspiration/Injection: L knee    Date/Time: 2/10/2025 1:00 PM    Performed by: Franklyn Koenig MD  Authorized by: Franklyn Koenig MD    Consent Done?:  Yes (Verbal)  Indications:  Pain, joint swelling and diagnostic evaluation  Site marked: the procedure site was marked    Timeout: prior to procedure the correct patient, procedure, and site was verified    Prep: patient was prepped and draped in usual sterile fashion      Local anesthesia used?: Yes    Local anesthetic:  Lidocaine spray    Details:  Needle Size:  21 G  Approach:  Anteromedial  Location:  Knee  Site:  L knee  Medications:  16.8 mg sodium hyaluronate 16.8 mg/2 mL  Patient tolerance:  Patient tolerated the procedure well with no immediate complications

## 2025-02-10 NOTE — PROGRESS NOTES
Katy Soto is a 84 y.o. female presents for 3/3 Gelsyn 3 injection to left knee.    See procedure note for details.    Pseudo-septic reaction discussed.

## 2025-02-13 ENCOUNTER — PATIENT OUTREACH (OUTPATIENT)
Dept: ADMINISTRATIVE | Facility: HOSPITAL | Age: 85
End: 2025-02-13
Payer: MEDICARE

## 2025-02-21 ENCOUNTER — PATIENT OUTREACH (OUTPATIENT)
Dept: ADMINISTRATIVE | Facility: HOSPITAL | Age: 85
End: 2025-02-21
Payer: MEDICARE

## 2025-04-29 LAB
LEFT EYE DM RETINOPATHY: NEGATIVE
RIGHT EYE DM RETINOPATHY: NEGATIVE

## 2025-05-07 ENCOUNTER — LAB VISIT (OUTPATIENT)
Dept: LAB | Facility: HOSPITAL | Age: 85
End: 2025-05-07
Attending: PODIATRIST
Payer: MEDICARE

## 2025-05-07 DIAGNOSIS — E03.4 HYPOTHYROIDISM DUE TO ACQUIRED ATROPHY OF THYROID: ICD-10-CM

## 2025-05-07 DIAGNOSIS — Z78.0 ASYMPTOMATIC POSTMENOPAUSAL STATUS: ICD-10-CM

## 2025-05-07 DIAGNOSIS — I10 ESSENTIAL HYPERTENSION: ICD-10-CM

## 2025-05-07 DIAGNOSIS — E78.5 HYPERLIPIDEMIA, UNSPECIFIED HYPERLIPIDEMIA TYPE: ICD-10-CM

## 2025-05-07 DIAGNOSIS — H91.90 HEARING LOSS, UNSPECIFIED HEARING LOSS TYPE, UNSPECIFIED LATERALITY: ICD-10-CM

## 2025-05-07 DIAGNOSIS — E11.9 TYPE 2 DIABETES MELLITUS WITHOUT COMPLICATION, WITHOUT LONG-TERM CURRENT USE OF INSULIN: ICD-10-CM

## 2025-05-07 DIAGNOSIS — I63.9 OCCIPITAL STROKE: ICD-10-CM

## 2025-05-07 DIAGNOSIS — B35.1 DERMATOPHYTOSIS OF NAIL: Primary | ICD-10-CM

## 2025-05-07 LAB
ALT SERPL W/O P-5'-P-CCNC: 16 UNIT/L (ref 10–44)
AST SERPL-CCNC: 24 UNIT/L (ref 15–46)

## 2025-05-07 PROCEDURE — 84460 ALANINE AMINO (ALT) (SGPT): CPT | Mod: PN

## 2025-05-07 PROCEDURE — 84450 TRANSFERASE (AST) (SGOT): CPT | Mod: PN

## 2025-05-07 PROCEDURE — 36415 COLL VENOUS BLD VENIPUNCTURE: CPT | Mod: PN

## 2025-05-07 RX ORDER — MEMANTINE HYDROCHLORIDE 5 MG/1
TABLET ORAL
Qty: 180 TABLET | Refills: 3 | Status: SHIPPED | OUTPATIENT
Start: 2025-05-07

## 2025-05-07 RX ORDER — IRBESARTAN 300 MG/1
300 TABLET ORAL
Qty: 90 TABLET | Refills: 2 | Status: SHIPPED | OUTPATIENT
Start: 2025-05-07

## 2025-05-07 RX ORDER — CLOPIDOGREL BISULFATE 75 MG/1
TABLET ORAL
Qty: 90 TABLET | Refills: 2 | Status: SHIPPED | OUTPATIENT
Start: 2025-05-07

## 2025-05-07 NOTE — TELEPHONE ENCOUNTER
No care due was identified.  [facility] Embedded Care Due Messages. Reference number: [id number].   5/07/2025 5:46:46 AM CDT

## 2025-05-07 NOTE — TELEPHONE ENCOUNTER
Refill Routing Note   Medication(s) are not appropriate for processing by [facility] for the following reason(s):        Outside of protocol    [facility] action(s):  Route  Approve               Appointments  past 12m or future 3m with PCP    Date Provider   Last Visit   12/12/2024 Rudy Cruz MD   Next Visit   6/12/2025 Rudy Cruz MD   ED visits in past 90 days: 0        Note composed:8:21 AM 05/07/2025

## 2025-05-09 ENCOUNTER — PATIENT OUTREACH (OUTPATIENT)
Dept: ADMINISTRATIVE | Facility: HOSPITAL | Age: 85
End: 2025-05-09
Payer: MEDICARE

## 2025-05-09 NOTE — PROGRESS NOTES
Population Health Chart Review & Patient Outreach Details      Additional Pop Health Notes:               Updates Requested / Reviewed:      Updated Care Coordination Note         Health Maintenance Topics Overdue:      VB Score: 0     Patient is not due for any topics at this time.    RSV Vaccine                  Health Maintenance Topic(s) Outreach Outcomes & Actions Taken:    Eye Exam - Outreach Outcomes & Actions Taken  : Diabetic Eye External Records Uploaded, Care Team & History Updated if Applicable

## 2025-05-19 ENCOUNTER — OFFICE VISIT (OUTPATIENT)
Dept: SPORTS MEDICINE | Facility: CLINIC | Age: 85
End: 2025-05-19
Payer: MEDICARE

## 2025-05-19 VITALS — WEIGHT: 203.25 LBS | HEIGHT: 64 IN | BODY MASS INDEX: 34.7 KG/M2

## 2025-05-19 DIAGNOSIS — M25.562 ACUTE PAIN OF LEFT KNEE: ICD-10-CM

## 2025-05-19 DIAGNOSIS — M17.12 PRIMARY OSTEOARTHRITIS OF LEFT KNEE: Primary | ICD-10-CM

## 2025-05-19 DIAGNOSIS — M17.11 PRIMARY OSTEOARTHRITIS OF RIGHT KNEE: ICD-10-CM

## 2025-05-19 PROCEDURE — 99212 OFFICE O/P EST SF 10 MIN: CPT | Mod: S$GLB,,, | Performed by: ORTHOPAEDIC SURGERY

## 2025-05-19 PROCEDURE — 1101F PT FALLS ASSESS-DOCD LE1/YR: CPT | Mod: CPTII,S$GLB,, | Performed by: ORTHOPAEDIC SURGERY

## 2025-05-19 PROCEDURE — 99999 PR PBB SHADOW E&M-EST. PATIENT-LVL III: CPT | Mod: PBBFAC,,, | Performed by: ORTHOPAEDIC SURGERY

## 2025-05-19 PROCEDURE — 1157F ADVNC CARE PLAN IN RCRD: CPT | Mod: CPTII,S$GLB,, | Performed by: ORTHOPAEDIC SURGERY

## 2025-05-19 PROCEDURE — 1159F MED LIST DOCD IN RCRD: CPT | Mod: CPTII,S$GLB,, | Performed by: ORTHOPAEDIC SURGERY

## 2025-05-19 PROCEDURE — 1160F RVW MEDS BY RX/DR IN RCRD: CPT | Mod: CPTII,S$GLB,, | Performed by: ORTHOPAEDIC SURGERY

## 2025-05-19 PROCEDURE — 1125F AMNT PAIN NOTED PAIN PRSNT: CPT | Mod: CPTII,S$GLB,, | Performed by: ORTHOPAEDIC SURGERY

## 2025-05-19 PROCEDURE — 3288F FALL RISK ASSESSMENT DOCD: CPT | Mod: CPTII,S$GLB,, | Performed by: ORTHOPAEDIC SURGERY

## 2025-05-19 NOTE — PROGRESS NOTES
Subjective:     Chief Complaint: Katy Soto is a 84 y.o. female who had concerns including Follow-up of the Left Knee.    HPI    Patient presents today for follow-up of bilateral knee pain, with right being worse than left.  She received viscosupplementation injections about 3 months ago and feels this did help improve her symptoms.  She is still reports occasional achiness and soreness, worse in her right knee compared to left.  This is usually after increased activity.  Pain is generalized, but worse along the medial joint line.  She rates this pain as 6/10.    Interval history 10/21/2024:  Patient presents today for follow-up of acute left knee pain.  Patient received viscosupplementation injections about 3 months ago.  She states this significantly reduced her pain.  She is able to perform most functions without any issue.  She is still endorses some occasional swelling following increased activity.  She complete her home health physical therapy and is now performing a home exercise program on her own.  Her pain today is 0/10.    Interval history 06/17/2024:  Patient with history of CKD presents to clinic with acute left knee pain x 2 weeks. Patient states the pain began gradually and is localized circumferential around the patella and medial and lateral joint lines. She she states she was walking when she felt her left knee buckle which preceded the pain.  She visited ER and obtain radiographs which were unremarkable.  Pain is made worse with ambulation as well as feelings of prolonged sitting.  She rates the pain as 10/10. She has attempted multiple conservative measures that include activity modification, ice & elevation, and oral medications (Tylenol), with some relief.  Is affecting ADLs and limiting desired level of activity. Denies numbness, tingling, radiation, and inability to bear weight. She is here today to discuss treatment options.    No previous surgeries or trauma on bilateral  knees      Review of Systems   Constitutional: Negative.   HENT: Negative.     Eyes: Negative.    Cardiovascular: Negative.    Respiratory: Negative.     Endocrine: Negative.    Hematologic/Lymphatic: Negative.    Skin: Negative.    Musculoskeletal:  Positive for joint pain and muscle weakness. Negative for arthritis, joint swelling and stiffness.   Neurological: Negative.    Psychiatric/Behavioral: Negative.     Allergic/Immunologic: Negative.                  Objective:     General: Katy is well-developed, well-nourished, appears stated age, in no acute distress, alert and oriented to time, place and person.     General    Nursing note and vitals reviewed.  Constitutional: She is oriented to person, place, and time. She appears well-developed and well-nourished. No distress.   HENT:   Head: Normocephalic and atraumatic.   Nose: Nose normal.   Eyes: EOM are normal.   Cardiovascular:  Intact distal pulses.            Pulmonary/Chest: Effort normal. No respiratory distress.   Neurological: She is alert and oriented to person, place, and time.   Psychiatric: She has a normal mood and affect. Her behavior is normal. Judgment and thought content normal.           Right Knee Exam     Inspection   Erythema: absent  Scars: absent  Swelling: absent  Effusion: absent  Deformity: absent  Bruising: absent    Tenderness   The patient is experiencing no tenderness.     Range of Motion   Extension:  -5   Flexion:  120     Tests   Meniscus   Deo:  Medial - negative Lateral - negative  Ligament Examination   Lachman: normal (-1 to 2mm)   PCL-Posterior Drawer: normal (0 to 2mm)     MCL - Valgus: normal (0 to 2mm)  LCL - Varus: normal  Patella   Patellar apprehension: negative  Passive Patellar Tilt: neutral  Patellar Tracking: normal  Patellar Grind: negative    Other   Muscle Tightness: hamstring tightness  Sensation: normal    Left Knee Exam     Inspection   Erythema: absent  Scars: absent  Swelling: absent  Effusion:  absent  Deformity: absent  Bruising: absent    Tenderness   The patient tender to palpation of the medial joint line and lateral joint line.    Range of Motion   Extension:  -5   Flexion:  110     Tests   Meniscus   Deo:  Medial - positive Lateral - positive  Stability   Lachman: normal (-1 to 2mm)   PCL-Posterior Drawer: normal (0 to 2mm)  MCL - Valgus: normal (0 to 2mm)  LCL - Varus: normal (0 to 2mm)  Patella   Patellar apprehension: negative  Passive Patellar Tilt: neutral  Patellar Tracking: normal  Patellar Grind: negative    Other   Muscle Tightness: hamstring tightness  Sensation: normal    Muscle Strength   Right Lower Extremity   Quadriceps:  5/5   Hamstrin/5   Left Lower Extremity   Quadriceps:  4/5   Hamstrin/5     Vascular Exam     Right Pulses  Dorsalis Pedis:      2+  Posterior Tibial:      2+        Left Pulses  Dorsalis Pedis:      2+  Posterior Tibial:      2+        Edema  Right Lower Leg: absent  Left Lower Leg: absent    Radiographs bilateral knee     My interpretation:    Joint space narrowing seen within the bilateral medial compartments with right being worse than left; tricompartmental osteophyte formation      Assessment:     Encounter Diagnoses   Name Primary?    Primary osteoarthritis of left knee Yes    Acute pain of left knee     Primary osteoarthritis of right knee             Plan:       1. We again discussed a variety of treatment options including medications, injections, physical therapy and other alternative treatments. I also explained the indications, risks and benefits of surgery. Patient's pain is refractory HEP, conservative management, and NSAIDs. Had a lengthy discussion about osteoarthritis in the knees to include the primary causes to include excessive weight, trauma, genetics, and muscle weakness as well as the different forms of treatment used to help alleviate symptoms including CSI, physical therapy, and Visco supplementation injections.  Patient can  not receive CSI due to ongoing CKD.  At this time, Pt would like to proceed with repeat Visco supplementation in 3 months.  She will continue her home exercise program.    3. Pre-authorization placed for Gelsyn 3 injections.    4. Ice compress to the affected area 2-3x a day for 15-20 minutes as needed for pain management.  Tylenol prn pain.  Patient he is currently taking Plavix and has ongoing CKD, and therefore can not take anti-inflammatories.    5. RTC to see Kyler Malik PA-C in three months for Gelsyn 3 injections.        All of the patient's questions were answered. Patient was advised to call the clinic or contact me through the patient portal for any questions or concerns.       Medical Dictation software was used during the dictation of portions or the entirety of this medical record.  Phonetic or grammatic errors may exist due to the use of this software. For clarification, refer to the author of the document.      Patient questionnaires may have been collected.

## 2025-06-12 ENCOUNTER — OFFICE VISIT (OUTPATIENT)
Dept: FAMILY MEDICINE | Facility: CLINIC | Age: 85
End: 2025-06-12
Payer: MEDICARE

## 2025-06-12 ENCOUNTER — LAB VISIT (OUTPATIENT)
Dept: LAB | Facility: HOSPITAL | Age: 85
End: 2025-06-12
Attending: FAMILY MEDICINE
Payer: MEDICARE

## 2025-06-12 VITALS
TEMPERATURE: 98 F | SYSTOLIC BLOOD PRESSURE: 132 MMHG | BODY MASS INDEX: 34.59 KG/M2 | DIASTOLIC BLOOD PRESSURE: 66 MMHG | HEART RATE: 97 BPM | HEIGHT: 64 IN | WEIGHT: 202.63 LBS | OXYGEN SATURATION: 99 %

## 2025-06-12 DIAGNOSIS — I11.0 HYPERTENSIVE HEART DISEASE WITH HEART FAILURE: ICD-10-CM

## 2025-06-12 DIAGNOSIS — E11.22 TYPE 2 DIABETES MELLITUS WITH STAGE 3A CHRONIC KIDNEY DISEASE AND HYPERTENSION: ICD-10-CM

## 2025-06-12 DIAGNOSIS — E78.5 HYPERLIPIDEMIA, UNSPECIFIED HYPERLIPIDEMIA TYPE: ICD-10-CM

## 2025-06-12 DIAGNOSIS — I12.9 TYPE 2 DIABETES MELLITUS WITH STAGE 3A CHRONIC KIDNEY DISEASE AND HYPERTENSION: ICD-10-CM

## 2025-06-12 DIAGNOSIS — E66.9 OBESITY, DIABETES, AND HYPERTENSION SYNDROME: ICD-10-CM

## 2025-06-12 DIAGNOSIS — I15.2 OBESITY, DIABETES, AND HYPERTENSION SYNDROME: ICD-10-CM

## 2025-06-12 DIAGNOSIS — N18.32 STAGE 3B CHRONIC KIDNEY DISEASE: ICD-10-CM

## 2025-06-12 DIAGNOSIS — E11.69 OBESITY, DIABETES, AND HYPERTENSION SYNDROME: ICD-10-CM

## 2025-06-12 DIAGNOSIS — E11.69 TYPE 2 DIABETES MELLITUS WITH HYPERLIPIDEMIA: ICD-10-CM

## 2025-06-12 DIAGNOSIS — Z86.73 HISTORY OF CEREBRAL INFARCTION: ICD-10-CM

## 2025-06-12 DIAGNOSIS — I10 ESSENTIAL HYPERTENSION: ICD-10-CM

## 2025-06-12 DIAGNOSIS — E03.4 HYPOTHYROIDISM DUE TO ACQUIRED ATROPHY OF THYROID: ICD-10-CM

## 2025-06-12 DIAGNOSIS — E66.01 SEVERE OBESITY (BMI 35.0-39.9) WITH COMORBIDITY: ICD-10-CM

## 2025-06-12 DIAGNOSIS — E11.59 OBESITY, DIABETES, AND HYPERTENSION SYNDROME: ICD-10-CM

## 2025-06-12 DIAGNOSIS — I70.0 ATHEROSCLEROSIS OF AORTA: ICD-10-CM

## 2025-06-12 DIAGNOSIS — E78.5 TYPE 2 DIABETES MELLITUS WITH HYPERLIPIDEMIA: ICD-10-CM

## 2025-06-12 DIAGNOSIS — F03.90 DEMENTIA, UNSPECIFIED DEMENTIA SEVERITY, UNSPECIFIED DEMENTIA TYPE, UNSPECIFIED WHETHER BEHAVIORAL, PSYCHOTIC, OR MOOD DISTURBANCE OR ANXIETY: ICD-10-CM

## 2025-06-12 DIAGNOSIS — I11.0 HYPERTENSIVE HEART DISEASE WITH HEART FAILURE: Primary | ICD-10-CM

## 2025-06-12 DIAGNOSIS — N18.31 TYPE 2 DIABETES MELLITUS WITH STAGE 3A CHRONIC KIDNEY DISEASE AND HYPERTENSION: ICD-10-CM

## 2025-06-12 LAB
25(OH)D3+25(OH)D2 SERPL-MCNC: 50 NG/ML (ref 30–96)
ABSOLUTE EOSINOPHIL (OHS): 0.1 K/UL
ABSOLUTE MONOCYTE (OHS): 0.47 K/UL (ref 0.3–1)
ABSOLUTE NEUTROPHIL COUNT (OHS): 2.73 K/UL (ref 1.8–7.7)
ALBUMIN SERPL BCP-MCNC: 4 G/DL (ref 3.5–5.2)
ALBUMIN/CREAT UR: 7.7 UG/MG
ALP SERPL-CCNC: 134 UNIT/L (ref 38–126)
ALT SERPL W/O P-5'-P-CCNC: 15 UNIT/L (ref 10–44)
ANION GAP (OHS): 10 MMOL/L (ref 8–16)
AST SERPL-CCNC: 21 UNIT/L (ref 15–46)
BASOPHILS # BLD AUTO: 0.04 K/UL
BASOPHILS NFR BLD AUTO: 0.7 %
BILIRUB SERPL-MCNC: 0.9 MG/DL (ref 0.1–1)
BILIRUB UR QL STRIP.AUTO: NEGATIVE
BUN SERPL-MCNC: 21 MG/DL (ref 7–17)
CALCIUM SERPL-MCNC: 9.2 MG/DL (ref 8.7–10.5)
CHLORIDE SERPL-SCNC: 102 MMOL/L (ref 95–110)
CHOLEST SERPL-MCNC: 142 MG/DL (ref 120–199)
CHOLEST/HDLC SERPL: 4.1 {RATIO} (ref 2–5)
CLARITY UR: CLEAR
CO2 SERPL-SCNC: 28 MMOL/L (ref 23–29)
COLOR UR AUTO: YELLOW
CREAT SERPL-MCNC: 1.3 MG/DL (ref 0.5–1.4)
CREAT UR-MCNC: 246 MG/DL (ref 15–325)
EAG (OHS): 131 MG/DL (ref 68–131)
ERYTHROCYTE [DISTWIDTH] IN BLOOD BY AUTOMATED COUNT: 12.7 % (ref 11.5–14.5)
GFR SERPLBLD CREATININE-BSD FMLA CKD-EPI: 40 ML/MIN/1.73/M2
GLUCOSE SERPL-MCNC: 128 MG/DL (ref 70–110)
GLUCOSE UR QL STRIP: NEGATIVE
HBA1C MFR BLD: 6.2 % (ref 4–5.6)
HCT VFR BLD AUTO: 36.7 % (ref 37–48.5)
HDLC SERPL-MCNC: 35 MG/DL (ref 40–75)
HDLC SERPL: 24.6 % (ref 20–50)
HGB BLD-MCNC: 11.9 GM/DL (ref 12–16)
HGB UR QL STRIP: NEGATIVE
HOLD SPECIMEN: NORMAL
IMM GRANULOCYTES # BLD AUTO: 0.01 K/UL (ref 0–0.04)
IMM GRANULOCYTES NFR BLD AUTO: 0.2 % (ref 0–0.5)
KETONES UR QL STRIP: ABNORMAL
LDLC SERPL CALC-MCNC: 75.8 MG/DL (ref 63–159)
LEUKOCYTE ESTERASE UR QL STRIP: NEGATIVE
LYMPHOCYTES # BLD AUTO: 2.6 K/UL (ref 1–4.8)
MCH RBC QN AUTO: 30.6 PG (ref 27–31)
MCHC RBC AUTO-ENTMCNC: 32.4 G/DL (ref 32–36)
MCV RBC AUTO: 94 FL (ref 82–98)
MICROALBUMIN UR-MCNC: 19 UG/ML (ref ?–5000)
NITRITE UR QL STRIP: NEGATIVE
NONHDLC SERPL-MCNC: 107 MG/DL
NUCLEATED RBC (/100WBC) (OHS): 0 /100 WBC
PH UR STRIP: 6 [PH]
PLATELET # BLD AUTO: 211 K/UL (ref 150–450)
PMV BLD AUTO: 10.3 FL (ref 9.2–12.9)
POTASSIUM SERPL-SCNC: 4.2 MMOL/L (ref 3.5–5.1)
PROT SERPL-MCNC: 8.5 GM/DL (ref 6–8.4)
PROT UR QL STRIP: NEGATIVE
RBC # BLD AUTO: 3.89 M/UL (ref 4–5.4)
RELATIVE EOSINOPHIL (OHS): 1.7 %
RELATIVE LYMPHOCYTE (OHS): 43.7 % (ref 18–48)
RELATIVE MONOCYTE (OHS): 7.9 % (ref 4–15)
RELATIVE NEUTROPHIL (OHS): 45.8 % (ref 38–73)
SODIUM SERPL-SCNC: 140 MMOL/L (ref 136–145)
SP GR UR STRIP: 1.01
TRIGL SERPL-MCNC: 156 MG/DL (ref 30–150)
TSH SERPL-ACNC: 3.51 UIU/ML (ref 0.4–4)
UROBILINOGEN UR STRIP-ACNC: NEGATIVE EU/DL
WBC # BLD AUTO: 5.95 K/UL (ref 3.9–12.7)

## 2025-06-12 PROCEDURE — 82043 UR ALBUMIN QUANTITATIVE: CPT | Mod: PN

## 2025-06-12 PROCEDURE — 83036 HEMOGLOBIN GLYCOSYLATED A1C: CPT | Mod: PN

## 2025-06-12 PROCEDURE — 80061 LIPID PANEL: CPT | Mod: PN

## 2025-06-12 PROCEDURE — 80053 COMPREHEN METABOLIC PANEL: CPT | Mod: PN

## 2025-06-12 PROCEDURE — 81003 URINALYSIS AUTO W/O SCOPE: CPT | Mod: PN

## 2025-06-12 PROCEDURE — 82306 VITAMIN D 25 HYDROXY: CPT | Mod: PN

## 2025-06-12 PROCEDURE — 85025 COMPLETE CBC W/AUTO DIFF WBC: CPT | Mod: PN

## 2025-06-12 PROCEDURE — 36415 COLL VENOUS BLD VENIPUNCTURE: CPT | Mod: PN

## 2025-06-12 PROCEDURE — 84443 ASSAY THYROID STIM HORMONE: CPT | Mod: PN

## 2025-06-12 RX ORDER — LEVOTHYROXINE SODIUM 50 UG/1
50 TABLET ORAL
Qty: 90 TABLET | Refills: 3 | Status: SHIPPED | OUTPATIENT
Start: 2025-06-12 | End: 2026-06-12

## 2025-06-12 RX ORDER — TERBINAFINE HYDROCHLORIDE 250 MG/1
250 TABLET ORAL DAILY
COMMUNITY
Start: 2025-05-08

## 2025-06-12 RX ORDER — DONEPEZIL HYDROCHLORIDE 5 MG/1
5 TABLET, FILM COATED ORAL DAILY
COMMUNITY
Start: 2025-05-07

## 2025-06-12 NOTE — PROGRESS NOTES
"Subjective:      Patient ID: Katy Soto is a 85 y.o. female.    Chief Complaint: Follow-up (6 mon)      Vitals:    06/12/25 0849   BP: 132/66   Pulse: 97   Temp: 97.5 °F (36.4 °C)   TempSrc: Oral   SpO2: 99%   Weight: 91.9 kg (202 lb 9.6 oz)   Height: 5' 4" (1.626 m)        HPI   I spent a total of 44 minutes on the day of the visit.This includes face to face time and non-face to face time preparing to see the patient (eg, review of tests), obtaining and/or reviewing separately obtained history, documenting clinical information in the electronic or other health record, independently interpreting results and communicating results to the patient/family/caregiver, or care coordinator.    Problem List  Problem List[1]     ALLERGIES:   Review of patient's allergies indicates:   Allergen Reactions    Cilostazol      [id number] and stomach cramps    Clonidine     Coreg [carvedilol]      Cramps stomach and nausea    Diltiazem      nauseana d stoamch cramps    Lexapro [escitalopram oxalate]      Dizzy, nausea and weak    Shellfish containing products Hives       MEDS: Current Medications[2]      History:  Current Providers as of 6/12/2025  PCP: Rudy Cruz MD  Care Team Provider: New Mehta MD  Care Team Provider: Pinky Grajeda RD  Care Team Provider: Jorge Ewing MD  Care Team Provider: Aguila Calero MD  Encounter Provider: Rudy Cruz MD, starting on Thu Jun 12, 2025 12:00 AM  Referring Provider: not found, starting on Thu Jun 12, 2025 12:00 AM  Consulting Physician: Rudy Cruz MD, starting on Thu Dec 12, 2024  3:30 PM (Active)   Past Medical History:   Diagnosis Date    Hyperlipidemia     Hypertension     Hypertensive heart disease with heart failure 6/12/2025    Memory loss     right Occipital stroke 06/19/2022    Stroke     TIA (transient ischemic attack) 06/18/2022    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 04/20/2023    Type 2 " diabetes mellitus without complication, without long-term current use of insulin 2022     Past Surgical History:   Procedure Laterality Date     SECTION      x1    HERNIA REPAIR      umbilical    LEG SURGERY Right 2017    stents placed     Social History[3]      Review of Systems   Constitutional: Negative.    HENT:  Positive for hearing loss.    Respiratory: Negative.     Cardiovascular:  Positive for leg swelling.   Gastrointestinal: Negative.    Endocrine: Negative.    Genitourinary: Negative.    Musculoskeletal:  Positive for arthralgias and gait problem.   Psychiatric/Behavioral:  Positive for confusion.    All other systems reviewed and are negative.    Objective:     Physical Exam  Vitals and nursing note reviewed.   Constitutional:       Appearance: She is well-developed.   HENT:      Head: Normocephalic.   Eyes:      Conjunctiva/sclera: Conjunctivae normal.      Pupils: Pupils are equal, round, and reactive to light.   Cardiovascular:      Rate and Rhythm: Normal rate and regular rhythm.      Heart sounds: Normal heart sounds. No murmur heard.  Pulmonary:      Effort: Pulmonary effort is normal.      Breath sounds: Normal breath sounds.   Musculoskeletal:         General: Normal range of motion.      Cervical back: Normal range of motion and neck supple.   Skin:     General: Skin is warm and dry.   Neurological:      General: No focal deficit present.      Mental Status: She is alert and oriented to person, place, and time. Mental status is at baseline.      Deep Tendon Reflexes: Reflexes are normal and symmetric.   Psychiatric:         Mood and Affect: Mood normal.         Behavior: Behavior normal.         Thought Content: Thought content normal.         Judgment: Judgment normal.             Assessment:     1. Hypertensive heart disease with heart failure    2. Severe obesity (BMI 35.0-39.9) with comorbidity    3. Dementia, unspecified dementia severity, unspecified dementia type,  unspecified whether behavioral, psychotic, or mood disturbance or anxiety    4. Stage 3b chronic kidney disease    5. 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 Atherosclerosis of aorta    7. Essential hypertension    8. Hyperlipidemia, unspecified hyperlipidemia type    9. Hypothyroidism due to acquired atrophy of thyroid    10. History of cerebral infarction    11. Obesity, diabetes, and hypertension syndrome    12. Type 2 diabetes mellitus with hyperlipidemia      Plan:        Medication List            Accurate as of June 12, 2025  9:27 AM. If you have any questions, ask your nurse or doctor.                CONTINUE taking these medications      atorvastatin 40 MG tablet  Commonly known as: LIPITOR  TAKE 1 TABLET(40 MG) BY MOUTH EVERY DAY     blood sugar diagnostic Strp     blood-glucose meter kit  Use daily     CENTRUM SILVER ORAL     cholecalciferol (vitamin D3) 125 mcg (5,000 unit) Tab  Commonly known as: VITAMIN D3  Take 1 tablet (5,000 Units total) by mouth once daily.     clopidogreL 75 mg tablet  Commonly known as: PLAVIX  TAKE 1 TABLET(75 MG) BY MOUTH EVERY DAY FOR 21 DAYS     cyanocobalamin (vitamin B-12) 1,000 mcg Lozg  Place 1 lozenge under the tongue Daily. For B12 and nerves     donepeziL 5 MG tablet  Commonly known as: ARICEPT     furosemide 40 MG tablet  Commonly known as: LASIX  TAKE 1 TABLET(40 MG) BY MOUTH TWICE DAILY     irbesartan 300 MG tablet  Commonly known as: AVAPRO  TAKE 1 TABLET(300 MG) BY MOUTH EVERY DAY     isosorbide mononitrate 30 MG 24 hr tablet  Commonly known as: IMDUR  Take 1 tablet (30 mg total) by mouth once daily.     lancets 31 gauge Misc     levothyroxine 50 MCG tablet  Commonly known as: SYNTHROID  Take 1 tablet (50 mcg total) by mouth before breakfast. For thyroid     memantine 5 MG Tab  Commonly known as: NAMENDA  TAKE 1 TABLET(5 MG) BY MOUTH TWICE DAILY FOR MEMORY     potassium chloride SA 20 MEQ tablet  Commonly known as:  K-GIOVANNA,KLOR-CON  TAKE 1 TABLET BY MOUTH EVERY DAY     sucralfate 1 gram tablet  Commonly known as: CARAFATE  TAKE 1 TABLET(1 GRAM) BY MOUTH THREE TIMES DAILY     terazosin 5 MG capsule  Commonly known as: HYTRIN  TAKE 1 CAPSULE(5 MG) BY MOUTH EVERY EVENING     terbinafine  mg tablet  Commonly known as: LAMISIL               Where to Get Your Medications        These medications were sent to [id number] - [place], [place] - [address] [facility] [address] [facility] [address]  [address] [address]      Phone: [phone]   levothyroxine 50 MCG tablet       Hypertensive heart disease with heart failure    Severe obesity (BMI 35.0-39.9) with comorbidity    Dementia, unspecified dementia severity, unspecified dementia type, unspecified whether behavioral, psychotic, or mood disturbance or anxiety    Stage 3b chronic kidney disease    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    Atherosclerosis of aorta    Essential hypertension    Hyperlipidemia, unspecified hyperlipidemia type    Hypothyroidism due to acquired atrophy of thyroid    History of cerebral infarction    Obesity, diabetes, and hypertension syndrome    Type 2 diabetes mellitus with hyperlipidemia    Other orders  -     levothyroxine (SYNTHROID) 50 MCG tablet; Take 1 tablet (50 mcg total) by mouth before breakfast. For thyroid  Dispense: 90 tablet; Refill: 3             [1]   Patient Active Problem List  Diagnosis    Hyperlipidemia    Arthritis    PAD (peripheral artery disease)    Bilateral leg edema    Anxiety    Obesity, diabetes, and hypertension syndrome    Osteopenia    Atherosclerosis of aorta    Elevated sed rate    Vitamin D insufficiency    Severe obesity (BMI 35.0-39.9) with comorbidity    Reduced visual acuity    History of cerebral infarction    Generalized edema    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    Type 2 diabetes mellitus  with hyperlipidemia    Diabetic polyneuropathy associated with type 2 diabetes mellitus    Dementia, unspecified dementia severity, unspecified dementia type, unspecified whether behavioral, psychotic, or mood disturbance or anxiety    Primary hypertension    Stage 3b chronic kidney disease    Other thrombophilia    Hypertensive heart disease with heart failure   [2]   Current Outpatient Medications:     atorvastatin (LIPITOR) 40 MG tablet, TAKE 1 TABLET(40 MG) BY MOUTH EVERY DAY, Disp: 90 tablet, Rfl: 3    blood sugar diagnostic Strp, by Misc.(Non-Drug; Combo Route) route., Disp: , Rfl:     blood-glucose meter kit, Use daily, Disp: 1 each, Rfl: 0    cholecalciferol, vitamin D3, (VITAMIN D3) 125 mcg (5,000 unit) Tab, Take 1 tablet (5,000 Units total) by mouth once daily., Disp: 90 tablet, Rfl: 3    clopidogreL (PLAVIX) 75 mg tablet, TAKE 1 TABLET(75 MG) BY MOUTH EVERY DAY FOR 21 DAYS, Disp: 90 tablet, Rfl: 2    cyanocobalamin, vitamin B-12, 1,000 mcg Lozg, Place 1 lozenge under the tongue Daily. For B12 and nerves, Disp: 100 lozenge, Rfl: 3    donepeziL (ARICEPT) 5 MG tablet, Take 5 mg by mouth once daily., Disp: , Rfl:     folic acid/multivit-min/lutein (CENTRUM SILVER ORAL), Take by mouth., Disp: , Rfl:     furosemide (LASIX) 40 MG tablet, TAKE 1 TABLET(40 MG) BY MOUTH TWICE DAILY, Disp: 180 tablet, Rfl: 3    irbesartan (AVAPRO) 300 MG tablet, TAKE 1 TABLET(300 MG) BY MOUTH EVERY DAY, Disp: 90 tablet, Rfl: 2    isosorbide mononitrate (IMDUR) 30 MG 24 hr tablet, Take 1 tablet (30 mg total) by mouth once daily., Disp: 90 tablet, Rfl: 3    lancets 31 gauge Misc, by Misc.(Non-Drug; Combo Route) route., Disp: , Rfl:     memantine (NAMENDA) 5 MG Tab, TAKE 1 TABLET(5 MG) BY MOUTH TWICE DAILY FOR MEMORY, Disp: 180 tablet, Rfl: 3    potassium chloride SA (K-DUR,KLOR-CON) 20 MEQ tablet, TAKE 1 TABLET BY MOUTH EVERY DAY, Disp: 90 tablet, Rfl: 3    sucralfate (CARAFATE) 1 gram tablet, TAKE 1 TABLET(1 GRAM) BY MOUTH THREE  TIMES DAILY, Disp: 270 tablet, Rfl: 3    terazosin (HYTRIN) 5 MG capsule, TAKE 1 CAPSULE(5 MG) BY MOUTH EVERY EVENING, Disp: 90 capsule, Rfl: 3    terbinafine HCL (LAMISIL) 250 mg tablet, Take 250 mg by mouth once daily., Disp: , Rfl:     levothyroxine (SYNTHROID) 50 MCG tablet, Take 1 tablet (50 mcg total) by mouth before breakfast. For thyroid, Disp: 90 tablet, Rfl: 3  [3]   Social History  Tobacco Use    Smoking status: Never     Passive exposure: Never    Smokeless tobacco: Never   Substance Use Topics    Alcohol use: No    Drug use: No

## 2025-07-06 ENCOUNTER — RESULTS FOLLOW-UP (OUTPATIENT)
Dept: FAMILY MEDICINE | Facility: CLINIC | Age: 85
End: 2025-07-06

## 2025-07-22 DIAGNOSIS — M17.12 PRIMARY OSTEOARTHRITIS OF LEFT KNEE: Primary | ICD-10-CM

## 2025-07-22 DIAGNOSIS — M17.11 PRIMARY OSTEOARTHRITIS OF RIGHT KNEE: ICD-10-CM

## 2025-08-01 RX ORDER — DONEPEZIL HYDROCHLORIDE 5 MG/1
5 TABLET, FILM COATED ORAL NIGHTLY
Qty: 90 TABLET | Refills: 3 | Status: SHIPPED | OUTPATIENT
Start: 2025-08-01

## 2025-08-01 RX ORDER — TERAZOSIN 5 MG/1
CAPSULE ORAL
Qty: 90 CAPSULE | Refills: 3 | Status: SHIPPED | OUTPATIENT
Start: 2025-08-01

## 2025-08-01 NOTE — TELEPHONE ENCOUNTER
Refill Routing Note   Medication(s) are not appropriate for processing by [facility] for the following reason(s):        Outside of protocol    [facility] action(s):  Approve  Route             Appointments  past 12m or future 3m with PCP    Date Provider   Last Visit   6/12/2025 Rudy Cruz MD   Next Visit   12/17/2025 Rudy Cruz MD   ED visits in past 90 days: 0        Note composed:12:13 PM 08/01/2025

## 2025-08-01 NOTE — TELEPHONE ENCOUNTER
No care due was identified.  [facility] Embedded Care Due Messages. Reference number: [id number].   8/01/2025 5:45:46 AM CDT

## 2025-08-05 RX ORDER — POTASSIUM CHLORIDE 20 MEQ/1
20 TABLET, EXTENDED RELEASE ORAL
Qty: 90 TABLET | Refills: 3 | Status: SHIPPED | OUTPATIENT
Start: 2025-08-05

## 2025-08-05 NOTE — TELEPHONE ENCOUNTER
Refill Decision Note   Katy Soto  is requesting a refill authorization.  Brief Assessment and Rationale for Refill:  Approve     Medication Therapy Plan:         Comments:     Note composed:6:49 PM 08/05/2025

## 2025-08-05 NOTE — TELEPHONE ENCOUNTER
No care due was identified.  [facility] Embedded Care Due Messages. Reference number: [id number].   8/05/2025 8:15:46 AM CDT